# Patient Record
Sex: MALE | Race: WHITE | NOT HISPANIC OR LATINO | Employment: OTHER | URBAN - METROPOLITAN AREA
[De-identification: names, ages, dates, MRNs, and addresses within clinical notes are randomized per-mention and may not be internally consistent; named-entity substitution may affect disease eponyms.]

---

## 2020-04-02 ENCOUNTER — TELEPHONE (OUTPATIENT)
Dept: FAMILY MEDICINE CLINIC | Facility: CLINIC | Age: 65
End: 2020-04-02

## 2020-04-02 DIAGNOSIS — I10 ESSENTIAL HYPERTENSION: Primary | ICD-10-CM

## 2020-04-02 RX ORDER — AMLODIPINE BESYLATE 5 MG/1
5 TABLET ORAL DAILY
Qty: 90 TABLET | Refills: 0 | Status: SHIPPED | OUTPATIENT
Start: 2020-04-02 | End: 2020-08-03 | Stop reason: SDUPTHER

## 2020-07-10 LAB — HBA1C MFR BLD HPLC: 5.7 %

## 2020-07-21 ENCOUNTER — OFFICE VISIT (OUTPATIENT)
Dept: FAMILY MEDICINE CLINIC | Facility: CLINIC | Age: 65
End: 2020-07-21
Payer: COMMERCIAL

## 2020-07-21 VITALS
OXYGEN SATURATION: 98 % | HEIGHT: 74 IN | DIASTOLIC BLOOD PRESSURE: 100 MMHG | RESPIRATION RATE: 17 BRPM | BODY MASS INDEX: 32.65 KG/M2 | WEIGHT: 254.4 LBS | TEMPERATURE: 97.8 F | SYSTOLIC BLOOD PRESSURE: 154 MMHG | HEART RATE: 77 BPM

## 2020-07-21 DIAGNOSIS — Z76.89 ENCOUNTER TO ESTABLISH CARE WITH NEW DOCTOR: Primary | ICD-10-CM

## 2020-07-21 DIAGNOSIS — E78.00 HYPERCHOLESTEROLEMIA: ICD-10-CM

## 2020-07-21 DIAGNOSIS — G47.33 OBSTRUCTIVE SLEEP APNEA SYNDROME: ICD-10-CM

## 2020-07-21 DIAGNOSIS — Z11.4 SCREENING FOR HUMAN IMMUNODEFICIENCY VIRUS: ICD-10-CM

## 2020-07-21 DIAGNOSIS — I10 ESSENTIAL HYPERTENSION: ICD-10-CM

## 2020-07-21 DIAGNOSIS — E66.09 CLASS 1 OBESITY DUE TO EXCESS CALORIES WITH SERIOUS COMORBIDITY AND BODY MASS INDEX (BMI) OF 32.0 TO 32.9 IN ADULT: ICD-10-CM

## 2020-07-21 DIAGNOSIS — Z13.31 NEGATIVE DEPRESSION SCREENING: ICD-10-CM

## 2020-07-21 DIAGNOSIS — Z11.59 NEED FOR HEPATITIS C SCREENING TEST: ICD-10-CM

## 2020-07-21 DIAGNOSIS — Z12.5 SCREENING FOR PROSTATE CANCER: ICD-10-CM

## 2020-07-21 PROBLEM — E66.811 CLASS 1 OBESITY DUE TO EXCESS CALORIES WITH SERIOUS COMORBIDITY AND BODY MASS INDEX (BMI) OF 32.0 TO 32.9 IN ADULT: Status: ACTIVE | Noted: 2020-07-21

## 2020-07-21 PROCEDURE — 1036F TOBACCO NON-USER: CPT | Performed by: FAMILY MEDICINE

## 2020-07-21 PROCEDURE — 3080F DIAST BP >= 90 MM HG: CPT | Performed by: FAMILY MEDICINE

## 2020-07-21 PROCEDURE — 3008F BODY MASS INDEX DOCD: CPT | Performed by: FAMILY MEDICINE

## 2020-07-21 PROCEDURE — 3077F SYST BP >= 140 MM HG: CPT | Performed by: FAMILY MEDICINE

## 2020-07-21 PROCEDURE — 99204 OFFICE O/P NEW MOD 45 MIN: CPT | Performed by: FAMILY MEDICINE

## 2020-07-21 RX ORDER — VALSARTAN 160 MG/1
160 TABLET ORAL DAILY
COMMUNITY
End: 2021-02-03 | Stop reason: SDUPTHER

## 2020-07-21 RX ORDER — ATORVASTATIN CALCIUM 10 MG/1
5 TABLET, FILM COATED ORAL DAILY
COMMUNITY
End: 2021-02-12 | Stop reason: SDUPTHER

## 2020-07-21 RX ORDER — HYDROCHLOROTHIAZIDE 12.5 MG/1
12.5 CAPSULE, GELATIN COATED ORAL DAILY
COMMUNITY
End: 2021-02-03 | Stop reason: SDUPTHER

## 2020-07-21 RX ORDER — ASPIRIN 81 MG/1
81 TABLET, CHEWABLE ORAL DAILY
COMMUNITY
End: 2021-01-20 | Stop reason: HOSPADM

## 2020-07-21 NOTE — PROGRESS NOTES
Assessment/Plan:    No problem-specific Assessment & Plan notes found for this encounter  Diagnoses and all orders for this visit:    Encounter to establish care with new doctor    Essential hypertension  -     CBC and differential; Future  -     Comprehensive metabolic panel; Future  -     TSH, 3rd generation with Free T4 reflex; Future    Need for hepatitis C screening test  -     Hepatitis C antibody; Future    Screening for human immunodeficiency virus  -     HIV 1/2 Antigen/Antibody (4th Generation) w Reflex [facility]; Future    Screening for prostate cancer  -     PSA, Total Screen; Future    Class 1 obesity due to excess calories with serious comorbidity and body mass index (BMI) of 32 0 to 32 9 in adult    Negative depression screening    Hypercholesterolemia  -     Comprehensive metabolic panel; Future  -     Lipid panel; Future    Obstructive sleep apnea syndrome    Other orders  -     valsartan (DIOVAN) 160 mg tablet; Take 160 mg by mouth daily  -     hydrochlorothiazide (MICROZIDE) 12 5 mg capsule; Take 12 5 mg by mouth daily  -     atorvastatin (LIPITOR) 10 mg tablet; Take 10 mg by mouth daily  -     aspirin 81 mg chewable tablet; Chew 81 mg daily          PHQ-9 Depression Screening    PHQ-9:    Frequency of the following problems over the past two weeks:       Little interest or pleasure in doing things:  0 - not at all  Feeling down, depressed, or hopeless:  0 - not at all  PHQ-2 Score:  0        BMI Counseling: Body mass index is 32 66 kg/m²  The BMI is above normal  Nutrition recommendations include reducing portion sizes and 3-5 servings of fruits/vegetables daily  Exercise recommendations include moderate aerobic physical activity for 150 minutes/week and exercising 3-5 times per week  Subjective:      Patient ID: Tami Holland is a 60 y o  male      New pt here to establish with HTN, hypercholesterolemia, insomnia, mild sleep apnea, tonsils out, ORIF left thumb, retired , single, no children, non smoker, occ  EtoH, no illegal drugs    Hypertension   This is a chronic problem  Pertinent negatives include no chest pain, palpitations or shortness of breath  Past treatments include diuretics, angiotensin blockers and calcium channel blockers  Compliance problems include diet and exercise  The following portions of the patient's history were reviewed and updated as appropriate: allergies, current medications, past family history, past medical history, past social history, past surgical history and problem list     Review of Systems   Constitutional: Negative  Negative for chills, fatigue and fever  HENT: Negative  Eyes: Negative  Respiratory: Negative for shortness of breath and wheezing  Cardiovascular: Negative for chest pain and palpitations  Gastrointestinal: Negative for abdominal pain, blood in stool, constipation, diarrhea, nausea and vomiting  Endocrine: Negative  Genitourinary: Negative for difficulty urinating and dysuria  Musculoskeletal: Negative for arthralgias and myalgias  Skin: Negative  Allergic/Immunologic: Negative  Neurological: Negative for seizures and syncope  Hematological: Negative for adenopathy  Psychiatric/Behavioral: Negative  Objective:    /100   Pulse 77   Temp 97 8 °F (36 6 °C)   Resp 17   Ht 6' 2" (1 88 m)   Wt 115 kg (254 lb 6 4 oz)   SpO2 98%   BMI 32 66 kg/m²      Physical Exam   Constitutional: He is oriented to person, place, and time  He appears well-developed and well-nourished  No distress  HENT:   Head: Normocephalic and atraumatic  Right Ear: External ear normal    Left Ear: External ear normal    Nose: Nose normal    Mouth/Throat: Oropharynx is clear and moist    Eyes: Pupils are equal, round, and reactive to light  Conjunctivae and EOM are normal  No scleral icterus  Neck: Normal range of motion  Neck supple  Cardiovascular: Normal rate, regular rhythm and normal heart sounds   Exam reveals no gallop and no friction rub  No murmur heard  Pulmonary/Chest: Effort normal and breath sounds normal  No respiratory distress  He has no wheezes  He has no rales  Abdominal: Soft  Bowel sounds are normal  He exhibits no distension and no mass  There is no tenderness  There is no rebound and no guarding  Musculoskeletal: Normal range of motion  He exhibits no edema  Lymphadenopathy:     He has no cervical adenopathy  Neurological: He is alert and oriented to person, place, and time  He has normal reflexes  Skin: Skin is warm and dry  He is not diaphoretic  Psychiatric: He has a normal mood and affect   His behavior is normal  Judgment and thought content normal

## 2020-08-02 DIAGNOSIS — I10 ESSENTIAL HYPERTENSION: ICD-10-CM

## 2020-08-02 RX ORDER — AMLODIPINE BESYLATE 5 MG/1
TABLET ORAL
Qty: 90 TABLET | Refills: 0 | OUTPATIENT
Start: 2020-08-02

## 2020-08-02 NOTE — TELEPHONE ENCOUNTER
I do not know this patient and do not think he comes here  PCP in chart is listed Shayne Horne and pt is seeing him 7:30 a m  9/1?? Nothing in Epic to indicate he ever came here? ? (that I could find? ?)

## 2020-08-03 DIAGNOSIS — I10 ESSENTIAL HYPERTENSION: ICD-10-CM

## 2020-08-03 RX ORDER — AMLODIPINE BESYLATE 5 MG/1
5 TABLET ORAL DAILY
Qty: 90 TABLET | Refills: 3 | Status: SHIPPED | OUTPATIENT
Start: 2020-08-03 | End: 2020-09-01

## 2020-08-24 LAB
EXTERNAL HIV SCREEN: NORMAL
HCV AB SER-ACNC: NEGATIVE

## 2020-09-01 ENCOUNTER — OFFICE VISIT (OUTPATIENT)
Dept: FAMILY MEDICINE CLINIC | Facility: CLINIC | Age: 65
End: 2020-09-01
Payer: COMMERCIAL

## 2020-09-01 VITALS
BODY MASS INDEX: 32.98 KG/M2 | HEIGHT: 74 IN | WEIGHT: 257 LBS | OXYGEN SATURATION: 96 % | DIASTOLIC BLOOD PRESSURE: 92 MMHG | TEMPERATURE: 97.8 F | SYSTOLIC BLOOD PRESSURE: 142 MMHG | HEART RATE: 71 BPM

## 2020-09-01 DIAGNOSIS — I10 ESSENTIAL HYPERTENSION: Primary | ICD-10-CM

## 2020-09-01 DIAGNOSIS — Z12.11 SCREENING FOR COLON CANCER: ICD-10-CM

## 2020-09-01 PROCEDURE — 99213 OFFICE O/P EST LOW 20 MIN: CPT | Performed by: FAMILY MEDICINE

## 2020-09-01 RX ORDER — AMLODIPINE BESYLATE 10 MG/1
10 TABLET ORAL DAILY
Qty: 30 TABLET | Refills: 6 | Status: SHIPPED | OUTPATIENT
Start: 2020-09-01 | End: 2020-10-01 | Stop reason: SDUPTHER

## 2020-09-01 NOTE — PROGRESS NOTES
Assessment/Plan:    No problem-specific Assessment & Plan notes found for this encounter  Diagnoses and all orders for this visit:    Essential hypertension  -     amLODIPine (NORVASC) 10 mg tablet; Take 1 tablet (10 mg total) by mouth daily    Screening for colon cancer  -     Ambulatory referral to Gastroenterology; Future          PHQ-9 Depression Screening    PHQ-9:    Frequency of the following problems over the past two weeks:                 Subjective:      Patient ID: Inez Sanchez is a 60 y o  male  Hypertension   This is a chronic problem  The current episode started more than 1 year ago  The problem is unchanged  The problem is uncontrolled  Pertinent negatives include no chest pain, palpitations or shortness of breath  There are no associated agents to hypertension  Risk factors for coronary artery disease include obesity, male gender and sedentary lifestyle  Past treatments include calcium channel blockers, angiotensin blockers and diuretics  Compliance problems include diet and exercise  The following portions of the patient's history were reviewed and updated as appropriate: allergies, current medications, past family history, past medical history, past social history, past surgical history and problem list     Review of Systems   Constitutional: Negative  Negative for chills, fatigue and fever  HENT: Negative  Eyes: Negative  Respiratory: Negative for shortness of breath and wheezing  Cardiovascular: Negative for chest pain and palpitations  Gastrointestinal: Negative for abdominal pain, blood in stool, constipation, diarrhea, nausea and vomiting  Endocrine: Negative  Genitourinary: Negative for difficulty urinating and dysuria  Musculoskeletal: Negative for arthralgias and myalgias  Skin: Negative  Allergic/Immunologic: Negative  Neurological: Negative for seizures and syncope  Hematological: Negative for adenopathy  Psychiatric/Behavioral: Negative  Objective:    /92 (BP Location: Left arm, Patient Position: Sitting, Cuff Size: Large)   Pulse 71   Temp 97 8 °F (36 6 °C) (Tympanic)   Ht 6' 2" (1 88 m)   Wt 117 kg (257 lb)   SpO2 96%   BMI 33 00 kg/m²      Physical Exam  Vitals signs and nursing note reviewed  Constitutional:       General: He is not in acute distress  Appearance: Normal appearance  He is well-developed  He is obese  He is not ill-appearing, toxic-appearing or diaphoretic  HENT:      Head: Normocephalic and atraumatic  Right Ear: Tympanic membrane, ear canal and external ear normal  There is no impacted cerumen  Left Ear: Tympanic membrane, ear canal and external ear normal  There is no impacted cerumen  Nose: Nose normal  No congestion or rhinorrhea  Mouth/Throat:      Mouth: Mucous membranes are moist       Pharynx: No oropharyngeal exudate or posterior oropharyngeal erythema  Eyes:      General: No scleral icterus  Conjunctiva/sclera: Conjunctivae normal       Pupils: Pupils are equal, round, and reactive to light  Neck:      Musculoskeletal: Normal range of motion and neck supple  No neck rigidity  Cardiovascular:      Rate and Rhythm: Normal rate and regular rhythm  Heart sounds: Normal heart sounds  No murmur  No friction rub  No gallop  Pulmonary:      Effort: Pulmonary effort is normal  No respiratory distress  Breath sounds: Normal breath sounds  No wheezing or rales  Abdominal:      General: Bowel sounds are normal  There is no distension  Palpations: Abdomen is soft  There is no mass  Tenderness: There is no abdominal tenderness  There is no guarding or rebound  Musculoskeletal: Normal range of motion  Right lower leg: No edema  Left lower leg: No edema  Lymphadenopathy:      Cervical: No cervical adenopathy  Skin:     General: Skin is warm and dry  Neurological:      General: No focal deficit present        Mental Status: He is alert and oriented to person, place, and time  Sensory: No sensory deficit  Motor: No weakness  Coordination: Coordination normal       Gait: Gait normal       Deep Tendon Reflexes: Reflexes are normal and symmetric  Psychiatric:         Mood and Affect: Mood normal          Behavior: Behavior normal          Thought Content:  Thought content normal          Judgment: Judgment normal

## 2020-09-24 ENCOUNTER — OFFICE VISIT (OUTPATIENT)
Dept: SLEEP CENTER | Facility: CLINIC | Age: 65
End: 2020-09-24
Payer: COMMERCIAL

## 2020-09-24 ENCOUNTER — TELEPHONE (OUTPATIENT)
Dept: SLEEP CENTER | Facility: CLINIC | Age: 65
End: 2020-09-24

## 2020-09-24 VITALS
WEIGHT: 257 LBS | HEART RATE: 80 BPM | DIASTOLIC BLOOD PRESSURE: 80 MMHG | TEMPERATURE: 97.1 F | BODY MASS INDEX: 32.98 KG/M2 | HEIGHT: 74 IN | OXYGEN SATURATION: 97 % | SYSTOLIC BLOOD PRESSURE: 130 MMHG

## 2020-09-24 DIAGNOSIS — G47.33 OBSTRUCTIVE SLEEP APNEA SYNDROME: Primary | ICD-10-CM

## 2020-09-24 DIAGNOSIS — R41.3 MEMORY DIFFICULTY: ICD-10-CM

## 2020-09-24 DIAGNOSIS — F45.8 BRUXISM: ICD-10-CM

## 2020-09-24 DIAGNOSIS — G47.9 SLEEP DISTURBANCE: ICD-10-CM

## 2020-09-24 DIAGNOSIS — R40.0 DAYTIME SLEEPINESS: ICD-10-CM

## 2020-09-24 DIAGNOSIS — Z20.822 ENCOUNTER FOR LABORATORY TESTING FOR COVID-19 VIRUS: Primary | ICD-10-CM

## 2020-09-24 DIAGNOSIS — I10 ESSENTIAL HYPERTENSION: ICD-10-CM

## 2020-09-24 DIAGNOSIS — E66.9 OBESITY (BMI 30-39.9): ICD-10-CM

## 2020-09-24 PROCEDURE — 99244 OFF/OP CNSLTJ NEW/EST MOD 40: CPT | Performed by: INTERNAL MEDICINE

## 2020-09-24 NOTE — PROGRESS NOTES
Review of Systems      Genitourinary need to urinate more than twice a night   Cardiology ankle/leg swelling   Gastrointestinal none   Neurology forgetfulness and difficulty with memory   Constitutional fatigue   Integumentary itching   Psychiatry none   Musculoskeletal none   Pulmonary snoring   ENT throat clearing and ringing in ears   Endocrine frequent urination   Hematological none

## 2020-09-24 NOTE — PROGRESS NOTES
Consultation - [facility] [facility]  60 y o  male  GSH:[birth year]  ASR:[id number]    Physician Requesting Consult: Leandra Marte DO     Reason for Consult : At your kind request, I saw this patient for initial sleep evaluation today  Home sleep testing was undertaken to evaluate for sleep disordered breathing around 3 years ago and patient is here because he is experiencing difficulty with sleep continuity and memory  He is interested in repeating his studies to get to the bottom of his symptoms  He states he is unable to obtain results of the study however he has apnea was characterized as mild and CPAP was recommended  [facility], Problem List, Medications & Allergies were reviewed in EMR  He  has a past medical history of Hypertension  He has a current medication list which includes the following prescription(s): amlodipine, aspirin, atorvastatin, hydrochlorothiazide, and valsartan  HPI:  So study was undertaken for complaints of sleep difficulties and daily headaches  The latter resolved since he has initiated CPAP  He is using CPAP regularly, benefitting from use and experiencing no adverse effects  He sleeps alone and is not aware of snoring or breathing difficulties during sleep  Other Complaints: none  Restless Leg Syndrome: reports no suggestive symptoms    Parasomnia: reports teeth grinding during sleep, but no other features of parasomnia   Sleep Routine:   Typical Bedtime:  11:30 p m  Gets OOB:  7:00 a m  TIB:7 5 hrs  Sleep latency:< 15 minutes Sleep Interruptions:3/nite is unsure of the cause and struggles to fall back asleep  Awakens: spontaneously  Upon awakening: is not always refreshed  He estimates getting 6 hrs sleep  He has  Daytime Sleepiness and naps occasionally  [place] Sleepiness Scale rated at Total score: 7 /24  Habits:  reports that he has quit smoking  His smoking use included cigarettes   He has never used smokeless tobacco  ;   E-Cigarette/Vaping  E-Cigarette Use Never User     ;  reports no history of drug use ;  reports current alcohol use  ; Caffeine use:limited ; Exercise routine: none   Family History: Negative for sleep disturbance  ROS - constitutional, psychiatric, ENT, respiratory,CVS, GI, UGS, CNS, MSK, integumentary, endocrine, hematological reviewed- see attached  Significant for weight fluctuates in the range of around 5 lb  He has been experiencing difficulty with memory  EXAM:  /80 (BP Location: Left arm, Cuff Size: Large)   Pulse 80   Temp (!) 97 1 °F (36 2 °C) (Tympanic)   Ht 6' 2" (1 88 m)   Wt 117 kg (257 lb)   SpO2 97%   BMI 33 00 kg/m²    General: Well groomed male, well appearing, in no apparent distress  Psychiatric: Alert and orientated; Cooperative; Speech:clear and coherent; Normal mood, affect & thought   HEENT:  Craniofacial anatomy: obvious overjet Sinuses: non- tender  TMJ: Normal    Eyes: EOM's intact;  conjunctiva/corneas clear   Ears: Externallyappear normal     Nasal Airway: is patent Septum:intact; Mucosa: normal; Turbinates: normal; Rhinorrhea: None   Mouth: Lips: normal posture; Dentition: worn down and irregular  Mucosa:moist  ; Hard Palate:narrow and high arched   Oropharryx: crowded and AP narrowing Tongue: Mallampati:Class IV and MobileSoft Palate:  redundant  Tonsils: no hypertrophy  Neck:is thick; Neck Circumference: 17 "; Supple; no abnormal masses; Thyroid:normal  Trachea:central     Lymph: No Cervical or Submandibular Lymhadenopathy  Heart: S1,S2 normal; RRR; no gallop; nomurmurs   Lungs: Respiratory Effort:normal  Air entry good bilaterally  No wheezes  No rales  Abdomen: Obese, Soft & non-tender    Extremities: No pedal edema  No clubbing or cyanosis  Skin: warm and dry; Color& Hydration good; no facial rashes or lesions   Neurological: CNII-XII intact;  Motor normal; Sensation normal  Musculoskeletal: Muscle bulk, tone and power WNL Gait:normal        IMPRESSION: Primary, Secondary Sleep Diagnoses (to Medical or Psych conditions) & Comorbidities   1  Obstructive sleep apnea syndrome  Ambulatory referral to Sleep Medicine    Split Study   2  Sleep disturbance  Split Study   3  Daytime sleepiness  Split Study   4  Memory difficulty     5  Bruxism     6  Essential hypertension     7  Obesity (BMI 30-39  9)        PLAN:   1  Comprehensive counseling was provided on pathophysiology, diagnostic strategies & treatment options; effects on symptoms and comorbidities; risks of inadequate therapy; costs and insurance aspects  2  I advised on weight reduction, avoiding sleeping supine, using alcohol or sedating medications close to bed time and on safe driving practices  3  Repeat Nocturnal polysomnography is indicated and since his willing to continue CPAP, a split study will be scheduled  He was instructed to discontinue use of CPAP for 5 days prior to the study  4  Cognitive behavioral therapy was initiated, Sleep Hygiene and behavioral techniques to manage Insomnia were discussed  Specifically, starting an exercise routine, limiting time in bed to 7 hours and on relaxation techniques  5  Follow-up will be scheduled after the studies to review results, further details of treatment options and to adjust therapy  Thank you for allowing me to participate in the care of this patient  I will keep you apprised of developments      Sincerely,     Authenticated electronically by George Robbins MD   on 38/97/86   Board Certified Specialist

## 2020-09-24 NOTE — PATIENT INSTRUCTIONS
What is JESSICA? Obstructive sleep apnea is a common and serious sleep disorder that causes you to stop breathing during sleep  The airway repeatedly becomes blocked, limiting the amount of air that reaches your lungs  When this happens, you may snore loudly or making choking noises as you try to breathe  Your brain and body becomes oxygen deprived and you may wake up  This may happen a few times a night, or in more severe cases, several hundred times a night  Sleep apnea can make you wake up in the morning feeling tired or unrefreshed even though you have had a full night of sleep  During the day, you may feel fatigued, have difficulty concentrating or you may even unintentionally fall asleep  This is because your body is waking up numerous times throughout the night, even though you might not be conscious of each awakening  The lack of oxygen your body receives can have negative long-term consequences for your health  This includes:  High blood pressure  Heart disease  Irregular heart rhythms  Stroke  Pre-diabetes and diabetes  Depression    Testing  An objective evaluation of your sleep may be needed before your board certified sleep physician can make a diagnosis  Options include:   In-lab overnight sleep study  This type of sleep study requires you to stay overnight at a sleep center, in a bed that may resemble a hotel room  You will sleep with sensors hooked up to various parts of your body  These sensors record your brain waves, heartbeat, breathing and movement  An overnight sleep study also provides your doctor with the most complete information about your sleep  Learn more about an overnight sleep study      Home sleep apnea test  Some patients with high risk factors for obstructive sleep apnea and no other medical disorders may be candidates for a home sleep apnea test  The testing equipment differs in that it is less complicated than what is used in an overnight sleep study   As such, does not give all the data an in-lab will and if negative, may not mean you do not have the problem  Treatment for sleep apnea  includes using a continuous positive airway pressure (CPAP) machine to keep your airway open during sleep  A mask is placed over your nose and mouth, or just your nose  The mask is hooked to the CPAP machine that blows a gentle stream of air into the mask when you breathe  This helps keep your airway open so you can breathe more regularly  Extra oxygen may be given to you through the machine  You may be given a mouth device  It looks like a mouth guard or dental retainer and stops your tongue and mouth tissues from blocking your throat while you sleep  Surgery may be needed to remove extra tissues that block your mouth, throat, or nose  Manage sleep apnea:   Do not smoke  Nicotine and other chemicals in cigarettes and cigars can cause lung damage  Ask your healthcare provider for information if you currently smoke and need help to quit  E-cigarettes or smokeless tobacco still contain nicotine  Talk to your healthcare provider before you use these products  Do not drink alcohol or take sedative medicine before you go to sleep  Alcohol and sedatives can relax the muscles and tissues around your throat  This can block the airflow to your lungs  Maintain a healthy weight  Excess tissue around your throat may restrict your breathing  Ask your healthcare provider for information if you need to lose weight  Sleep on your side or use pillows designed to prevent sleep apnea  This prevents your tongue or other tissues from blocking your throat  You can also raise the head of your bed  Driving Safety  Refrain from driving when drowsy  Follow up with your healthcare provider as directed:  Write down your questions so you remember to ask them during your visits  Go to [employer] website for more information: Sleepeducation  org     What is JESSICA?    Obstructive sleep apnea is a common and serious sleep disorder that causes you to stop breathing during sleep  The airway repeatedly becomes blocked, limiting the amount of air that reaches your lungs  When this happens, you may snore loudly or making choking noises as you try to breathe  Your brain and body becomes oxygen deprived and you may wake up  This may happen a few times a night, or in more severe cases, several hundred times a night  Sleep apnea can make you wake up in the morning feeling tired or unrefreshed even though you have had a full night of sleep  During the day, you may feel fatigued, have difficulty concentrating or you may even unintentionally fall asleep  This is because your body is waking up numerous times throughout the night, even though you might not be conscious of each awakening  The lack of oxygen your body receives can have negative long-term consequences for your health  This includes:  High blood pressure  Heart disease  Irregular heart rhythms  Stroke  Pre-diabetes and diabetes  Depression    Testing  An objective evaluation of your sleep may be needed before your board certified sleep physician can make a diagnosis  Options include:   In-lab overnight sleep study  This type of sleep study requires you to stay overnight at a sleep center, in a bed that may resemble a hotel room  You will sleep with sensors hooked up to various parts of your body  These sensors record your brain waves, heartbeat, breathing and movement  An overnight sleep study also provides your doctor with the most complete information about your sleep  Learn more about an overnight sleep study      Home sleep apnea test  Some patients with high risk factors for obstructive sleep apnea and no other medical disorders may be candidates for a home sleep apnea test  The testing equipment differs in that it is less complicated than what is used in an overnight sleep study   As such, does not give all the data an in-lab will and if negative, may not mean you do not have the problem  Treatment for sleep apnea  includes using a continuous positive airway pressure (CPAP) machine to keep your airway open during sleep  A mask is placed over your nose and mouth, or just your nose  The mask is hooked to the CPAP machine that blows a gentle stream of air into the mask when you breathe  This helps keep your airway open so you can breathe more regularly  Extra oxygen may be given to you through the machine  You may be given a mouth device  It looks like a mouth guard or dental retainer and stops your tongue and mouth tissues from blocking your throat while you sleep  Surgery may be needed to remove extra tissues that block your mouth, throat, or nose  Manage sleep apnea:   Do not smoke  Nicotine and other chemicals in cigarettes and cigars can cause lung damage  Ask your healthcare provider for information if you currently smoke and need help to quit  E-cigarettes or smokeless tobacco still contain nicotine  Talk to your healthcare provider before you use these products  Do not drink alcohol or take sedative medicine before you go to sleep  Alcohol and sedatives can relax the muscles and tissues around your throat  This can block the airflow to your lungs  Maintain a healthy weight  Excess tissue around your throat may restrict your breathing  Ask your healthcare provider for information if you need to lose weight  Sleep on your side or use pillows designed to prevent sleep apnea  This prevents your tongue or other tissues from blocking your throat  You can also raise the head of your bed  Driving Safety  Refrain from driving when drowsy  Follow up with your healthcare provider as directed:  Write down your questions so you remember to ask them during your visits  Go to AAS website for more information: Sleepeducation  org     Nursing Support:  When: Monday through Friday 7A-5PM except holidays  Where: Our direct line is [phone]      If you are having a true emergency please call 911  In the event that the line is busy or it is after hours please leave a voice message and we will return your call  Please speak clearly, leaving your full name, birth date, best number to reach you and the reason for your call  Medication refills: We will need the name of the medication, the dosage, the ordering provider, whether you get a 30 or 90 day refill, and the pharmacy name and address  Medications will be ordered by the provider only  Nurses cannot call in prescriptions  Please allow 7 days for medication refills  Physician requested updates: If your provider requested that you call with an update after starting medication, please be ready to provide us the medication and dosage, what time you take your medication, the time you attempt to fall asleep, time you fall asleep, when you wake up, and what time you get out of bed  Sleep Study Results: We will contact you with sleep study results and/or next steps after the physician has reviewed your testing

## 2020-09-29 ENCOUNTER — OFFICE VISIT (OUTPATIENT)
Dept: URGENT CARE | Facility: CLINIC | Age: 65
End: 2020-09-29
Payer: COMMERCIAL

## 2020-09-29 VITALS
HEART RATE: 84 BPM | OXYGEN SATURATION: 97 % | DIASTOLIC BLOOD PRESSURE: 77 MMHG | WEIGHT: 257 LBS | SYSTOLIC BLOOD PRESSURE: 136 MMHG | RESPIRATION RATE: 18 BRPM | TEMPERATURE: 99.1 F | HEIGHT: 74 IN | BODY MASS INDEX: 32.98 KG/M2

## 2020-09-29 DIAGNOSIS — R30.0 DYSURIA: ICD-10-CM

## 2020-09-29 DIAGNOSIS — R31.9 URINARY TRACT INFECTION WITH HEMATURIA, SITE UNSPECIFIED: Primary | ICD-10-CM

## 2020-09-29 DIAGNOSIS — N39.0 URINARY TRACT INFECTION WITH HEMATURIA, SITE UNSPECIFIED: Primary | ICD-10-CM

## 2020-09-29 LAB
SL AMB  POCT GLUCOSE, UA: ABNORMAL
SL AMB LEUKOCYTE ESTERASE,UA: ABNORMAL
SL AMB POCT BILIRUBIN,UA: ABNORMAL
SL AMB POCT BLOOD,UA: ABNORMAL
SL AMB POCT CLARITY,UA: ABNORMAL
SL AMB POCT COLOR,UA: ABNORMAL
SL AMB POCT KETONES,UA: ABNORMAL
SL AMB POCT NITRITE,UA: ABNORMAL
SL AMB POCT PH,UA: 5
SL AMB POCT SPECIFIC GRAVITY,UA: 1.01
SL AMB POCT URINE PROTEIN: 30
SL AMB POCT UROBILINOGEN: 0.2

## 2020-09-29 PROCEDURE — 87077 CULTURE AEROBIC IDENTIFY: CPT | Performed by: PHYSICIAN ASSISTANT

## 2020-09-29 PROCEDURE — 87186 SC STD MICRODIL/AGAR DIL: CPT | Performed by: PHYSICIAN ASSISTANT

## 2020-09-29 PROCEDURE — 99283 EMERGENCY DEPT VISIT LOW MDM: CPT | Performed by: PHYSICIAN ASSISTANT

## 2020-09-29 PROCEDURE — G0382 LEV 3 HOSP TYPE B ED VISIT: HCPCS | Performed by: PHYSICIAN ASSISTANT

## 2020-09-29 PROCEDURE — 87086 URINE CULTURE/COLONY COUNT: CPT | Performed by: PHYSICIAN ASSISTANT

## 2020-09-29 RX ORDER — CIPROFLOXACIN 500 MG/1
500 TABLET, FILM COATED ORAL EVERY 12 HOURS SCHEDULED
Qty: 14 TABLET | Refills: 0 | Status: SHIPPED | OUTPATIENT
Start: 2020-09-29 | End: 2020-10-06

## 2020-09-29 NOTE — PROGRESS NOTES
[address] [facility]        NAME: Millie Veliz is a 60 y o  male  : 1955    MRN: [id number]  DATE: 2020  TIME: 12:29 PM    Assessment and Plan   Urinary tract infection with hematuria, site unspecified [N39 0, R31 9]  1  Urinary tract infection with hematuria, site unspecified  ciprofloxacin (CIPRO) 500 mg tablet   2  Dysuria  POCT urine dip auto non-scope    Urine culture         Patient Instructions     Take Ciprofloxacin as prescribed  Drink plenty of water   Follow up with PCP in 3-5 days  Proceed to  ER if symptoms worsen  Chief Complaint     Chief Complaint   Patient presents with    Possible UTI     x 2 days    Fever         History of Present Illness       Urinary Tract Infection    This is a new problem  Episode onset: 2d  The problem occurs every urination  The quality of the pain is described as burning  The pain is mild  The maximum temperature recorded prior to his arrival was 100 - 100 9 F  There is no history of pyelonephritis  Associated symptoms include frequency, hematuria, nausea and urgency  Pertinent negatives include no chills or vomiting  He has tried nothing for the symptoms  There is no history of catheterization, kidney stones, recurrent UTIs or a urological procedure  Review of Systems   Review of Systems   Constitutional: Positive for fever  Negative for activity change, appetite change and chills  Gastrointestinal: Positive for nausea  Negative for abdominal pain and vomiting  Genitourinary: Positive for dysuria, frequency, hematuria and urgency  Negative for discharge, penile pain, penile swelling, scrotal swelling and testicular pain  Neurological: Negative for light-headedness           Current Medications       Current Outpatient Medications:     amLODIPine (NORVASC) 10 mg tablet, Take 1 tablet (10 mg total) by mouth daily, Disp: 30 tablet, Rfl: 6    aspirin 81 mg chewable tablet, Chew 81 mg daily, Disp: , Rfl:     atorvastatin (LIPITOR) 10 mg tablet, Take 10 mg by mouth daily, Disp: , Rfl:     hydrochlorothiazide (MICROZIDE) 12 5 mg capsule, Take 12 5 mg by mouth daily, Disp: , Rfl:     valsartan (DIOVAN) 160 mg tablet, Take 160 mg by mouth daily, Disp: , Rfl:     ciprofloxacin (CIPRO) 500 mg tablet, Take 1 tablet (500 mg total) by mouth every 12 (twelve) hours for 7 days, Disp: 14 tablet, Rfl: 0    Current Allergies     Allergies as of 09/29/2020    (No Known Allergies)            The following portions of the patient's history were reviewed and updated as appropriate: allergies, current medications, past family history, past medical history, past social history, past surgical history and problem list      Past Medical History:   Diagnosis Date    Hypertension        Past Surgical History:   Procedure Laterality Date    TONSILLECTOMY         Family History   Problem Relation Age of Onset    Heart disease Mother     Heart attack Father     Leukemia Brother          Medications have been verified  Objective   /77   Pulse 84   Temp 99 1 °F (37 3 °C)   Resp 18   Ht 6' 2" (1 88 m)   Wt 117 kg (257 lb)   SpO2 97%   BMI 33 00 kg/m²        Physical Exam     Physical Exam  Vitals signs reviewed  Constitutional:       General: He is not in acute distress  Appearance: He is well-developed  He is not diaphoretic  Cardiovascular:      Rate and Rhythm: Normal rate and regular rhythm  Heart sounds: Normal heart sounds  No murmur  No friction rub  No gallop  Pulmonary:      Effort: Pulmonary effort is normal  No respiratory distress  Breath sounds: Normal breath sounds  No wheezing or rales  Chest:      Chest wall: No tenderness  Abdominal:      Palpations: Abdomen is soft  Tenderness: There is no abdominal tenderness  Comments: Negative CVA tenderness  Skin:     General: Skin is warm  Neurological:      Mental Status: He is alert     Psychiatric:         Behavior: Behavior normal          Thought Content:  Thought content normal          Judgment: Judgment normal

## 2020-09-29 NOTE — PATIENT INSTRUCTIONS
Take Ciprofloxacin as prescribed  Drink plenty of water   Follow up with PCP in 3-5 days  Proceed to  ER if symptoms worsen  Urinary Tract Infection in Men   WHAT YOU NEED TO KNOW:   A urinary tract infection (UTI) is caused by bacteria that get inside your urinary tract  Most bacteria that enter your urinary tract come out when you urinate  If the bacteria stay in your urinary tract, you may get an infection  Your urinary tract includes your kidneys, ureters, bladder, and urethra  Urine is made in your kidneys, and it flows from the ureters to the bladder  Urine leaves the bladder through the urethra  A UTI is more common in your lower urinary tract, which includes your bladder and urethra  DISCHARGE INSTRUCTIONS:   Return to the emergency department if:   · You are urinating very little or not at all  · You have a high fever with shaking chills  · You have side or back pain that gets worse  Contact your healthcare provider if:   · You have a mild fever  · You do not feel better after 2 days of taking antibiotics  · You are vomiting  · You have new symptoms, such as blood or pus in your urine  · You have questions or concerns about your condition or care  Medicines:   · Antibiotics  help fight a bacterial infection  · Medicines  may be given to decrease pain and burning when you urinate  They will also help decrease the feeling that you need to urinate often  These medicines will make your urine orange or red  · Take your medicine as directed  Contact your healthcare provider if you think your medicine is not helping or if you have side effects  Tell him of her if you are allergic to any medicine  Keep a list of the medicines, vitamins, and herbs you take  Include the amounts, and when and why you take them  Bring the list or the pill bottles to follow-up visits  Carry your medicine list with you in case of an emergency  Prevent another UTI:   · Empty your bladder often  Urinate and empty your bladder as soon as you feel the need  Do not hold your urine for long periods of time  · Drink liquids as directed  Ask how much liquid to drink each day and which liquids are best for you  You may need to drink more liquids than usual to help flush out the bacteria  Do not drink alcohol, caffeine, or citrus juices  These can irritate your bladder and increase your symptoms  Your healthcare provider may recommend cranberry juice to help prevent a UTI  · Urinate after you have sex  This can help flush out bacteria passed during sex  · Do pelvic muscle exercises often  Pelvic muscle exercises may help you start and stop urinating  Strong pelvic muscles may help you empty your bladder easier  Squeeze these muscles tightly for 5 seconds like you are trying to hold back urine  Then relax for 5 seconds  Gradually work up to squeezing for 10 seconds  Do 3 sets of 15 repetitions a day, or as directed  Follow up with your healthcare provider as directed:  Write down your questions so you remember to ask them during your visits  © 2017 [facility] Information is for End User's use only and may not be sold, redistributed or otherwise used for commercial purposes  All illustrations and images included in CareNotes® are the copyrighted property of A D A M [employer]  or [employer]  The above information is an  only  It is not intended as medical advice for individual conditions or treatments  Talk to your doctor, nurse or pharmacist before following any medical regimen to see if it is safe and effective for you

## 2020-10-01 DIAGNOSIS — I10 ESSENTIAL HYPERTENSION: ICD-10-CM

## 2020-10-01 LAB — BACTERIA UR CULT: ABNORMAL

## 2020-10-01 RX ORDER — AMLODIPINE BESYLATE 10 MG/1
10 TABLET ORAL DAILY
Qty: 30 TABLET | Refills: 6 | Status: SHIPPED | OUTPATIENT
Start: 2020-10-01 | End: 2020-11-05 | Stop reason: SDUPTHER

## 2020-10-30 DIAGNOSIS — Z20.822 ENCOUNTER FOR LABORATORY TESTING FOR COVID-19 VIRUS: ICD-10-CM

## 2020-10-30 PROCEDURE — U0003 INFECTIOUS AGENT DETECTION BY NUCLEIC ACID (DNA OR RNA); SEVERE ACUTE RESPIRATORY SYNDROME CORONAVIRUS 2 (SARS-COV-2) (CORONAVIRUS DISEASE [COVID-19]), AMPLIFIED PROBE TECHNIQUE, MAKING USE OF HIGH THROUGHPUT TECHNOLOGIES AS DESCRIBED BY CMS-2020-01-R: HCPCS | Performed by: INTERNAL MEDICINE

## 2020-10-31 LAB — SARS-COV-2 RNA SPEC QL NAA+PROBE: NOT DETECTED

## 2020-11-02 NOTE — TELEPHONE ENCOUNTER
Left message that COVID test is negative and to continue to social distance and wear mask  Offered 11/4 opening 
Order placed for COVID test to be completed on 10/30/20 prior to PAP study on 11/11/20 
Patient informed that COVID testing is to be performed 10 days prior to PAP study  Patient is to tell site that it is needed for a procedure so it is expedited  Testing site information provided  Date is 11/11/2020 and covid papers were given 
Negative

## 2020-11-05 ENCOUNTER — OFFICE VISIT (OUTPATIENT)
Dept: FAMILY MEDICINE CLINIC | Facility: CLINIC | Age: 65
End: 2020-11-05
Payer: COMMERCIAL

## 2020-11-05 VITALS
WEIGHT: 262 LBS | BODY MASS INDEX: 33.62 KG/M2 | DIASTOLIC BLOOD PRESSURE: 88 MMHG | HEART RATE: 72 BPM | SYSTOLIC BLOOD PRESSURE: 138 MMHG | TEMPERATURE: 97.2 F | HEIGHT: 74 IN

## 2020-11-05 DIAGNOSIS — I10 ESSENTIAL HYPERTENSION: Primary | ICD-10-CM

## 2020-11-05 DIAGNOSIS — Z12.11 SCREENING FOR COLON CANCER: ICD-10-CM

## 2020-11-05 DIAGNOSIS — Z12.5 SCREENING FOR PROSTATE CANCER: ICD-10-CM

## 2020-11-05 DIAGNOSIS — E78.00 HYPERCHOLESTEROLEMIA: ICD-10-CM

## 2020-11-05 DIAGNOSIS — Z23 ENCOUNTER FOR IMMUNIZATION: ICD-10-CM

## 2020-11-05 PROCEDURE — 99213 OFFICE O/P EST LOW 20 MIN: CPT | Performed by: FAMILY MEDICINE

## 2020-11-05 RX ORDER — AMLODIPINE BESYLATE 10 MG/1
10 TABLET ORAL DAILY
Qty: 90 TABLET | Refills: 3 | Status: SHIPPED | OUTPATIENT
Start: 2020-11-05 | End: 2021-11-15

## 2020-11-11 ENCOUNTER — HOSPITAL ENCOUNTER (OUTPATIENT)
Dept: SLEEP CENTER | Facility: HOSPITAL | Age: 65
Discharge: HOME | End: 2020-11-11
Attending: INTERNAL MEDICINE
Payer: COMMERCIAL

## 2020-11-11 DIAGNOSIS — G47.9 SLEEP DISTURBANCE: ICD-10-CM

## 2020-11-11 DIAGNOSIS — R40.0 DAYTIME SLEEPINESS: ICD-10-CM

## 2020-11-11 DIAGNOSIS — G47.33 OBSTRUCTIVE SLEEP APNEA SYNDROME: ICD-10-CM

## 2020-11-11 PROCEDURE — 95811 POLYSOM 6/>YRS CPAP 4/> PARM: CPT

## 2020-11-12 DIAGNOSIS — G47.33 OBSTRUCTIVE SLEEP APNEA SYNDROME: Primary | ICD-10-CM

## 2020-11-13 ENCOUNTER — TELEPHONE (OUTPATIENT)
Dept: SLEEP CENTER | Facility: CLINIC | Age: 65
End: 2020-11-13

## 2020-12-07 ENCOUNTER — TELEPHONE (OUTPATIENT)
Dept: FAMILY MEDICINE CLINIC | Facility: CLINIC | Age: 65
End: 2020-12-07

## 2020-12-07 ENCOUNTER — TRANSCRIBE ORDERS (OUTPATIENT)
Dept: FAMILY MEDICINE CLINIC | Facility: CLINIC | Age: 65
End: 2020-12-07

## 2020-12-07 DIAGNOSIS — R35.0 FREQUENT URINATION: Primary | ICD-10-CM

## 2021-01-01 ENCOUNTER — APPOINTMENT (EMERGENCY)
Dept: CT IMAGING | Facility: HOSPITAL | Age: 66
End: 2021-01-01
Payer: COMMERCIAL

## 2021-01-01 ENCOUNTER — HOSPITAL ENCOUNTER (EMERGENCY)
Facility: HOSPITAL | Age: 66
Discharge: OTHER INSTITUTION | End: 2021-01-01
Attending: EMERGENCY MEDICINE
Payer: COMMERCIAL

## 2021-01-01 ENCOUNTER — APPOINTMENT (INPATIENT)
Dept: RADIOLOGY | Facility: HOSPITAL | Age: 66
DRG: 055 | End: 2021-01-01
Payer: COMMERCIAL

## 2021-01-01 ENCOUNTER — HOSPITAL ENCOUNTER (INPATIENT)
Facility: HOSPITAL | Age: 66
LOS: 5 days | Discharge: HOME | DRG: 055 | End: 2021-01-06
Attending: EMERGENCY MEDICINE | Admitting: INTERNAL MEDICINE
Payer: COMMERCIAL

## 2021-01-01 VITALS
SYSTOLIC BLOOD PRESSURE: 139 MMHG | TEMPERATURE: 98.5 F | HEART RATE: 73 BPM | RESPIRATION RATE: 36 BRPM | DIASTOLIC BLOOD PRESSURE: 82 MMHG | OXYGEN SATURATION: 95 %

## 2021-01-01 DIAGNOSIS — G93.89: ICD-10-CM

## 2021-01-01 DIAGNOSIS — I63.9 CVA (CEREBRAL VASCULAR ACCIDENT) (HCC): Primary | ICD-10-CM

## 2021-01-01 DIAGNOSIS — R47.01 MIXED APHASIA: ICD-10-CM

## 2021-01-01 DIAGNOSIS — I63.9 ACUTE CEREBROVASCULAR ACCIDENT (CVA) (HCC): Primary | ICD-10-CM

## 2021-01-01 LAB
ANION GAP SERPL CALCULATED.3IONS-SCNC: 8 MMOL/L (ref 4–13)
APTT PPP: 27 SECONDS (ref 23–37)
BUN SERPL-MCNC: 14 MG/DL (ref 7–25)
CALCIUM SERPL-MCNC: 9.3 MG/DL (ref 8.6–10.5)
CHLORIDE SERPL-SCNC: 102 MMOL/L (ref 98–107)
CO2 SERPL-SCNC: 27 MMOL/L (ref 21–31)
CREAT SERPL-MCNC: 0.68 MG/DL (ref 0.7–1.3)
ERYTHROCYTE [DISTWIDTH] IN BLOOD BY AUTOMATED COUNT: 15.1 % (ref 11.5–14.5)
FLUAV RNA RESP QL NAA+PROBE: NEGATIVE
FLUBV RNA RESP QL NAA+PROBE: NEGATIVE
GFR SERPL CREATININE-BSD FRML MDRD: 101 ML/MIN/1.73SQ M
GLUCOSE SERPL-MCNC: 86 MG/DL (ref 65–140)
GLUCOSE SERPL-MCNC: 90 MG/DL (ref 65–99)
HCT VFR BLD AUTO: 47.1 % (ref 42–47)
HGB BLD-MCNC: 16 G/DL (ref 14–18)
INR PPP: 0.89 (ref 0.84–1.19)
MCH RBC QN AUTO: 30 PG (ref 26–34)
MCHC RBC AUTO-ENTMCNC: 34 G/DL (ref 31–37)
MCV RBC AUTO: 88 FL (ref 81–99)
PLATELET # BLD AUTO: 204 THOUSANDS/UL (ref 149–390)
PMV BLD AUTO: 8.6 FL (ref 8.6–11.7)
POTASSIUM SERPL-SCNC: 4.3 MMOL/L (ref 3.5–5.5)
PROTHROMBIN TIME: 12 SECONDS (ref 11.6–14.5)
RBC # BLD AUTO: 5.35 MILLION/UL (ref 4.3–5.9)
RSV RNA RESP QL NAA+PROBE: NEGATIVE
SARS-COV-2 RNA RESP QL NAA+PROBE: NEGATIVE
SODIUM SERPL-SCNC: 137 MMOL/L (ref 134–143)
TROPONIN I SERPL-MCNC: <0.03 NG/ML
TSH SERPL DL<=0.05 MIU/L-ACNC: 3.66 UIU/ML (ref 0.45–5.33)
WBC # BLD AUTO: 7.9 THOUSAND/UL (ref 4.8–10.8)

## 2021-01-01 PROCEDURE — 85610 PROTHROMBIN TIME: CPT | Performed by: PHYSICIAN ASSISTANT

## 2021-01-01 PROCEDURE — 70496 CT ANGIOGRAPHY HEAD: CPT

## 2021-01-01 PROCEDURE — G1004 CDSM NDSC: HCPCS

## 2021-01-01 PROCEDURE — 99291 CRITICAL CARE FIRST HOUR: CPT

## 2021-01-01 PROCEDURE — 70498 CT ANGIOGRAPHY NECK: CPT

## 2021-01-01 PROCEDURE — 84443 ASSAY THYROID STIM HORMONE: CPT | Performed by: STUDENT IN AN ORGANIZED HEALTH CARE EDUCATION/TRAINING PROGRAM

## 2021-01-01 PROCEDURE — 99292 CRITICAL CARE ADDL 30 MIN: CPT

## 2021-01-01 PROCEDURE — 82948 REAGENT STRIP/BLOOD GLUCOSE: CPT

## 2021-01-01 PROCEDURE — 85730 THROMBOPLASTIN TIME PARTIAL: CPT | Performed by: PHYSICIAN ASSISTANT

## 2021-01-01 PROCEDURE — 94660 CPAP INITIATION&MGMT: CPT

## 2021-01-01 PROCEDURE — 84484 ASSAY OF TROPONIN QUANT: CPT | Performed by: PHYSICIAN ASSISTANT

## 2021-01-01 PROCEDURE — G0509 CRIT CARE TELEHEA CONSULT 50: HCPCS | Performed by: PSYCHIATRY & NEUROLOGY

## 2021-01-01 PROCEDURE — 93005 ELECTROCARDIOGRAM TRACING: CPT

## 2021-01-01 PROCEDURE — 85027 COMPLETE CBC AUTOMATED: CPT | Performed by: PHYSICIAN ASSISTANT

## 2021-01-01 PROCEDURE — 99285 EMERGENCY DEPT VISIT HI MDM: CPT | Performed by: PHYSICIAN ASSISTANT

## 2021-01-01 PROCEDURE — 36415 COLL VENOUS BLD VENIPUNCTURE: CPT | Performed by: PHYSICIAN ASSISTANT

## 2021-01-01 PROCEDURE — 80048 BASIC METABOLIC PNL TOTAL CA: CPT | Performed by: PHYSICIAN ASSISTANT

## 2021-01-01 PROCEDURE — 0241U HB NFCT DS VIR RESP RNA 4 TRGT: CPT | Performed by: PHYSICIAN ASSISTANT

## 2021-01-01 PROCEDURE — 70551 MRI BRAIN STEM W/O DYE: CPT

## 2021-01-01 PROCEDURE — 99291 CRITICAL CARE FIRST HOUR: CPT | Performed by: EMERGENCY MEDICINE

## 2021-01-01 PROCEDURE — 94760 N-INVAS EAR/PLS OXIMETRY 1: CPT

## 2021-01-01 RX ORDER — HYDROCHLOROTHIAZIDE 12.5 MG/1
12.5 TABLET ORAL DAILY
Status: DISCONTINUED | OUTPATIENT
Start: 2021-01-02 | End: 2021-01-06 | Stop reason: HOSPADM

## 2021-01-01 RX ORDER — HYDRALAZINE HYDROCHLORIDE 20 MG/ML
10 INJECTION INTRAMUSCULAR; INTRAVENOUS EVERY 6 HOURS PRN
Status: DISCONTINUED | OUTPATIENT
Start: 2021-01-01 | End: 2021-01-06 | Stop reason: HOSPADM

## 2021-01-01 RX ORDER — LOSARTAN POTASSIUM 50 MG/1
100 TABLET ORAL DAILY
Status: DISCONTINUED | OUTPATIENT
Start: 2021-01-02 | End: 2021-01-06 | Stop reason: HOSPADM

## 2021-01-01 RX ORDER — LOSARTAN POTASSIUM 50 MG/1
100 TABLET ORAL DAILY
Status: DISCONTINUED | OUTPATIENT
Start: 2021-01-02 | End: 2021-01-01

## 2021-01-01 RX ORDER — ONDANSETRON 2 MG/ML
4 INJECTION INTRAMUSCULAR; INTRAVENOUS ONCE
Status: DISCONTINUED | OUTPATIENT
Start: 2021-01-01 | End: 2021-01-01

## 2021-01-01 RX ORDER — ACETAMINOPHEN 325 MG/1
650 TABLET ORAL EVERY 6 HOURS PRN
Status: DISCONTINUED | OUTPATIENT
Start: 2021-01-01 | End: 2021-01-06 | Stop reason: HOSPADM

## 2021-01-01 RX ORDER — ATORVASTATIN CALCIUM 40 MG/1
40 TABLET, FILM COATED ORAL EVERY EVENING
Status: DISCONTINUED | OUTPATIENT
Start: 2021-01-01 | End: 2021-01-06 | Stop reason: HOSPADM

## 2021-01-01 RX ORDER — AMLODIPINE BESYLATE 10 MG/1
10 TABLET ORAL DAILY
Status: DISCONTINUED | OUTPATIENT
Start: 2021-01-02 | End: 2021-01-06 | Stop reason: HOSPADM

## 2021-01-01 RX ORDER — FENTANYL CITRATE 50 UG/ML
50 INJECTION, SOLUTION INTRAMUSCULAR; INTRAVENOUS ONCE
Status: DISCONTINUED | OUTPATIENT
Start: 2021-01-01 | End: 2021-01-01

## 2021-01-01 RX ADMIN — ATORVASTATIN CALCIUM 40 MG: 40 TABLET, FILM COATED ORAL at 19:31

## 2021-01-01 RX ADMIN — ALTEPLASE 81 MG: KIT at 11:59

## 2021-01-01 RX ADMIN — IOHEXOL 85 ML: 350 INJECTION, SOLUTION INTRAVENOUS at 11:38

## 2021-01-01 NOTE — EMTALA/ACUTE CARE TRANSFER
[facility]  [address] [address] [phone]  Dept: [phone]      EMTALA TRANSFER CONSENT    NAME Medardo Paniagua                                         1955                              MRN [id number]    I have been informed of my rights regarding examination, treatment, and transfer   by Dr Anil Duvall DO    Benefits: Specialized equipment and/or services available at the receiving facility (Include comment)________________________(Neuro, Critical Care)    Risks: Potential for delay in receiving treatment, Potential deterioration of medical condition, Increased discomfort during transfer, Loss of IV, Possible worsening of condition or death during transfer      Consent for Transfer:  I acknowledge that my medical condition has been evaluated and explained to me by the emergency department physician or other qualified medical person and/or my attending physician, who has recommended that I be transferred to the service of  Accepting Physician: Dr Bobby Cavazos at [address], [address] : [facility]  The above potential benefits of such transfer, the potential risks associated with such transfer, and the probable risks of not being transferred have been explained to me, and I fully understand them  The doctor has explained that, in my case, the benefits of transfer outweigh the risks  I agree to be transferred  I authorize the performance of emergency medical procedures and treatments upon me in both transit and upon arrival at the receiving facility  Additionally, I authorize the release of any and all medical records to the receiving facility and request they be transported with me, if possible  I understand that the safest mode of transportation during a medical emergency is an ambulance and that the Hospital advocates the use of this mode of transport   Risks of traveling to the receiving facility by car, including absence of medical control, life sustaining equipment, such as oxygen, and medical personnel has been explained to me and I fully understand them  (JAYASHREE CORRECT BOX BELOW)  [  ]  I consent to the stated transfer and to be transported by ambulance/helicopter  [  ]  I consent to the stated transfer, but refuse transportation by ambulance and accept full responsibility for my transportation by car  I understand the risks of non-ambulance transfers and I exonerate the Hospital and its staff from any deterioration in my condition that results from this refusal     X___________________________________________    DATE  21  TIME________  Signature of patient or legally responsible individual signing on patient behalf           RELATIONSHIP TO PATIENT_________________________          Provider Certification    NAME Kayden Mata                                        [facility] 1955                              MRN [id number]    A medical screening exam was performed on the above named patient  Based on the examination:    Condition Necessitating Transfer The primary encounter diagnosis was CVA (cerebral vascular accident) ([address] )  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      Patient Condition: The patient has been stabilized such that within reasonable medical probability, no material deterioration of the patient condition or the condition of the unborn child(capri) is likely to result from the transfer    Reason for Transfer: Level of Care needed not available at this facility    Transfer Requirements: [facility] [address]   · Space available and qualified personnel available for treatment as acknowledged by    · Agreed to accept transfer and to provide appropriate medical treatment as acknowledged by       Dr Fito Olguin  · Appropriate medical records of the examination and treatment of the patient are provided at the time of transfer   [address] _______  · Transfer will be performed by qualified personnel from    and appropriate transfer equipment as required, including the use of necessary and appropriate life support measures  Provider Certification: I have examined the patient and explained the following risks and benefits of being transferred/refusing transfer to the patient/family:  General risk, such as traffic hazards, adverse weather conditions, rough terrain or turbulence, possible failure of equipment (including vehicle or aircraft), or consequences of actions of persons outside the control of the transport personnel, Unanticipated needs of medical equipment and personnel during transport, Risk of worsening condition, The possibility of a transport vehicle being unavailable      Based on these reasonable risks and benefits to the patient and/or the unborn child(capri), and based upon the information available at the time of the patients examination, I certify that the medical benefits reasonably to be expected from the provision of appropriate medical treatments at another medical facility outweigh the increasing risks, if any, to the individuals medical condition, and in the case of labor to the unborn child, from effecting the transfer      X____________________________________________ DATE 01/01/21        TIME_______      ORIGINAL - SEND TO MEDICAL RECORDS   COPY - SEND WITH PATIENT DURING TRANSFER

## 2021-01-01 NOTE — ED PROVIDER NOTES
History  Chief Complaint   Patient presents with   Hraun[address] 21     pt presents with difficulty speaking and slurred speech since [facility] 0900-0930 today       72-year-old male presents emergency department complaining of expressive aphasia that was sudden onset approximately 8:45 this morning  He is accompanied by his mother  He lives with his mother  His mother states that he was normal during breakfast and afterwords developed "an inability to speak "  He gives jumbled verbal responses when asked questions  Stroke alert called at time of presentation  Patient's mother provides his medical history  Unable to obtain ROS  Patient has a medical history of hypertension obesity hyperlipidemia sleep apnea  Prior to Admission Medications   Prescriptions Last Dose Informant Patient Reported? Taking? amLODIPine (NORVASC) 10 mg tablet   No No   Sig: Take 1 tablet (10 mg total) by mouth daily   aspirin 81 mg chewable tablet  Self Yes No   Sig: Chew 81 mg daily   atorvastatin (LIPITOR) 10 mg tablet  Self Yes No   Sig: Take 10 mg by mouth daily   hydrochlorothiazide (MICROZIDE) 12 5 mg capsule  Self Yes No   Sig: Take 12 5 mg by mouth daily   valsartan (DIOVAN) 160 mg tablet  Self Yes No   Sig: Take 160 mg by mouth daily      Facility-Administered Medications: None       Past Medical History:   Diagnosis Date    Bruxism (teeth grinding)     Hypertension     Obesity     Sleep apnea     CPAP nightly       Past Surgical History:   Procedure Laterality Date    TONSILLECTOMY         Family History   Problem Relation Age of Onset    Heart disease Mother     Heart attack Father     Leukemia Brother      I have reviewed and agree with the history as documented      E-Cigarette/Vaping    E-Cigarette Use Never User      E-Cigarette/Vaping Substances    Nicotine No     THC No     CBD No     Flavoring No     Other No     Unknown No      Social History     Tobacco Use    Smoking status: Former Smoker Types: Cigarettes    Smokeless tobacco: Never Used   Substance Use Topics    Alcohol use: Yes     Frequency: 2-4 times a month     Drinks per session: 3 or 4     Binge frequency: Never    Drug use: Never       Review of Systems   Unable to perform ROS: Acuity of condition       Physical Exam  Physical Exam  Vitals signs and nursing note reviewed  Constitutional:       General: He is not in acute distress  Appearance: He is well-developed  He is not ill-appearing  HENT:      Head: Normocephalic and atraumatic  Right Ear: External ear normal       Left Ear: External ear normal       Nose: Nose normal    Eyes:      Pupils: Pupils are equal, round, and reactive to light  Neck:      Musculoskeletal: Normal range of motion and neck supple  Cardiovascular:      Rate and Rhythm: Normal rate and regular rhythm  Heart sounds: Normal heart sounds  No murmur  No friction rub  No gallop  Pulmonary:      Effort: Pulmonary effort is normal  No respiratory distress  Breath sounds: Normal breath sounds  No stridor  No wheezing or rales  Abdominal:      General: Bowel sounds are normal  There is no distension  Palpations: Abdomen is soft  Tenderness: There is no abdominal tenderness  There is no guarding  Musculoskeletal: Normal range of motion  General: No tenderness  Skin:     General: Skin is warm  Capillary Refill: Capillary refill takes less than 2 seconds  Neurological:      General: No focal deficit present  Mental Status: He is alert  GCS: GCS eye subscore is 4  GCS verbal subscore is 3  GCS motor subscore is 6  Cranial Nerves: Dysarthria present  No facial asymmetry  Sensory: Sensation is intact  Motor: Motor function is intact  Coordination: Coordination is intact  Gait: Gait is intact  Gait normal       Comments:   Patient has mixed aphasia  Patient appears confused at times and gives an inappropriate verbal responses  Difficult to assess complete NIH  Psychiatric:         Behavior: Behavior is cooperative  Vital Signs  ED Triage Vitals   Temperature Pulse Respirations Blood Pressure SpO2   01/01/21 1307 01/01/21 1123 01/01/21 1123 01/01/21 1123 01/01/21 1123   98 5 °F (36 9 °C) 71 18 150/83 97 %      Temp Source Heart Rate Source Patient Position - Orthostatic VS BP Location FiO2 (%)   01/01/21 1307 01/01/21 1123 -- 01/01/21 1123 --   Oral Monitor  Right arm       Pain Score       01/01/21 1115       1           Vitals:    01/01/21 1330 01/01/21 1345 01/01/21 1400 01/01/21 1415   BP: 150/85 155/83 139/80 139/82   Pulse: 73 85 76 73         Visual Acuity  Visual Acuity      Most Recent Value   L Pupil Size (mm)  3   R Pupil Size (mm)  3          ED Medications  Medications   iohexol (OMNIPAQUE) 350 MG/ML injection (MULTI-DOSE) 85 mL (85 mL Intravenous Given 1/1/21 1138)   alteplase (ACTIVASE) bolus 9 mg (9 mg Intravenous Given 1/1/21 1155)     Followed by   alteplase (ACTIVASE) infusion 81 mg (0 mg Intravenous Stopped 1/1/21 1259)       Diagnostic Studies  Results Reviewed     Procedure Component Value Units Date/Time    COVID19, Influenza A/B, RSV PCR, [facility] [[id number]]  (Normal) Collected: 01/01/21 1144    Lab Status: Final result Specimen: Nares from Nasopharyngeal Swab Updated: 01/01/21 1305     SARS-CoV-2 Negative     INFLUENZA A PCR Negative     INFLUENZA B PCR Negative     RSV PCR Negative    Narrative: This test has been authorized by FDA under an EUA (Emergency Use Assay) for use by authorized laboratories  Clinical caution and judgement should be used with the interpretation of these results with consideration of the clinical impression and other laboratory testing  Testing reported as "Positive" or "Negative" has been proven to be accurate according to standard laboratory validation requirements  All testing is performed with control materials showing appropriate reactivity at standard intervals  Basic metabolic panel [[id number]]  (Abnormal) Collected: 01/01/21 1144    Lab Status: Final result Specimen: Blood from Arm, Right Updated: 01/01/21 1223     Sodium 137 mmol/L      Potassium 4 3 mmol/L      Chloride 102 mmol/L      CO2 27 mmol/L      ANION GAP 8 mmol/L      BUN 14 mg/dL      Creatinine 0 68 mg/dL      Glucose 90 mg/dL      Calcium 9 3 mg/dL      eGFR 101 ml/min/1 73sq m     Narrative:      [place] guidelines for Chronic Kidney Disease (CKD):     Stage 1 with normal or high GFR (GFR > 90 mL/min/1 73 square meters)    Stage 2 Mild CKD (GFR = 60-89 mL/min/1 73 square meters)    Stage 3A Moderate CKD (GFR = 45-59 mL/min/1 73 square meters)    Stage 3B Moderate CKD (GFR = 30-44 mL/min/1 73 square meters)    Stage 4 Severe CKD (GFR = 15-29 mL/min/1 73 square meters)    Stage 5 End Stage CKD (GFR <15 mL/min/1 73 square meters)  Note: GFR calculation is accurate only with a steady state creatinine    Protime-INR [[id number]]  (Normal) Collected: 01/01/21 1144    Lab Status: Final result Specimen: Blood from Arm, Right Updated: 01/01/21 1221     Protime 12 0 seconds      INR 0 89    APTT [[id number]]  (Normal) Collected: 01/01/21 1144    Lab Status: Final result Specimen: Blood from Arm, Right Updated: 01/01/21 1221     PTT 27 seconds     Troponin I [[id number]]  (Normal) Collected: 01/01/21 1144    Lab Status: Final result Specimen: Blood from Arm, Right Updated: 01/01/21 1217     Troponin I <0 03 ng/mL     CBC and Platelet [[id number]]  (Abnormal) Collected: 01/01/21 1144    Lab Status: Final result Specimen: Blood from Arm, Right Updated: 01/01/21 1152     WBC 7 90 Thousand/uL      RBC 5 35 Million/uL      Hemoglobin 16 0 g/dL      Hematocrit 47 1 %      MCV 88 fL      MCH 30 0 pg      MCHC 34 0 g/dL      RDW 15 1 %      Platelets 364 Thousands/uL      MPV 8 6 fL     Fingerstick Glucose (POCT) [[id number]]  (Normal) Collected: 01/01/21 1113    Lab Status: Final result Updated: 01/01/21 1114     POC Glucose 86 mg/dl                  CTA stroke alert (head/neck)   Final Result by Dale Alexandra DO (01/01 1210)   1  Unremarkable CTA of the neck and brain  No evidence of large vessel central occlusive disease involving the [place] or its branch vessels  If there is continued concern for acute cerebral ischemia, recommend follow-up MRI of the brain with    diffusion-weighted sequencing  2   No pulmonary parenchymal changes to suggest COVID19 infection  Findings were directly discussed with René Lira on 1/1/2021 12:00 PM                      Workstation performed: [id number]         CT stroke alert brain   Final Result by Dale Alexandra DO (01/01 1210)   No acute intracranial abnormality  Findings were directly discussed with René Lira on 1/1/2021 11:33 AM       Workstation performed: [id number]                    Procedures  Procedures         ED Course  ED Course as of Jan 01 1715 Fri Jan 01, 2021   1115 Dr Dmitriy Faye made aware of patient at time of presentation to ED        1144  Patient indicated that the mother may answer for the patint   Risks and benefits of tPA explained  TPA consent obtained from the patient's mother and patient  Camelia Chanel 45 neuro completed  Patient appears to have mixed aphasia                      Stroke Assessment     Row Name 01/01/21 1123             NIH Stroke Scale    Interval  Baseline      Level of Consciousness (1a )  0      LOC Questions (1b )  2      LOC Commands (1c )  0      Best Gaze (2 )  0      Visual (3 )        Facial Palsy (4 )  0      Motor Arm, Left (5a )  0      Motor Arm, Right (5b )  0      Motor Leg, Left (6a )  0      Motor Leg, Right (6b )  0      Limb Ataxia (7 )  0      Sensory (8 )        Best Language (9 )  1      Dysarthria (10 )  0      Extinction and Inattention (11 ) (Formerly Neglect)        Total                                    MDM  Number of Diagnoses or Management Options  CVA (cerebral vascular accident) [facility]):   Mixed aphasia:   Diagnosis management comments:   Suspected CVA with mixed aphasia  Case was discussed with Neuro Dr Kamari Mike  Patient received TPA  Tele Neuro consult completed  Patient's symptoms appear to be waxing and waning during tPA administration and during tele Neuro consult  Patient to be transferred to ICU for further treatment evaluation  Patient will need MRI with diffusion imaging per neuro   Prior to the patient departing the emergency department his symptoms were completely resolved and he stated that he feels much better  He does endorse having a mild headache prior to the onset of his symptoms       Amount and/or Complexity of Data Reviewed  Clinical lab tests: ordered and reviewed  Tests in the radiology section of CPT®: reviewed and ordered    Risk of Complications, Morbidity, and/or Mortality  Presenting problems: low  Diagnostic procedures: low  Management options: low    Patient Progress  Patient progress: stable      Disposition  Final diagnoses:   CVA (cerebral vascular accident) ([facility] [address] )   Mixed aphasia     Time reflects when diagnosis was documented in both MDM as applicable and the Disposition within this note     Time User Action Codes Description Comment    1/1/2021 12:37 PM Maple Cera Add [I63 9] [facility]     1/1/2021 12:38 PM Maple Cera Add [R47 01] Mixed aphasia       ED Disposition     ED Disposition Condition Date/Time Comment    Transfer to Another Facility-In Network  Fri Jan 1, 2021  1:20 PM Unjuanita Ferguson should be transferred out to [facility]          MD Documentation      Most Recent Value   Patient Condition  The patient has been stabilized such that within reasonable medical probability, no material deterioration of the patient condition or the condition of the unborn child(capri) is likely to result from the transfer   Reason for Transfer  Level of Care needed not available at this facility   Benefits of Transfer  Specialized equipment and/or services available at the receiving facility (Include comment)________________________ Chong Canavan, Critical Care]   Risks of Transfer  Potential for delay in receiving treatment, Potential deterioration of medical condition, Increased discomfort during transfer, Loss of IV, Possible worsening of condition or death during transfer   Accepting Physician  Dr Melissa Mckeon Name, Lázaro Avery   Sending MD Kulwinder Molina PA-C   Provider Certification  General risk, such as traffic hazards, adverse weather conditions, rough terrain or turbulence, possible failure of equipment (including vehicle or aircraft), or consequences of actions of persons outside the control of the transport personnel, Unanticipated needs of medical equipment and personnel during transport, Risk of worsening condition, The possibility of a transport vehicle being unavailable      RN Documentation      [facility] Name, Lázaro Avery   Transport Mode  Ambulance   Level of Care  Advanced life support      Follow-up Information    None         Discharge Medication List as of 1/1/2021  2:30 PM      CONTINUE these medications which have NOT CHANGED    Details   amLODIPine (NORVASC) 10 mg tablet Take 1 tablet (10 mg total) by mouth daily, Starting Thu 11/5/2020, Normal      aspirin 81 mg chewable tablet Chew 81 mg daily, Historical Med      atorvastatin (LIPITOR) 10 mg tablet Take 10 mg by mouth daily, Historical Med      hydrochlorothiazide (MICROZIDE) 12 5 mg capsule Take 12 5 mg by mouth daily, Historical Med      valsartan (DIOVAN) 160 mg tablet Take 160 mg by mouth daily, Historical Med           No discharge procedures on file      PDMP Review     None          ED Provider  Electronically Signed by           Augustin Tavarez PA-C  01/01/21 0064

## 2021-01-01 NOTE — TELEMEDICINE
TeleConsultation - Stroke   Tracy Duran 60 y o  male MRN: [id number]  Unit/Bed#: TR 05 Encounter: [id number]      REQUIRED DOCUMENTATION:     1  This service was provided via Telemedicine  2  Provider located at [facility]  3  TeleMed provider: Jhoan Hodges MD   4  Identify all parties in room with patient during tele consult:  Patient, advanced practitioner Denise Gruber  5  After connecting through televideo, patient was identified by name and date of birth and assistant checked wristband  Patient was then informed that this was a Telemedicine visit and that the exam was being conducted confidentially over secure lines  Patient acknowledged consent and understanding of privacy and security of the Telemedicine visit, and gave us permission to have the assistant stay in the room in order to assist with the history and to conduct the exam   I informed the patient that I have reviewed their record in Epic and presented the opportunity for them to ask any questions regarding the visit today  The patient agreed to participate  Assessment/Plan   Assessment: Acute left hemispheric cva  Pt in window for iv tpa and received bolus at 11:55 am  He exhibits an expressive>receptive aphasia  He has vascular risk factors such as JESSICA, htn, hlp  He's on lipitor at home, no antiplatelets  Etiology unclear  No cardiac hx or palpitation hx  No nidus on cta head/neck  No infectious signs/symptoms  Plan:  Pt being [facility]    Follow post iv tpa parameters  Neurology team will see over weekend  Mri brain w/o contrast  Tele  2-d echo  Pt will need post IV TPA 24 hour head ct        History of Present Illness     Reason for Consult / Principal Problem: stroke alert    Patient last known well: :8:30 AM  Stroke alert called: 11:13 AM  Neurology time of arrival: 11:13 AM call back and then eventual teleconsult  HPI: Tracy Duran is a 60 y o   male who presents with acute mixed aphasia since this morning  No hx of prior cva or any hx of anxiety and no headache  Per mother who was with him, he was fine this morning, had breakfast and then went over to watch TV with her when he began having trouble communicating  Never happened before  No infectious signs/symptoms  Ct head and cta head/neck unremarkable  sbp in the 140s  Blood sugar 86  No hx of palpitations  Pt in sinus rhythm at the [facility] ED  He did have a mild HA prior to coming to the ED  Consult to Neurology  Consult performed by: Jackie Fontaine MD  Consult ordered by: Joel Tate PA-C          Review of Systems   Negative per review however not at cognitive baseline  Historical Information   Past Medical History:   Diagnosis Date    Bruxism (teeth grinding)     Hypertension     Obesity     Sleep apnea     CPAP nightly     Past Surgical History:   Procedure Laterality Date    TONSILLECTOMY       Social History   Social History     Substance and Sexual Activity   Alcohol Use Yes    Frequency: 2-4 times a month    Drinks per session: 3 or 4    Binge frequency: Never     Social History     Substance and Sexual Activity   Drug Use Never     E-Cigarette/Vaping    E-Cigarette Use Never User      E-Cigarette/Vaping Substances    Nicotine No     THC No     CBD No     Flavoring No     Other No     Unknown No      Social History     Tobacco Use   Smoking Status Former Smoker    Types: Cigarettes   Smokeless Tobacco Never Used     Family History: non-contributory      Meds/Allergies   all current active meds have been reviewed    No Known Allergies    Objective   Vitals:Blood pressure 136/82, pulse 71, temperature 98 5 °F (36 9 °C), temperature source Oral, resp  rate 20, SpO2 94 %  ,There is no height or weight on file to calculate BMI  Physical Exam   General: no acute distress  Extremities: no visible deformities  Neurologic Exam  MS: Alert and oriented to self   Initially couldn't say where he was however by end of assessment give correct location  Able to only name one object from stroke booklet, another object was able to describe what it does but not name it  He was able to read the phrases appropriately with occasional paraphrasic error  Unable to follow multistep commands such as taking rt thumb to left ear to nose  Two point discrimination intact  No neglect  He called a chair a table  When asking him to push and pull he would do the opposite  He would say yes/no but not consistently appearing that he was always processing the questions  My exam was about thirty minutes after IV tpa was administered  He had near resolution of symptoms then similar level of aphasia returned but even during my exam there was some improvement by the end  CN 2-12 INTACT  Unable to properly test visual fields however he would reach out and touch examiner's hands in different quadrants suggesting intact  Motor  Normal tone and bulk  No involuntary movements  Appears 5 power ue/le bilat  Coordination: finger to nose intact bilat, wasn't comprehending heel to shin bilat  Sensory: light touch intact throughout  NIHSS:  1a Level of Consciousness: 0 = Alert   1b  LOC Questions: 1 = Answers one correctly   1c  LOC Commands: 1 = Obeys one correctly   2  Best Gaze: 0 = Normal   3  Visual: 0 = No visual field loss   4  Facial Palsy: 0=Normal symmetric movement   5a  Motor Right Arm: 0=No drift, limb holds 90 (or 45) degrees for full 10 seconds   5b  Motor Left Arm: 0=No drift, limb holds 90 (or 45) degrees for full 10 seconds   6a  Motor Right Le=No drift, limb holds 90 (or 45) degrees for full 10 seconds   6b  Motor Left Le=No drift, limb holds 90 (or 45) degrees for full 10 seconds   7  Limb Ataxia:  0=Absent   8  Sensory: 0=Normal; no sensory loss   9  Best Language:  2=Severe aphasia; fragmentary expression, inference needed, cannot identify materials   10  Dysarthria: 0=Normal articulation   11   Extinction and Inattention (formerly Neglect): 0=No abnormality   Total Score: 4     Time NIHSS was completed: 12:30 pm    Modified Soraya Score:  2 (Unable to carry out all previous activities, but able to look after own affairs without assistance)    Lab Results: I have personally reviewed pertinent reports  Imaging Studies: I have personally reviewed pertinent films in PACS  EKG, Pathology, and Other Studies: I have personally reviewed pertinent films in PACS    Counseling / Coordination of Care  Total 32 min critical care time spent

## 2021-01-01 NOTE — H&P
H&P Exam - Critical Care   Reji Akins 60 y o  male MRN: [id number]  Unit/Bed#: ICU 08 Encounter: [id number]      -------------------------------------------------------------------------------------------------------------  Chief Complaint: Acute L cerebral CVA    Reji Akins is a 60 y o  male w PMH of HTN, Obsetiy, Sleep apena and HLD who presents with an incident of slurred speech started today morning while talking to his mother, for which he took 10 of his home daily ASA 81mg (6*81mg), and went to [facility] ED stroke alert was called, Neuro was consulted and the patient was given tPA at 11:55am, and transferred to [facility] ICU for further evaluation and management       History obtained from the patient   -------------------------------------------------------------------------------------------------------------  Assessment and Plan:    Neuro:    Diagnosis: Acute Left Hemispheric CVA   CT Stroke alert brain - no acute intracranial abnormality CTA Stroke Alert Head/Neck - unremarkable   o Plan: Neurology is consulted and following    o Repeat CT Head 24H post tpa   o On Cardio-Pulmonary monitoring     CV:    Diagnosis: Hypertension    Plan: Continue home medications:    - Norvasc 10 daily    - HCZ 12 5 mg daily    - Valsartan 160 switched to Losartan 100 mg daily while in patient    - Hydralazine for SBP>160      Diagnosis: Acute CVA stroke - unknown etiology   o Echocardiogram      Diagnosis: Hyperlipidemia   o home Lipitor 10 mg increased to 40 mg     Pulm:   Diagnosis: Obstructive Sleep Apnea   o Plan: CPAP at bed time       GI:    Diagnosis: no acute issue   o Plan: no indication for ulcer prophylaxis     :    Diagnosis: No acute issue   o I/O     F/E/N:    Plan: Bedside swallow eval and Clear liquid diet    Strict I/O    Monitor electrolytes and replete as needed       Heme/Onc:   o Diagnosis: No acute issues   o S/p tpa today morning        Endo:     Diagnosis: No acute issues   o Plan: TSH w reflex to T4   o HA1C   o Lipid Panel     ID:   o Diagnosis: no acute issue   o Patient afebrile and no leucocytosis   o Trend WBC and Temperature       MSK/Skin:    Diagnosis: No acute issues   o Plan: encourage mobilization / frequent turning   o PT/OT Eval and Treatment   o PMNR consult         Disposition: Continue Critical Care   Code Status: No Order  --------------------------------------------------------------------------------------------------------------  Review of Systems   Constitutional: Negative for fatigue and fever  HENT: Negative for congestion, mouth sores, nosebleeds, sneezing, sore throat and trouble swallowing  Eyes: Negative for photophobia, pain and visual disturbance  Respiratory: Negative for cough, chest tightness and wheezing  Cardiovascular: Negative for chest pain and palpitations  Gastrointestinal: Negative for abdominal pain, constipation, diarrhea, nausea and vomiting  Genitourinary: Negative for difficulty urinating and dysuria  Neurological: Positive for speech difficulty and headaches  Negative for seizures, facial asymmetry and numbness  Psychiatric/Behavioral: Negative for confusion, hallucinations, self-injury, sleep disturbance and suicidal ideas  A 12-point, complete review of systems was reviewed and negative except as stated above     Physical Exam  Constitutional:       General: He is not in acute distress  Appearance: Normal appearance  He is obese  He is not ill-appearing  HENT:      Head: Normocephalic and atraumatic  Right Ear: External ear normal       Left Ear: External ear normal       Nose: Nose normal       Mouth/Throat:      Mouth: Mucous membranes are moist    Eyes:      General: No scleral icterus  Extraocular Movements: Extraocular movements intact  Conjunctiva/sclera: Conjunctivae normal       Pupils: Pupils are equal, round, and reactive to light     Cardiovascular:      Rate and Rhythm: Normal rate and regular rhythm  Pulses: Normal pulses  Heart sounds: Normal heart sounds  No murmur  No gallop  Pulmonary:      Effort: Pulmonary effort is normal       Breath sounds: Normal breath sounds  No wheezing  Abdominal:      General: Abdomen is flat  Bowel sounds are normal  There is no distension  Tenderness: There is no abdominal tenderness  Musculoskeletal:      Right lower leg: No edema  Left lower leg: No edema  Skin:     General: Skin is warm  Capillary Refill: Capillary refill takes less than 2 seconds  Neurological:      General: No focal deficit present  Mental Status: He is alert and oriented to person, place, and time  Motor: No weakness  Psychiatric:         Mood and Affect: Mood normal          Behavior: Behavior normal          Thought Content: Thought content normal          Judgment: Judgment normal        --------------------------------------------------------------------------------------------------------------  Vitals:   Vitals:    01/01/21 1532 01/01/21 1600   BP: 135/99 150/99   BP Location: Left arm Left arm   Pulse: 78 72   Resp: 22 (!) 26   Temp: 98 °F (36 7 °C)    TempSrc: Oral    SpO2: 95% 96%     Temp  Min: 98 °F (36 7 °C)  Max: 98 5 °F (36 9 °C)        There is no height or weight on file to calculate BMI    N/A    Laboratory and Diagnostics:  Results from last 7 days   Lab Units 01/01/21  1144   WBC Thousand/uL 7 90   HEMOGLOBIN g/dL 16 0   HEMATOCRIT % 47 1*   PLATELETS Thousands/uL 204     Results from last 7 days   Lab Units 01/01/21  1144   SODIUM mmol/L 137   POTASSIUM mmol/L 4 3   CHLORIDE mmol/L 102   CO2 mmol/L 27   ANION GAP mmol/L 8   BUN mg/dL 14   CREATININE mg/dL 0 68*   CALCIUM mg/dL 9 3   GLUCOSE RANDOM mg/dL 90          Results from last 7 days   Lab Units 01/01/21  1144   INR  0 89   PTT seconds 27      Results from last 7 days   Lab Units 01/01/21  1144   TROPONIN I ng/mL <0 03         ABG:    VBG:          Micro:        EKG: EKG 12 lead ordered 01/01/2021  Imaging: I have personally reviewed pertinent reports  Historical Information   Past Medical History:   Diagnosis Date    Bruxism (teeth grinding)     Hypertension     Obesity     Sleep apnea     CPAP nightly     Past Surgical History:   Procedure Laterality Date    TONSILLECTOMY       Social History   Social History     Substance and Sexual Activity   Alcohol Use Yes    Frequency: 2-4 times a month    Drinks per session: 3 or 4    Binge frequency: Never     Social History     Substance and Sexual Activity   Drug Use Never     Social History     Tobacco Use   Smoking Status Former Smoker    Types: Cigarettes   Smokeless Tobacco Never Used     Exercise History:     Family History:   Family History   Problem Relation Age of Onset    Heart disease Mother     Heart attack Father     Leukemia Brother      I have reviewed this patient's family history and commented on sigificant items within the HPI      Medications:  Current Facility-Administered Medications   Medication Dose Route Frequency    atorvastatin (LIPITOR) tablet 40 mg  40 mg Per NG Tube QPM     Home medications:  Prior to Admission Medications   Prescriptions Last Dose Informant Patient Reported? Taking?    amLODIPine (NORVASC) 10 mg tablet 1/1/2021 at Unknown time  No Yes   Sig: Take 1 tablet (10 mg total) by mouth daily   aspirin 81 mg chewable tablet 1/1/2021 at Unknown time Self Yes Yes   Sig: Chew 81 mg daily   atorvastatin (LIPITOR) 10 mg tablet 12/31/2020 at Unknown time Self Yes Yes   Sig: Take 10 mg by mouth daily   hydrochlorothiazide (MICROZIDE) 12 5 mg capsule 1/1/2021 at Unknown time Self Yes Yes   Sig: Take 12 5 mg by mouth daily   valsartan (DIOVAN) 160 mg tablet 1/1/2021 at Unknown time Self Yes Yes   Sig: Take 160 mg by mouth daily      Facility-Administered Medications: None     Allergies:  No Known Allergies  ------------------------------------------------------------------------------------------------------------  Advance Directive and Living Will:      Power of :    POLST:    ------------------------------------------------------------------------------------------------------------  Anticipated Length of Stay is > 2 midnights    Care Time Delivered:   No Critical Care time spent       [facility] LEJEUNE,         Portions of the record may have been created with voice recognition software  Occasional wrong word or "sound a like" substitutions may have occurred due to the inherent limitations of voice recognition software    Read the chart carefully and recognize, using context, where substitutions have occurred

## 2021-01-02 ENCOUNTER — APPOINTMENT (INPATIENT)
Dept: NON INVASIVE DIAGNOSTICS | Facility: HOSPITAL | Age: 66
DRG: 055 | End: 2021-01-02
Payer: COMMERCIAL

## 2021-01-02 ENCOUNTER — APPOINTMENT (INPATIENT)
Dept: RADIOLOGY | Facility: HOSPITAL | Age: 66
DRG: 055 | End: 2021-01-02
Payer: COMMERCIAL

## 2021-01-02 PROBLEM — R73.03 PREDIABETES: Status: ACTIVE | Noted: 2021-01-02

## 2021-01-02 LAB
ANION GAP SERPL CALCULATED.3IONS-SCNC: 4 MMOL/L (ref 4–13)
BASOPHILS # BLD AUTO: 0.03 THOUSANDS/ΜL (ref 0–0.1)
BASOPHILS NFR BLD AUTO: 0 % (ref 0–1)
BUN SERPL-MCNC: 10 MG/DL (ref 5–25)
CALCIUM SERPL-MCNC: 9 MG/DL (ref 8.3–10.1)
CHLORIDE SERPL-SCNC: 108 MMOL/L (ref 100–108)
CHOLEST SERPL-MCNC: 182 MG/DL (ref 50–200)
CO2 SERPL-SCNC: 30 MMOL/L (ref 21–32)
CREAT SERPL-MCNC: 0.81 MG/DL (ref 0.6–1.3)
EOSINOPHIL # BLD AUTO: 0.11 THOUSAND/ΜL (ref 0–0.61)
EOSINOPHIL NFR BLD AUTO: 2 % (ref 0–6)
ERYTHROCYTE [DISTWIDTH] IN BLOOD BY AUTOMATED COUNT: 14.7 % (ref 11.6–15.1)
EST. AVERAGE GLUCOSE BLD GHB EST-MCNC: 117 MG/DL
GFR SERPL CREATININE-BSD FRML MDRD: 94 ML/MIN/1.73SQ M
GLUCOSE SERPL-MCNC: 115 MG/DL (ref 65–140)
HBA1C MFR BLD: 5.7 %
HCT VFR BLD AUTO: 47.3 % (ref 36.5–49.3)
HDLC SERPL-MCNC: 34 MG/DL
HGB BLD-MCNC: 15.4 G/DL (ref 12–17)
IMM GRANULOCYTES # BLD AUTO: 0.02 THOUSAND/UL (ref 0–0.2)
IMM GRANULOCYTES NFR BLD AUTO: 0 % (ref 0–2)
LDLC SERPL CALC-MCNC: 104 MG/DL (ref 0–100)
LYMPHOCYTES # BLD AUTO: 2.05 THOUSANDS/ΜL (ref 0.6–4.47)
LYMPHOCYTES NFR BLD AUTO: 28 % (ref 14–44)
MCH RBC QN AUTO: 29.1 PG (ref 26.8–34.3)
MCHC RBC AUTO-ENTMCNC: 32.6 G/DL (ref 31.4–37.4)
MCV RBC AUTO: 89 FL (ref 82–98)
MONOCYTES # BLD AUTO: 0.69 THOUSAND/ΜL (ref 0.17–1.22)
MONOCYTES NFR BLD AUTO: 9 % (ref 4–12)
NEUTROPHILS # BLD AUTO: 4.47 THOUSANDS/ΜL (ref 1.85–7.62)
NEUTS SEG NFR BLD AUTO: 61 % (ref 43–75)
NRBC BLD AUTO-RTO: 0 /100 WBCS
PLATELET # BLD AUTO: 214 THOUSANDS/UL (ref 149–390)
PMV BLD AUTO: 10.3 FL (ref 8.9–12.7)
POTASSIUM SERPL-SCNC: 4.3 MMOL/L (ref 3.5–5.3)
RBC # BLD AUTO: 5.3 MILLION/UL (ref 3.88–5.62)
SODIUM SERPL-SCNC: 142 MMOL/L (ref 136–145)
TRIGL SERPL-MCNC: 219 MG/DL
WBC # BLD AUTO: 7.37 THOUSAND/UL (ref 4.31–10.16)

## 2021-01-02 PROCEDURE — 94760 N-INVAS EAR/PLS OXIMETRY 1: CPT

## 2021-01-02 PROCEDURE — NC001 PR NO CHARGE: Performed by: EMERGENCY MEDICINE

## 2021-01-02 PROCEDURE — 94660 CPAP INITIATION&MGMT: CPT

## 2021-01-02 PROCEDURE — 97163 PT EVAL HIGH COMPLEX 45 MIN: CPT

## 2021-01-02 PROCEDURE — 70450 CT HEAD/BRAIN W/O DYE: CPT

## 2021-01-02 PROCEDURE — 93306 TTE W/DOPPLER COMPLETE: CPT | Performed by: INTERNAL MEDICINE

## 2021-01-02 PROCEDURE — 80048 BASIC METABOLIC PNL TOTAL CA: CPT | Performed by: STUDENT IN AN ORGANIZED HEALTH CARE EDUCATION/TRAINING PROGRAM

## 2021-01-02 PROCEDURE — 92610 EVALUATE SWALLOWING FUNCTION: CPT

## 2021-01-02 PROCEDURE — 99233 SBSQ HOSP IP/OBS HIGH 50: CPT | Performed by: EMERGENCY MEDICINE

## 2021-01-02 PROCEDURE — 85025 COMPLETE CBC W/AUTO DIFF WBC: CPT | Performed by: STUDENT IN AN ORGANIZED HEALTH CARE EDUCATION/TRAINING PROGRAM

## 2021-01-02 PROCEDURE — 70553 MRI BRAIN STEM W/O & W/DYE: CPT

## 2021-01-02 PROCEDURE — G1004 CDSM NDSC: HCPCS

## 2021-01-02 PROCEDURE — 80061 LIPID PANEL: CPT | Performed by: STUDENT IN AN ORGANIZED HEALTH CARE EDUCATION/TRAINING PROGRAM

## 2021-01-02 PROCEDURE — A9577 INJ MULTIHANCE: HCPCS | Performed by: PSYCHIATRY & NEUROLOGY

## 2021-01-02 PROCEDURE — 93306 TTE W/DOPPLER COMPLETE: CPT

## 2021-01-02 PROCEDURE — 83036 HEMOGLOBIN GLYCOSYLATED A1C: CPT | Performed by: STUDENT IN AN ORGANIZED HEALTH CARE EDUCATION/TRAINING PROGRAM

## 2021-01-02 PROCEDURE — 99233 SBSQ HOSP IP/OBS HIGH 50: CPT | Performed by: PSYCHIATRY & NEUROLOGY

## 2021-01-02 RX ORDER — ASPIRIN 81 MG/1
81 TABLET, CHEWABLE ORAL DAILY
Status: DISCONTINUED | OUTPATIENT
Start: 2021-01-02 | End: 2021-01-06 | Stop reason: HOSPADM

## 2021-01-02 RX ORDER — HEPARIN SODIUM 5000 [USP'U]/ML
5000 INJECTION, SOLUTION INTRAVENOUS; SUBCUTANEOUS EVERY 8 HOURS SCHEDULED
Status: DISCONTINUED | OUTPATIENT
Start: 2021-01-02 | End: 2021-01-03

## 2021-01-02 RX ORDER — LORAZEPAM 1 MG/1
1 TABLET ORAL ONCE
Status: COMPLETED | OUTPATIENT
Start: 2021-01-02 | End: 2021-01-02

## 2021-01-02 RX ADMIN — HEPARIN SODIUM 5000 UNITS: 5000 INJECTION INTRAVENOUS; SUBCUTANEOUS at 15:51

## 2021-01-02 RX ADMIN — LORAZEPAM 1 MG: 1 TABLET ORAL at 21:09

## 2021-01-02 RX ADMIN — ASPIRIN 81 MG CHEWABLE TABLET 81 MG: 81 TABLET CHEWABLE at 15:52

## 2021-01-02 RX ADMIN — GADOBENATE DIMEGLUMINE 11 ML: 529 INJECTION, SOLUTION INTRAVENOUS at 22:20

## 2021-01-02 RX ADMIN — AMLODIPINE BESYLATE 10 MG: 10 TABLET ORAL at 09:25

## 2021-01-02 RX ADMIN — ACETAMINOPHEN 650 MG: 325 TABLET, FILM COATED ORAL at 12:12

## 2021-01-02 RX ADMIN — ATORVASTATIN CALCIUM 40 MG: 40 TABLET, FILM COATED ORAL at 17:59

## 2021-01-02 RX ADMIN — HYDROCHLOROTHIAZIDE 12.5 MG: 12.5 TABLET ORAL at 09:25

## 2021-01-02 RX ADMIN — HEPARIN SODIUM 5000 UNITS: 5000 INJECTION INTRAVENOUS; SUBCUTANEOUS at 21:09

## 2021-01-02 NOTE — PLAN OF CARE
Problem: Nutrition  Goal: Nutrition/Hydration status is improving  Description: Monitor and assess patient's nutrition/hydration status for malnutrition (ex- brittle hair, bruises, dry skin, pale skin and conjunctiva, muscle wasting, smooth red tongue, and disorientation)  Collaborate with interdisciplinary team and initiate plan and interventions as ordered  Monitor patient's weight and dietary intake as ordered or per policy  Utilize nutrition screening tool and intervene per policy  Determine patient's food preferences and provide high-protein, high-caloric foods as appropriate  - Assist patient with eating   - Allow adequate time for meals   - Encourage patient to take dietary supplement as ordered  - Collaborate with clinical nutritionist   - Include patient/family/caregiver in decisions related to nutrition    Outcome: Progressing

## 2021-01-02 NOTE — ASSESSMENT & PLAN NOTE
Radames Bruner is a 60 y o  male who presented as stroke alert to [facility] on 1/1/2021 at 11:13 AM with LKW at 8:30AM , initial BP was Blood Pressure: 135/99  Initial presenting deficits were dysarthria and expressive aphasia  CTH/CTA were negative for acute findings  Pt was found to be a tPA candidate within the appropriate time window and without obvious contraindication and tPA was given at 11:55AM     Post tPA exam:    Current BP: Blood Pressure: 131/87    Lab Results   Component Value Date    HGBA1C 5 7 (H) 01/02/2021    CHOLESTEROL 182 01/02/2021    LDLCALC 104 (H) 01/02/2021    TRIG 219 (H) 01/02/2021    INR 0 89 01/01/2021        Imaging:   CTH: unremarkable   CTA: unremarkable  · MRI: No evidence of acute infarct  T2/ FLAIR hyperintense focus at the left temporal juxtacortical region presumed to represent a developmental venous anomaly, however given patient's clinical history a subacute infarct cannot be excluded  Contrast-enhanced MR examination recommended for further characterization     Repeat CTH at 24 hours: pending   Echocardiogram: pending   Telemetry: NSR    Plan:   Stroke pathway   If pt mental status declines by GCS > 2 in 1 hour, obtain CTH urgently   BP as close to but <180 SBP due to recent tPA administration, pressers/fluids indicated if necessary to keep BP at goal  [facility] ASA after repeat CTH as long as no hemorrhage present   atorvastatin 40mg qhs   Maintain glucose below 200   Echo pending   Neuro checks   Monitor on telemetry   Medical management as per critical care appreciated   PT/OT/Speech/PMR consults appreciated when able

## 2021-01-02 NOTE — PHYSICAL THERAPY NOTE
Physical Therapy Evaluation     Patient's Name: Malick David    Admitting Diagnosis  CVA (cerebral vascular accident) [facility]) [I63 9]  [facility]vascular [facility] [I63 9]    Problem List  Patient Active Problem List   Diagnosis    Class 1 obesity due to excess calories with serious comorbidity and body mass index (BMI) of 32 0 to 32 9 in adult    Negative depression screening    Hypercholesterolemia    Essential hypertension    Obstructive sleep apnea syndrome    Sleep disturbance    Daytime sleepiness    CVA [facility]cerebral vascular [facility]) ([facility] )    Prediabetes       Past Medical History  Past Medical History:   Diagnosis Date    Bruxism (teeth grinding)     Hypertension     Obesity     Sleep apnea     CPAP nightly       Past Surgical History  Past Surgical History:   Procedure Laterality Date    TONSILLECTOMY          01/02/21 1130   PT Last Visit   PT Visit Date 01/02/21   Note Type   Note type Evaluation   Pain Assessment   Pain Assessment Tool 0-10   Pain Score 1   Hospital Pain Intervention(s) Repositioned   Home Living   Type of [address] One level  (4 GRACIE)   Prior Function   Level of [place] Independent with ADLs and functional mobility   Lives With Other (Comment)  (mother)   ADL Assistance Independent   IADLs Independent   Falls in the last 6 months 0   Vocational Retired   General   Family/Caregiver Present No   Cognition   Orientation Level Oriented X4   RLE Assessment   RLE Assessment WNL   LLE Assessment   LLE Assessment WNL   Coordination   Movements are Fluid and Coordinated 1   Bed Mobility   Supine to Sit 7  Independent   Sit to Supine 7  Independent   Transfers   Sit to Stand 7  Independent   Stand to Sit 7  Independent   Ambulation/Elevation   Gait pattern WNL   Gait Assistance 5  Supervision   Additional items Assist x 1   Assistive Device None   Distance 360   Balance   Static Sitting Normal   Dynamic Sitting Normal   Ambulatory Fair -   Endurance Deficit   Endurance Deficit Yes   Endurance Deficit Description limited compared to baseline   Activity Tolerance   Activity Tolerance Patient tolerated treatment well   Nurse Made Aware yes, nsg gave clearance to work with pt   Assessment   Prognosis Excellent   Problem List Pain   Assessment Pt is 60 y o  male seen for PT evaluation s/p admit to [facility] on 1/1/2021 w/ CVA  PT consulted to assess pt's functional mobility and d/c needs  Order placed for PT eval and tx  Comorbidities affecting pt's physical performance at time of assessment include:  has a past medical history of Bruxism (teeth grinding), Hypertension, Obesity, and Sleep apnea  PTA, pt was retired and I with all mobility  Personal factors affecting pt at time of IE include: impulsivity and inability to perform IADLs  Please find objective findings from PT assessment regarding body systems outlined above with impairments and limitations including decreased endurance and decreased activity tolerance  Pt demonstrated ability to complete all mobility with S or better level of A  Ambulated with steady gait  Offered stair training/trial although pt deferred  Pt offered no additional questions or concerns related to mobility  The following objective measures performed on IE also reveal limitations: Barthel Index: 85/100  Pt's clinical presentation is currently unstable/unpredictable seen in pt's presentation of critical care monitoring  Pt to benefit from continued ambulation with staff to maintain level of functional independent mobility and consistency  From PT/mobility standpoint, recommendation at time of d/c would be home with increased family support pending progress in order to facilitate return to PLOF  Goals   Patient Goals To go home   Plan   Treatment/Interventions OT; Spoke to case management;Spoke to nursing;Gait training;Bed mobility;Cognitive reorientation; Endurance training;Functional transfer training   Recommendation   PT Discharge Recommendation Return to previous environment with no needs   PT - OK to Discharge Yes   Barthel Index   Feeding 10   Bathing 5   Grooming Score 5   Dressing Score 10   Bladder Score 10   Bowels Score 10   Toilet Use Score 10   Transfers (Bed/Chair) Score 10   Mobility (Level Surface) Score 10   Stairs Score 5   Barthel Index Score [address] [facility] [address], PT          Palak Blevins, [place]

## 2021-01-02 NOTE — CONSULTS
NEUROLOGY RESIDENCY CONSULT NOTE     Name: Reji Akins   Age & Sex: 60 y o  male   MRN: [id number]  Unit/Bed#: ICU 08   Encounter: [id number]  Length of Stay: 1    ASSESSMENT & PLAN     * CVA (cerebral vascular accident) [facility]  Pieter Mullins is a 60 y o  male who presented as stroke alert to [facility] on 1/1/2021 at 11:13 AM with LKW at 8:30AM , initial BP was Blood Pressure: 135/99  Initial presenting deficits were dysarthria and expressive aphasia  CTH/CTA were negative for acute findings  Pt was found to be a tPA candidate within the appropriate time window and without obvious contraindication and tPA was given at 11:55AM     Post tPA exam:    Current BP: Blood Pressure: 131/87    Lab Results   Component Value Date    HGBA1C 5 7 (H) 01/02/2021    CHOLESTEROL 182 01/02/2021    LDLCALC 104 (H) 01/02/2021    TRIG 219 (H) 01/02/2021    INR 0 89 01/01/2021        Imaging:   CTH: unremarkable   CTA: unremarkable  · MRI: No evidence of acute infarct  T2/ FLAIR hyperintense focus at the left temporal juxtacortical region presumed to represent a developmental venous anomaly, however given patient's clinical history a subacute infarct cannot be excluded  Contrast-enhanced MR examination recommended for further characterization     Repeat CTH at 24 hours: pending   Echocardiogram: pending   Telemetry: NSR    Plan:   Stroke pathway   If pt mental status declines by GCS > 2 in 1 hour, obtain CTH urgently   BP as close to but <180 SBP due to recent tPA administration, pressers/fluids indicated if necessary to keep BP at goal  [facility] ASA after repeat CTH as long as no hemorrhage present   atorvastatin 40mg qhs   Maintain glucose below 200   Echo pending   Neuro checks   Monitor on telemetry   Medical management as per critical care appreciated   PT/OT/Speech/PMR consults appreciated when able        Reji Akins will need follow up in in 6 weeks with neurovascular attending or advance practitioner  He will not require outpatient neurological testing  SUBJECTIVE     Reason for Consult / Principal Problem:   Presumed stroke s/p tPA  Hx and PE limited by: Nothing    HPI    HPI: Stephany Doyle is a 60 y o  male with past medical history significant for JESSICA, HTN and HLD who presented on 1/1/2021 as a transfer from [facility] as a stroke alert s/p tPA  He was at home and acutely developed "an inability to speak" at 8:45AM which was witnessed by his mother  On arrival to the ED he was noted to be dysarthric with mixed aphasia, NIHSS of 3 and stroke alert was called  CT head and CTA head and neck were unremarkable  He does not take thinners at baseline, and was otherwise a candidate for tPA which was given at 11:55AM and subsequently transferred to [facility] for further stroke workup  MRI was completed which was negative for acute infarct however did show a congenital vascular abnormality vs subacute stroke in the left temporal juxtacortical region on flair  On my exam this morning, patient appears to be mostly back to baseline, however does have waxing and waning mixed aphasia  Most of the time his conversation is completely intelligible without any errors, at times he appears to stumble over words and has word insertion errors  Repetition is also waxing and waning  On 1st exam, patient was able to repeat several complex sentences with only 1 error  On 2nd visit to the room 15 minutes later he had significant difficulty with repetition  This presentation of waxing and weaning aphasia is not typical for stroke, and given MRI findings and sudden onset of clinical presentation yesterday morning it is pertinent to also rule out seizure as cause of his symptoms    Will additionally obtain routine EEG and as recommended by Radiology, MRI brain with contrast           Inpatient consult to Neurology     Date/Time 1/2/2021 9:25 AM     Performed by  Duc Cerda MD     Authorized by Red Lundy DO Historical Information   Past Medical History:   Diagnosis Date    Bruxism (teeth grinding)     Hypertension     Obesity     Sleep apnea     CPAP nightly     Past Surgical History:   Procedure Laterality Date    TONSILLECTOMY       Social History   Social History     Substance and Sexual Activity   Alcohol Use Yes    Frequency: 2-4 times a month    Drinks per session: 3 or 4    Binge frequency: Never     Social History     Substance and Sexual Activity   Drug Use Never     E-Cigarette/Vaping    E-Cigarette Use Never User      E-Cigarette/Vaping Substances    Nicotine No     THC No     CBD No     Flavoring No     Other No     Unknown No      Social History     Tobacco Use   Smoking Status Former Smoker    Types: Cigarettes   Smokeless Tobacco Never Used     Family History:   Family History   Problem Relation Age of Onset    Heart disease Mother     Heart attack Father     Leukemia Brother      Meds/Allergies   all current active meds have been reviewed, current meds:   Current Facility-Administered Medications   Medication Dose Route Frequency    acetaminophen (TYLENOL) tablet 650 mg  650 mg Oral Q6H PRN    amLODIPine (NORVASC) tablet 10 mg  10 mg Oral Daily    atorvastatin (LIPITOR) tablet 40 mg  40 mg Per NG Tube QPM    hydrALAZINE (APRESOLINE) injection 10 mg  10 mg Intravenous Q6H PRN    hydrochlorothiazide (HYDRODIURIL) tablet 12 5 mg  12 5 mg Oral Daily    losartan (COZAAR) tablet 100 mg  100 mg Oral Daily    and PTA meds:   Prior to Admission Medications   Prescriptions Last Dose Informant Patient Reported? Taking?    amLODIPine (NORVASC) 10 mg tablet 1/1/2021 at Unknown time  No Yes   Sig: Take 1 tablet (10 mg total) by mouth daily   aspirin 81 mg chewable tablet 1/1/2021 at Unknown time Self Yes Yes   Sig: Chew 81 mg daily   atorvastatin (LIPITOR) 10 mg tablet 12/31/2020 at Unknown time Self Yes Yes   Sig: Take 10 mg by mouth daily   hydrochlorothiazide (MICROZIDE) 12 5 mg capsule 1/1/2021 at Unknown time Self Yes Yes   Sig: Take 12 5 mg by mouth daily   valsartan (DIOVAN) 160 mg tablet 1/1/2021 at Unknown time Self Yes Yes   Sig: Take 160 mg by mouth daily      Facility-Administered Medications: None     No Known Allergies    Review of previous medical records was  completed  Review of Systems   Constitutional: Negative for chills and fever  HENT: Negative for ear pain and sore throat  Eyes: Negative for pain and visual disturbance  Respiratory: Negative for cough and shortness of breath  Cardiovascular: Negative for chest pain and palpitations  Gastrointestinal: Negative for abdominal pain and vomiting  Genitourinary: Negative for dysuria and hematuria  Musculoskeletal: Negative for arthralgias and back pain  Skin: Negative for color change and rash  Neurological: Negative for seizures and syncope  All other systems reviewed and are negative  OBJECTIVE     Patient ID: Willis Chávez is a 60 y o  male  Vitals:   Vitals:    01/02/21 0603 01/02/21 0700 01/02/21 0740 01/02/21 0800   BP: 123/83 133/91 135/84 131/87   BP Location:   Left arm Left arm   Pulse: 62 60 58 64   Resp: 18 21 12 (!) 26   Temp:   97 9 °F (36 6 °C)    TempSrc:   Oral    SpO2: 93% 94% 92% 94%   Weight:       Height:          Body mass index is 32 75 kg/m²  Intake/Output Summary (Last 24 hours) at 1/2/2021 0920  Last data filed at 1/2/2021 0856  Gross per 24 hour   Intake 720 ml   Output 1640 ml   Net -920 ml       Physical Exam  Vitals signs and nursing note reviewed  Constitutional:       Appearance: He is well-developed  HENT:      Head: Normocephalic and atraumatic  Eyes:      Extraocular Movements: EOM normal       Conjunctiva/sclera: Conjunctivae normal       Pupils: Pupils are equal, round, and reactive to light  Neck:      Musculoskeletal: Neck supple  Cardiovascular:      Rate and Rhythm: Normal rate and regular rhythm  Heart sounds: No murmur     Pulmonary: Effort: Pulmonary effort is normal  No respiratory distress  Breath sounds: Normal breath sounds  Abdominal:      Palpations: Abdomen is soft  Tenderness: There is no abdominal tenderness  Skin:     General: Skin is warm and dry  Neurological:      Mental Status: He is alert and oriented to person, place, and time  Coordination: Finger-Nose-Finger Test normal       Gait: Gait is intact  Deep Tendon Reflexes: Strength normal    Psychiatric:         Mood and Affect: Mood normal          Behavior: Behavior normal          Thought Content: Thought content normal           Neurologic Exam     Mental Status   Oriented to person, place, and time  Level of consciousness: alert  Knowledge: good  Able to name object  Able to read  Normal comprehension  Waxing and waning repetition and speech errors  At times conversation is baseline and he is able to repeat complex sentences adequately  At other times he has word insertion errors and expressive aphasia     Cranial Nerves     CN II   Visual fields full to confrontation  CN III, IV, VI   Pupils are equal, round, and reactive to light  Extraocular motions are normal      CN V   Facial sensation intact  CN VII   Facial expression full, symmetric  CN VIII   CN VIII normal      CN IX, X   CN IX normal    CN X normal      CN XI   CN XI normal      CN XII   CN XII normal      Motor Exam   Muscle bulk: normal  Overall muscle tone: normal  Right arm pronator drift: absent  Left arm pronator drift: absent    Strength   Strength 5/5 throughout  Sensory Exam   Light touch normal      Gait, Coordination, and Reflexes     Gait  Gait: normal    Coordination   Finger to nose coordination: normal    Reflexes   Reflexes 2+ except as noted  LABORATORY DATA     Labs: I have personally reviewed pertinent reports      Results from last 7 days   Lab Units 01/02/21  0440 01/01/21  1144   WBC Thousand/uL 7 37 7 90   HEMOGLOBIN g/dL 15 4 16 0   HEMATOCRIT % 47 3 47 1*   PLATELETS Thousands/uL 214 204   NEUTROS PCT % 61  --    MONOS PCT % 9  --       Results from last 7 days   Lab Units 01/02/21  0440 01/01/21  1144   POTASSIUM mmol/L 4 3 4 3   CHLORIDE mmol/L 108 102   CO2 mmol/L 30 27   BUN mg/dL 10 14   CREATININE mg/dL 0 81 0 68*   CALCIUM mg/dL 9 0 9 3              Results from last 7 days   Lab Units 01/01/21  1144   INR  0 89   PTT seconds 27         Results from last 7 days   Lab Units 01/01/21  1144   TROPONIN I ng/mL <0 03       IMAGING & DIAGNOSTIC TESTING     Radiology Results: I have personally reviewed pertinent reports  and I have personally reviewed pertinent films in PACS  MRI brain wo contrast   Final Result by Marianne Ferguson MD (01/02 0019)      No evidence of acute infarct  T2/ FLAIR hyperintense focus at the left temporal juxtacortical region presumed to represent a developmental venous anomaly, however given patient's clinical history a subacute infarct cannot be excluded  Contrast-enhanced MR examination recommended for    further characterization  The study was marked in EPIC for significant notification  Workstation performed: [id number]         CT head wo contrast    (Results Pending)       Other Diagnostic Testing: I have personally reviewed pertinent reports  ACTIVE MEDICATIONS     Current Facility-Administered Medications   Medication Dose Route Frequency    acetaminophen (TYLENOL) tablet 650 mg  650 mg Oral Q6H PRN    amLODIPine (NORVASC) tablet 10 mg  10 mg Oral Daily    atorvastatin (LIPITOR) tablet 40 mg  40 mg Per NG Tube QPM    hydrALAZINE (APRESOLINE) injection 10 mg  10 mg Intravenous Q6H PRN    hydrochlorothiazide (HYDRODIURIL) tablet 12 5 mg  12 5 mg Oral Daily    losartan (COZAAR) tablet 100 mg  100 mg Oral Daily       Prior to Admission medications    Medication Sig Start Date End Date Taking?  Authorizing Provider   amLODIPine (NORVASC) 10 mg tablet Take 1 tablet (10 mg total) by mouth daily 11/5/20  Yes Feng Lilly,    aspirin 81 mg chewable tablet Chew 81 mg daily   Yes Historical Provider, MD   atorvastatin (LIPITOR) 10 mg tablet Take 10 mg by mouth daily   Yes Historical Provider, MD   hydrochlorothiazide (MICROZIDE) 12 5 mg capsule Take 12 5 mg by mouth daily   Yes Historical Provider, MD   valsartan (DIOVAN) 160 mg tablet Take 160 mg by mouth daily   Yes Historical Provider, MD       CODE STATUS & ADVANCED DIRECTIVES     Code Status: No Order  Advance Directive and Living Will:      Power of :    POLST:        VTE Pharmacologic Prophylaxis: Pharmacologic VTE Prophylaxis contraindicated due to recent tPA administration  VTE Mechanical Prophylaxis: sequential compression device    ==  MD Omar Zarate 74 Neurology Residency, PGY-2

## 2021-01-02 NOTE — CASE MANAGEMENT
PT IS NOT A 30 DAYS READMISSION  PT IS NOT A BUNDLE  CM contacted pt's mother Ivana to discuss DCP and cm role  Pt lives with mother in a ranch home with 5 myron  Prior to admission pt was independent with ambulation and with ADLS  No prior hx of VNA  Pt's preference for pharmacy is [facility] in [facility]  Pt has no hx of drugs/ETOH or inpt psych stays  CM reviewed d/c planning process including the following: identifying help at home, patient preference for d/c planning needs, Discharge Lounge, [facility] Meds to Bed program, availability of treatment team to discuss questions or concerns patient and/or family may have regarding understanding medications and recognizing signs and symptoms once discharged  CM also encouraged patient to follow up with all recommended appointments after discharge  Patient advised of importance for patient and family to participate in managing patients medical well being

## 2021-01-02 NOTE — PROGRESS NOTES
Code Status: Level 1 - Full Code  POA:    POLST:      Reason for ICU admission:   Speech Arrest incident stroke vs seizure s/p tPA 01/01/2021 AM     Active problems:   Principal Problem:    CVA (cerebral vascular accident) ([address] )  Active Problems:    Hypercholesterolemia    Essential hypertension    Obstructive sleep apnea syndrome    Class 1 obesity due to excess calories with serious comorbidity and body mass index (BMI) of 32 0 to 32 9 in adult    Prediabetes  Resolved Problems:    * No resolved hospital problems  *      Consultants:   Neurology   Epileptology   Nutrition     History of Present Illness:   60 y o  male w PMH of HTN, Obsetiy, Sleep apena and HLD who presents with an incident of slurred speech started today morning while talking to his mother, for which he took 10 of his home daily ASA 81mg (6*81mg), and went to [facility] ED stroke alert was called, NIH 3, Neuro was consulted and the patient was given tPA at 11:55am, and transferred to [facility] ICU for further evaluation and management  Summary of clinical course:   Arrived to [facility] 01/02 night AOX4, overall negative neuro exam, overnight used CPAP given JESSICA hx, BP controlled goal SBP<160, noted to have intermittent/incidental difficulty finding the right words sometimes still, otherwise PE unremarkable, MRI showed a temporal MATT region possible venous anomaly for which f/u MRI w contrast order for further evaluation as well as Spot EEG to r/o epileptic activity/seizure  CTH 24H post TPA today no acute abnormalities  DVT prophylaxis and daily ASA added  1/1 MRI- No evidence of acute infarct   T2/ FLAIR hyperintense focus at the left temporal juxtacortical region presumed to represent a developmental venous anomaly, however given patient's clinical history a subacute infarct cannot be excluded    Recent or scheduled procedures:   Spot EEG   MRI Head w contrast     Outstanding/pending diagnostics:   n/a    Cultures:   None        Mobilization Plan: As tolerated, PT/OT following     Nutrition Plan:   Regular low salt diet, nutrition consulted     Invasive Devices Review  Invasive Devices     Peripheral Intravenous Line            Peripheral IV 01/01/21 Antecubital 1 day                Rationale for remaining devices: n/a   Peripheral IV while in patient     VTE Pharmacologic Prophylaxis: Heparin  VTE Mechanical Prophylaxis: sequential compression device    Discharge Plan:   Patient should be ready for discharge to med surg on 1/2/2021 after 2 pm     Initial Physical Therapy Recommendations: TBD       Initial Occupational Therapy Recommendations: TBD   Initial /Plan: following     Home medications that are not reordered and reason why:   Lipitor increased to 40 mg   Daily ASA was held initially but restarted 1/02/2021       Spoke with Dr Papo Arana  regarding transfer  Please call Meliza Tellez, DO with any questions or concerns  Portions of the record may have been created with voice recognition software  Occasional wrong word or "sound a like" substitutions may have occurred due to the inherent limitations of voice recognition software  Read the chart carefully and recognize, using context, where substitutions have occurred

## 2021-01-02 NOTE — SPEECH THERAPY NOTE
Speech Language/Pathology  Speech/Language Pathology  Assessment    Patient Name: Kamar Saxena  QDOAL'A Date: 1/2/2021     Problem List  Principal Problem:    CVA (cerebral vascular accident) [facility])    Past Medical History  Past Medical History:   Diagnosis Date    Bruxism (teeth grinding)     Hypertension     Obesity     Sleep apnea     CPAP nightly     Past Surgical History  Past Surgical History:   Procedure Laterality Date    TONSILLECTOMY          Bedside Swallow Evaluation:    Summary:  Pt presents w/ oral and pharyngeal stages that are wnl for mastication, bolus control, formation, transfer, prompt swallow, and laryngeal rise  No overt s/s aspiration  t denied any swallowing diffiuclty and was toelrating [place] toast and  thin liquids WNL  Speech was clear and fluent at this time w/ no dysarthria, word-finding diffiuclties,  or paraphasias  No need for self correction  Pt denied any current difficulty and was quite conversive  Recommendations:  Diet:regular  Liquid:thin  Meds:as tolerated  Supervision:none at this time  Positioning:Upright  Strategies: Pt to take PO/Meds only when fully alert and upright  Oral care  Aspiration precautions  Reflux precautions  Eval only, No f/u tx indicated  If there are speech/language concerns or status changes, recommend reconsult speech (or f/u as OP if mild)       Patient's goal: hopes he can go home soon    Consider consult w/:  Nutrition    Reason for consult:  R/o aspiration  Determine safest and least restrictive diet  New neuro event  Stroke protocol  Current diet:  Was NPO, now regular ordered this am  Premorbid diet[de-identified]  regular  Previous VBS:  none  O2 requirement:  none  Voice/Speech:  wnl  Social:  States his mother SO dies about a year ago so they live together now  Follows commands:  yes                        Cognitive Status:  A&O  [facility]  Dentition:wnl  Labial strength and ROM:+  Lingual strength and ROM:+  Mandibular strength and ROM:+  Secretion management:wnl    Items administered:  Noted above    Results d/w:  Pt, nursing  Nursing noted no deficits observed  Per H&P  Chief Complaint: Acute L cerebral CVA  Stephanie Louie is a 60 y o  male w PMH of HTN, Obsetiy, Sleep apena and HLD who presents with an incident of slurred speech started today morning while talking to his mother, for which he took 10 of his home daily ASA 81mg (6*81mg), and went to [facility] ED stroke alert was called, Neuro was consulted and the patient was given tPA at 11:55am, and transferred to [facility] ICU for further evaluation and management  Per neuro:1/1  Assessment/Plan   Assessment: Acute left hemispheric cva  Pt in window for iv tpa and received bolus at 11:55 am  He exhibits an expressive>receptive aphasia  He has vascular risk factors such as JESSICA, htn, hlp  He's on lipitor at home, no antiplatelets  Etiology unclear  No cardiac hx or palpitation hx  No nidus on cta head/neck  No infectious signs/symptoms  Plan:  Pt being transferred to [facility]  Follow post iv tpa parameters  Neurology team will see over weekend  Mri brain w/o contrast  Tele  2-d echo  Pt will need post IV TPA 24 hour head ct    MRI 1/1  IMPRESSION:  No evidence of acute infarct  T2/ FLAIR hyperintense focus at the left temporal juxtacortical region presumed to represent a developmental venous anomaly, however given patient's clinical history a subacute infarct cannot be excluded  Contrast-enhanced MR examination recommended for   further characterization

## 2021-01-02 NOTE — UTILIZATION REVIEW
Initial Clinical Review    Admission: Date/Time/Statement:   Admission Orders (From admission, onward)     Ordered        01/01/21 1537  Inpatient Admission  Once                   Orders Placed This Encounter   Procedures    Inpatient Admission     Standing Status:   Standing     Number of Occurrences:   1     Order Specific Question:   Admitting Physician     Answer:   Paramjit Irving     Order Specific Question:   Level of Care     Answer:   Critical Care [15]     Order Specific Question:   Estimated length of stay     Answer:   More than 2 Midnights     Order Specific Question:   Certification     Answer:   I certify that inpatient services are medically necessary for this patient for a duration of greater than two midnights  See H&P and MD Progress Notes for additional information about the patient's course of treatment  Assessment/Plan: transferred from [place] [id number]  60 y o  male w PMH of HTN, Obsetiy, Sleep apena and HLD who presents with an incident of slurred speech started today morning while talking to his mother, for which he took 6 of his home daily ASA 81mg (6*81mg), and went to [facility] ED stroke alert was called, Neuro was consulted and the patient was given tPA at 11:55am, and transferred to [facility] ICU for further evaluation and management  Admitted to inpatient status for Acute CVA- presume left MCA distribution (NIHSS 3) s/p tPA, neuro consulted      ED Triage Vitals   Temperature Pulse Respirations Blood Pressure SpO2   01/01/21 1532 01/01/21 1532 01/01/21 1532 01/01/21 1532 01/01/21 1532   98 °F (36 7 °C) 78 22 135/99 95 %      Temp Source Heart Rate Source Patient Position - Orthostatic VS BP Location FiO2 (%)   01/01/21 1532 01/01/21 1532 01/01/21 1532 01/01/21 1532 --   Oral Monitor Lying Left arm       Pain Score       01/01/21 1530       1          Wt Readings from Last 1 Encounters:   01/01/21 119 kg (262 lb)     Additional Vital Signs:   01/01/21 1830  --  84 34Abnormal   174/98Abnormal   119  95 %  None (Room air)  Lying   01/01/21 1800  --  80  36Abnormal   161/89  112  92 %  None (Room air)  Lying   01/01/21 1730  --  70  26Abnormal   139/85  100  95 %  --  Lying   01/01/21 1700  --  74  20  147/92  106  95 %  None (Room air)  Lying   01/01/21 1629  --  74  25Abnormal   132/95  --  94 %  None (Room air)  Lying   01/01/21 1600  --  72  26Abnormal   150/99  114  96 %  None (Room air)  Lying   01/01/21 1532  98 °F (36 7 °C)  78  22  135/99  --  95 %  None (Room air)  Lying     Pertinent Labs/Diagnostic Test Results:   Results from last 7 days   Lab Units 01/01/21  1144   SARS-COV-2  Negative     Results from last 7 days   Lab Units 01/02/21  0440 01/01/21  1144   WBC Thousand/uL 7 37 7 90   HEMOGLOBIN g/dL 15 4 16 0   HEMATOCRIT % 47 3 47 1*   PLATELETS Thousands/uL 214 204   NEUTROS ABS Thousands/µL 4 47  --      Results from last 7 days   Lab Units 01/02/21  0440 01/01/21  1144   SODIUM mmol/L 142 137   POTASSIUM mmol/L 4 3 4 3   CHLORIDE mmol/L 108 102   CO2 mmol/L 30 27   ANION GAP mmol/L 4 8   BUN mg/dL 10 14   CREATININE mg/dL 0 81 0 68*   EGFR ml/min/1 73sq m 94 101   CALCIUM mg/dL 9 0 9 3     Results from last 7 days   Lab Units 01/01/21  1113   POC GLUCOSE mg/dl 86     Results from last 7 days   Lab Units 01/02/21  0440 01/01/21  1144   GLUCOSE RANDOM mg/dL 115 90     Results from last 7 days   Lab Units 01/02/21  0440   HEMOGLOBIN A1C % 5 7*   EAG mg/dl 117     Results from last 7 days   Lab Units 01/01/21  1144   TROPONIN I ng/mL <0 03     Results from last 7 days   Lab Units 01/01/21  1144   PROTIME seconds 12 0   INR  0 89   PTT seconds 27     Results from last 7 days   Lab Units 01/01/21  1144   TSH 3RD GENERATON uIU/mL 3 660     Results from last 7 days   Lab Units 01/01/21  1144   INFLUENZA A PCR  Negative   INFLUENZA B PCR  Negative   RSV PCR  Negative     1/1  Ct brain =No acute intracranial abnormality  CTA head & neck=1    Unremarkable CTA of the neck and brain  No evidence of large vessel central occlusive disease involving the [place] or its branch vessels  If there is continued concern for acute cerebral ischemia, recommend follow-up MRI of the brain with  diffusion-weighted sequencing  2   No pulmonary parenchymal changes to suggest COVID19 infection  MRI brain=No evidence of acute infarct  T2/ FLAIR hyperintense focus at the left temporal juxtacortical region presumed to represent a developmental venous anomaly, however given patient's clinical history a subacute infarct cannot be excluded  Contrast-enhanced MR examination recommended for  further characterization  1/2  CT head=No acute intracranial abnormality  Specifically, no noncontrast CT evidence of acute intracranial hemorrhage or evolving acute intracranial ischemia  Past Medical History:   Diagnosis Date    Bruxism (teeth grinding)     Hypertension     Obesity     Sleep apnea     CPAP nightly     Admitting Diagnosis: CVA (cerebral vascular accident) ([facility] [address] ) [I63 9]  Age/Sex: 60 y o  male  Admission Orders:  Neuro checks:Every 1 hour x 4 hours, then every 2 hours x 8 hours, then every 4 hours x 72 hours  Nursing dysphagia assessment  Consult neuro  Pt/ot/st eval & tx  1:1 observation    Scheduled Medications:  amLODIPine, 10 mg, Oral, Daily  aspirin, 81 mg, Oral, Daily  atorvastatin, 40 mg, Per NG Tube, QPM  heparin (porcine), 5,000 Units, Subcutaneous, Q8H ERICA  hydrochlorothiazide, 12 5 mg, Oral, Daily  losartan, 100 mg, Oral, Daily    PRN Meds:  acetaminophen, 650 mg, Oral, Q6H PRN  hydrALAZINE, 10 mg, Intravenous, Q6H PRN    Network Utilization Review Department  ATTENTION: Please call with any questions or concerns to [phone] and carefully listen to the prompts so that you are directed to the right person   All voicemails are confidential   Odumnomi Lozano all requests for admission clinical reviews, approved or denied determinations and any other requests to dedicated fax number below belonging to the campus where the patient is receiving treatment   List of dedicated fax numbers for the Facilities:  [address] [place] [address] DENIALS (Administrative/Medical Necessity) [phone]   [address] (Maternity/NICU/Pediatrics) [phone] [address] Virginia Bliss 1531 (  Mckayla Araya "Thea" 103) [address] [address] Michela Perez 1481 [address] [place] [address] [phone]

## 2021-01-02 NOTE — PROGRESS NOTES
Daily Progress Note - Critical Care   Isaac Ochoa 60 y o  male MRN: [id number]  Unit/Bed#: ICU 08 Encounter: [id number]        ----------------------------------------------------------------------------------------  HPI/24hr events: no acute events overnight except for two times noted the patient use/ pick wrong word during talking/chatting which he correct quickly otherwise stable and neuro checks are normal since admission      ---------------------------------------------------------------------------------------  SUBJECTIVE  - GENERAL: Negative for any nausea, vomiting, fevers, chills, or weight loss  - HEENT: positive for mild headache overnight which was relieved by Tylenol  Negative for any head/Neck trauma, double/blurry vision, sinusitis, rhinitis, nose bleeding   - CARDIAC: Negative for any chest pain, palpitation, Dyspnea on exertion, peripheral edema  - PULMONARY: Negative for any SOB, cough, wheezing    - GASTROINTESTINAL: Negative for any abdominal pain, N/V/D/C, blood in stool    - GENITOURINARY: Negative for any dysuria, hematuria, incontinence   - NEUROLOGIC: Negative for any muscle weakness, numbness/tingling, memory changes  - MUSCULOSKELETAL: Negative for any joint pains/swelling, limited ROM  - INTEGUMENTARY: Negative for any rashes, cuts/ lesions   - HEMATOLOGIC: Negative for any abnormal bruising, frequent infections or bleeding  Review of Systems  Review of systems was reviewed and negative unless stated above in HPI/24-hour events   ---------------------------------------------------------------------------------------  Assessment and Plan:    Neuro:   · Diagnosis: Acute Left Hemispheric CVA   CT Stroke alert brain - no e Head/Neck - unremarkable   ? Plan: Neurology is consulted and following  ? Repeat CT Head 24H post tpa - scheduled    ? MRI head: no acute infarct - see full report for more details   ?  On Cardio-Pulmonary monitoring      CV:   · Diagnosis: Hypertension · Plan: Continue home medications:               - Norvasc 10 daily               - HCZ 12 5 mg daily               - Valsartan 160 switched to Losartan 100 mg daily while in patient               - Hydralazine PRN for SBP>160 and HR>70     · Diagnosis: Acute CVA stroke - unknown etiology   ? Echocardiogram , f/u repor     · Diagnosis: Hyperlipidemia   ? Lipid profile -  H , H and HDL <50   ? home Lipitor 10 mg increased to 40 mg      Pulm:  · Diagnosis: Obstructive Sleep Apnea   ? Plan: CPAP at bed time         GI:   · Diagnosis: no acute issue   ? Plan: no indication for ulcer prophylaxis      :   · Diagnosis: No acute issue   ? I/O      F/E/N:   · Plan: Bedside swallow eval and Clear liquid diet > started on Regular Diet 1/2  · Strict I/O   · Monitor electrolytes and replete as needed          Heme/Onc:   ? Diagnosis: No acute issues   ? S/p tpa today morning         Endo:    · Diagnosis: No acute issues   ? Plan: TSH w reflex to T4 - normal   ? HA1C - 5 7 (Prediabetes)  ? Counseling on weight and diet management   ? Exercise and lifestyle changes   ? F/u with PCP for further management      ID:   ? Diagnosis: no acute issue   ? Patient afebrile and no leucocytosis   ? Trend WBC and Temperature         MSK/Skin:   · Diagnosis: No acute issues   ? Plan: encourage mobilization / frequent turning   ? PT/OT Eval and Treatment   ? PMNR consult       Disposition: Continue Critical Care till after 24H post tPA Ct H; then will consider transfer to Med Surgery   Code Status: No Order  ---------------------------------------------------------------------------------------  ICU CORE MEASURES    Prophylaxis   VTE Pharmacologic Prophylaxis: Pharmacologic VTE Prophylaxis contraindicated due to post tPA   VTE Mechanical Prophylaxis: sequential compression device  Stress Ulcer Prophylaxis: Prophylaxis Not Indicated     ABCDE Protocol (if indicated)  Plan to perform spontaneous awakening trial today?  Not applicable  Plan to perform spontaneous breathing trial today? Not applicable  Obvious barriers to extubation? Not applicable  CAM-ICU: Negative    Invasive Devices Review  Invasive Devices     Peripheral Intravenous Line            Peripheral IV 21 Antecubital less than 1 day              Can any invasive devices be discontinued today? Not applicable , continue IV peripheral line while inpatient   ---------------------------------------------------------------------------------------  OBJECTIVE    Vitals   Vitals:    21 0603 21 0700 21 0740 21 0800   BP: 123/83 133/91 135/84 131/87   BP Location:   Left arm Left arm   Pulse: 62 60 58 64   Resp: 18 21 12 (!) 26   Temp:   97 9 °F (36 6 °C)    TempSrc:   Oral    SpO2: 93% 94% 92% 94%   Weight:       Height:         Temp (24hrs), Av °F (36 7 °C), Min:97 5 °F (36 4 °C), Max:98 5 °F (36 9 °C)  Current: Temperature: 97 9 °F (36 6 °C)  HR:64  BP: 130/80   RR: 20  SpO2: 94%  Respiratory:  SpO2: SpO2: 94 %       Invasive/non-invasive ventilation settings   Respiratory    Lab Data (Last 4 hours)    None         O2/Vent Data (Last 4 hours)    None                Physical Exam  - GEN: Appears well, alert and oriented x 3, pleasant and cooperative, in no acute distress  - HEENT: Anicteric, mucous membranes moist, PERRL and EOMI   - NECK: No lymphadenopathy, JVD or carotid bruits   - HEART: RRR, normal S1 and S2, no murmurs, clicks, gallops or rubs   - LUNGS: Clear to auscultation bilaterally; no wheezes, rales, or rhonchi  - ABDOMEN: Normal bowel sounds, soft, no tenderness, no distention, no organomegaly or masses felt on exam    - EXTREMITIES: Peripheral pulses normal; no clubbing, cyanosis, or edema  - NEURO: No focal findings, CN II-XII are grossly intact  - Musculoskeletal: 5/5 strength, normal ROM, no swollen or erythematous joints     - SKIN: Normal without suspicious lesions on exposed skin  Laboratory and Diagnostics:  Results from last 7 days   Lab Units 01/02/21  0440 01/01/21  1144   WBC Thousand/uL 7 37 7 90   HEMOGLOBIN g/dL 15 4 16 0   HEMATOCRIT % 47 3 47 1*   PLATELETS Thousands/uL 214 204   NEUTROS PCT % 61  --    MONOS PCT % 9  --      Results from last 7 days   Lab Units 01/02/21  0440 01/01/21  1144   SODIUM mmol/L 142 137   POTASSIUM mmol/L 4 3 4 3   CHLORIDE mmol/L 108 102   CO2 mmol/L 30 27   ANION GAP mmol/L 4 8   BUN mg/dL 10 14   CREATININE mg/dL 0 81 0 68*   CALCIUM mg/dL 9 0 9 3   GLUCOSE RANDOM mg/dL 115 90          Results from last 7 days   Lab Units 01/01/21  1144   INR  0 89   PTT seconds 27      Results from last 7 days   Lab Units 01/01/21  1144   TROPONIN I ng/mL <0 03         ABG:    VBG:          Micro        EKG: N/A   Imaging: I have personally reviewed pertinent reports   - See NEURO A&P    Intake and Output  I/O       12/31 0701 - 01/01 0700 01/01 0701 - 01/02 0700 01/02 0701 - 01/03 0700    P  O   720     Total Intake(mL/kg)  720 (6 1)     Urine (mL/kg/hr)  1400     Total Output  1400     Net  -680                UOP: 0 5 ml/kg/hr! Height and Weights   Height: 6' 3" (190 5 cm)  IBW: 84 5 kg  Body mass index is 32 75 kg/m²  Weight (last 2 days)     Date/Time   Weight    01/01/21 1532   119 (262)                Nutrition       Diet Orders   (From admission, onward)             Start     Ordered    01/02/21 0832  Diet Regular; Regular House  Diet effective now     Question Answer Comment   Diet Type Regular    Regular Regular House    RD to adjust diet per protocol?  Yes        01/02/21 0831              TF n/a on regular diet       Active Medications  Scheduled Meds:  Current Facility-Administered Medications   Medication Dose Route Frequency Provider Last Rate    acetaminophen  650 mg Oral Q6H PRN PAMELA Reina      amLODIPine  10 mg Oral Daily Rakan Aiad, DO      atorvastatin  40 mg Per NG Tube QPM Rakan Aiad, DO      hydrALAZINE  10 mg Intravenous Q6H PRN Rakan Aiad, DO      hydrochlorothiazide 12 5 mg Oral Daily Rakan Aiad, DO      losartan  100 mg Oral Daily Rakan Aiad, DO       Continuous Infusions:     PRN Meds:   acetaminophen, 650 mg, Q6H PRN  hydrALAZINE, 10 mg, Q6H PRN        Allergies   No Known Allergies  ---------------------------------------------------------------------------------------  Advance Directive and Living Will:      Power of :    POLST:    ---------------------------------------------------------------------------------------  Care Time Delivered:   No Critical Care time spent     [facility],       Portions of the record may have been created with voice recognition software  Occasional wrong word or "sound a like" substitutions may have occurred due to the inherent limitations of voice recognition software    Read the chart carefully and recognize, using context, where substitutions have occurred

## 2021-01-03 ENCOUNTER — APPOINTMENT (INPATIENT)
Dept: RADIOLOGY | Facility: HOSPITAL | Age: 66
DRG: 055 | End: 2021-01-03
Payer: COMMERCIAL

## 2021-01-03 PROBLEM — R56.9 SEIZURE-LIKE ACTIVITY (HCC): Status: ACTIVE | Noted: 2021-01-03

## 2021-01-03 PROCEDURE — G1004 CDSM NDSC: HCPCS

## 2021-01-03 PROCEDURE — 94660 CPAP INITIATION&MGMT: CPT

## 2021-01-03 PROCEDURE — 94760 N-INVAS EAR/PLS OXIMETRY 1: CPT

## 2021-01-03 PROCEDURE — 99233 SBSQ HOSP IP/OBS HIGH 50: CPT | Performed by: PSYCHIATRY & NEUROLOGY

## 2021-01-03 PROCEDURE — 71260 CT THORAX DX C+: CPT

## 2021-01-03 PROCEDURE — 99232 SBSQ HOSP IP/OBS MODERATE 35: CPT | Performed by: INTERNAL MEDICINE

## 2021-01-03 PROCEDURE — B01B1ZZ FLUOROSCOPY OF SPINAL CORD USING LOW OSMOLAR CONTRAST: ICD-10-PCS | Performed by: RADIOLOGY

## 2021-01-03 PROCEDURE — 74177 CT ABD & PELVIS W/CONTRAST: CPT

## 2021-01-03 PROCEDURE — 009U3ZX DRAINAGE OF SPINAL CANAL, PERCUTANEOUS APPROACH, DIAGNOSTIC: ICD-10-PCS | Performed by: RADIOLOGY

## 2021-01-03 PROCEDURE — 87389 HIV-1 AG W/HIV-1&-2 AB AG IA: CPT | Performed by: PSYCHIATRY & NEUROLOGY

## 2021-01-03 RX ORDER — LEVETIRACETAM 500 MG/1
1000 TABLET ORAL EVERY 12 HOURS SCHEDULED
Status: DISCONTINUED | OUTPATIENT
Start: 2021-01-03 | End: 2021-01-06 | Stop reason: HOSPADM

## 2021-01-03 RX ORDER — HEPARIN SODIUM 5000 [USP'U]/ML
5000 INJECTION, SOLUTION INTRAVENOUS; SUBCUTANEOUS EVERY 8 HOURS SCHEDULED
Status: DISCONTINUED | OUTPATIENT
Start: 2021-01-04 | End: 2021-01-04

## 2021-01-03 RX ADMIN — ATORVASTATIN CALCIUM 40 MG: 40 TABLET, FILM COATED ORAL at 17:52

## 2021-01-03 RX ADMIN — HEPARIN SODIUM 5000 UNITS: 5000 INJECTION INTRAVENOUS; SUBCUTANEOUS at 14:54

## 2021-01-03 RX ADMIN — IOHEXOL 100 ML: 350 INJECTION, SOLUTION INTRAVENOUS at 21:41

## 2021-01-03 RX ADMIN — ASPIRIN 81 MG CHEWABLE TABLET 81 MG: 81 TABLET CHEWABLE at 08:41

## 2021-01-03 RX ADMIN — LOSARTAN POTASSIUM 100 MG: 50 TABLET, FILM COATED ORAL at 08:42

## 2021-01-03 RX ADMIN — HEPARIN SODIUM 5000 UNITS: 5000 INJECTION INTRAVENOUS; SUBCUTANEOUS at 05:14

## 2021-01-03 RX ADMIN — LEVETIRACETAM 1000 MG: 500 TABLET, FILM COATED ORAL at 21:03

## 2021-01-03 RX ADMIN — AMLODIPINE BESYLATE 10 MG: 10 TABLET ORAL at 08:42

## 2021-01-03 RX ADMIN — HYDROCHLOROTHIAZIDE 12.5 MG: 12.5 TABLET ORAL at 08:41

## 2021-01-03 NOTE — PLAN OF CARE
Problem: Neurological Deficit  Goal: Neurological status is stable or improving  Description: Interventions:  - Monitor and assess patient's level of consciousness, motor function, sensory function, and level of assistance needed for ADLs  - Monitor and report changes from baseline  Collaborate with interdisciplinary team to initiate plan and implement interventions as ordered  - Provide and maintain a safe environment  - Consider seizure precautions  - Consider fall precautions  - Consider aspiration precautions  - Consider bleeding precautions  Outcome: Progressing     Problem: Communication Impairment  Goal: Ability to express needs and understand communication  Description: Assess patient's communication skills and ability to understand information  Patient will demonstrate use of effective communication techniques, alternative methods of communication and understanding even if not able to speak  - Encourage communication and provide alternate methods of communication as needed  - Collaborate with case management/ for discharge needs  - Include patient/family/caregiver in decisions related to communication  Outcome: Progressing     Problem: Potential for Aspiration  Goal: Non-ventilated patient's risk of aspiration is minimized  Description: Assess and monitor vital signs, respiratory status, and labs (WBC)  Monitor for signs of aspiration (tachypnea, cough, rales, wheezing, cyanosis, fever)  - Assess and monitor patient's ability to swallow  - Place patient up in chair to eat if possible  - HOB up at 90 degrees to eat if unable to get patient up into chair   - Supervise patient during oral intake  - Instruct patient/ family to take small bites  - Instruct patient/ family to take small single sips when taking liquids    - Follow patient-specific strategies generated by speech pathologist   Outcome: Progressing     Problem: Nutrition  Goal: Nutrition/Hydration status is improving  Description: Monitor and assess patient's nutrition/hydration status for malnutrition (ex- brittle hair, bruises, dry skin, pale skin and conjunctiva, muscle wasting, smooth red tongue, and disorientation)  Collaborate with interdisciplinary team and initiate plan and interventions as ordered  Monitor patient's weight and dietary intake as ordered or per policy  Utilize nutrition screening tool and intervene per policy  Determine patient's food preferences and provide high-protein, high-caloric foods as appropriate  - Assist patient with eating   - Allow adequate time for meals   - Encourage patient to take dietary supplement as ordered  - Collaborate with clinical nutritionist   - Include patient/family/caregiver in decisions related to nutrition  Outcome: Progressing     Problem: Prexisting or High Potential for Compromised Skin Integrity  Goal: Skin integrity is maintained or improved  Description: INTERVENTIONS:  - Identify patients at risk for skin breakdown  - Assess and monitor skin integrity  - Assess and monitor nutrition and hydration status  - Monitor labs   - Assess for incontinence   - Turn and reposition patient  - Assist with mobility/ambulation  - Relieve pressure over bony prominences  - Avoid friction and shearing  - Provide appropriate hygiene as needed including keeping skin clean and dry  - Evaluate need for skin moisturizer/barrier cream  - Collaborate with interdisciplinary team   - Patient/family teaching  - Consider wound care consult   Outcome: Progressing     Problem: Potential for Falls  Goal: Patient will remain free of falls  Description: INTERVENTIONS:  - Assess patient frequently for physical needs  -  Identify cognitive and physical deficits and behaviors that affect risk of falls    -  [place] fall precautions as indicated by assessment   - Educate patient/family on patient safety including physical limitations  - Instruct patient to call for assistance with activity based on assessment  - Modify environment to reduce risk of injury  - Consider OT/PT consult to assist with strengthening/mobility  Outcome: Progressing     Problem: PAIN - ADULT  Goal: Verbalizes/displays adequate comfort level or baseline comfort level  Description: Interventions:  - Encourage patient to monitor pain and request assistance  - Assess pain using appropriate pain scale  - Administer analgesics based on type and severity of pain and evaluate response  - Implement non-pharmacological measures as appropriate and evaluate response  - Consider cultural and social influences on pain and pain management  - Notify physician/advanced practitioner if interventions unsuccessful or patient reports new pain  Outcome: Progressing     Problem: DISCHARGE PLANNING  Goal: Discharge to home or other facility with appropriate resources  Description: INTERVENTIONS:  - Identify barriers to discharge w/patient   - Arrange for needed discharge resources and transportation as appropriate  - Identify discharge learning needs (meds, wound care, etc )  - Arrange for interpretive services to assist at discharge as needed  - Refer to Case Management Department for coordinating discharge planning if the patient needs post-hospital services based on physician/advanced practitioner order or complex needs related to functional status, cognitive ability, or social support system  Outcome: Progressing     Problem: Knowledge Deficit  Goal: Patient/family/caregiver demonstrates understanding of disease process, treatment plan, medications, and discharge instructions  Description: Complete learning assessment and assess knowledge base    Interventions:  - Provide teaching at level of understanding  - Provide teaching via preferred learning methods  Outcome: Progressing     Problem: Nutrition/Hydration-ADULT  Goal: Nutrient/Hydration intake appropriate for improving, restoring or maintaining nutritional needs  Description: Monitor and assess patient's nutrition/hydration status for malnutrition  Collaborate with interdisciplinary team and initiate plan and interventions as ordered  Monitor patient's weight and dietary intake as ordered or per policy  Utilize nutrition screening tool and intervene as necessary  Determine patient's food preferences and provide high-protein, high-caloric foods as appropriate       INTERVENTIONS:  - Monitor oral intake, urinary output, labs, and treatment plans  - Assess nutrition and hydration status and recommend course of action  - Evaluate amount of meals eate  - Allow adequate time for meals  - Recommend/ encourage appropriate diets, oral nutritional supplements, and vitamin/mineral supplements  - Order, calculate, and assess calorie counts as needed  - Recommend, monitor, and adjust tube feedings and TPN/PPN based on assessed needs  - Assess need for intravenous fluids  - Provide specific nutrition/hydration education as appropriate  - Include patient/family/caregiver in decisions related to nutrition  Outcome: Progressing

## 2021-01-03 NOTE — PROGRESS NOTES
NEUROLOGY RESIDENCY PROGRESS NOTE     Name: Charly Carlisle   Age & Sex: 60 y o  male   MRN: [id number]  Unit/Bed#: [facility] 723-01   Encounter: [id number]    ASSESSMENT & PLAN     Seizure-like activity [facility])  Assessment & Plan  Charly Carlisle is a 60 y o  male who presented as stroke alert to [facility] on 1/1/2021 at 11:13 AM with LKW at 8:30AM , initial BP was Blood Pressure: 135/99  Initial presenting deficits were dysarthria and expressive aphasia  CTH/CTA were negative for acute findings  Pt was found to be a tPA candidate within the appropriate time window and without obvious contraindication and tPA was given at 11:55AM      Impression- Pt's aphasia resolved at this time  Reviewed MRI brain w contrast, pt has intense ring enhancing lesion in left temporal lobe, which localized pt's semiology of difficulty in speaking/aphasia  Unclear etiology of ring enhancing lesion at this time- Neoplastic vs infectious  Will do lumbar puncture to further narrow down the diagnosis  No recent fever, weight loss, no ill contacts, no fatigue or any other constitutional symptoms per patient  Pending EEG we also plan to start [address] as most likely pt had an action seizure causing waxing and waining aphasia  Plan:   Lumbar puncture ordered, pending labs to rule out infectious vs neoplastic cause of lesion   Pending EEG    Start Keppra 1gm bid as suspecting seizure at this time   CT chest abdomen and pelvis with contrast to rule out primary neoplasm   Pending EEG results, will fill out [employer] form   If pt mental status declines by GCS > 2 in 1 hour, obtain CTH urgently   atorvastatin 40mg qhs   Medical management as per primary team    PT/OT/Speech appreciated       Lab Results   Component Value Date    HGBA1C 5 7 (H) 01/02/2021    CHOLESTEROL 182 01/02/2021    LDLCALC 104 (H) 01/02/2021    TRIG 219 (H) 01/02/2021    INR 0 89 01/01/2021      Imaging:   CTH: unremarkable   CTA: unremarkable  · MRI: No evidence of acute infarct  T2/ FLAIR hyperintense focus at the left temporal juxtacortical region presumed to represent a developmental venous anomaly, however given patient's clinical history a subacute infarct cannot be excluded  Contrast-enhanced MR examination recommended for further characterization   Repeat CTH at 24 hours: negative for hemorrhage    MRI brain w contrast- Intense ring enhancement associated with T2/FLAIR signal abnormality lesion in the left temporal lobe   Echocardiogram: EF- 60%, Left atrium- mild to moderately enlarged   Telemetry: NSR   EEG- pending       Opal Santo will need follow up in in 6 weeks with epilepsy attending or advance practitioner  He will not require outpatient neurological testing  SUBJECTIVE     Patient was seen and examined  No acute events overnight  He reports he feels much better today, denies any symptoms today  He thinks his speech is much better today  He does report few instances of forgetfulness at home, for eg-he forgets names of people at times  Independent in all ADLs  Pertinent Negatives include: numbness, weakness, speech or visual changes, headaches, amaurosis, diplopia, other visual changes, paralysis/weakness, numbness or tingling, involuntary movements, tremor, speech impairment     Review of Systems   Constitutional: Negative for activity change  HENT: Negative for congestion  Eyes: Negative for photophobia and visual disturbance  Respiratory: Negative for cough, choking, chest tightness, shortness of breath and wheezing  Cardiovascular: Negative for chest pain, palpitations and leg swelling  Gastrointestinal: Negative for abdominal distention, abdominal pain, diarrhea, nausea and vomiting  Neurological: Negative for facial asymmetry, weakness, light-headedness, numbness and headaches  Hematological: Negative for adenopathy  All other systems reviewed and are negative        OBJECTIVE     Patient ID: Opal Santo is a 60 y o  male  Vitals:    21 0117 21 0154 21 0500 21 0900   BP: 119/79  132/87    BP Location: Left arm  Left arm    Pulse: 64  66    Resp: 20  20    Temp: 97 5 °F (36 4 °C)  97 9 °F (36 6 °C) 100 °F (37 8 °C)   TempSrc: Oral  Oral    SpO2:  98%     Weight:       Height:          Temperature:   Temp (24hrs), Av 5 °F (36 9 °C), Min:97 5 °F (36 4 °C), Max:100 °F (37 8 °C)    Temperature: 100 °F (37 8 °C)      Physical Exam  Vitals signs and nursing note reviewed  Constitutional:       Appearance: Normal appearance  HENT:      Head: Normocephalic and atraumatic  Mouth/Throat:      Mouth: Mucous membranes are moist       Pharynx: Oropharynx is clear  Eyes:      Extraocular Movements: Extraocular movements intact  Conjunctiva/sclera: Conjunctivae normal       Pupils: Pupils are equal, round, and reactive to light  Cardiovascular:      Rate and Rhythm: Normal rate  Pulses: Normal pulses  Pulmonary:      Effort: Pulmonary effort is normal    Abdominal:      General: Bowel sounds are normal       Palpations: Abdomen is soft  Comments: Mild protruded    Musculoskeletal: Normal range of motion  Skin:     Capillary Refill: Capillary refill takes less than 2 seconds  Neurological:      Mental Status: He is alert  Neurological Examination:     Mental Status: The patient was awake, alert, attentive, oriented to person, place, and time  Recent and remote memory intact to conversation with no evidence of language dysfunction  Satisfactory fund of knowledge  Able to name, repeat, describe a complex scene  He was able to name all the objects on the stroke book  Cranial Nerves:   I: smell Not tested   II: visual fields Full to confrontation  Pupils equal, round, reactive to light with normal accomodation  Fundus: benign fundus  III,IV,VI: extraocular muscles EOMI, no nystagmus   V: masseter and pterygoid strength full   Sensation in the V1 through V3 distributions intact to pinprick and light touch bilaterally  VII: Face is symmetric with no weakness noted  VIII: Audition intact to finger rub bilaterally  IX/X: Uvula midline  Soft palate elevation symmetric  XI: Trapezius and SCM strength 5/5 B/L  XII: Tongue midline with no atrophy or fasciculations with appropriate movement  Motor Examination:   No pronator drift  Bulk: Normal  No atrophy Tone: Normal  Fasciculations: None  Deltoid Biceps Triceps WE   WF   FF IO     Right        5         5          5         5      5      5   5        Left           5        5          5          5      5     5   5                       IP        Quad   Ham     TA       Gastroc   Right      5            5          5         5                5  Left         5            5         5         5                5       Reflexes:                   Biceps Brachioradialis Triceps Patella Achilles Plantars   Right          2+            2+                  2+        2+       1+         Down   Left            2+             2+                 2+         2+       1+         Down     Clonus: None    Pathological Reflexes:  Hoffmans: negative  Babinsky: negative  Jaw Jerk: negative    Coordination: Patient able to perform normal finger-to-nose and heel to shin appropriately  Normal rapid alternating movements  Sensory:  Mild decreased vibration bilateral lower extremity  Normal sensation to light touch, pin prick throughout  Gait:normal stance and posture, normal stride length and arm swing, normal turn around  Patient able to perform tandem gait without difficulty  Able toe walk and heel walk without difficulty  Romberg negative  LABORATORY DATA     Labs: I have personally reviewed pertinent reports      Results from last 7 days   Lab Units 01/02/21  0440 01/01/21  1144   WBC Thousand/uL 7 37 7 90   HEMOGLOBIN g/dL 15 4 16 0   HEMATOCRIT % 47 3 47 1*   PLATELETS Thousands/uL 214 204   NEUTROS PCT % 61  -- MONOS PCT % 9  --       Results from last 7 days   Lab Units 01/02/21  0440 01/01/21  1144   POTASSIUM mmol/L 4 3 4 3   CHLORIDE mmol/L 108 102   CO2 mmol/L 30 27   BUN mg/dL 10 14   CREATININE mg/dL 0 81 0 68*   CALCIUM mg/dL 9 0 9 3              Results from last 7 days   Lab Units 01/01/21  1144   INR  0 89   PTT seconds 27         Results from last 7 days   Lab Units 01/01/21  1144   TROPONIN I ng/mL <0 03       IMAGING & DIAGNOSTIC TESTING     Radiology Results: I have personally reviewed pertinent reports  MRI brain w wo contrast   Final Result by Candi Franco MD (01/03 9161)      Intense ring enhancement associated with T2/FLAIR signal abnormality lesion in the left temporal lobe  The appearance is strongly suggestive of parenchymal brain neoplasm, most likely glioma or metastasis  Alternatively, tumefactive demyelinating    lesion is possible, but considered less likely due to complete absence of diffusion restriction  I personally discussed this study with Dr Delbert Wooten via secure text messaging on 1/3/2021 at 8:54 AM                Workstation performed: [id number]         CT head wo contrast   Final Result by Candi Franco MD (01/02 1403)      No acute intracranial abnormality  Specifically, no noncontrast CT evidence of acute intracranial hemorrhage or evolving acute intracranial ischemia  Workstation performed: [id number]         MRI brain wo contrast   Final Result by Marito Benavidez MD (01/02 0019)      No evidence of acute infarct  T2/ FLAIR hyperintense focus at the left temporal juxtacortical region presumed to represent a developmental venous anomaly, however given patient's clinical history a subacute infarct cannot be excluded  Contrast-enhanced MR examination recommended for    further characterization  The study was marked in EPIC for significant notification        Workstation performed: [id number]         CT chest abdomen pelvis w contrast (Results Pending)   FL lumbar puncture diagnostic    (Results Pending)       Other Diagnostic Testing: I have personally reviewed pertinent reports  ACTIVE MEDICATIONS     Current Facility-Administered Medications   Medication Dose Route Frequency    acetaminophen (TYLENOL) tablet 650 mg  650 mg Oral Q6H PRN    amLODIPine (NORVASC) tablet 10 mg  10 mg Oral Daily    aspirin chewable tablet 81 mg  81 mg Oral Daily    atorvastatin (LIPITOR) tablet 40 mg  40 mg Per NG Tube QPM    [START ON 1/4/2021] heparin (porcine) subcutaneous injection 5,000 Units  5,000 Units Subcutaneous Q8H [address]    hydrALAZINE (APRESOLINE) injection 10 mg  10 mg Intravenous Q6H PRN    hydrochlorothiazide (HYDRODIURIL) tablet 12 5 mg  12 5 mg Oral Daily    levETIRAcetam (KEPPRA) tablet 1,000 mg  1,000 mg Oral Q12H ERICA    losartan (COZAAR) tablet 100 mg  100 mg Oral Daily       Prior to Admission medications    Medication Sig Start Date End Date Taking?  Authorizing Provider   amLODIPine (NORVASC) 10 mg tablet Take 1 tablet (10 mg total) by mouth daily 11/5/20  Yes Clem Pale, DO   aspirin 81 mg chewable tablet Chew 81 mg daily   Yes Historical Provider, MD   atorvastatin (LIPITOR) 10 mg tablet Take 10 mg by mouth daily   Yes Historical Provider, MD   hydrochlorothiazide (MICROZIDE) 12 5 mg capsule Take 12 5 mg by mouth daily   Yes Historical Provider, MD   valsartan (DIOVAN) 160 mg tablet Take 160 mg by mouth daily   Yes Historical Provider, MD         VTE Pharmacologic Prophylaxis: Heparin  VTE Mechanical Prophylaxis: sequential compression device    ==  MD Meagan Fan's Neurology Residency, PGY-2

## 2021-01-03 NOTE — ASSESSMENT & PLAN NOTE
Ranjeet Aguayo is a 60 y o  male who presented as stroke alert to [facility] on 1/1/2021 at 11:13 AM with LKW at 8:30AM , initial BP was Blood Pressure: 135/99  Initial presenting deficits were dysarthria and expressive aphasia  CTH/CTA were negative for acute findings  Pt was found to be a tPA candidate within the appropriate time window and without obvious contraindication and tPA was given at 11:55AM     Post tPA exam:    Current BP: Blood Pressure: 132/87    Lab Results   Component Value Date    HGBA1C 5 7 (H) 01/02/2021    CHOLESTEROL 182 01/02/2021    LDLCALC 104 (H) 01/02/2021    TRIG 219 (H) 01/02/2021    INR 0 89 01/01/2021        Imaging:   CTH: unremarkable   CTA: unremarkable  · MRI: No evidence of acute infarct  T2/ FLAIR hyperintense focus at the left temporal juxtacortical region presumed to represent a developmental venous anomaly, however given patient's clinical history a subacute infarct cannot be excluded  Contrast-enhanced MR examination recommended for further characterization     Repeat CTH at 24 hours: pending   Echocardiogram: pending   Telemetry: NSR    Plan:   Stroke pathway   If pt mental status declines by GCS > 2 in 1 hour, obtain CTH urgently   BP as close to but <180 SBP due to recent tPA administration, pressers/fluids indicated if necessary to keep BP at goal  [facility] after repeat CTH as long as no hemorrhage present   atorvastatin 40mg qhs   Maintain glucose below 200   Echo pending   Neuro checks   Monitor on telemetry   Medical management as per critical care appreciated   PT/OT/Speech/PMR consults appreciated when able

## 2021-01-03 NOTE — ASSESSMENT & PLAN NOTE
- pt with BMI of Body mass index is 32 79 kg/m²    - pt counseled on risks associated with obesity and it's contribution to other health issues  - advise portion control, healthy food choices, drinking water instead of juice/soda  - advise beginning light exercise program (i e  Walking 30min 4-5x/wk)  - advise keeping food diary to help identify problem foods/times/stressors  - pt advised that weight loss of ~ 1lb/wk is a good goal and not to become frustrated if loss is slower

## 2021-01-03 NOTE — ASSESSMENT & PLAN NOTE
Lizandro Saini is a 60 y o  male who presented as stroke alert to [facility] on 1/1/2021 at 11:13 AM with LKW at 8:30AM , initial BP was Blood Pressure: 135/99  Initial presenting deficits were dysarthria and expressive aphasia  CTH/CTA were negative for acute findings  Pt was found to be a tPA candidate within the appropriate time window and without obvious contraindication and tPA was given at 11:55AM  Initially symptoms appeared resolved however aphasia transiently reappeared and MRI was negative for acute findings however demonstrated unclear lesion on flair  Impression- Pt's aphasia resolved at this time  Reviewed MRI brain w contrast, pt has intense ring enhancing lesion in left temporal lobe, which localized pt's semiology of difficulty in speaking/aphasia  Unclear etiology of ring enhancing lesion at this time- Neoplastic vs infectious  Will do lumbar puncture to further narrow down the diagnosis  No recent fever, weight loss, no ill contacts, no fatigue or any other constitutional symptoms per patient  Plan:   Lumbar puncture ordered, pending labs to rule out infectious vs neoplastic cause of lesion   Start Keppra 1gm bid as suspecting seizure at this time   If pt mental status declines by GCS > 2 in 1 hour, obtain CTH urgently   Medical management as per primary team     Lab Results   Component Value Date    HGBA1C 5 7 (H) 01/02/2021    CHOLESTEROL 182 01/02/2021    LDLCALC 104 (H) 01/02/2021    TRIG 219 (H) 01/02/2021    INR 0 89 01/01/2021      workup:   CTH: unremarkable   CTA: unremarkable  · MRI: No evidence of acute infarct  T2/ FLAIR hyperintense focus at the left temporal juxtacortical region presumed to represent a developmental venous anomaly, however given patient's clinical history a subacute infarct cannot be excluded  Contrast-enhanced MR examination recommended for further characterization     Repeat CTH at 24 hours: negative for hemorrhage    MRI brain w contrast- Intense ring enhancement associated with T2/FLAIR signal abnormality lesion in the left temporal lobe   Echocardiogram: EF- 60%, Left atrium- mild to moderately enlarged      Telemetry: NSR   EEG- normal  · CT chest abdomen and pelvis with contrast to rule out primary neoplasm negative

## 2021-01-03 NOTE — ASSESSMENT & PLAN NOTE
Currently well controlled; continue current medications  Unclear if true CVA; otherwise, outside window permissive hypertension; continue to maintain SBP < 160

## 2021-01-03 NOTE — PLAN OF CARE
Problem: Neurological Deficit  Goal: Neurological status is stable or improving  Description: Interventions:  - Monitor and assess patient's level of consciousness, motor function, sensory function, and level of assistance needed for ADLs  - Monitor and report changes from baseline  Collaborate with interdisciplinary team to initiate plan and implement interventions as ordered  - Provide and maintain a safe environment  - Consider seizure precautions  - Consider fall precautions  - Consider aspiration precautions  - Consider bleeding precautions  Outcome: Progressing     Problem: Communication Impairment  Goal: Ability to express needs and understand communication  Description: Assess patient's communication skills and ability to understand information  Patient will demonstrate use of effective communication techniques, alternative methods of communication and understanding even if not able to speak  - Encourage communication and provide alternate methods of communication as needed  - Collaborate with case management/ for discharge needs  - Include patient/family/caregiver in decisions related to communication  Outcome: Progressing     Problem: Potential for Aspiration  Goal: Non-ventilated patient's risk of aspiration is minimized  Description: Assess and monitor vital signs, respiratory status, and labs (WBC)  Monitor for signs of aspiration (tachypnea, cough, rales, wheezing, cyanosis, fever)  - Assess and monitor patient's ability to swallow  - Place patient up in chair to eat if possible  - HOB up at 90 degrees to eat if unable to get patient up into chair   - Supervise patient during oral intake  - Instruct patient/ family to take small bites  - Instruct patient/ family to take small single sips when taking liquids    - Follow patient-specific strategies generated by speech pathologist   Outcome: Progressing     Problem: Nutrition  Goal: Nutrition/Hydration status is improving  Description: Monitor and assess patient's nutrition/hydration status for malnutrition (ex- brittle hair, bruises, dry skin, pale skin and conjunctiva, muscle wasting, smooth red tongue, and disorientation)  Collaborate with interdisciplinary team and initiate plan and interventions as ordered  Monitor patient's weight and dietary intake as ordered or per policy  Utilize nutrition screening tool and intervene per policy  Determine patient's food preferences and provide high-protein, high-caloric foods as appropriate  - Assist patient with eating   - Allow adequate time for meals   - Encourage patient to take dietary supplement as ordered  - Collaborate with clinical nutritionist   - Include patient/family/caregiver in decisions related to nutrition  Outcome: Progressing     Problem: Prexisting or High Potential for Compromised Skin Integrity  Goal: Skin integrity is maintained or improved  Description: INTERVENTIONS:  - Identify patients at risk for skin breakdown  - Assess and monitor skin integrity  - Assess and monitor nutrition and hydration status  - Monitor labs   - Assess for incontinence   - Turn and reposition patient  - Assist with mobility/ambulation  - Relieve pressure over bony prominences  - Avoid friction and shearing  - Provide appropriate hygiene as needed including keeping skin clean and dry  - Evaluate need for skin moisturizer/barrier cream  - Collaborate with interdisciplinary team   - Patient/family teaching  - Consider wound care consult   Outcome: Progressing     Problem: Potential for Falls  Goal: Patient will remain free of falls  Description: INTERVENTIONS:  - Assess patient frequently for physical needs  -  Identify cognitive and physical deficits and behaviors that affect risk of falls    -  [place] fall precautions as indicated by assessment   - Educate patient/family on patient safety including physical limitations  - Instruct patient to call for assistance with activity based on assessment  - Modify environment to reduce risk of injury  - Consider OT/PT consult to assist with strengthening/mobility  Outcome: Progressing     Problem: PAIN - ADULT  Goal: Verbalizes/displays adequate comfort level or baseline comfort level  Description: Interventions:  - Encourage patient to monitor pain and request assistance  - Assess pain using appropriate pain scale  - Administer analgesics based on type and severity of pain and evaluate response  - Implement non-pharmacological measures as appropriate and evaluate response  - Consider cultural and social influences on pain and pain management  - Notify physician/advanced practitioner if interventions unsuccessful or patient reports new pain  Outcome: Progressing     Problem: DISCHARGE PLANNING  Goal: Discharge to home or other facility with appropriate resources  Description: INTERVENTIONS:  - Identify barriers to discharge w/patient and caregiver  - Arrange for needed discharge resources and transportation as appropriate  - Identify discharge learning needs (meds, wound care, etc )  - Arrange for interpretive services to assist at discharge as needed  - Refer to Case Management Department for coordinating discharge planning if the patient needs post-hospital services based on physician/advanced practitioner order or complex needs related to functional status, cognitive ability, or social support system  Outcome: Progressing     Problem: Knowledge Deficit  Goal: Patient/family/caregiver demonstrates understanding of disease process, treatment plan, medications, and discharge instructions  Description: Complete learning assessment and assess knowledge base    Interventions:  - Provide teaching at level of understanding  - Provide teaching via preferred learning methods  Outcome: Progressing     Problem: Nutrition/Hydration-ADULT  Goal: Nutrient/Hydration intake appropriate for improving, restoring or maintaining nutritional needs  Description: Monitor and assess patient's nutrition/hydration status for malnutrition  Collaborate with interdisciplinary team and initiate plan and interventions as ordered  Monitor patient's weight and dietary intake as ordered or per policy  Utilize nutrition screening tool and intervene as necessary  Determine patient's food preferences and provide high-protein, high-caloric foods as appropriate       INTERVENTIONS:  - Monitor oral intake, urinary output, labs, and treatment plans  - Assess nutrition and hydration status and recommend course of action  - Evaluate amount of meals eaten  - Assist patient with eating if necessary   - Allow adequate time for meals  - Recommend/ encourage appropriate diets, oral nutritional supplements, and vitamin/mineral supplements  - Order, calculate, and assess calorie counts as needed  - Recommend, monitor, and adjust tube feedings and TPN/PPN based on assessed needs  - Assess need for intravenous fluids  - Provide specific nutrition/hydration education as appropriate  - Include patient/family/caregiver in decisions related to nutrition  Outcome: Progressing

## 2021-01-03 NOTE — UTILIZATION REVIEW
Notification of Inpatient Admission/Inpatient Authorization Request  This is a Notification of Inpatient Admission/Request for Inpatient Authorization for our facility [facility]  Be advised that this patient was admitted to our facility under Inpatient Status  Please contact the Utilization Review Department where the patient is receiving care services for additional admission information  [facility]ility: [facility] ([address] [place] [address])  Place of Service Code: 21   Place of Service Name: [address]  Presentation Date & Time: 1/1/2021  3:32 PM  Inpatient Admission Date & Time: 1/1/21 1532  Discharge Date & Time: No discharge date for patient encounter  Discharge Disposition (if discharged): Discharge/Transfer to not defined Healthcare Facility  Attending Physician and NPI#: Yosef Zurita, [address] [[id number]]  Admission Orders (From admission, onward)     Ordered        01/01/21 1537  Inpatient Admission  Once                   Network Utilization Review Department  Phone: [phone]; Fax [phone]  [email or web address]  ATTENTION: Please call with any questions or concerns to [phone]  and carefully listen to the prompts so that you are directed to the right person  Send all requests for admission clinical reviews, approved or denied determinations and any other requests to fax [phone]   All voicemails are confidential

## 2021-01-03 NOTE — ASSESSMENT & PLAN NOTE
Patient developed acute dysarthria on 01/01/2021 at 8:45 a m ; presented to [facility] ER  At that time, stroke alert was called and patient administered tPA; subsequently transferred to [facility]

## 2021-01-04 ENCOUNTER — APPOINTMENT (INPATIENT)
Dept: RADIOLOGY | Facility: HOSPITAL | Age: 66
DRG: 055 | End: 2021-01-04
Payer: COMMERCIAL

## 2021-01-04 ENCOUNTER — APPOINTMENT (INPATIENT)
Dept: NEUROLOGY | Facility: CLINIC | Age: 66
DRG: 055 | End: 2021-01-04
Payer: COMMERCIAL

## 2021-01-04 LAB
APPEARANCE CSF: CLEAR
APTT PPP: 29 SECONDS (ref 23–37)
ATRIAL RATE: 73 BPM
C GATTII+NEOFOR DNA CSF QL NAA+NON-PROBE: NOT DETECTED
CMV DNA CSF QL NAA+NON-PROBE: NOT DETECTED
E COLI K1 DNA CSF QL NAA+NON-PROBE: NOT DETECTED
EV RNA CSF QL NAA+NON-PROBE: NOT DETECTED
GLUCOSE CSF-MCNC: 56 MG/DL (ref 50–80)
GP B STREP DNA CSF QL NAA+NON-PROBE: NOT DETECTED
GRAM STN SPEC: NORMAL
GRAM STN SPEC: NORMAL
HAEM INFLU DNA CSF QL NAA+NON-PROBE: NOT DETECTED
HHV6 DNA CSF QL NAA+NON-PROBE: NOT DETECTED
HIV 1+2 AB+HIV1 P24 AG SERPL QL IA: NORMAL
HSV1 DNA CSF QL NAA+NON-PROBE: NOT DETECTED
HSV2 DNA CSF QL NAA+NON-PROBE: NOT DETECTED
INR PPP: 0.94 (ref 0.84–1.19)
L MONOCYTOG DNA CSF QL NAA+NON-PROBE: NOT DETECTED
LDH CSF L TO P-CCNC: 43 U/L (ref 0–20)
LYMPHOCYTES NFR CSF MANUAL: 91 %
MONOS+MACROS CSF MANUAL: 9 %
N MEN DNA CSF QL NAA+NON-PROBE: NOT DETECTED
P AXIS: 54 DEGREES
PARECHOVIRUS A RNA CSF QL NAA+NON-PROBE: NOT DETECTED
PLATELET # BLD AUTO: 201 THOUSANDS/UL (ref 149–390)
PMV BLD AUTO: 9.8 FL (ref 8.9–12.7)
PR INTERVAL: 210 MS
PROT CSF-MCNC: 61 MG/DL (ref 15–45)
PROTHROMBIN TIME: 12.6 SECONDS (ref 11.6–14.5)
QRS AXIS: -18 DEGREES
QRSD INTERVAL: 104 MS
QT INTERVAL: 406 MS
QTC INTERVAL: 447 MS
RBC # CSF MANUAL: 7 UL (ref 0–10)
S PNEUM DNA CSF QL NAA+NON-PROBE: NOT DETECTED
T WAVE AXIS: 52 DEGREES
TOTAL CELLS COUNTED BLD: NO
TOTAL CELLS COUNTED SPEC: 11
TUBE # CSF: 4
VENTRICULAR RATE: 73 BPM
VZV DNA CSF QL NAA+NON-PROBE: NOT DETECTED
WBC # CSF AUTO: 1 /UL (ref 0–5)

## 2021-01-04 PROCEDURE — 88108 CYTOPATH CONCENTRATE TECH: CPT | Performed by: PATHOLOGY

## 2021-01-04 PROCEDURE — 87476 LYME DIS DNA AMP PROBE: CPT | Performed by: PSYCHIATRY & NEUROLOGY

## 2021-01-04 PROCEDURE — 87116 MYCOBACTERIA CULTURE: CPT | Performed by: PSYCHIATRY & NEUROLOGY

## 2021-01-04 PROCEDURE — 93010 ELECTROCARDIOGRAM REPORT: CPT | Performed by: INTERNAL MEDICINE

## 2021-01-04 PROCEDURE — 85610 PROTHROMBIN TIME: CPT | Performed by: PHYSICIAN ASSISTANT

## 2021-01-04 PROCEDURE — 82945 GLUCOSE OTHER FLUID: CPT | Performed by: PSYCHIATRY & NEUROLOGY

## 2021-01-04 PROCEDURE — 84157 ASSAY OF PROTEIN OTHER: CPT | Performed by: PSYCHIATRY & NEUROLOGY

## 2021-01-04 PROCEDURE — 84165 PROTEIN E-PHORESIS SERUM: CPT | Performed by: PSYCHIATRY & NEUROLOGY

## 2021-01-04 PROCEDURE — 87102 FUNGUS ISOLATION CULTURE: CPT | Performed by: PSYCHIATRY & NEUROLOGY

## 2021-01-04 PROCEDURE — 84165 PROTEIN E-PHORESIS SERUM: CPT | Performed by: PATHOLOGY

## 2021-01-04 PROCEDURE — 84166 PROTEIN E-PHORESIS/URINE/CSF: CPT | Performed by: PSYCHIATRY & NEUROLOGY

## 2021-01-04 PROCEDURE — 87483 CNS DNA AMP PROBE TYPE 12-25: CPT | Performed by: PSYCHIATRY & NEUROLOGY

## 2021-01-04 PROCEDURE — 85730 THROMBOPLASTIN TIME PARTIAL: CPT | Performed by: PHYSICIAN ASSISTANT

## 2021-01-04 PROCEDURE — 94760 N-INVAS EAR/PLS OXIMETRY 1: CPT

## 2021-01-04 PROCEDURE — 88184 FLOWCYTOMETRY/ TC 1 MARKER: CPT | Performed by: PSYCHIATRY & NEUROLOGY

## 2021-01-04 PROCEDURE — 88185 FLOWCYTOMETRY/TC ADD-ON: CPT

## 2021-01-04 PROCEDURE — 85049 AUTOMATED PLATELET COUNT: CPT | Performed by: PHYSICIAN ASSISTANT

## 2021-01-04 PROCEDURE — 89050 BODY FLUID CELL COUNT: CPT | Performed by: PSYCHIATRY & NEUROLOGY

## 2021-01-04 PROCEDURE — 83615 LACTATE (LD) (LDH) ENZYME: CPT | Performed by: PSYCHIATRY & NEUROLOGY

## 2021-01-04 PROCEDURE — 87206 SMEAR FLUORESCENT/ACID STAI: CPT | Performed by: PSYCHIATRY & NEUROLOGY

## 2021-01-04 PROCEDURE — 99232 SBSQ HOSP IP/OBS MODERATE 35: CPT | Performed by: INTERNAL MEDICINE

## 2021-01-04 PROCEDURE — 95819 EEG AWAKE AND ASLEEP: CPT | Performed by: PSYCHIATRY & NEUROLOGY

## 2021-01-04 PROCEDURE — 62328 DX LMBR SPI PNXR W/FLUOR/CT: CPT

## 2021-01-04 PROCEDURE — 99232 SBSQ HOSP IP/OBS MODERATE 35: CPT | Performed by: PSYCHIATRY & NEUROLOGY

## 2021-01-04 PROCEDURE — 99255 IP/OBS CONSLTJ NEW/EST HI 80: CPT | Performed by: STUDENT IN AN ORGANIZED HEALTH CARE EDUCATION/TRAINING PROGRAM

## 2021-01-04 PROCEDURE — 87070 CULTURE OTHR SPECIMN AEROBIC: CPT | Performed by: PSYCHIATRY & NEUROLOGY

## 2021-01-04 PROCEDURE — 95816 EEG AWAKE AND DROWSY: CPT

## 2021-01-04 PROCEDURE — 89051 BODY FLUID CELL COUNT: CPT | Performed by: PSYCHIATRY & NEUROLOGY

## 2021-01-04 RX ORDER — LORAZEPAM 1 MG/1
1 TABLET ORAL ONCE
Status: COMPLETED | OUTPATIENT
Start: 2021-01-04 | End: 2021-01-05

## 2021-01-04 RX ORDER — LIDOCAINE HYDROCHLORIDE 10 MG/ML
5 INJECTION, SOLUTION EPIDURAL; INFILTRATION; INTRACAUDAL; PERINEURAL
Status: DISCONTINUED | OUTPATIENT
Start: 2021-01-04 | End: 2021-01-06 | Stop reason: HOSPADM

## 2021-01-04 RX ADMIN — ASPIRIN 81 MG CHEWABLE TABLET 81 MG: 81 TABLET CHEWABLE at 08:08

## 2021-01-04 RX ADMIN — LOSARTAN POTASSIUM 100 MG: 50 TABLET, FILM COATED ORAL at 08:11

## 2021-01-04 RX ADMIN — HYDROCHLOROTHIAZIDE 12.5 MG: 12.5 TABLET ORAL at 08:11

## 2021-01-04 RX ADMIN — LEVETIRACETAM 1000 MG: 500 TABLET, FILM COATED ORAL at 08:09

## 2021-01-04 RX ADMIN — AMLODIPINE BESYLATE 10 MG: 10 TABLET ORAL at 08:11

## 2021-01-04 RX ADMIN — LEVETIRACETAM 1000 MG: 500 TABLET, FILM COATED ORAL at 20:41

## 2021-01-04 RX ADMIN — ATORVASTATIN CALCIUM 40 MG: 40 TABLET, FILM COATED ORAL at 17:21

## 2021-01-04 NOTE — PLAN OF CARE
Problem: Neurological Deficit  Goal: Neurological status is stable or improving  Description: Interventions:  - Monitor and assess patient's level of consciousness, motor function, sensory function, and level of assistance needed for ADLs  - Monitor and report changes from baseline  Collaborate with interdisciplinary team to initiate plan and implement interventions as ordered  - Provide and maintain a safe environment  - Consider seizure precautions  - Consider fall precautions  - Consider aspiration precautions  - Consider bleeding precautions  Outcome: Progressing     Problem: Communication Impairment  Goal: Ability to express needs and understand communication  Description: Assess patient's communication skills and ability to understand information  Patient will demonstrate use of effective communication techniques, alternative methods of communication and understanding even if not able to speak  - Encourage communication and provide alternate methods of communication as needed  - Collaborate with case management/ for discharge needs  - Include patient/family/caregiver in decisions related to communication  Outcome: Progressing     Problem: Potential for Aspiration  Goal: Non-ventilated patient's risk of aspiration is minimized  Description: Assess and monitor vital signs, respiratory status, and labs (WBC)  Monitor for signs of aspiration (tachypnea, cough, rales, wheezing, cyanosis, fever)  - Assess and monitor patient's ability to swallow  - Place patient up in chair to eat if possible  - HOB up at 90 degrees to eat if unable to get patient up into chair   - Supervise patient during oral intake  - Instruct patient/ family to take small bites  - Instruct patient/ family to take small single sips when taking liquids    - Follow patient-specific strategies generated by speech pathologist   Outcome: Progressing     Problem: Nutrition  Goal: Nutrition/Hydration status is improving  Description: Monitor and assess patient's nutrition/hydration status for malnutrition (ex- brittle hair, bruises, dry skin, pale skin and conjunctiva, muscle wasting, smooth red tongue, and disorientation)  Collaborate with interdisciplinary team and initiate plan and interventions as ordered  Monitor patient's weight and dietary intake as ordered or per policy  Utilize nutrition screening tool and intervene per policy  Determine patient's food preferences and provide high-protein, high-caloric foods as appropriate  - Assist patient with eating   - Allow adequate time for meals   - Encourage patient to take dietary supplement as ordered  - Collaborate with clinical nutritionist   - Include patient/family/caregiver in decisions related to nutrition  Outcome: Progressing     Problem: Prexisting or High Potential for Compromised Skin Integrity  Goal: Skin integrity is maintained or improved  Description: INTERVENTIONS:  - Identify patients at risk for skin breakdown  - Assess and monitor skin integrity  - Assess and monitor nutrition and hydration status  - Monitor labs   - Assess for incontinence   - Turn and reposition patient  - Assist with mobility/ambulation  - Relieve pressure over bony prominences  - Avoid friction and shearing  - Provide appropriate hygiene as needed including keeping skin clean and dry  - Evaluate need for skin moisturizer/barrier cream  - Collaborate with interdisciplinary team   - Patient/family teaching  - Consider wound care consult   Outcome: Progressing     Problem: Potential for Falls  Goal: Patient will remain free of falls  Description: INTERVENTIONS:  - Assess patient frequently for physical needs  -  Identify cognitive and physical deficits and behaviors that affect risk of falls    -  Ola fall precautions as indicated by assessment   - Educate patient/family on patient safety including physical limitations  - Instruct patient to call for assistance with activity based on assessment  - Modify environment to reduce risk of injury  - Consider OT/PT consult to assist with strengthening/mobility  Outcome: Progressing     Problem: PAIN - ADULT  Goal: Verbalizes/displays adequate comfort level or baseline comfort level  Description: Interventions:  - Encourage patient to monitor pain and request assistance  - Assess pain using appropriate pain scale  - Administer analgesics based on type and severity of pain and evaluate response  - Implement non-pharmacological measures as appropriate and evaluate response  - Consider cultural and social influences on pain and pain management  - Notify physician/advanced practitioner if interventions unsuccessful or patient reports new pain  Outcome: Progressing     Problem: DISCHARGE PLANNING  Goal: Discharge to home or other facility with appropriate resources  Description: INTERVENTIONS:  - Identify barriers to discharge w/patient and caregiver  - Arrange for needed discharge resources and transportation as appropriate  - Identify discharge learning needs (meds, wound care, etc )  - Arrange for interpretive services to assist at discharge as needed  - Refer to Case Management Department for coordinating discharge planning if the patient needs post-hospital services based on physician/advanced practitioner order or complex needs related to functional status, cognitive ability, or social support system  Outcome: Progressing     Problem: Knowledge Deficit  Goal: Patient/family/caregiver demonstrates understanding of disease process, treatment plan, medications, and discharge instructions  Description: Complete learning assessment and assess knowledge base    Interventions:  - Provide teaching at level of understanding  - Provide teaching via preferred learning methods  Outcome: Progressing     Problem: Nutrition/Hydration-ADULT  Goal: Nutrient/Hydration intake appropriate for improving, restoring or maintaining nutritional needs  Description: Monitor and assess patient's nutrition/hydration status for malnutrition  Collaborate with interdisciplinary team and initiate plan and interventions as ordered  Monitor patient's weight and dietary intake as ordered or per policy  Utilize nutrition screening tool and intervene as necessary  Determine patient's food preferences and provide high-protein, high-caloric foods as appropriate       INTERVENTIONS:  - Monitor oral intake, urinary output, labs, and treatment plans  - Assess nutrition and hydration status and recommend course of action  - Evaluate amount of meals eaten  - Assist patient with eating if necessary   - Allow adequate time for meals  - Recommend/ encourage appropriate diets, oral nutritional supplements, and vitamin/mineral supplements  - Order, calculate, and assess calorie counts as needed  - Recommend, monitor, and adjust tube feedings and TPN/PPN based on assessed needs  - Assess need for intravenous fluids  - Provide specific nutrition/hydration education as appropriate  - Include patient/family/caregiver in decisions related to nutrition  Outcome: Progressing

## 2021-01-04 NOTE — OCCUPATIONAL THERAPY NOTE
Occupational Therapy Screen     01/04/21 1326   OT Last Visit   OT Visit Date 01/04/21   Note Type   Note type Screen     OT orders received, pt's chart reviewed  Per chart, and conversation with Clau Monge from physical therapy, pt is functioning independently  No acute OT needs at this time, however if pt does undergo brain biopsy, please re-consult OT        Delmy Ríos, COLBY, OTR/L

## 2021-01-04 NOTE — PROGRESS NOTES
NEUROLOGY RESIDENCY PROGRESS NOTE     Name: Lizandro Saini   Age & Sex: 60 y o  male   MRN: [id number]  Unit/Bed#: [facility] 723-01   Encounter: [id number]    ASSESSMENT & PLAN     * Seizure-like activity [facility])  Assessment & Plan  Lizandro Saini is a 60 y o  male who presented as stroke alert to [facility] on 1/1/2021 at 11:13 AM with LKW at 8:30AM , initial BP was Blood Pressure: 135/99  Initial presenting deficits were dysarthria and expressive aphasia  CTH/CTA were negative for acute findings  Pt was found to be a tPA candidate within the appropriate time window and without obvious contraindication and tPA was given at 11:55AM  Initially symptoms appeared resolved however aphasia transiently reappeared and MRI was negative for acute findings however demonstrated unclear lesion on flair  Impression- Pt's aphasia resolved at this time  Reviewed MRI brain w contrast, pt has intense ring enhancing lesion in left temporal lobe, which localized pt's semiology of difficulty in speaking/aphasia  Unclear etiology of ring enhancing lesion at this time- Neoplastic vs infectious  Will do lumbar puncture to further narrow down the diagnosis  No recent fever, weight loss, no ill contacts, no fatigue or any other constitutional symptoms per patient  Plan:   Lumbar puncture ordered, pending labs to rule out infectious vs neoplastic cause of lesion   Start Keppra 1gm bid as suspecting seizure at this time   If pt mental status declines by GCS > 2 in 1 hour, obtain CTH urgently   Medical management as per primary team     Lab Results   Component Value Date    HGBA1C 5 7 (H) 01/02/2021    CHOLESTEROL 182 01/02/2021    LDLCALC 104 (H) 01/02/2021    TRIG 219 (H) 01/02/2021    INR 0 89 01/01/2021      workup:   CTH: unremarkable   CTA: unremarkable  · MRI: No evidence of acute infarct   T2/ FLAIR hyperintense focus at the left temporal juxtacortical region presumed to represent a developmental venous anomaly, however given patient's clinical history a subacute infarct cannot be excluded  Contrast-enhanced MR examination recommended for further characterization   Repeat CTH at 24 hours: negative for hemorrhage    MRI brain w contrast- Intense ring enhancement associated with T2/FLAIR signal abnormality lesion in the left temporal lobe   Echocardiogram: EF- 60%, Left atrium- mild to moderately enlarged   Telemetry: NSR   EEG- normal  · CT chest abdomen and pelvis with contrast to rule out primary neoplasm negative      Recommendations for outpatient neurological follow up have yet to be determined  SUBJECTIVE     Patient was seen and examined  No acute events overnight  Feels well, and has no acute complaints  Aphasia was not present on my exam although this has been transient previously  Recalled today that he had an episode of confusion, LOC and urinary incontinence 25 years ago without medical follow up    Pertinent Negatives include: numbness, weakness, speech or visual changes     Review of Systems   Constitutional: Negative for chills and fever  HENT: Negative for ear pain and sore throat  Eyes: Negative for pain and visual disturbance  Respiratory: Negative for cough and shortness of breath  Cardiovascular: Negative for chest pain and palpitations  Gastrointestinal: Negative for abdominal pain and vomiting  Genitourinary: Negative for dysuria and hematuria  Musculoskeletal: Negative for arthralgias and back pain  Skin: Negative for color change and rash  Neurological: Negative for seizures and syncope  All other systems reviewed and are negative  OBJECTIVE     Patient ID: Willis Chávez is a 60 y o  male      Vitals:    01/03/21 1945 01/03/21 2219 01/04/21 0300 01/04/21 0807   BP:  133/86 123/78 127/83   BP Location:  Left arm Left arm    Pulse:  83 62    Resp:  16 18    Temp:  97 7 °F (36 5 °C) 97 5 °F (36 4 °C)    TempSrc:  Oral Axillary    SpO2: 98% 97% 96%    Weight:       Height: Temperature:   Temp (24hrs), Av 6 °F (36 4 °C), Min:97 5 °F (36 4 °C), Max:97 7 °F (36 5 °C)    Temperature: 97 5 °F (36 4 °C)      Physical Exam  Vitals signs and nursing note reviewed  Constitutional:       Appearance: He is well-developed  HENT:      Head: Normocephalic and atraumatic  Eyes:      Extraocular Movements: EOM normal       Conjunctiva/sclera: Conjunctivae normal       Pupils: Pupils are equal, round, and reactive to light  Neck:      Musculoskeletal: Neck supple  Cardiovascular:      Rate and Rhythm: Normal rate and regular rhythm  Heart sounds: No murmur  Pulmonary:      Effort: Pulmonary effort is normal  No respiratory distress  Breath sounds: Normal breath sounds  Abdominal:      Palpations: Abdomen is soft  Tenderness: There is no abdominal tenderness  Skin:     General: Skin is warm and dry  Neurological:      Mental Status: He is alert and oriented to person, place, and time  Coordination: Finger-Nose-Finger Test normal       Deep Tendon Reflexes: Strength normal    Psychiatric:         Mood and Affect: Mood normal          Speech: Speech normal          Behavior: Behavior normal          Thought Content: Thought content normal          Judgment: Judgment normal           Neurologic Exam     Mental Status   Oriented to person, place, and time  Attention: normal  Concentration: normal    Speech: speech is normal   Level of consciousness: alert  Knowledge: good  Able to name object  Able to repeat  Normal comprehension  Cranial Nerves     CN II   Visual fields full to confrontation  CN III, IV, VI   Pupils are equal, round, and reactive to light  Extraocular motions are normal      CN V   Facial sensation intact  CN VII   Facial expression full, symmetric       CN VIII   CN VIII normal      CN IX, X   CN IX normal    CN X normal      CN XI   CN XI normal      CN XII   CN XII normal      Motor Exam   Muscle bulk: normal  Overall muscle tone: normal  Right arm pronator drift: absent  Left arm pronator drift: absent    Strength   Strength 5/5 throughout  Sensory Exam   Light touch normal      Gait, Coordination, and Reflexes     Coordination   Finger to nose coordination: normal    Reflexes   Reflexes 2+ except as noted  LABORATORY DATA     Labs: I have personally reviewed pertinent reports  Results from last 7 days   Lab Units 01/04/21  0955 01/02/21  0440 01/01/21  1144   WBC Thousand/uL  --  7 37 7 90   HEMOGLOBIN g/dL  --  15 4 16 0   HEMATOCRIT %  --  47 3 47 1*   PLATELETS Thousands/uL 201 214 204   NEUTROS PCT %  --  61  --    MONOS PCT %  --  9  --       Results from last 7 days   Lab Units 01/02/21  0440 01/01/21  1144   POTASSIUM mmol/L 4 3 4 3   CHLORIDE mmol/L 108 102   CO2 mmol/L 30 27   BUN mg/dL 10 14   CREATININE mg/dL 0 81 0 68*   CALCIUM mg/dL 9 0 9 3              Results from last 7 days   Lab Units 01/04/21  0955 01/01/21  1144   INR  0 94 0 89   PTT seconds 29 27         Results from last 7 days   Lab Units 01/01/21  1144   TROPONIN I ng/mL <0 03       IMAGING & DIAGNOSTIC TESTING     Radiology Results: I have personally reviewed pertinent reports  and I have personally reviewed pertinent films in PACS    CT chest abdomen pelvis w contrast   Final Result by Citlali Baeza MD (01/04 0600)      CT chest:      No evidence of significant abnormal mass  Punctate mediastinal and hilar calcified granulomata  Trace bibasilar subsegmental atelectasis, left greater than right  CT abdomen and pelvis:      1 5 cm left hepatic indeterminate nodule  Although this may simply be a flash filling hemangioma or possibly benign AV shunt, advise follow-up with dedicated liver MRI with and without contrast for further characterization  Otherwise, no evidence of significant abnormal mass        Bilateral renal simple cysts and subcentimeter hypodensities too small to characterize statistically representing simple cysts  Mild prostatomegaly  Colonic diverticulosis  This study demonstrates a significant  finding and was documented as such in [facility] for liaison and referring practitioner notification  This study demonstrates a finding requiring imaging follow-up and was documented as such in Epic  Workstation performed: [id number]         MRI brain w wo contrast   Final Result by Melinda Elizabeth MD (01/03 6411)      Intense ring enhancement associated with T2/FLAIR signal abnormality lesion in the left temporal lobe  The appearance is strongly suggestive of parenchymal brain neoplasm, most likely glioma or metastasis  Alternatively, tumefactive demyelinating    lesion is possible, but considered less likely due to complete absence of diffusion restriction  I personally discussed this study with Dr Saroj Mcgovern via secure text messaging on 1/3/2021 at 8:54 AM                Workstation performed: [id number]         CT head wo contrast   Final Result by Melinda Elizabeth MD (01/02 1403)      No acute intracranial abnormality  Specifically, no noncontrast CT evidence of acute intracranial hemorrhage or evolving acute intracranial ischemia  Workstation performed: [id number]         MRI brain wo contrast   Final Result by Lamar Latham MD (01/02 0019)      No evidence of acute infarct  T2/ FLAIR hyperintense focus at the left temporal juxtacortical region presumed to represent a developmental venous anomaly, however given patient's clinical history a subacute infarct cannot be excluded  Contrast-enhanced MR examination recommended for    further characterization  The study was marked in EPIC for significant notification  Workstation performed: [id number]         FL lumbar puncture diagnostic    (Results Pending)   MRI inpatient order    (Results Pending)       Other Diagnostic Testing: I have personally reviewed pertinent reports        ACTIVE MEDICATIONS Current Facility-Administered Medications   Medication Dose Route Frequency    acetaminophen (TYLENOL) tablet 650 mg  650 mg Oral Q6H PRN    amLODIPine (NORVASC) tablet 10 mg  10 mg Oral Daily    aspirin chewable tablet 81 mg  81 mg Oral Daily    atorvastatin (LIPITOR) tablet 40 mg  40 mg Per NG Tube QPM    enoxaparin (LOVENOX) subcutaneous injection 40 mg  40 mg Subcutaneous Q24H ERICA    hydrALAZINE (APRESOLINE) injection 10 mg  10 mg Intravenous Q6H PRN    hydrochlorothiazide (HYDRODIURIL) tablet 12 5 mg  12 5 mg Oral Daily    levETIRAcetam (KEPPRA) tablet 1,000 mg  1,000 mg Oral Q12H ERICA    lidocaine (PF) (XYLOCAINE-MPF) 1 % injection 5 mL  5 mL Injection Once in imaging    LORazepam (ATIVAN) tablet 1 mg  1 mg Oral Once    losartan (COZAAR) tablet 100 mg  100 mg Oral Daily       Prior to Admission medications    Medication Sig Start Date End Date Taking?  Authorizing Provider   amLODIPine (NORVASC) 10 mg tablet Take 1 tablet (10 mg total) by mouth daily 11/5/20  Yes Meliza Sanderson,    aspirin 81 mg chewable tablet Chew 81 mg daily   Yes Historical Provider, MD   atorvastatin (LIPITOR) 10 mg tablet Take 10 mg by mouth daily   Yes Historical Provider, MD   hydrochlorothiazide (MICROZIDE) 12 5 mg capsule Take 12 5 mg by mouth daily   Yes Historical Provider, MD   valsartan (DIOVAN) 160 mg tablet Take 160 mg by mouth daily   Yes Historical Provider, MD         VTE Pharmacologic Prophylaxis: Enoxaparin (Lovenox)  VTE Mechanical Prophylaxis: sequential compression device    ==  MD Omar Cooley [facility] Neurology Residency, PGY-2

## 2021-01-04 NOTE — ASSESSMENT & PLAN NOTE
Patient presents for acute dysarthria on 1/1; initially treated as CVA  Subsequent imaging and paucity of CVA findings make CVA less likely  Repeat brain MRI revealed:  Intense ring enhancement associated with T2/FLAIR signal abnormality lesion in the left temporal lobe  The appearance is strongly suggestive of parenchymal brain neoplasm, most likely glioma or metastasis  Alternatively, tumefactive demyelinating   lesion is possible, but considered less likely due to complete absence of diffusion restriction      Roberts CT to be initiated by neurology; LP pending  Keppra 1 g BID  Await further neurology findings; may require Bx brain

## 2021-01-04 NOTE — PLAN OF CARE
Problem: Neurological Deficit  Goal: Neurological status is stable or improving  Description: Interventions:  - Monitor and assess patient's level of consciousness, motor function, sensory function, and level of assistance needed for ADLs  - Monitor and report changes from baseline  Collaborate with interdisciplinary team to initiate plan and implement interventions as ordered  - Provide and maintain a safe environment  - Consider seizure precautions  - Consider fall precautions  Problem: Communication Impairment  Goal: Ability to express needs and understand communication  Description: Assess patient's communication skills and ability to understand information  Patient will demonstrate use of effective communication techniques, alternative methods of communication and understanding even if not able to speak  - Encourage communication and provide alternate methods of communication as needed  - Collaborate with case management/ for discharge needs  - Include patient/family/caregiver in decisions related to communication  Outcome: Progressing     Problem: Potential for Aspiration  Goal: Non-ventilated patient's risk of aspiration is minimized  Description: Assess and monitor vital signs, respiratory status, and labs (WBC)  Monitor for signs of aspiration (tachypnea, cough, rales, wheezing, cyanosis, fever)  - Assess and monitor patient's ability to swallow  - Place patient up in chair to eat if possible  - HOB up at 90 degrees to eat if unable to get patient up into chair   - Supervise patient during oral intake  - Instruct patient/ family to take small bites  - Instruct patient/ family to take small single sips when taking liquids    - Follow patient-specific strategies generated by speech pathologist   Outcome: Progressing     Problem: Nutrition  Goal: Nutrition/Hydration status is improving  Description: Monitor and assess patient's nutrition/hydration status for malnutrition (ex- brittle hair, bruises, dry skin, pale skin and conjunctiva, muscle wasting, smooth red tongue, and disorientation)  Collaborate with interdisciplinary team and initiate plan and interventions as ordered  Monitor patient's weight and dietary intake as ordered or per policy  Utilize nutrition screening tool and intervene per policy  Determine patient's food preferences and provide high-protein, high-caloric foods as appropriate  Problem: Prexisting or High Potential for Compromised Skin Integrity  Goal: Skin integrity is maintained or improved  Description: INTERVENTIONS:  - Identify patients at risk for skin breakdown  - Assess and monitor skin integrity  - Assess and monitor nutrition and hydration status  - Monitor labs   - Assess for incontinence   - Turn and reposition patient  - Assist with mobility/ambulation  - Relieve pressure over bony prominences  - Avoid friction and shearing  - Provide appropriate hygiene as needed including keeping skin clean and dry  - Evaluate need for skin moisturizer/barrier cream  - Collaborate with interdisciplinary team   - Patient/family teaching  - Consider wound care consult   Outcome: Progressing     Problem: Potential for Falls  Goal: Patient will remain free of falls  Description: INTERVENTIONS:  - Assess patient frequently for physical needs  -  Identify cognitive and physical deficits and behaviors that affect risk of falls    -  [place] fall precautions as indicated by assessment   - Educate patient/family on patient safety including physical limitations  - Instruct patient to call for assistance with activity based on assessment  - Modify environment to reduce risk of injury  - Consider OT/PT consult to assist with strengthening/mobility  Outcome: Progressing     Problem: PAIN - ADULT  Goal: Verbalizes/displays adequate comfort level or baseline comfort level  Description: Interventions:  - Encourage patient to monitor pain and request assistance  - Assess pain using appropriate pain scale  - Administer analgesics based on type and severity of pain and evaluate response  - Implement non-pharmacological measures as appropriate and evaluate response  - Consider cultural and social influences on pain and pain management  - Notify physician/advanced practitioner if interventions unsuccessful or patient reports new pain  Outcome: Progressing     Problem: DISCHARGE PLANNING  Goal: Discharge to home or other facility with appropriate resources  Description: INTERVENTIONS:  - Identify barriers to discharge w/patient and caregiver  - Arrange for needed discharge resources and transportation as appropriate  - Identify discharge learning needs (meds, wound care, etc )  - Arrange for interpretive services to assist at discharge as needed  - Refer to Case Management Department for coordinating discharge planning if the patient needs post-hospital services based on physician/advanced practitioner order or complex needs related to functional status, cognitive ability, or social support system  Outcome: Progressing     Problem: Knowledge Deficit  Goal: Patient/family/caregiver demonstrates understanding of disease process, treatment plan, medications, and discharge instructions  Description: Complete learning assessment and assess knowledge base  Interventions:  - Provide teaching at level of understanding  - Provide teaching via preferred learning methods  Outcome: Progressing     Problem: Nutrition/Hydration-ADULT  Goal: Nutrient/Hydration intake appropriate for improving, restoring or maintaining nutritional needs  Description: Monitor and assess patient's nutrition/hydration status for malnutrition  Collaborate with interdisciplinary team and initiate plan and interventions as ordered  Monitor patient's weight and dietary intake as ordered or per policy  Utilize nutrition screening tool and intervene as necessary   Determine patient's food preferences and provide high-protein, high-caloric foods as appropriate  INTERVENTIONS:  - Monitor oral intake, urinary output, labs, and treatment plans  - Assess nutrition and hydration status and recommend course of action  - Evaluate amount of meals eaten  - Assist patient with eating if necessary   - Allow adequate time for meals  - Recommend/ encourage appropriate diets, oral nutritional supplements, and vitamin/mineral supplements  - Order, calculate, and assess calorie counts as needed  - Recommend, monitor, and adjust tube feedings and TPN/PPN based on assessed needs  - Assess need for intravenous fluids  - Provide specific nutrition/hydration education as appropriate  - Include patient/family/caregiver in decisions related to nutrition  Outcome: Progressing     - Allow adequate time for meals   - Encourage patient to take dietary supplement as ordered  - Collaborate with clinical nutritionist   - Include patient/family/caregiver in decisions related to nutrition  Outcome: Progressing   - Consider bleeding precautions    Outcome: Progressing

## 2021-01-04 NOTE — ASSESSMENT & PLAN NOTE
Lab Results   Component Value Date    HGBA1C 5 7 (H) 01/02/2021     Monitor and defer to PCP for treatment of prediabetes

## 2021-01-04 NOTE — CONSULTS
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 testing within 48 hours of potential admission  Please re-test if needed  Medical Co-morbidities Plan:  Seizure activity: Currently on antiepileptics, continue work up per primary team  Bowel plan: Last BM 1/3, not on any bowel medications  Can hold off a specific regimen, but if no BM in 2-3 days, would add senna, colace, and PRN miralax  Bladder plan: continent  DVT ppx: lovenox      Thank you for this consultation  Do not hesitate to contact service with further questions  Leonela Krueger, DO  PM&R    History of Present Illness:  Candee Gowers is a 60 y o  male with  has a past medical history of Bruxism (teeth grinding), Hypertension, Obesity, and Sleep apnea     Patient presented to the [address] on 1/1/21 with slurred speech  He had taken 6x his 81mg aspirin afterwards and went to [facility]  He received tPA that morning at 11:55 and transferred to [facility] for further management   He underwent further imaging with evidence for a T2/Flair enhancing lesion in the left temporal lobe suggestive of a neoplasm  He underwent an LP today for further analysis  He remains of Keppra for seizure ppx  Of note, a 1 5cm liver mass was also noted on CT with recommendation to obtain dedicated liver MRI  Review of Systems: 10 point ROS negative except for what is noted in HPI    Function:  Prior level of function and living situation:  Independent functioning  Lives in a single level home with 4-5 steps to enter with handrail  Retired approximately 1 year ago  Lives alone    Current level of function:  Physical Therapy: Independent  Occupational Therapy: Independent    Physical Exam:  /83   Pulse 62   Temp 97 5 °F (36 4 °C) (Axillary)   Resp 18   Ht 6' 3" (1 905 m)   Wt 119 kg (262 lb 5 6 oz)   SpO2 96%   BMI 32 79 kg/m²        Intake/Output Summary (Last 24 hours) at 1/4/2021 1528  Last data filed at 1/4/2021 0601  Gross per 24 hour   Intake --   Output 1680 ml   Net -1680 ml       Body mass index is 32 79 kg/m²  Physical Exam  Constitutional:       Appearance: Normal appearance  HENT:      Head: Normocephalic and atraumatic  Nose: No rhinorrhea  Mouth/Throat:      Mouth: Mucous membranes are moist    Eyes:      Extraocular Movements: Extraocular movements intact  Neck:      Musculoskeletal: Normal range of motion  Cardiovascular:      Rate and Rhythm: Normal rate and regular rhythm  Pulses: Normal pulses  Pulmonary:      Effort: Pulmonary effort is normal  No respiratory distress  Breath sounds: No wheezing or rales  Abdominal:      General: Bowel sounds are normal  There is no distension  Palpations: Abdomen is soft  Musculoskeletal:      Right lower leg: No edema  Left lower leg: No edema  Skin:     General: Skin is warm  Capillary Refill: Capillary refill takes less than 2 seconds  Neurological:      General: No focal deficit present  Mental Status: He is alert and oriented to person, place, and time  Sensory: No sensory deficit  Motor: No weakness        Coordination: Coordination normal    Psychiatric:         Mood and Affect: Mood normal          Behavior: Behavior normal               Social History:    Social History     Socioeconomic History    Marital status: Single     Spouse name: Not on file    Number of children: Not on file    Years of education: Not on file    Highest education level: Not on file   Occupational History    Not on file   Social Needs    Financial resource strain: Not on file    Food insecurity     Worry: Not on file     Inability: Not on file    Transportation needs     Medical: Not on file     Non-medical: Not on file   Tobacco Use    Smoking status: Former Smoker     Types: Cigarettes    Smokeless tobacco: Never Used   Substance and Sexual Activity    Alcohol use: Yes     Frequency: 2-4 times a month     Drinks per session: 3 or 4     Binge frequency: Never    Drug use: Never    Sexual activity: Not Currently   Lifestyle    Physical activity     Days per week: Not on file     Minutes per session: Not on file    Stress: Not on file   Relationships    Social connections     Talks on phone: Not on file     Gets together: Not on file     Attends [employer] service: Not on file     Active member of club or organization: Not on file     Attends meetings of clubs or organizations: Not on file     Relationship status: Not on file    Intimate partner violence     Fear of current or ex partner: Not on file     Emotionally abused: Not on file     Physically abused: Not on file     Forced sexual activity: Not on file   Other Topics Concern    Not on file   Social History Narrative    Daily caffeine use- 2 cups of coffee        Family History:    Family History   Problem Relation Age of Onset    Heart disease Mother     Heart attack Father     Leukemia Brother          Medications:     Current Facility-Administered Medications:     acetaminophen (TYLENOL) tablet 650 mg, 650 mg, Oral, Q6H PRN, [employer], DO, 650 mg at 01/02/21 1212    amLODIPine (NORVASC) tablet 10 mg, 10 mg, Oral, Daily, Rakan Brooks DO, 10 mg at 01/04/21 0811    aspirin chewable tablet 81 mg, 81 mg, Oral, Daily, Rakan Brooks DO, 81 mg at 01/04/21 0808    atorvastatin (LIPITOR) tablet 40 mg, 40 mg, Per NG Tube, QPM, Rakan Brooks DO, 40 mg at 01/03/21 1752    enoxaparin (LOVENOX) subcutaneous injection 40 mg, 40 mg, Subcutaneous, Q24H [address], Eunice Duran MD, Stopped at 01/04/21 1502    hydrALAZINE (APRESOLINE) injection 10 mg, 10 mg, Intravenous, Q6H PRN, Rakan Brooks DO    hydrochlorothiazide (HYDRODIURIL) tablet 12 5 mg, 12 5 mg, Oral, Daily, Rakan Brooks DO, 12 5 mg at 01/04/21 0811    levETIRAcetam (KEPPRA) tablet 1,000 mg, 1,000 mg, Oral, Q12H [address], Arnoldo Mccrary MD, 1,000 mg at 01/04/21 0809    lidocaine (PF) (XYLOCAINE-MPF) 1 % injection 5 mL, 5 mL, Injection, Once in imaging, Arnoldo Mccrary MD    LORazepam (ATIVAN) tablet 1 mg, 1 mg, Oral, Once, Eunice Duran MD    losartan (COZAAR) tablet 100 mg, 100 mg, Oral, Daily, Rakan Brooks DO, 100 mg at 01/04/21 9478    Past Medical History:     Past Medical History:   Diagnosis Date    Bruxism (teeth grinding)     Hypertension     Obesity     Sleep apnea     CPAP nightly        Past Surgical History:     Past Surgical History:   Procedure Laterality Date    TONSILLECTOMY           Allergies:     No Known Allergies        LABORATORY RESULTS:      Lab Results   Component Value Date    HGB 15 4 01/02/2021    HCT 47 3 01/02/2021    WBC 7 37 01/02/2021     Lab Results   Component Value Date    BUN 10 01/02/2021    K 4 3 01/02/2021     01/02/2021    CREATININE 0 81 01/02/2021     Lab Results   Component Value Date    PROTIME 12 6 01/04/2021    INR 0 94 01/04/2021        DIAGNOSTIC STUDIES: Reviewed  Ct Head Wo Contrast    Result Date: 1/2/2021  Impression: No acute intracranial abnormality    Specifically, no noncontrast CT evidence of acute intracranial hemorrhage or evolving acute intracranial ischemia  Workstation performed: [id number]     Mri Brain Wo Contrast    Result Date: 1/2/2021  Impression: No evidence of acute infarct  T2/ FLAIR hyperintense focus at the left temporal juxtacortical region presumed to represent a developmental venous anomaly, however given patient's clinical history a subacute infarct cannot be excluded  Contrast-enhanced MR examination recommended for  further characterization  The study was marked in EPIC for significant notification  Workstation performed: [id number]     Mri Brain W Wo Contrast    Result Date: 1/3/2021  Impression: Intense ring enhancement associated with T2/FLAIR signal abnormality lesion in the left temporal lobe  The appearance is strongly suggestive of parenchymal brain neoplasm, most likely glioma or metastasis  Alternatively, tumefactive demyelinating lesion is possible, but considered less likely due to complete absence of diffusion restriction  I personally discussed this study with Dr Indra Aragon via secure text messaging on 1/3/2021 at 8:54 AM  Workstation performed: [id number]     Ct Chest Abdomen Pelvis W Contrast    Result Date: 1/4/2021  Impression: CT chest: No evidence of significant abnormal mass  Punctate mediastinal and hilar calcified granulomata  Trace bibasilar subsegmental atelectasis, left greater than right  CT abdomen and pelvis: 1 5 cm left hepatic indeterminate nodule  Although this may simply be a flash filling hemangioma or possibly benign AV shunt, advise follow-up with dedicated liver MRI with and without contrast for further characterization  Otherwise, no evidence of significant abnormal mass  Bilateral renal simple cysts and subcentimeter hypodensities too small to characterize statistically representing simple cysts  Mild prostatomegaly  Colonic diverticulosis  This study demonstrates a significant  finding and was documented as such in [facility] for liaison and referring practitioner notification   This study demonstrates a finding requiring imaging follow-up and was documented as such in Epic  Workstation performed: [id number]     Ct Stroke Alert Brain    Result Date: 1/1/2021  Impression: No acute intracranial abnormality  Findings were directly discussed with Brigitte Ospina on 1/1/2021 11:33 AM  Workstation performed: [id number]     Cta Stroke Alert (head/neck)    Result Date: 1/1/2021  Impression: 1  Unremarkable CTA of the neck and brain  No evidence of large vessel central occlusive disease involving the [place] or its branch vessels  If there is continued concern for acute cerebral ischemia, recommend follow-up MRI of the brain with  diffusion-weighted sequencing  2   No pulmonary parenchymal changes to suggest COVID19 infection   Findings were directly discussed with Brigitte Ospina on 1/1/2021 12:00 PM  Workstation performed: [id number]

## 2021-01-04 NOTE — RESTORATIVE TECHNICIAN NOTE
Restorative Specialist Mobility Note       Activity: Ambulate in pedro, Ambulate in room, Bathroom privileges, Chair, Dangle, Stand at bedside(Educated/encouraged pt to ambulate with assistance 3-4 x's/day  Bed alarm on   Pt callbell, phone/tray within reach )     Assistive Device: None       Martina ECHOLS, Restorative Technician, [facility]

## 2021-01-04 NOTE — PROGRESS NOTES
Progress Note - Stephanie Louie 1955, 60 y o  male MRN: [id number]    Unit/Bed#: [facility] 723-01 Encounter: [id number]    Primary Care Provider: Analilia Bowie DO   Date and time admitted to hospital: 1/1/2021  3:32 PM        * Seizure-like activity [facility])  Assessment & Plan  Patient presents for acute dysarthria on 1/1; initially treated as CVA  Subsequent imaging and paucity of CVA findings make CVA less likely  Repeat brain MRI revealed:  Intense ring enhancement associated with T2/FLAIR signal abnormality lesion in the left temporal lobe  The appearance is strongly suggestive of parenchymal brain neoplasm, most likely glioma or metastasis  Alternatively, tumefactive demyelinating   lesion is possible, but considered less likely due to complete absence of diffusion restriction  Roberts CT to be initiated by neurology; LP pending  Keppra 1 g BID  Await further neurology findings; may require Bx brain    1/4-CT chest abdomen pelvis findings of 1 5 cm liver mass; Radiology recommend dedicated MRI liver  Patient somewhat resistant MRI given underlying claustrophobia; will give Ativan and reimage  Prediabetes  Assessment & Plan  Lab Results   Component Value Date    HGBA1C 5 7 (H) 01/02/2021     Monitor and defer to PCP for treatment of prediabetes    Obstructive sleep apnea syndrome  Assessment & Plan  CPAP QHS    Essential hypertension  Assessment & Plan  Currently well controlled; continue current medications  Unclear if true CVA; otherwise, outside window permissive hypertension; continue to maintain SBP < 160    Hypercholesterolemia  Assessment & Plan  statin    Class 1 obesity due to excess calories with serious comorbidity and body mass index (BMI) of 32 0 to 32 9 in adult  Assessment & Plan  - pt with BMI of Body mass index is 32 79 kg/m²    - pt counseled on risks associated with obesity and it's contribution to other health issues  - advise portion control, healthy food choices, drinking water instead of juice/soda  - advise beginning light exercise program (i e  Walking 30min 4-5x/wk)  - advise keeping food diary to help identify problem foods/times/stressors  - pt advised that weight loss of ~ 1lb/wk is a good goal and not to become frustrated if loss is slower        VTE Pharmacologic Prophylaxis:   Pharmacologic: Enoxaparin (Lovenox)  Mechanical VTE Prophylaxis in Place: Yes    Patient Centered Rounds: I have performed bedside rounds with nursing staff today  Discussions with Specialists or Other Care Team Provider:  Neurology    Education and Discussions with Family / Patient: Care plan discussed with patient who voiced understanding and agrees with recommendations  Time Spent for Care: 30 minutes  More than 50% of total time spent on counseling and coordination of care as described above  Current Length of Stay: 3 day(s)    Current Patient Status: Inpatient   Certification Statement: The patient will continue to require additional inpatient hospital stay due to Evaluation of seizure-like activity    Discharge Plan: To be determined    Code Status: Level 1 - Full Code      Subjective:   Patient seen examined bedside, no acute distress or discomfort noted  Discussed with radiology today; patient would benefit from MRI of liver to further describe 1 5 cm liver mass  Cardiology requesting LP and will workup patient for possibility of metastatic brain lesion  Reports no recurrent seizure-like activity; resting comfortably  Objective:     Vitals:   Temp (24hrs), Av 6 °F (36 4 °C), Min:97 5 °F (36 4 °C), Max:97 7 °F (36 5 °C)    Temp:  [97 5 °F (36 4 °C)-97 7 °F (36 5 °C)] 97 5 °F (36 4 °C)  HR:  [62-83] 62  Resp:  [16-18] 18  BP: (123-133)/(78-86) 127/83  SpO2:  [96 %-98 %] 96 %  Body mass index is 32 79 kg/m²  Input and Output Summary (last 24 hours):        Intake/Output Summary (Last 24 hours) at 2021 1304  Last data filed at 2021 0601  Gross per 24 hour   Intake --   Output 2280 ml   Net -2280 ml       Physical Exam:     Physical Exam  Vitals signs and nursing note reviewed  Constitutional:       Appearance: He is well-developed  HENT:      Head: Normocephalic and atraumatic  Eyes:      Conjunctiva/sclera: Conjunctivae normal    Neck:      Musculoskeletal: Neck supple  Cardiovascular:      Rate and Rhythm: Normal rate and regular rhythm  Heart sounds: No murmur  Pulmonary:      Effort: Pulmonary effort is normal  No respiratory distress  Breath sounds: Normal breath sounds  Abdominal:      Palpations: Abdomen is soft  Tenderness: There is no abdominal tenderness  Skin:     General: Skin is warm and dry  Neurological:      Mental Status: He is alert and oriented to person, place, and time  Psychiatric:         Mood and Affect: Mood normal          Behavior: Behavior normal          Judgment: Judgment normal            Additional Data:     Labs:    Results from last 7 days   Lab Units 01/04/21  0955 01/02/21  0440   WBC Thousand/uL  --  7 37   HEMOGLOBIN g/dL  --  15 4   HEMATOCRIT %  --  47 3   PLATELETS Thousands/uL 201 214   NEUTROS PCT %  --  61   LYMPHS PCT %  --  28   MONOS PCT %  --  9   EOS PCT %  --  2     Results from last 7 days   Lab Units 01/02/21  0440   SODIUM mmol/L 142   POTASSIUM mmol/L 4 3   CHLORIDE mmol/L 108   CO2 mmol/L 30   BUN mg/dL 10   CREATININE mg/dL 0 81   ANION GAP mmol/L 4   CALCIUM mg/dL 9 0   GLUCOSE RANDOM mg/dL 115     Results from last 7 days   Lab Units 01/04/21  0955   INR  0 94     Results from last 7 days   Lab Units 01/01/21  1113   POC GLUCOSE mg/dl 86     Results from last 7 days   Lab Units 01/02/21  0440   HEMOGLOBIN A1C % 5 7*               * I Have Reviewed All Lab Data Listed Above  * Additional Pertinent Lab Tests Reviewed:  All Labs Within Last 24 Hours Reviewed    Imaging:    Imaging Reports Reviewed Today Include:  MRI brain, CTA chest abdomen pelvis  Imaging Personally Reviewed by Myself Includes: Recent Cultures (last 7 days):           Last 24 Hours Medication List:   Current Facility-Administered Medications   Medication Dose Route Frequency Provider Last Rate    acetaminophen  650 mg Oral Q6H PRN Rakan Aiad, DO      amLODIPine  10 mg Oral Daily Rakan Aiad, DO      aspirin  81 mg Oral Daily Rakan Aiad, DO      atorvastatin  40 mg Per NG Tube QPM Rakan Aiad, DO      heparin (porcine)  5,000 Units Subcutaneous Q8H [address] [place], MD      hydrALAZINE  10 mg Intravenous Q6H PRN Rakan Aiad, DO      hydrochlorothiazide  12 5 mg Oral Daily Rakan Aiad, DO      levETIRAcetam  1,000 mg Oral Q12H [facility] Marzena Grijalva MD      LORazepam  1 mg Oral Once Raven Neal MD      losartan  100 mg Oral Daily Ciera Hill DO          Today, Patient Was Seen By: Usha Alaniz MD    ** Please Note: Dictation voice to text software may have been used in the creation of this document   **

## 2021-01-04 NOTE — PROGRESS NOTES
Progress Note - Marylee Deaner 1955, 60 y o  male MRN: [id number]    Unit/Bed#: [facility] 723-01 Encounter: [id number]    Primary Care Provider: Armani Ackerman DO   Date and time admitted to hospital: 1/1/2021  3:32 PM        Prediabetes  Assessment & Plan  Lab Results   Component Value Date    HGBA1C 5 7 (H) 01/02/2021     Monitor and defer to PCP for treatment of prediabetes    Obstructive sleep apnea syndrome  Assessment & Plan  CPAP QHS    Essential hypertension  Assessment & Plan  Currently well controlled; continue current medications  Unclear if true CVA; otherwise, outside window permissive hypertension; continue to maintain SBP < 160    Hypercholesterolemia  Assessment & Plan  statin    Class 1 obesity due to excess calories with serious comorbidity and body mass index (BMI) of 32 0 to 32 9 in adult  Assessment & Plan  - pt with BMI of Body mass index is 32 79 kg/m²  - pt counseled on risks associated with obesity and it's contribution to other health issues  - advise portion control, healthy food choices, drinking water instead of juice/soda  - advise beginning light exercise program (i e  Walking 30min 4-5x/wk)  - advise keeping food diary to help identify problem foods/times/stressors  - pt advised that weight loss of ~ 1lb/wk is a good goal and not to become frustrated if loss is slower      * Seizure-like activity [facility])  Assessment & Plan  Patient presents for acute dysarthria on 1/1; initially treated as CVA  Subsequent imaging and paucity of CVA findings make CVA less likely  Repeat brain MRI revealed:  Intense ring enhancement associated with T2/FLAIR signal abnormality lesion in the left temporal lobe  The appearance is strongly suggestive of parenchymal brain neoplasm, most likely glioma or metastasis  Alternatively, tumefactive demyelinating   lesion is possible, but considered less likely due to complete absence of diffusion restriction      Roberts CT to be initiated by neurology; LP pending  Keppra 1 g BID  Await further neurology findings; may require Bx brain      VTE Pharmacologic Prophylaxis:   Pharmacologic: Heparin  Mechanical VTE Prophylaxis in Place: Yes    Patient Centered Rounds: I have performed bedside rounds with nursing staff today  Discussions with Specialists or Other Care Team Provider: neurology    Education and Discussions with Family / Patient: Care plan discussed with patient who voiced understanding and agrees with recommendations  Time Spent for Care: 45 minutes  More than 50% of total time spent on counseling and coordination of care as described above  Current Length of Stay: 2 day(s)    Current Patient Status: Inpatient   Certification Statement: The patient will continue to require additional inpatient hospital stay due to seizure like activity    Discharge Plan: TBD    Code Status: Level 1 - Full Code      Subjective:   Patient seen and examined bedside; no acute complaints  Objective:     Vitals:   Temp (24hrs), Av 5 °F (36 9 °C), Min:97 5 °F (36 4 °C), Max:100 °F (37 8 °C)    Temp:  [97 5 °F (36 4 °C)-100 °F (37 8 °C)] 100 °F (37 8 °C)  HR:  [64-66] 66  Resp:  [20] 20  BP: (119-132)/(79-87) 132/87  SpO2:  [96 %-98 %] 98 %  Body mass index is 32 79 kg/m²  Input and Output Summary (last 24 hours): Intake/Output Summary (Last 24 hours) at 1/3/2021 1953  Last data filed at 1/3/2021 1701  Gross per 24 hour   Intake 480 ml   Output 2400 ml   Net -1920 ml       Physical Exam:     Physical Exam  Vitals signs and nursing note reviewed  Constitutional:       Appearance: He is well-developed  HENT:      Head: Normocephalic and atraumatic  Eyes:      Conjunctiva/sclera: Conjunctivae normal    Neck:      Musculoskeletal: Neck supple  Cardiovascular:      Rate and Rhythm: Normal rate and regular rhythm  Heart sounds: No murmur  Pulmonary:      Effort: Pulmonary effort is normal  No respiratory distress        Breath sounds: Normal breath sounds  Abdominal:      Palpations: Abdomen is soft  Tenderness: There is no abdominal tenderness  Skin:     General: Skin is warm and dry  Neurological:      Mental Status: He is alert and oriented to person, place, and time  Psychiatric:         Mood and Affect: Mood normal          Behavior: Behavior normal          Judgment: Judgment normal            Additional Data:     Labs:    Results from last 7 days   Lab Units 01/02/21  0440   WBC Thousand/uL 7 37   HEMOGLOBIN g/dL 15 4   HEMATOCRIT % 47 3   PLATELETS Thousands/uL 214   NEUTROS PCT % 61   LYMPHS PCT % 28   MONOS PCT % 9   EOS PCT % 2     Results from last 7 days   Lab Units 01/02/21  0440   SODIUM mmol/L 142   POTASSIUM mmol/L 4 3   CHLORIDE mmol/L 108   CO2 mmol/L 30   BUN mg/dL 10   CREATININE mg/dL 0 81   ANION GAP mmol/L 4   CALCIUM mg/dL 9 0   GLUCOSE RANDOM mg/dL 115     Results from last 7 days   Lab Units 01/01/21  1144   INR  0 89     Results from last 7 days   Lab Units 01/01/21  1113   POC GLUCOSE mg/dl 86     Results from last 7 days   Lab Units 01/02/21  0440   HEMOGLOBIN A1C % 5 7*               * I Have Reviewed All Lab Data Listed Above  * Additional Pertinent Lab Tests Reviewed:  All Labs Within Last 24 Hours Reviewed    Imaging:    Imaging Reports Reviewed Today Include: MRI brain; CT Head  Imaging Personally Reviewed by Myself Includes:      Recent Cultures (last 7 days):           Last 24 Hours Medication List:   Current Facility-Administered Medications   Medication Dose Route Frequency Provider Last Rate    acetaminophen  650 mg Oral Q6H PRN Rakan Aiad, DO      amLODIPine  10 mg Oral Daily Rakan Aiad, DO      aspirin  81 mg Oral Daily Rakan Aiad, DO      atorvastatin  40 mg Per NG Tube QPM Rakan Aiad, DO      [START ON 1/4/2021] heparin (porcine)  5,000 Units Subcutaneous Q8H [address] [place], MD      hydrALAZINE  10 mg Intravenous Q6H PRN Rakan Aiad, DO      hydrochlorothiazide  12 5 mg Oral Daily Rakan Aiad, DO  levETIRAcetam  1,000 mg Oral Q12H [address], MD      losartan  100 mg Oral Daily Red Lundy DO          Today, Patient Was Seen By: Akbar Robb MD    ** Please Note: Dictation voice to text software may have been used in the creation of this document   **

## 2021-01-04 NOTE — ASSESSMENT & PLAN NOTE
Patient presents for acute dysarthria on 1/1; initially treated as CVA  Subsequent imaging and paucity of CVA findings make CVA less likely  Repeat brain MRI revealed:  Intense ring enhancement associated with T2/FLAIR signal abnormality lesion in the left temporal lobe  The appearance is strongly suggestive of parenchymal brain neoplasm, most likely glioma or metastasis  Alternatively, tumefactive demyelinating   lesion is possible, but considered less likely due to complete absence of diffusion restriction  Roberts CT to be initiated by neurology; LP pending  Keppra 1 g BID  Await further neurology findings; may require Bx brain    1/4-CT chest abdomen pelvis findings of 1 5 cm liver mass; Radiology recommend dedicated MRI liver  Patient somewhat resistant MRI given underlying claustrophobia; will give Ativan and reimage

## 2021-01-05 ENCOUNTER — APPOINTMENT (INPATIENT)
Dept: RADIOLOGY | Facility: HOSPITAL | Age: 66
DRG: 055 | End: 2021-01-05
Payer: COMMERCIAL

## 2021-01-05 PROBLEM — G93.89 MASS OF LEFT TEMPORAL LOBE: Status: ACTIVE | Noted: 2021-01-05

## 2021-01-05 LAB
ALBUMIN SERPL ELPH-MCNC: 4.12 G/DL (ref 3.5–5)
ALBUMIN SERPL ELPH-MCNC: 61.5 % (ref 52–65)
ALPHA1 GLOB SERPL ELPH-MCNC: 0.32 G/DL (ref 0.1–0.4)
ALPHA1 GLOB SERPL ELPH-MCNC: 4.8 % (ref 2.5–5)
ALPHA2 GLOB SERPL ELPH-MCNC: 0.76 G/DL (ref 0.4–1.2)
ALPHA2 GLOB SERPL ELPH-MCNC: 11.3 % (ref 7–13)
BETA GLOB ABNORMAL SERPL ELPH-MCNC: 0.38 G/DL (ref 0.4–0.8)
BETA1 GLOB SERPL ELPH-MCNC: 5.6 % (ref 5–13)
BETA2 GLOB SERPL ELPH-MCNC: 5.1 % (ref 2–8)
BETA2+GAMMA GLOB SERPL ELPH-MCNC: 0.34 G/DL (ref 0.2–0.5)
GAMMA GLOB ABNORMAL SERPL ELPH-MCNC: 0.78 G/DL (ref 0.5–1.6)
GAMMA GLOB SERPL ELPH-MCNC: 11.7 % (ref 12–22)
IGG/ALB SER: 1.6 {RATIO} (ref 1.1–1.8)
PROT PATTERN SERPL ELPH-IMP: ABNORMAL
PROT SERPL-MCNC: 6.7 G/DL (ref 6.4–8.2)
SCAN RESULT: NORMAL

## 2021-01-05 PROCEDURE — 94660 CPAP INITIATION&MGMT: CPT

## 2021-01-05 PROCEDURE — 99232 SBSQ HOSP IP/OBS MODERATE 35: CPT | Performed by: INTERNAL MEDICINE

## 2021-01-05 PROCEDURE — 74183 MRI ABD W/O CNTR FLWD CNTR: CPT

## 2021-01-05 PROCEDURE — A9585 GADOBUTROL INJECTION: HCPCS | Performed by: INTERNAL MEDICINE

## 2021-01-05 PROCEDURE — 99232 SBSQ HOSP IP/OBS MODERATE 35: CPT | Performed by: PSYCHIATRY & NEUROLOGY

## 2021-01-05 PROCEDURE — G1004 CDSM NDSC: HCPCS

## 2021-01-05 RX ADMIN — ENOXAPARIN SODIUM 40 MG: 40 INJECTION SUBCUTANEOUS at 09:31

## 2021-01-05 RX ADMIN — LOSARTAN POTASSIUM 100 MG: 50 TABLET, FILM COATED ORAL at 09:33

## 2021-01-05 RX ADMIN — LEVETIRACETAM 1000 MG: 500 TABLET, FILM COATED ORAL at 20:54

## 2021-01-05 RX ADMIN — ATORVASTATIN CALCIUM 40 MG: 40 TABLET, FILM COATED ORAL at 18:05

## 2021-01-05 RX ADMIN — LORAZEPAM 1 MG: 1 TABLET ORAL at 21:22

## 2021-01-05 RX ADMIN — GADOBUTROL 12 ML: 604.72 INJECTION INTRAVENOUS at 22:55

## 2021-01-05 RX ADMIN — ACETAMINOPHEN 650 MG: 325 TABLET, FILM COATED ORAL at 12:33

## 2021-01-05 RX ADMIN — ASPIRIN 81 MG CHEWABLE TABLET 81 MG: 81 TABLET CHEWABLE at 09:32

## 2021-01-05 RX ADMIN — LEVETIRACETAM 1000 MG: 500 TABLET, FILM COATED ORAL at 09:31

## 2021-01-05 RX ADMIN — HYDROCHLOROTHIAZIDE 12.5 MG: 12.5 TABLET ORAL at 09:32

## 2021-01-05 RX ADMIN — AMLODIPINE BESYLATE 10 MG: 10 TABLET ORAL at 09:33

## 2021-01-05 NOTE — PROGRESS NOTES
Progress Note - Radames Bruner 1955, 60 y o  male MRN: [id number]    Unit/Bed#: [facility] 723-01 Encounter: [id number]    Primary Care Provider: Klever Savage DO   Date and time admitted to hospital: 1/1/2021  3:32 PM        * Seizure-like activity [facility])  Assessment & Plan  Patient presents for acute dysarthria on 1/1; initially treated as CVA  Repeat brain MRI revealed:  Intense ring enhancement associated with T2/FLAIR signal abnormality lesion in the left temporal lobe  The appearance is strongly suggestive of parenchymal brain neoplasm, most likely glioma or metastasis  Alternatively, tumefactive demyelinating   lesion is possible, but considered less likely due to complete absence of diffusion restriction  Keppra 1 g BID      1/4-CT chest abdomen pelvis findings of 1 5 cm liver mass; Radiology recommend dedicated MRI liver  Patient somewhat resistant MRI given underlying claustrophobia; will give Ativan and reimage  Neurology following  Request Neurosurgery evaluation    Mass of left temporal lobe  Assessment & Plan  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  Presently on [address]  Neurology following  Will request Neurosurgery inputs will likely need tissue diagnosis    Prediabetes  Assessment & Plan  Lab Results   Component Value Date    HGBA1C 5 7 (H) 01/02/2021     Therapeutic lifestyle modification    Obstructive sleep apnea syndrome  Assessment & Plan  CPAP QHS    Essential hypertension  Assessment & Plan  Monitor blood pressures  Avoid hypotension    Hypercholesterolemia  Assessment & Plan  statin    Class 1 obesity due to excess calories with serious comorbidity and body mass index (BMI) of 32 0 to 32 9 in adult  Assessment & Plan  Therapeutic lifestyle modification        VTE Pharmacologic Prophylaxis:   Pharmacologic: Enoxaparin (Lovenox)  Mechanical VTE Prophylaxis in Place: Yes    Patient Centered Rounds: I have performed bedside rounds with nursing staff today      Discussions with Specialists or Other Care Team Provider:  Neurosurgery    Education and Discussions with Family / Patient:  Discussed with the patient, reports he is keeping his family updated    Time Spent for Care: 30 minutes  More than 50% of total time spent on counseling and coordination of care as described above  Current Length of Stay: 4 day(s)    Current Patient Status: Inpatient   Certification Statement: The patient will continue to require additional inpatient hospital stay due to As outlined    Discharge Plan:  Awaiting clinical and symptomatic improvement    Code Status: Level 1 - Full Code      Subjective:     Sitting up in bed  Reports feeling better  Ambulating the room and hallway    Objective:     Vitals:   Temp (24hrs), Av 9 °F (36 1 °C), Min:96 °F (35 6 °C), Max:97 7 °F (36 5 °C)    Temp:  [96 °F (35 6 °C)-97 7 °F (36 5 °C)] 96 °F (35 6 °C)  HR:  [66] 66  Resp:  [18] 18  BP: (124-139)/(82-84) 139/84  SpO2:  [92 %-96 %] 92 %  Body mass index is 32 79 kg/m²  Input and Output Summary (last 24 hours):        Intake/Output Summary (Last 24 hours) at 2021 1543  Last data filed at 2021 0931  Gross per 24 hour   Intake 1262 ml   Output 1800 ml   Net -538 ml       Physical Exam:     Physical Exam    Comfortably sitting up in bed  Neck supple  Obese  Lungs diminished at the bases no additional sounds  Heart sounds S1 and S2 noted  Abdomen soft nontender  Awake alert obeys simple commands  Pulses noted  No rash    Additional Data:     Labs:    Results from last 7 days   Lab Units 21  0955 21  0440   WBC Thousand/uL  --  7 37   HEMOGLOBIN g/dL  --  15 4   HEMATOCRIT %  --  47 3   PLATELETS Thousands/uL 201 214   NEUTROS PCT %  --  61   LYMPHS PCT %  --  28   MONOS PCT %  --  9   EOS PCT %  --  2     Results from last 7 days   Lab Units 21  0440   SODIUM mmol/L 142   POTASSIUM mmol/L 4 3   CHLORIDE mmol/L 108   CO2 mmol/L 30   BUN mg/dL 10   CREATININE mg/dL 0 81   ANION GAP mmol/L 4   CALCIUM mg/dL 9 0   GLUCOSE RANDOM mg/dL 115     Results from last 7 days   Lab Units 01/04/21  0955   INR  0 94     Results from last 7 days   Lab Units 01/01/21  1113   POC GLUCOSE mg/dl 86     Results from last 7 days   Lab Units 01/02/21  0440   HEMOGLOBIN A1C % 5 7*               * I Have Reviewed All Lab Data Listed Above  * Additional Pertinent Lab Tests Reviewed: All Labs Within Last 24 Hours Reviewed    Imaging:    Imaging Reports Reviewed Today Include:  Imaging studies noted  Imaging Personally Reviewed by Myself Includes:     Recent Cultures (last 7 days):     Results from last 7 days   Lab Units 01/04/21  1438   GRAM STAIN RESULT  Rare Mononuclear Cells  No Polys or Bacteria seen       Last 24 Hours Medication List:   Current Facility-Administered Medications   Medication Dose Route Frequency Provider Last Rate    acetaminophen  650 mg Oral Q6H PRN Rakan Aiad, DO      amLODIPine  10 mg Oral Daily Rakan Aiad, DO      aspirin  81 mg Oral Daily Rakan Aiad, DO      atorvastatin  40 mg Per NG Tube QPM Rakan Aiad, DO      enoxaparin  40 mg Subcutaneous Q24H [facility] Robbie Santana MD      hydrALAZINE  10 mg Intravenous Q6H PRN Rakan Aiad, DO      hydrochlorothiazide  12 5 mg Oral Daily Rakan Aiad, DO      levETIRAcetam  1,000 mg Oral Q12H [address], MD      lidocaine (PF)  5 mL Injection Once in imaging Frances Mora MD      LORazepam  1 mg Oral Once Robbie Santana MD      losartan  100 mg Oral Daily Thompson Cramp, DO          Today, Patient Was Seen By: Annika Merchant MD    ** Please Note: Dictation voice to text software may have been used in the creation of this document   **

## 2021-01-05 NOTE — PROGRESS NOTES
NEUROLOGY RESIDENCY PROGRESS NOTE     Name: Lou Hansen   Age & Sex: 60 y o  male   MRN: [id number]  Unit/Bed#: [facility] 723-01   Encounter: [id number]    ASSESSMENT & PLAN     * Seizure-like activity [facility])  Assessment & Plan  Lou Hansen is a 60 y o  male who presented as stroke alert to [facility] on 1/1/2021 at 11:13 AM with LKW at 8:30AM , initial BP was Blood Pressure: 135/99  Initial presenting deficits were dysarthria and expressive aphasia  CTH/CTA were negative for acute findings  Pt was found to be a tPA candidate within the appropriate time window and without obvious contraindication and tPA was given at 11:55AM  Initially symptoms appeared resolved however aphasia transiently reappeared and MRI was negative for acute findings however demonstrated unclear lesion on flair  Impression- Pt's aphasia resolved at this time  Reviewed MRI brain w contrast, pt has intense ring enhancing lesion in left temporal lobe, which localized pt's semiology of difficulty in speaking/aphasia  Unclear etiology of ring enhancing lesion at this time- Neoplastic vs infectious  Plan:   Lumbar puncture done, pending labs to rule out infectious vs neoplastic cause of lesion   Keppra 1gm bid as suspecting seizure at this time   If pt mental status declines by GCS > 2 in 1 hour, obtain CTH urgently   Medical management as per primary team     Workup:   CTH: unremarkable   CTA: unremarkable  · MRI: No evidence of acute infarct  T2/ FLAIR hyperintense focus at the left temporal juxtacortical region presumed to represent a developmental venous anomaly, however given patient's clinical history a subacute infarct cannot be excluded  Contrast-enhanced MR examination recommended for further characterization   Repeat CTH at 24 hours: negative for hemorrhage    MRI brain w contrast- Intense ring enhancement associated with T2/FLAIR signal abnormality lesion in the left temporal lobe       Echocardiogram: EF- 60%, Left atrium- mild to moderately enlarged   EEG- normal  · CT chest abdomen and pelvis with contrast to rule out primary neoplasm negative  · LP studies  · Abnormal: LDH 43, protein 61  · WNL: WBC, glucose, ME panel      SUBJECTIVE     Patient was seen and examined  No acute events overnight  LP done yesterday, tolerated well and has no complaints or concerns  D/w patient that LP labs have not been forthcoming thus far and we may eventually need to pursue brain biopsy vs watchful waiting with serial MRIs  Contacted neurosurgery and made them aware of this patient  Review of Systems   Constitutional: Negative for chills and fever  HENT: Negative for ear pain and sore throat  Eyes: Negative for pain and visual disturbance  Respiratory: Negative for cough and shortness of breath  Cardiovascular: Negative for chest pain and palpitations  Gastrointestinal: Negative for abdominal pain and vomiting  Genitourinary: Negative for dysuria and hematuria  Musculoskeletal: Negative for arthralgias and back pain  Skin: Negative for color change and rash  Neurological: Negative for seizures and syncope  All other systems reviewed and are negative  OBJECTIVE     Patient ID: Verónica Gamble is a 60 y o  male  Vitals:    21 0807 21 2229 21 2242 21 0730   BP: 127/83  124/82 128/82   BP Location:       Pulse:   66 66   Resp:   18    Temp:   97 7 °F (36 5 °C) (!) 96 °F (35 6 °C)   TempSrc:       SpO2:  96% 96% 95%   Weight:       Height:          Temperature:   Temp (24hrs), Av 9 °F (36 1 °C), Min:96 °F (35 6 °C), Max:97 7 °F (36 5 °C)    Temperature: (!) 96 °F (35 6 °C)      Physical Exam  Vitals signs and nursing note reviewed  Constitutional:       Appearance: He is well-developed  HENT:      Head: Normocephalic and atraumatic  Eyes:      Extraocular Movements: EOM normal       Conjunctiva/sclera: Conjunctivae normal       Pupils: Pupils are equal, round, and reactive to light  Neck:      Musculoskeletal: Neck supple  Cardiovascular:      Rate and Rhythm: Normal rate and regular rhythm  Heart sounds: No murmur  Pulmonary:      Effort: Pulmonary effort is normal  No respiratory distress  Breath sounds: Normal breath sounds  Abdominal:      Palpations: Abdomen is soft  Tenderness: There is no abdominal tenderness  Skin:     General: Skin is warm and dry  Neurological:      Mental Status: He is alert and oriented to person, place, and time  Coordination: Finger-Nose-Finger Test normal       Gait: Gait is intact  Deep Tendon Reflexes: Strength normal    Psychiatric:         Mood and Affect: Mood normal          Speech: Speech normal          Behavior: Behavior normal           Neurologic Exam     Mental Status   Oriented to person, place, and time  Attention: normal  Concentration: normal    Speech: speech is normal   Level of consciousness: alert  Knowledge: good  Able to name object  Normal comprehension  Cranial Nerves     CN II   Visual fields full to confrontation  CN III, IV, VI   Pupils are equal, round, and reactive to light  Extraocular motions are normal    Right pupil: Shape: regular  Reactivity: brisk  Consensual response: intact  Accommodation: intact  Left pupil: Shape: regular  Reactivity: brisk  Consensual response: intact  Accommodation: intact  CN III: no CN III palsy  CN VI: no CN VI palsy  Nystagmus: none   Diplopia: none  Ophthalmoparesis: none  Upgaze: normal  Downgaze: normal    CN V   Facial sensation intact  CN VII   Facial expression full, symmetric  CN VIII   CN VIII normal      CN IX, X   CN IX normal    CN X normal      CN XI   CN XI normal      CN XII   CN XII normal      Motor Exam   Muscle bulk: normal  Overall muscle tone: normal  Right arm pronator drift: absent  Left arm pronator drift: absent    Strength   Strength 5/5 throughout       Sensory Exam   Light touch normal    Pinprick normal      Gait, Coordination, and Reflexes     Gait  Gait: normal    Coordination   Finger to nose coordination: normal    Tremor   Resting tremor: absent  Intention tremor: absent  Action tremor: absent    Reflexes   Reflexes 2+ except as noted  Right plantar: normal  Left plantar: normal  Right ankle clonus: absent  Left ankle clonus: absent       LABORATORY DATA     Labs: I have personally reviewed pertinent reports  Results from last 7 days   Lab Units 01/04/21  0955 01/02/21  0440 01/01/21  1144   WBC Thousand/uL  --  7 37 7 90   HEMOGLOBIN g/dL  --  15 4 16 0   HEMATOCRIT %  --  47 3 47 1*   PLATELETS Thousands/uL 201 214 204   NEUTROS PCT %  --  61  --    MONOS PCT %  --  9  --       Results from last 7 days   Lab Units 01/02/21  0440 01/01/21  1144   POTASSIUM mmol/L 4 3 4 3   CHLORIDE mmol/L 108 102   CO2 mmol/L 30 27   BUN mg/dL 10 14   CREATININE mg/dL 0 81 0 68*   CALCIUM mg/dL 9 0 9 3              Results from last 7 days   Lab Units 01/04/21  0955 01/01/21  1144   INR  0 94 0 89   PTT seconds 29 27         Results from last 7 days   Lab Units 01/01/21  1144   TROPONIN I ng/mL <0 03       IMAGING & DIAGNOSTIC TESTING     Radiology Results: I have personally reviewed pertinent reports  and I have personally reviewed pertinent films in PACS    [facility] lumbar puncture diagnostic   Final Result by Emma White MD (01/04 1706)      Successful fluoroscopically guided lumbar puncture  Procedure was performed by ESTRELLITA Ford PA-C under the direct supervision of Dr Sue Perry  Workstation performed: [id number]         CT chest abdomen pelvis w contrast   Final Result by Irene Rod MD (01/04 0600)      CT chest:      No evidence of significant abnormal mass  Punctate mediastinal and hilar calcified granulomata  Trace bibasilar subsegmental atelectasis, left greater than right  CT abdomen and pelvis:      1 5 cm left hepatic indeterminate nodule   Although this may simply be a flash filling hemangioma or possibly benign AV shunt, advise follow-up with dedicated liver MRI with and without contrast for further characterization  Otherwise, no evidence of significant abnormal mass  Bilateral renal simple cysts and subcentimeter hypodensities too small to characterize statistically representing simple cysts  Mild prostatomegaly  Colonic diverticulosis  This study demonstrates a significant  finding and was documented as such in [facility] for liaison and referring practitioner notification  This study demonstrates a finding requiring imaging follow-up and was documented as such in Epic  Workstation performed: [id number]         MRI brain w wo contrast   Final Result by Rui Somers MD (01/03 6822)      Intense ring enhancement associated with T2/FLAIR signal abnormality lesion in the left temporal lobe  The appearance is strongly suggestive of parenchymal brain neoplasm, most likely glioma or metastasis  Alternatively, tumefactive demyelinating    lesion is possible, but considered less likely due to complete absence of diffusion restriction  I personally discussed this study with Dr Kayden Dorsey via secure text messaging on 1/3/2021 at 8:54 AM                Workstation performed: [id number]         CT head wo contrast   Final Result by Rui Somers MD (01/02 4477)      No acute intracranial abnormality  Specifically, no noncontrast CT evidence of acute intracranial hemorrhage or evolving acute intracranial ischemia  Workstation performed: [id number]         MRI brain wo contrast   Final Result by Karl Pearl MD (01/02 0019)      No evidence of acute infarct  T2/ FLAIR hyperintense focus at the left temporal juxtacortical region presumed to represent a developmental venous anomaly, however given patient's clinical history a subacute infarct cannot be excluded    Contrast-enhanced MR examination recommended for    further characterization  The study was marked in EPIC for significant notification  Workstation performed: [id number]         MRI abdomen w wo contrast    (Results Pending)       Other Diagnostic Testing: I have personally reviewed pertinent reports  ACTIVE MEDICATIONS     Current Facility-Administered Medications   Medication Dose Route Frequency    acetaminophen (TYLENOL) tablet 650 mg  650 mg Oral Q6H PRN    amLODIPine (NORVASC) tablet 10 mg  10 mg Oral Daily    aspirin chewable tablet 81 mg  81 mg Oral Daily    atorvastatin (LIPITOR) tablet 40 mg  40 mg Per NG Tube QPM    enoxaparin (LOVENOX) subcutaneous injection 40 mg  40 mg Subcutaneous Q24H ERICA    hydrALAZINE (APRESOLINE) injection 10 mg  10 mg Intravenous Q6H PRN    hydrochlorothiazide (HYDRODIURIL) tablet 12 5 mg  12 5 mg Oral Daily    levETIRAcetam (KEPPRA) tablet 1,000 mg  1,000 mg Oral Q12H ERICA    lidocaine (PF) (XYLOCAINE-MPF) 1 % injection 5 mL  5 mL Injection Once in imaging    LORazepam (ATIVAN) tablet 1 mg  1 mg Oral Once    losartan (COZAAR) tablet 100 mg  100 mg Oral Daily       Prior to Admission medications    Medication Sig Start Date End Date Taking?  Authorizing Provider   amLODIPine (NORVASC) 10 mg tablet Take 1 tablet (10 mg total) by mouth daily 11/5/20  Yes Ronit Smart,    aspirin 81 mg chewable tablet Chew 81 mg daily   Yes Historical Provider, MD   atorvastatin (LIPITOR) 10 mg tablet Take 10 mg by mouth daily   Yes Historical Provider, MD   hydrochlorothiazide (MICROZIDE) 12 5 mg capsule Take 12 5 mg by mouth daily   Yes Historical Provider, MD   valsartan (DIOVAN) 160 mg tablet Take 160 mg by mouth daily   Yes Historical Provider, MD         ==  MD Omar Mai [facility] Neurology Residency, PGY-2

## 2021-01-05 NOTE — ASSESSMENT & PLAN NOTE
Patient presents for acute dysarthria on 1/1; initially treated as CVA  Repeat brain MRI revealed:  Intense ring enhancement associated with T2/FLAIR signal abnormality lesion in the left temporal lobe  The appearance is strongly suggestive of parenchymal brain neoplasm, most likely glioma or metastasis  Alternatively, tumefactive demyelinating   lesion is possible, but considered less likely due to complete absence of diffusion restriction  Keppra 1 g BID      1/4-CT chest abdomen pelvis findings of 1 5 cm liver mass; Radiology recommend dedicated MRI liver  Patient somewhat resistant MRI given underlying claustrophobia; will give Ativan and reimage      Neurology following  Request Neurosurgery evaluation

## 2021-01-05 NOTE — ASSESSMENT & PLAN NOTE
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

## 2021-01-05 NOTE — ASSESSMENT & PLAN NOTE
Philomena Orellana is a 60 y o  male who presented as stroke alert to [facility] on 1/1/2021 at 11:13 AM with LKW at 8:30AM , initial BP was Blood Pressure: 135/99  Initial presenting deficits were dysarthria and expressive aphasia  CTH/CTA were negative for acute findings  Pt was found to be a tPA candidate within the appropriate time window and without obvious contraindication and tPA was given at 11:55AM  Initially symptoms appeared resolved however aphasia transiently reappeared and MRI was negative for acute findings however demonstrated unclear lesion on flair  Impression- Pt's aphasia resolved at this time  Reviewed MRI brain w contrast, pt has intense ring enhancing lesion in left temporal lobe, which localized pt's semiology of difficulty in speaking/aphasia  Unclear etiology of ring enhancing lesion at this time- Neoplastic vs infectious  Plan:   Lumbar puncture done, pending labs to rule out infectious vs neoplastic cause of lesion   Keppra 1gm bid as suspecting seizure at this time   If pt mental status declines by GCS > 2 in 1 hour, obtain CTH urgently   Medical management as per primary team     Workup:   CTH: unremarkable   CTA: unremarkable  · MRI: No evidence of acute infarct  T2/ FLAIR hyperintense focus at the left temporal juxtacortical region presumed to represent a developmental venous anomaly, however given patient's clinical history a subacute infarct cannot be excluded  Contrast-enhanced MR examination recommended for further characterization   Repeat CTH at 24 hours: negative for hemorrhage    MRI brain w contrast- Intense ring enhancement associated with T2/FLAIR signal abnormality lesion in the left temporal lobe   Echocardiogram: EF- 60%, Left atrium- mild to moderately enlarged      EEG- normal  · CT chest abdomen and pelvis with contrast to rule out primary neoplasm negative  · LP studies  · Abnormal: LDH 43, protein 61  · WNL: WBC, glucose, ME panel

## 2021-01-05 NOTE — PLAN OF CARE
Problem: Neurological Deficit  Goal: Neurological status is stable or improving  Description: Interventions:  - Monitor and assess patient's level of consciousness, motor function, sensory function, and level of assistance needed for ADLs  - Monitor and report changes from baseline  Collaborate with interdisciplinary team to initiate plan and implement interventions as ordered  - Provide and maintain a safe environment  - Consider seizure precautions  - Consider fall precautions  - Consider aspiration precautions  - Consider bleeding precautions  Outcome: Progressing     Problem: Communication Impairment  Goal: Ability to express needs and understand communication  Description: Assess patient's communication skills and ability to understand information  Patient will demonstrate use of effective communication techniques, alternative methods of communication and understanding even if not able to speak  - Encourage communication and provide alternate methods of communication as needed  - Collaborate with case management/ for discharge needs  - Include patient/family/caregiver in decisions related to communication  Outcome: Progressing     Problem: Potential for Aspiration  Goal: Non-ventilated patient's risk of aspiration is minimized  Description: Assess and monitor vital signs, respiratory status, and labs (WBC)  Monitor for signs of aspiration (tachypnea, cough, rales, wheezing, cyanosis, fever)  - Assess and monitor patient's ability to swallow  - Place patient up in chair to eat if possible  - HOB up at 90 degrees to eat if unable to get patient up into chair   - Supervise patient during oral intake  - Instruct patient/ family to take small bites  - Instruct patient/ family to take small single sips when taking liquids    - Follow patient-specific strategies generated by speech pathologist   Outcome: Progressing     Problem: Nutrition  Goal: Nutrition/Hydration status is improving  Description: Monitor and assess patient's nutrition/hydration status for malnutrition (ex- brittle hair, bruises, dry skin, pale skin and conjunctiva, muscle wasting, smooth red tongue, and disorientation)  Collaborate with interdisciplinary team and initiate plan and interventions as ordered  Monitor patient's weight and dietary intake as ordered or per policy  Utilize nutrition screening tool and intervene per policy  Determine patient's food preferences and provide high-protein, high-caloric foods as appropriate  - Assist patient with eating   - Allow adequate time for meals   - Encourage patient to take dietary supplement as ordered  - Collaborate with clinical nutritionist   - Include patient/family/caregiver in decisions related to nutrition  Outcome: Progressing     Problem: Prexisting or High Potential for Compromised Skin Integrity  Goal: Skin integrity is maintained or improved  Description: INTERVENTIONS:  - Identify patients at risk for skin breakdown  - Assess and monitor skin integrity  - Assess and monitor nutrition and hydration status  - Monitor labs   - Assist with mobility/ambulation  - Relieve pressure over bony prominences  - Avoid friction and shearing  - Provide appropriate hygiene as needed including keeping skin clean and dry  - Evaluate need for skin moisturizer/barrier cream  - Collaborate with interdisciplinary team   - Patient/family teaching  - Consider wound care consult   Outcome: Progressing     Problem: Potential for Falls  Goal: Patient will remain free of falls  Description: INTERVENTIONS:  - Assess patient frequently for physical needs  -  Identify cognitive and physical deficits and behaviors that affect risk of falls    -  [place] fall precautions as indicated by assessment   - Educate patient/family on patient safety including physical limitations  - Instruct patient to call for assistance with activity based on assessment  - Modify environment to reduce risk of injury  - Consider OT/PT consult to assist with strengthening/mobility  Outcome: Progressing     Problem: PAIN - ADULT  Goal: Verbalizes/displays adequate comfort level or baseline comfort level  Description: Interventions:  - Encourage patient to monitor pain and request assistance  - Assess pain using appropriate pain scale  - Administer analgesics based on type and severity of pain and evaluate response  - Implement non-pharmacological measures as appropriate and evaluate response  - Consider cultural and social influences on pain and pain management  - Notify physician/advanced practitioner if interventions unsuccessful or patient reports new pain  Outcome: Progressing     Problem: DISCHARGE PLANNING  Goal: Discharge to home or other facility with appropriate resources  Description: INTERVENTIONS:  - Identify barriers to discharge w/patient and caregiver  - Arrange for needed discharge resources and transportation as appropriate  - Identify discharge learning needs (meds, wound care, etc )  - Arrange for interpretive services to assist at discharge as needed  - Refer to Case Management Department for coordinating discharge planning if the patient needs post-hospital services based on physician/advanced practitioner order or complex needs related to functional status, cognitive ability, or social support system  Outcome: Progressing     Problem: Knowledge Deficit  Goal: Patient/family/caregiver demonstrates understanding of disease process, treatment plan, medications, and discharge instructions  Description: Complete learning assessment and assess knowledge base    Interventions:  - Provide teaching at level of understanding  - Provide teaching via preferred learning methods  Outcome: Progressing     Problem: Nutrition/Hydration-ADULT  Goal: Nutrient/Hydration intake appropriate for improving, restoring or maintaining nutritional needs  Description: Monitor and assess patient's nutrition/hydration status for malnutrition  Collaborate with interdisciplinary team and initiate plan and interventions as ordered  Monitor patient's weight and dietary intake as ordered or per policy  Utilize nutrition screening tool and intervene as necessary  Determine patient's food preferences and provide high-protein, high-caloric foods as appropriate       INTERVENTIONS:  - Monitor oral intake, urinary output, labs, and treatment plans  - Assess nutrition and hydration status and recommend course of action  - Evaluate amount of meals eaten  - Assist patient with eating if necessary   - Allow adequate time for meals  - Recommend/ encourage appropriate diets, oral nutritional supplements, and vitamin/mineral supplements  - Order, calculate, and assess calorie counts as needed  - Recommend, monitor, and adjust tube feedings and TPN/PPN based on assessed needs  - Assess need for intravenous fluids  - Provide specific nutrition/hydration education as appropriate  - Include patient/family/caregiver in decisions related to nutrition  Outcome: Progressing

## 2021-01-05 NOTE — NURSING NOTE
Follow up phone call made to floors as patient is an inpatient, patient had Lumbar puncture procedure yesterday  Offering no complaints resulting from the procedure, per RN [facility] ([id number])

## 2021-01-06 ENCOUNTER — TELEPHONE (OUTPATIENT)
Dept: NEUROSURGERY | Facility: CLINIC | Age: 66
End: 2021-01-06

## 2021-01-06 VITALS
BODY MASS INDEX: 32.62 KG/M2 | OXYGEN SATURATION: 95 % | WEIGHT: 262.35 LBS | HEART RATE: 69 BPM | SYSTOLIC BLOOD PRESSURE: 133 MMHG | HEIGHT: 75 IN | RESPIRATION RATE: 18 BRPM | TEMPERATURE: 97.6 F | DIASTOLIC BLOOD PRESSURE: 81 MMHG

## 2021-01-06 PROCEDURE — NC001 PR NO CHARGE: Performed by: PHYSICIAN ASSISTANT

## 2021-01-06 PROCEDURE — 99239 HOSP IP/OBS DSCHRG MGMT >30: CPT | Performed by: INTERNAL MEDICINE

## 2021-01-06 PROCEDURE — 99232 SBSQ HOSP IP/OBS MODERATE 35: CPT | Performed by: PSYCHIATRY & NEUROLOGY

## 2021-01-06 RX ORDER — LEVETIRACETAM 1000 MG/1
1000 TABLET ORAL EVERY 12 HOURS SCHEDULED
Qty: 60 TABLET | Refills: 0 | Status: SHIPPED | OUTPATIENT
Start: 2021-01-06 | End: 2021-02-03 | Stop reason: SDUPTHER

## 2021-01-06 RX ADMIN — LOSARTAN POTASSIUM 100 MG: 50 TABLET, FILM COATED ORAL at 08:25

## 2021-01-06 RX ADMIN — ENOXAPARIN SODIUM 40 MG: 40 INJECTION SUBCUTANEOUS at 08:25

## 2021-01-06 RX ADMIN — AMLODIPINE BESYLATE 10 MG: 10 TABLET ORAL at 08:25

## 2021-01-06 RX ADMIN — HYDROCHLOROTHIAZIDE 12.5 MG: 12.5 TABLET ORAL at 08:25

## 2021-01-06 RX ADMIN — ASPIRIN 81 MG CHEWABLE TABLET 81 MG: 81 TABLET CHEWABLE at 08:25

## 2021-01-06 RX ADMIN — LEVETIRACETAM 1000 MG: 500 TABLET, FILM COATED ORAL at 08:25

## 2021-01-06 NOTE — INCIDENTAL FINDINGS
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

## 2021-01-06 NOTE — DISCHARGE SUMMARY
Discharge- Verónica Gamble 1955, 60 y o  male MRN: [id number]    Unit/Bed[facility] 723-01 Encounter: [id number]    Primary Care Provider: Carlo Mccoy DO   Date and time admitted to hospital: 1/1/2021  3:32 PM        * Seizure-like activity [facility])  Assessment & Plan  Patient presents for acute dysarthria on 1/1; initially treated as CVA  Repeat brain MRI revealed:  Intense ring enhancement associated with T2/FLAIR signal abnormality lesion in the left temporal lobe  The appearance is strongly suggestive of parenchymal brain neoplasm, most likely glioma or metastasis  Alternatively, tumefactive demyelinating   lesion is possible, but considered less likely due to complete absence of diffusion restriction      Noted to have left temporal ring-enhancing lesion  Continue Keppra  Discussed with Neurology  Neurosurgery input noted  Outpatient follow-up with Neurosurgery    Mass of left temporal lobe  Assessment & Plan  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  Continue Keppra  Outpatient Neurosurgery follow-up    Prediabetes  Assessment & Plan  Lab Results   Component Value Date    HGBA1C 5 7 (H) 01/02/2021     Therapeutic lifestyle modification    Obstructive sleep apnea syndrome  Assessment & Plan  CPAP QHS    Essential hypertension  Assessment & Plan  Monitor blood pressures  Avoid hypotension    Hypercholesterolemia  Assessment & Plan  statin    Class 1 obesity due to excess calories with serious comorbidity and body mass index (BMI) of 32 0 to 32 9 in adult  Assessment & Plan  Therapeutic lifestyle modification          Discharge Summary - [facility]    Patient Information: Verónica Gamble 60 y o  male MRN: [id number]  [facility] Encounter: [id number]    Discharging Physician / Practitioner: Carolynn Romberg, MD  PCP: Carlo Mccoy DO  Admission Date: 1/1/2021  Discharge Date: 01/06/21    Disposition:     Home    Reason for Admission:  Slurred speech, transfer from [facility]    Discharge Diagnoses:     Principal Problem:    Seizure-like activity [facility])  Active Problems:    Mass of left temporal lobe    Class 1 obesity due to excess calories with serious comorbidity and body mass index (BMI) of 32 0 to 32 9 in adult    Hypercholesterolemia    Essential hypertension    Obstructive sleep apnea syndrome    Prediabetes  Resolved Problems:    * No resolved hospital problems  *      Consultations During Hospital Stay:  · Neurology  · Neurosurgery    Procedures Performed:     · MRI brain  Intense ring enhancement associated with T2/FLAIR signal abnormality lesion in the left temporal lobe  The appearance is strongly suggestive of parenchymal brain neoplasm, most likely glioma or metastasis  Alternatively, tumefactive demyelinating lesion is possible      CT chest abdomen pelvis  CT chest:     No evidence of significant abnormal mass      Punctate mediastinal and hilar calcified granulomata      Trace bibasilar subsegmental atelectasis, left greater than right      · CT abdomen and pelvis:     1 5 cm left hepatic indeterminate nodule  Although this may simply be a flash filling hemangioma or possibly benign AV shunt, advise follow-up with dedicated liver MRI with and without contrast for further characterization      Otherwise, no evidence of significant abnormal mass      Bilateral renal simple cysts and subcentimeter hypodensities too small to characterize statistically representing simple cysts      Mild prostatomegaly      Colonic diverticulosis  · MRI abdomen    1 3 cm cavernous hemangioma left hepatic lobe  No suspicious hepatic lesions  3 mm pancreatic neck cyst could represent a tiny sidebranch IPMN  For simple cyst(s) less than 1 5 cm, recommend yearly followup 5 times, then every 2 year for 2 times  If cyst(s) stable after 9 years, no further followups  Recommend next followup  MRI/MRCP in 1 year  Bilateral renal cysts      2D echo - EF 60%      Hospital Course:     Tracy Duran is a 60 y o  male patient who originally presented to the hospital on 1/1/2021 due to slurred speech  Patient transfer to ICU at [facility] for stroke workup  Patient noticed slurred speech, he reports taking 6 tablets of aspirin 81 mg each and presented to [facility] ED  He was a stroke alert, received tPA and transferred to [facility] ICU  He was placed on the stroke pathway evaluated by Neurology  Further workup revealed left temporal ring-enhancing lesion likely cause of his neurologic symptoms and clinical picture  He has been placed on Keppra for seizure prophylaxis  He was seen in consultation with Neurology Neurosurgery with plans for outpatient follow-up  Condition at Discharge: fair     Discharge Day Visit / Exam:     Subjective:      Comfortably sitting up in bed  Reports feeling better  Agreeable to discharge plan    Vitals: Blood Pressure: 133/81 (01/06/21 0802)  Pulse: 69 (01/06/21 0802)  Temperature: 97 6 °F (36 4 °C) (01/06/21 0802)  Temp Source: Oral (01/06/21 0802)  Respirations: 18 (01/06/21 0802)  Height: 6' 3" (190 5 cm) (01/02/21 1639)  Weight - Scale: 119 kg (262 lb 5 6 oz) (01/02/21 1638)  SpO2: 95 % (01/06/21 0802)  Exam:   Physical Exam    Comfortably sitting up in bed  Obese  Short thick neck  Lungs diminished breath sounds bilaterally no additional sounds  Heart sounds S1-S2 noted  Abdomen soft nontender  Awake obeys simple commands  Pulses noted  No rash    Discharge instructions/Information to patient and family:   See after visit summary for information provided to patient and family      Discharge plan discussed with Neurology  Discharge plan discussed the patient, reports he is keeping his family updated  Outpatient follow-up with Neurology, Neurosurgery, primary care physician      Provisions for Follow-Up Care:  See after visit summary for information related to follow-up care and any pertinent home health orders  Planned Readmission: no     Discharge Statement:  I spent 45 minutes discharging the patient  This time was spent on the day of discharge  I had direct contact with the patient on the day of discharge  Greater than 50% of the total time was spent examining patient, answering all patient questions, arranging and discussing plan of care with patient as well as directly providing post-discharge instructions  Additional time then spent on discharge activities  Discharge Medications:  See after visit summary for reconciled discharge medications provided to patient and family        ** Please Note: This note has been constructed using a voice recognition system **

## 2021-01-06 NOTE — PROGRESS NOTES
NEUROLOGY RESIDENCY PROGRESS NOTE     Name: Medardo Paniagua   Age & Sex: 60 y o  male   MRN: [id number]  Unit/Bed#: [facility] 723-01   Encounter: [id number]    ASSESSMENT & PLAN     * Seizure-like activity [facility])  Assessment & Plan  Medardo Paniagua is a 60 y o  male who presented as stroke alert to [facility] on 1/1/2021 at 11:13 AM with LKW at 8:30AM , initial BP was Blood Pressure: 135/99  Initial presenting deficits were dysarthria and expressive aphasia  CTH/CTA were negative for acute findings  Pt was found to be a tPA candidate within the appropriate time window and without obvious contraindication and tPA was given at 11:55AM  Initially symptoms appeared resolved however aphasia transiently reappeared and MRI was negative for acute findings however demonstrated unclear lesion on flair  Impression- Pt's aphasia resolved at this time  Reviewed MRI brain w contrast, pt has intense ring enhancing lesion in left temporal lobe, which localized pt's semiology of difficulty in speaking/aphasia  Unclear etiology of ring enhancing lesion at this time- Neoplastic vs infectious  Plan:   Lumbar puncture done, pending labs to rule out infectious vs neoplastic cause of lesion   Keppra 1gm bid as suspecting seizure at this time, continue until seen by neurology outpatient   If pt mental status declines by GCS > 2 in 1 hour, obtain CTH urgently   Medical management as per primary team    Follow up with neurology outpatient   Follow up with neurosurgery outpatient    Workup:   CTH: unremarkable   CTA: unremarkable  · MRI: No evidence of acute infarct  T2/ FLAIR hyperintense focus at the left temporal juxtacortical region presumed to represent a developmental venous anomaly, however given patient's clinical history a subacute infarct cannot be excluded  Contrast-enhanced MR examination recommended for further characterization     Repeat CTH at 24 hours: negative for hemorrhage    MRI brain w contrast- Intense ring enhancement associated with T2/FLAIR signal abnormality lesion in the left temporal lobe   Echocardiogram: EF- 60%, Left atrium- mild to moderately enlarged   Routine EEG- normal  · CT chest abdomen and pelvis with contrast to rule out primary neoplasm negative  · LP studies  · Abnormal: LDH 43, protein 61  · WNL: WBC, glucose, ME panel, flow cytometry, CSF culture, gram stain, AFB    Mass of left temporal lobe  Assessment & Plan  Discovered on MRI while being worked up for possible stroke, there is a ring enhancing lesion in the left temporal lobe  LP studies pending  Neurosurgery aware  Pt may ultimately require biopsy  Medardo Paniagua will need follow up in in 4 weeks with neurovascular attending  He will not require outpatient neurological testing  SUBJECTIVE     Patient was seen and examined  No acute events overnight  He has no acute concerns or complaints and feels fairly well  He still has his typical headache but he received tylenol this AM and feels better  Review of Systems   Constitutional: Negative for chills and fever  HENT: Negative for ear pain and sore throat  Eyes: Negative for pain and visual disturbance  Respiratory: Negative for cough and shortness of breath  Cardiovascular: Negative for chest pain and palpitations  Gastrointestinal: Negative for abdominal pain and vomiting  Genitourinary: Negative for dysuria and hematuria  Musculoskeletal: Negative for arthralgias and back pain  Skin: Negative for color change and rash  Neurological: Positive for speech difficulty  Negative for seizures and syncope  All other systems reviewed and are negative  OBJECTIVE     Patient ID: Medardo Paniagua is a 60 y o  male      Vitals:    01/05/21 2310 01/05/21 2315 01/05/21 2320 01/06/21 0802   BP:    133/81   BP Location:    Left arm   Pulse: 75 67 66 69   Resp:    18   Temp:    97 6 °F (36 4 °C)   TempSrc:    Oral   SpO2: 96% 97% 93% 95%   Weight:       Height: Temperature:   Temp (24hrs), Av 7 °F (36 5 °C), Min:97 6 °F (36 4 °C), Max:97 7 °F (36 5 °C)    Temperature: 97 6 °F (36 4 °C)      Physical Exam  Vitals signs and nursing note reviewed  Constitutional:       Appearance: He is well-developed  HENT:      Head: Normocephalic and atraumatic  Eyes:      Extraocular Movements: EOM normal       Conjunctiva/sclera: Conjunctivae normal       Pupils: Pupils are equal, round, and reactive to light  Neck:      Musculoskeletal: Neck supple  Cardiovascular:      Rate and Rhythm: Normal rate and regular rhythm  Heart sounds: No murmur  Pulmonary:      Effort: Pulmonary effort is normal  No respiratory distress  Breath sounds: Normal breath sounds  Abdominal:      Palpations: Abdomen is soft  Tenderness: There is no abdominal tenderness  Skin:     General: Skin is warm and dry  Neurological:      Mental Status: He is alert and oriented to person, place, and time  Coordination: Finger-Nose-Finger Test normal       Gait: Gait is intact  Deep Tendon Reflexes: Strength normal    Psychiatric:         Speech: Speech normal           Neurologic Exam     Mental Status   Oriented to person, place, and time  Attention: normal  Concentration: normal    Speech: speech is normal   Level of consciousness: alert  Knowledge: good  Able to name object  Normal comprehension  Cranial Nerves     CN II   Visual fields full to confrontation  CN III, IV, VI   Pupils are equal, round, and reactive to light  Extraocular motions are normal    Right pupil: Shape: regular  Reactivity: brisk  Consensual response: intact  Accommodation: intact  Left pupil: Shape: regular  Reactivity: brisk  Consensual response: intact  Accommodation: intact  CN III: no CN III palsy  CN VI: no CN VI palsy  Nystagmus: none   Diplopia: none  Ophthalmoparesis: none  Upgaze: normal  Downgaze: normal    CN V   Facial sensation intact       CN VII   Facial expression full, symmetric  CN VIII   CN VIII normal      CN IX, X   CN IX normal    CN X normal      CN XI   CN XI normal      CN XII   CN XII normal      Motor Exam   Muscle bulk: normal  Overall muscle tone: normal  Right arm pronator drift: absent  Left arm pronator drift: absent    Strength   Strength 5/5 throughout  Sensory Exam   Light touch normal    Pinprick normal      Gait, Coordination, and Reflexes     Gait  Gait: normal    Coordination   Finger to nose coordination: normal    Tremor   Resting tremor: absent  Intention tremor: absent  Action tremor: absent    Reflexes   Reflexes 2+ except as noted  Right plantar: normal  Left plantar: normal  Right ankle clonus: absent  Left ankle clonus: absent         LABORATORY DATA     Labs: I have personally reviewed pertinent reports  Results from last 7 days   Lab Units 01/04/21  0955 01/02/21  0440 01/01/21  1144   WBC Thousand/uL  --  7 37 7 90   HEMOGLOBIN g/dL  --  15 4 16 0   HEMATOCRIT %  --  47 3 47 1*   PLATELETS Thousands/uL 201 214 204   NEUTROS PCT %  --  61  --    MONOS PCT %  --  9  --       Results from last 7 days   Lab Units 01/02/21  0440 01/01/21  1144   POTASSIUM mmol/L 4 3 4 3   CHLORIDE mmol/L 108 102   CO2 mmol/L 30 27   BUN mg/dL 10 14   CREATININE mg/dL 0 81 0 68*   CALCIUM mg/dL 9 0 9 3              Results from last 7 days   Lab Units 01/04/21  0955 01/01/21  1144   INR  0 94 0 89   PTT seconds 29 27         Results from last 7 days   Lab Units 01/01/21  1144   TROPONIN I ng/mL <0 03       IMAGING & DIAGNOSTIC TESTING     Radiology Results: I have personally reviewed pertinent reports  and I have personally reviewed pertinent films in PACS    MRI abdomen w wo contrast   Final Result by Nicolasa Dumont DO (01/06 9870)      1  1 3 cm cavernous hemangioma left hepatic lobe  No suspicious hepatic lesions  2  3 mm pancreatic neck cyst could represent a tiny sidebranch IPMN    For simple cyst(s) less than 1 5 cm, recommend yearly followup 5 times, then every 2 year for 2 times  If cyst(s) stable after 9 years, no further followups  Recommend next followup    MRI/MRCP in 1 year  3  Bilateral renal cysts  The study was marked in Epic for follow-up  Workstation performed: [id number]         [facility] lumbar puncture diagnostic   Final Result by Rachael Dixon MD (01/04 1706)      Successful fluoroscopically guided lumbar puncture  Procedure was performed by ESTRELLITA Pacheco PA-C under the direct supervision of Dr Ladi North  Workstation performed: [id number]         CT chest abdomen pelvis w contrast   Final Result by Darryl Elizalde MD (01/04 0600)      CT chest:      No evidence of significant abnormal mass  Punctate mediastinal and hilar calcified granulomata  Trace bibasilar subsegmental atelectasis, left greater than right  CT abdomen and pelvis:      1 5 cm left hepatic indeterminate nodule  Although this may simply be a flash filling hemangioma or possibly benign AV shunt, advise follow-up with dedicated liver MRI with and without contrast for further characterization  Otherwise, no evidence of significant abnormal mass  Bilateral renal simple cysts and subcentimeter hypodensities too small to characterize statistically representing simple cysts  Mild prostatomegaly  Colonic diverticulosis  This study demonstrates a significant  finding and was documented as such in [facility] for liaison and referring practitioner notification  This study demonstrates a finding requiring imaging follow-up and was documented as such in Epic  Workstation performed: [id number]         MRI brain w wo contrast   Final Result by Rui Somers MD (01/03 1128)      Intense ring enhancement associated with T2/FLAIR signal abnormality lesion in the left temporal lobe  The appearance is strongly suggestive of parenchymal brain neoplasm, most likely glioma or metastasis  Alternatively, tumefactive demyelinating    lesion is possible, but considered less likely due to complete absence of diffusion restriction  I personally discussed this study with Dr Mel Ch via secure text messaging on 1/3/2021 at 8:54 AM                Workstation performed: [id number]         CT head wo contrast   Final Result by Toya Chavez MD (01/02 1403)      No acute intracranial abnormality  Specifically, no noncontrast CT evidence of acute intracranial hemorrhage or evolving acute intracranial ischemia  Workstation performed: [id number]         MRI brain wo contrast   Final Result by Tenisha Soares MD (01/02 0019)      No evidence of acute infarct  T2/ FLAIR hyperintense focus at the left temporal juxtacortical region presumed to represent a developmental venous anomaly, however given patient's clinical history a subacute infarct cannot be excluded  Contrast-enhanced MR examination recommended for    further characterization  The study was marked in EPIC for significant notification  Workstation performed: [id number]             Other Diagnostic Testing: I have personally reviewed pertinent reports        ACTIVE MEDICATIONS     Current Facility-Administered Medications   Medication Dose Route Frequency    acetaminophen (TYLENOL) tablet 650 mg  650 mg Oral Q6H PRN    amLODIPine (NORVASC) tablet 10 mg  10 mg Oral Daily    aspirin chewable tablet 81 mg  81 mg Oral Daily    atorvastatin (LIPITOR) tablet 40 mg  40 mg Per NG Tube QPM    enoxaparin (LOVENOX) subcutaneous injection 40 mg  40 mg Subcutaneous Q24H ERICA    hydrALAZINE (APRESOLINE) injection 10 mg  10 mg Intravenous Q6H PRN    hydrochlorothiazide (HYDRODIURIL) tablet 12 5 mg  12 5 mg Oral Daily    levETIRAcetam (KEPPRA) tablet 1,000 mg  1,000 mg Oral Q12H ERICA    lidocaine (PF) (XYLOCAINE-MPF) 1 % injection 5 mL  5 mL Injection Once in imaging    losartan (COZAAR) tablet 100 mg  100 mg Oral Daily Prior to Admission medications    Medication Sig Start Date End Date Taking?  Authorizing Provider   amLODIPine (NORVASC) 10 mg tablet Take 1 tablet (10 mg total) by mouth daily 11/5/20  Yes Nury Cueto DO   aspirin 81 mg chewable tablet Chew 81 mg daily   Yes Historical Provider, MD   atorvastatin (LIPITOR) 10 mg tablet Take 10 mg by mouth daily   Yes Historical Provider, MD   hydrochlorothiazide (MICROZIDE) 12 5 mg capsule Take 12 5 mg by mouth daily   Yes Historical Provider, MD   valsartan (DIOVAN) 160 mg tablet Take 160 mg by mouth daily   Yes Historical Provider, MD       ==  MD Omar Tan 74 Neurology Residency, PGY-2

## 2021-01-06 NOTE — ASSESSMENT & PLAN NOTE
Discovered on MRI while being worked up for possible stroke, there is a ring enhancing lesion in the left temporal lobe  LP studies pending  Neurosurgery aware  Pt may ultimately require biopsy

## 2021-01-06 NOTE — CONSULTS
Contacted by neurology regarding this patient  He is currently asymptomatic and his seizures are controlled  He would like to discharge home today and follow up in the office to discuss possible surgery or biopsy of left temporal mass  Appointment will be scheduled in the next week  Our office will contact the patient to schedule  Patient not physically seen today

## 2021-01-06 NOTE — TELEPHONE ENCOUNTER
01/06/2021-CALLED PT, LEFT MESSAGE ON MACHINE CONFIRMING 01/12/2021 APT IN [place]    ----- Message from Gita Jon PA-C sent at 1/6/2021 11:37 AM EST -----  Regarding: hospital follow up  Can you please schedule an appointment with either Zelda or Sidney Costello in the next week for this patient to discuss surgery? Solo appointment  Thanks! He is discharging from the hospital today

## 2021-01-06 NOTE — ASSESSMENT & PLAN NOTE
Lab Results   Component Value Date    HGBA1C 5 7 (H) 01/02/2021     Therapeutic lifestyle modification

## 2021-01-06 NOTE — ASSESSMENT & PLAN NOTE
Patient presents for acute dysarthria on 1/1; initially treated as CVA  Repeat brain MRI revealed:  Intense ring enhancement associated with T2/FLAIR signal abnormality lesion in the left temporal lobe  The appearance is strongly suggestive of parenchymal brain neoplasm, most likely glioma or metastasis  Alternatively, tumefactive demyelinating   lesion is possible, but considered less likely due to complete absence of diffusion restriction      Noted to have left temporal ring-enhancing lesion  Continue Keppra  Discussed with Neurology  Neurosurgery input noted  Outpatient follow-up with Neurosurgery

## 2021-01-06 NOTE — ASSESSMENT & PLAN NOTE
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  Continue Kera  Outpatient Neurosurgery follow-up

## 2021-01-06 NOTE — RESTORATIVE TECHNICIAN NOTE
Restorative Specialist Mobility Note       Activity: Ambulate in pedro, Ambulate in room, Bathroom privileges, Chair, Dangle, Stand at bedside(Educated/encouraged pt to ambulate with assistance 3-4 x's/day   Pt callbell, phone/tray within reach )     Assistive Device: None       Sabine ECHOLS, Restorative Technician, [employer] [facility]

## 2021-01-06 NOTE — ASSESSMENT & PLAN NOTE
Malick David is a 60 y o  male who presented as stroke alert to [facility] on 1/1/2021 at 11:13 AM with LKW at 8:30AM , initial BP was Blood Pressure: 135/99  Initial presenting deficits were dysarthria and expressive aphasia  CTH/CTA were negative for acute findings  Pt was found to be a tPA candidate within the appropriate time window and without obvious contraindication and tPA was given at 11:55AM  Initially symptoms appeared resolved however aphasia transiently reappeared and MRI was negative for acute findings however demonstrated unclear lesion on flair  Impression- Pt's aphasia resolved at this time  Reviewed MRI brain w contrast, pt has intense ring enhancing lesion in left temporal lobe, which localized pt's semiology of difficulty in speaking/aphasia  Unclear etiology of ring enhancing lesion at this time- Neoplastic vs infectious  Plan:   Lumbar puncture done, pending labs to rule out infectious vs neoplastic cause of lesion   Keppra 1gm bid as suspecting seizure at this time, continue until seen by neurology outpatient   If pt mental status declines by GCS > 2 in 1 hour, obtain CTH urgently   Medical management as per primary team    Follow up with neurology outpatient   Follow up with neurosurgery outpatient    Workup:   CTH: unremarkable   CTA: unremarkable  · MRI: No evidence of acute infarct  T2/ FLAIR hyperintense focus at the left temporal juxtacortical region presumed to represent a developmental venous anomaly, however given patient's clinical history a subacute infarct cannot be excluded  Contrast-enhanced MR examination recommended for further characterization   Repeat CTH at 24 hours: negative for hemorrhage    MRI brain w contrast- Intense ring enhancement associated with T2/FLAIR signal abnormality lesion in the left temporal lobe   Echocardiogram: EF- 60%, Left atrium- mild to moderately enlarged      Routine EEG- normal  · CT chest abdomen and pelvis with contrast to rule out primary neoplasm negative  · LP studies  · Abnormal: LDH 43, protein 61  · WNL: WBC, glucose, ME panel, flow cytometry, CSF culture, gram stain, AFB

## 2021-01-07 ENCOUNTER — TELEPHONE (OUTPATIENT)
Dept: NEUROLOGY | Facility: CLINIC | Age: 66
End: 2021-01-07

## 2021-01-07 ENCOUNTER — TRANSITIONAL CARE MANAGEMENT (OUTPATIENT)
Dept: FAMILY MEDICINE CLINIC | Facility: CLINIC | Age: 66
End: 2021-01-07

## 2021-01-07 LAB
ALBUMIN MFR CSF ELPH: 62.9 % (ref 56.8–76.4)
ALPHA1 GLOB MFR CSF ELPH: 4.3 % (ref 1.1–6.6)
ALPHA2 GLOB MFR CSF ELPH: 7.1 % (ref 3–12.6)
B BURGDOR DNA SPEC QL NAA+PROBE: NEGATIVE
B-GLOBULIN MFR CSF ELPH: 16.3 % (ref 7.3–17.9)
BACTERIA CSF CULT: NO GROWTH
GAMMA GLOB MFR CSF ELPH: 6.8 % (ref 3–13)
INTERPRETATION CSF IFE-IMP: ABNORMAL
OLIGOCLONAL BANDS CSF ELPH-IMP: ABNORMAL %
PREALB MFR CSF ELPH: 2.6 % (ref 2.2–7.1)
PROT CSF-MCNC: 51.9 MG/DL (ref 0–44)

## 2021-01-07 NOTE — UTILIZATION REVIEW
Notification of Discharge  This is a Notification of Discharge from our facility [address]  Please be advised that this patient has been discharge from our facility  Below you will find the admission and discharge date and time including the patients disposition  PRESENTATION DATE: 1/1/2021  3:32 PM  OBS ADMISSION DATE:   IP ADMISSION DATE: 1/1/21 1532   DISCHARGE DATE: 1/6/2021  1:30 PM  DISPOSITION: Home/Self Care Home/Self Care   Admission Orders listed below:  Admission Orders (From admission, onward)     Ordered        01/01/21 1537  Inpatient Admission  Once                   Please contact the UR Department if additional information is required to close this patient's authorization/case  [address] Utilization Review Department  Main: [phone] x carefully listen to the prompts  All voicemails are confidential   [email or web address]  Send all requests for admission clinical reviews, approved or denied determinations and any other requests to dedicated fax number below belonging to the campus where the patient is receiving treatment   List of dedicated fax numbers:  [address] DENIALS (Administrative/Medical Necessity) [phone]   [address] (Maternity/NICU/Pediatrics) [phone]   Red Gagnon [phone]   [facility] Caller [phone]   [facility] [phone]   Tiffany Romero [facility] [address] [facility] [phone]   [facility]  [phone]   [address] [facility] [address]  [address] [address] [address] [facility] [phone]

## 2021-01-07 NOTE — TELEPHONE ENCOUNTER
correction for hospital follow up  Received: Today     Schedule follow-up appointment  Message Contents   Bakari Link MD  [facility] [place] [facility],     I accidentally put that this patient should see neurovascular but really it should be epilepsy  Thanks!

## 2021-01-07 NOTE — TELEPHONE ENCOUNTER
1ST ATTEMPT Called twice, it sounded like someone picked up the phone but didn't speak both times  I will try again tomorrow  SLB/Presumes Stroke S/P tPA/VERIFY INSURANCE??  PMR/SLB/VERIFY INSURANCE???  PT NEEDS TO SEE EPILEPSY TEAM NOT NEUROVASCULAR!!    NOTE FROM CHART:  Reason for Consult / Principal Problem:   Presumed stroke s/p tPA  Elizabeth Maxwell will need follow up in in 6 weeks with neurovascular attending or advance practitioner  He will not require outpatient neurological testing

## 2021-01-08 NOTE — TELEPHONE ENCOUNTER
2ND ATTEMPT Called twice, it sounded like someone picked up the phone but didn't speak both times  I will try again tomorrow    SLB/Presumes Stroke S/P tPA/VERIFY INSURANCE??  PMR/SLB/VERIFY INSURANCE???  PT NEEDS TO SEE EPILEPSY TEAM NOT NEUROVASCULAR!!

## 2021-01-11 NOTE — TELEPHONE ENCOUNTER
3RD ATTEMPT Called twice, it sounded like someone picked up the phone but didn't speak both times  I will try again tomorrow  SLB/Presumes Stroke S/P tPA/VERIFY INSURANCE??  PMR/SLB/VERIFY INSURANCE???  PT NEEDS TO SEE EPILEPSY TEAM NOT NEUROVASCULAR!!   Mailed letter to pt's home

## 2021-01-12 ENCOUNTER — OFFICE VISIT (OUTPATIENT)
Dept: NEUROSURGERY | Facility: CLINIC | Age: 66
End: 2021-01-12
Payer: COMMERCIAL

## 2021-01-12 VITALS
RESPIRATION RATE: 16 BRPM | HEIGHT: 75 IN | DIASTOLIC BLOOD PRESSURE: 87 MMHG | BODY MASS INDEX: 32.83 KG/M2 | SYSTOLIC BLOOD PRESSURE: 155 MMHG | TEMPERATURE: 97.9 F | HEART RATE: 65 BPM | WEIGHT: 264 LBS

## 2021-01-12 DIAGNOSIS — D49.6 BRAIN TUMOR (HCC): Primary | ICD-10-CM

## 2021-01-12 DIAGNOSIS — Z11.59 SCREENING FOR VIRAL DISEASE: ICD-10-CM

## 2021-01-12 PROCEDURE — 99245 OFF/OP CONSLTJ NEW/EST HI 55: CPT | Performed by: NEUROLOGICAL SURGERY

## 2021-01-12 NOTE — H&P (VIEW-ONLY)
Neurosurgery Office Note  Lou Hansen 60 y o  male MRN: [id number]      Assessment/Plan      Diagnoses and all orders for this visit:    Brain tumor [facility])  -     Case request operating room: BIOPSY BRAIN IMAGE-GUIDED NEEDLE - LEFT TEMPORAL; Standing  -     Case request operating room: BIOPSY BRAIN IMAGE-GUIDED NEEDLE - LEFT TEMPORAL          Discussion:    73-year-old male who on new year's Day, was speaking to his mother, and she noted that his speech was fluent but nonsensical   He did not lose consciousness, was aware of this problem  He [facility] to the [facility] who in ER, was evaluated there, and called a stroke alert  CT scan was normal, he was transferred down to [place]  In the subsequent days, Neurology obtained an MRI scan of the brain, as well as did a lumbar puncture, CT chest and pelvis, and he has subsequently had an MRI scan of the abdomen  On exam today, he is neurologically normal   He has had no subsequent episodes, he is maintained on 1 g of Keppra b i d  His [place] is 23/30  MRI scan of brain demonstrates an approximately 1 5 cm rim enhancing lesion left temporal lobe with some minimal adjacent edema  There is minimal if any mass effect  CT chest and pelvis was negative for obvious primary source  LP showed some slightly increased protein, but no definitive lymphoma, malignancy, etcetera  MRI scan of the abdomen for slight abnormality of the liver suggested a cavernoma, and no primary source  Eeg 31 minutes normal standard study showed no seizures  I reviewed the patient the differential diagnosis for this lesion and options for management  I think we have exhausted other noninvasive methods to try and diagnose it  It is concerning, and I would not recommend observation with serial scanning  I have recommended needle biopsy  I am not recommending presently more aggressive measures such as resection, until we have a diagnosis on this mass/finding    I reviewed the patient his mother the logistics of needle biopsy, which we expect, an overnight hospital stay etcetera  I reviewed with him attendant risks  He would like to proceed and written consent was obtained  Plan:  Image guided needle brain biopsy left temporal     01/12/21 Metrics: [id number]=7 000; ; ECOG 0; ; [facility]    Chief Complaint   Patient presents with    Follow-up     1 wk hosp f/u to discuss sx       HISTORY    History of Present Illness     60y o  year old male     HPI    See Discussion    REVIEW OF SYSTEMS    Review of Systems   Constitutional: Negative  HENT: Negative  Eyes: Negative  Respiratory: Negative  Cardiovascular: Negative  Gastrointestinal: Negative  Endocrine: Negative  Genitourinary: Negative  Musculoskeletal: Negative  Skin: Negative  Allergic/Immunologic: Negative  Neurological: Positive for seizures (last episode Jan 1, hospitalized)  Negative for dizziness, syncope, weakness, numbness and headaches (due to LP, but better now)  Hematological: Does not bruise/bleed easily (patient on ASA 81)  Psychiatric/Behavioral: Negative  Meds/Allergies     Current Outpatient Medications   Medication Sig Dispense Refill    amLODIPine (NORVASC) 10 mg tablet Take 1 tablet (10 mg total) by mouth daily 90 tablet 3    aspirin 81 mg chewable tablet Chew 81 mg daily      atorvastatin (LIPITOR) 10 mg tablet Take 5 mg by mouth daily       hydrochlorothiazide (MICROZIDE) 12 5 mg capsule Take 12 5 mg by mouth daily      levETIRAcetam (KEPPRA) 1000 MG tablet Take 1 tablet (1,000 mg total) by mouth every 12 (twelve) hours 60 tablet 0    valsartan (DIOVAN) 160 mg tablet Take 160 mg by mouth daily       No current facility-administered medications for this visit          No Known Allergies    PAST HISTORY    Past Medical History:   Diagnosis Date    Bruxism (teeth grinding)     Hypertension     Obesity     Sleep apnea     CPAP nightly       Past Surgical History:   Procedure Laterality Date    FL LUMBAR PUNCTURE DIAGNOSTIC  1/4/2021    HAND FRACTURE REPAIR      left thumb    TONSILLECTOMY         Social History     Tobacco Use    Smoking status: Former Smoker     Types: Cigarettes    Smokeless tobacco: Never Used   Substance Use Topics    Alcohol use: Yes     Frequency: 2-4 times a month     Drinks per session: 3 or 4     Binge frequency: Never    Drug use: Never       Family History   Problem Relation Age of Onset    Heart disease Mother     Heart attack Father     Leukemia Brother          The following portions of the patient's history were reviewed in this encounter and updated as appropriate: Past medical, surgical, family, and social history, as well as medications, allergies, and review of systems  EXAM    Vitals:Blood pressure 155/87, pulse 65, temperature 97 9 °F (36 6 °C), resp  rate 16, height 6' 3" (1 905 m), weight 120 kg (264 lb)  ,Body mass index is 33 kg/m²  Physical Exam  Vitals signs reviewed  Constitutional:       Appearance: Normal appearance  He is well-developed  HENT:      Head: Normocephalic and atraumatic  Eyes:      General: No scleral icterus  Neck:      Musculoskeletal: Neck supple  Cardiovascular:      Rate and Rhythm: Normal rate  Pulmonary:      Effort: Pulmonary effort is normal    Skin:     General: Skin is warm and dry  Neurological:      Mental Status: He is alert and oriented to person, place, and time  Sensory: No sensory deficit  Psychiatric:         Speech: Speech normal          Behavior: Behavior normal          Neurologic Exam     Mental Status   Oriented to person, place, and time  Attention: normal    Speech: speech is normal   Level of consciousness: alert    [place] 23/30     Cranial Nerves     CN VII   Facial expression full, symmetric       Motor Exam   Muscle bulk: normal  Overall muscle tone: normal  Moves all extremities, grossly normal     Gait, Coordination, and Reflexes     Tremor   Resting tremor: absent  Intention tremor: absent  Action tremor: absent  No aids  MEDICAL DECISION MAKING    Imaging Studies:     Ct Head Wo Contrast    Result Date: 1/2/2021  Narrative: CT BRAIN - WITHOUT CONTRAST INDICATION:   Stroke, follow up  24 hour post tpa CT Head  COMPARISON:  January 1, 2021 TECHNIQUE:  CT examination of the brain was performed  In addition to axial images, sagittal and coronal 2D reformatted images were created and submitted for interpretation  Radiation dose length product (DLP) for this visit:  877 32 mGy-cm   This examination, like all CT scans performed in the [facility], was performed utilizing techniques to minimize radiation dose exposure, including the use of iterative  reconstruction and automated exposure control  IMAGE QUALITY:  Diagnostic  FINDINGS: PARENCHYMA:  No intracranial mass, mass effect or midline shift  No CT signs of acute infarction  No acute parenchymal hemorrhage  VENTRICLES AND EXTRA-AXIAL SPACES:  Normal for the patient's age  VISUALIZED ORBITS AND PARANASAL SINUSES:  Unremarkable  CALVARIUM AND EXTRACRANIAL SOFT TISSUES:  Normal      Impression: No acute intracranial abnormality  Specifically, no noncontrast CT evidence of acute intracranial hemorrhage or evolving acute intracranial ischemia  Workstation performed: [id number]     Mri Brain Wo Contrast    Result Date: 1/2/2021  Narrative: MRI BRAIN WITHOUT CONTRAST INDICATION: Acute Stroke  COMPARISON:   None  TECHNIQUE:  Sagittal T1, axial T2, axial FLAIR, axial T1, axial [place] and axial diffusion imaging  IMAGE QUALITY:  Diagnostic  FINDINGS: BRAIN PARENCHYMA:  There is no discrete mass, mass effect or midline shift  There is no intracranial hemorrhage  Focus of T2/FLAIR hyperintensity within the left temporal juxtacortical region with a few stellate foci of susceptibility artifact internally    Focus demonstrates increased DWI signal without corresponding hypointensity on ADC map  VENTRICLES:  Normal for the patient's age  SELLA AND PITUITARY GLAND:  Normal  ORBITS:  Normal  PARANASAL SINUSES:  Normal  VASCULATURE:  Evaluation of the major intracranial vasculature demonstrates appropriate flow voids  CALVARIUM AND SKULL BASE:  Normal  EXTRACRANIAL SOFT TISSUES:  Normal      Impression: No evidence of acute infarct  T2/ FLAIR hyperintense focus at the left temporal juxtacortical region presumed to represent a developmental venous anomaly, however given patient's clinical history a subacute infarct cannot be excluded  Contrast-enhanced MR examination recommended for  further characterization  The study was marked in EPIC for significant notification  Workstation performed: [id number]     Mri Brain W Wo Contrast    Result Date: 1/3/2021  Narrative: MRI BRAIN WITH AND WITHOUT CONTRAST INDICATION: Transient neurologic symptoms  Abnormal finding on precontrast brain MRI  COMPARISON:  Pre-contrast brain MR performed January 1, 2021  Head CT and CT angiography performed January 1, 2021  TECHNIQUE: Sagittal T1, axial T2, axial FLAIR, axial T1, axial [place], axial diffusion  Sagittal, axial T1 postcontrast   Axial bravo postcontrast with coronal reconstructions  IV Contrast:  11 mL of gadobenate dimeglumine (MULTIHANCE)  IMAGE QUALITY:   Diagnostic  FINDINGS: BRAIN PARENCHYMA: There is intense ring enhancement associated with a lesion in the left temporal lobe, 1 6 x 1 1 cm on image 62 of series 12, corresponding to the focal area of ring-shaped T2 signal abnormality identified on precontrast imaging  There is no significant mass effect and no associated diffusion restriction or surrounding vasogenic edema  No other enhancing lesions are identified  There is subtle focal T2 signal abnormality in the periventricular white matter adjacent to the atrium of left lateral ventricle  Otherwise white matter signal abnormality is identified   No diffusion restriction to suggest acute intracranial ischemia  No acute intracranial hemorrhage  VENTRICLES:  Normal for the patient's age  SELLA AND PITUITARY GLAND:  Normal  ORBITS:  Normal  PARANASAL SINUSES:  Mild scattered sinus mucosal thickening  VASCULATURE:  Evaluation of the major intracranial vasculature demonstrates appropriate flow voids  CALVARIUM AND SKULL BASE:  Normal  EXTRACRANIAL SOFT TISSUES:  Normal      Impression: Intense ring enhancement associated with T2/FLAIR signal abnormality lesion in the left temporal lobe  The appearance is strongly suggestive of parenchymal brain neoplasm, most likely glioma or metastasis  Alternatively, tumefactive demyelinating lesion is possible, but considered less likely due to complete absence of diffusion restriction  I personally discussed this study with Dr Avril Alaniz via secure text messaging on 1/3/2021 at 8:54 AM  Workstation performed: [id number]     Mri Abdomen W Wo Contrast    Result Date: 1/6/2021  Narrative: MRI - ABDOMEN - WITH AND WITHOUT CONTRAST INDICATION:  Indeterminate left hepatic lobe lesion, new brain lesion  COMPARISON: CT abdomen chest, and pelvis 1/3/2021 TECHNIQUE:  The following pulse sequences were obtained prior to and following the administration of intravenous contrast:   Coronal and axial T2 with TE of 90 and 180 respectively, axial T2 with fat saturation, axial FIESTA fat-sat, axial T1-weighted in-and-out-of phase, axial DWI/ADC, precontrast axial T1 with fat saturation, post-contrast dynamic axial T1 with fat saturation at 20, 70, and 180 seconds, coronal T1 with fat saturation and 7 minute delayed axial T1 with fat saturation  Pre- and postcontrast subtraction images were also obtained  IV Contrast:  12 mL of Gadobutrol injection (SINGLE-DOSE) FINDINGS: LOWER CHEST:   Unremarkable  LIVER: Normal in size and configuration   In the anterior left lobe (segment 2) there is a 1 3 x 1 1 x 1 0 cm T1 hypointense, T2 hyperintense nodule corresponding to the CT finding  This demonstrates irregular peripheral nodular enhancement on arterial phase imaging with centripetal filling and  uniform postcontrast enhancement on delayed phase imaging, consistent with cavernous hemangioma  No other focal hepatic lesions are identified  The hepatic veins and portal veins are patent  BILE DUCTS: No intrahepatic or extrahepatic bile duct dilation  No evidence of pancreatic divisum  GALLBLADDER:  Normal  PANCREAS: 3 mm cyst in the pancreatic neck best seen on series 7/24 and series 11 image 2  This could represent a tiny sidebranch intraductal papillary mucinous neoplasm (IPMN)  No main pancreatic ductal dilation  ADRENAL GLANDS:  Normal  SPLEEN:  Normal  KIDNEYS/PROXIMAL URETERS: No hydroureteronephrosis  Bilateral cortical and parapelvic cysts  Mild bilateral perinephric edema, nonspecific  BOWEL:  Colonic diverticulosis  PERITONEUM/RETROPERITONEUM: No ascites  LYMPH NODES: No pathologic lymphadenopathy  VASCULAR STRUCTURES:  No aneurysm  ABDOMINAL WALL:  Unremarkable  OSSEOUS STRUCTURES:  No suspicious osseous lesion  Impression: 1  1 3 cm cavernous hemangioma left hepatic lobe  No suspicious hepatic lesions  2  3 mm pancreatic neck cyst could represent a tiny sidebranch IPMN  For simple cyst(s) less than 1 5 cm, recommend yearly followup 5 times, then every 2 year for 2 times  If cyst(s) stable after 9 years, no further followups  Recommend next followup MRI/MRCP in 1 year  3  Bilateral renal cysts  The study was marked in Epic for follow-up  Workstation performed: [id number]     Ct Chest Abdomen Pelvis W Contrast    Result Date: 1/4/2021  Narrative: CT CHEST, ABDOMEN AND PELVIS WITH IV CONTRAST INDICATION:   Metastases suspected, unknown primary Rule out Malignancy  Ring enhancing lesion in brain    COMPARISON:  None  TECHNIQUE: CT examination of the chest, abdomen and pelvis was performed    In addition to portal venous phase postcontrast scanning through the abdomen and pelvis, delayed phase postcontrast scanning was performed through the upper abdominal viscera  Axial, sagittal, and coronal 2D reformatted images were created from the source data and submitted for interpretation  Radiation dose length product (DLP) for this visit:  2120 09 mGy-cm   This examination, like all CT scans performed in the [facility], was performed utilizing techniques to minimize radiation dose exposure, including the use of iterative reconstruction and automated exposure control  IV Contrast:  100 mL of iohexol (OMNIPAQUE)   350 Enteric Contrast: Enteric contrast was not administered  FINDINGS: CHEST LUNGS:  No infiltrate or lung mass  Trace bibasilar subsegmental atelectasis, left greater than right  Trace bibasilar subpleural reticular scarring  Central airways are clear  PLEURA:  Unremarkable  HEART/GREAT VESSELS:  Heart is not enlarged  No pericardial effusion  Aortic and coronary artery calcification  MEDIASTINUM AND AMINATA:  Punctate mediastinal and bilateral hilar calcified granulomata  Otherwise unremarkable  CHEST WALL AND LOWER NECK:   Unremarkable  ABDOMEN LIVER/BILIARY TREE:  1 5 x 1 3 cm left anterior subcapsular nodular enhancement seen prominently on the portal venous phase image 57 series 2 and faintly visualized on the delayed phase image 17 series 6  This may be a flash filling hemangioma or possibly benign AV shunt  Equivocal mild hepatic steatosis  Remainder of the liver is unremarkable  No duct dilation  GALLBLADDER:  No calcified gallstones  No pericholecystic inflammatory change  SPLEEN:  Unremarkable  PANCREAS:  Unremarkable  ADRENAL GLANDS:  Unremarkable  KIDNEYS/URETERS: One or more sharply circumscribed subcentimeter renal hypodensities are noted  These lesions are too small to accurately characterize, but are statistically most likely to represent benign cortical renal cyst(s)    According to the guidelines published in the White Paper of the ACR Incidental Findings Committee (Radiology 2010), no further workup of these lesions is recommended  Bilateral renal parapelvic simple cysts, the largest of which measures 5 1 cm on the left  Bilateral renal cortical simple cysts, the largest of which measures 4 1 cm on the left minimal bilateral perinephric stranding, left greater than right  Otherwise normal parenchymal enhancement of the kidneys  No loculated perinephric collection  Small left renal  posterior lateral interpolar cortical scar or developmental cleft  X-rays  Kidneys otherwise unremarkable  No perinephric collection  STOMACH AND BOWEL:  Ascending through sigmoid colonic diverticulosis without immediate adjacent stranding  Right ventral mid abdominal cecum and ileocecal junction suggestive of mobile cecum  No bowel obstruction  APPENDIX:  A normal appendix was visualized  ABDOMINOPELVIC CAVITY:  No ascites  No pneumoperitoneum  No lymphadenopathy  VESSELS:  Minimal aortoiliac calcification  No aneurysm  PELVIS REPRODUCTIVE ORGANS:  Mildly enlarged prostate gland protruding into the bladder base  URINARY BLADDER:  As above  Otherwise unremarkable  ABDOMINAL WALL/INGUINAL REGIONS:  Small fat-containing umbilical and left inguinal hernias  No bowel herniation  Minimal bilateral ventral mid abdominal wall subcutaneous plaque-like soft tissue density presumably due to prior injections  OSSEOUS STRUCTURES:  No acute fracture or osseous destructive lesion identified  Mild degenerative changes of the spine and hips  Scattered small bone islands  Impression: CT chest: No evidence of significant abnormal mass  Punctate mediastinal and hilar calcified granulomata  Trace bibasilar subsegmental atelectasis, left greater than right  CT abdomen and pelvis: 1 5 cm left hepatic indeterminate nodule   Although this may simply be a flash filling hemangioma or possibly benign AV shunt, advise follow-up with dedicated liver MRI with and without contrast for further characterization  Otherwise, no evidence of significant abnormal mass  Bilateral renal simple cysts and subcentimeter hypodensities too small to characterize statistically representing simple cysts  Mild prostatomegaly  Colonic diverticulosis  This study demonstrates a significant  finding and was documented as such in [facility] for liaison and referring practitioner notification  This study demonstrates a finding requiring imaging follow-up and was documented as such in Epic  Workstation performed: [id number]     Ct Stroke Alert Brain    Result Date: 1/1/2021  Narrative: CT BRAIN - STROKE ALERT PROTOCOL INDICATION:   Sudden onset of expressive aphasia at 8:30 AM  Patient has suspected COVID-19  COMPARISON:  None  TECHNIQUE:  CT examination of the brain was performed  In addition to axial images, coronal reformatted images were created and submitted for interpretation  Radiation dose length product (DLP) for this visit:  0699 646 28 85 mGy-cm   This examination, like all CT scans performed in the [facility], was performed utilizing techniques to minimize radiation dose exposure, including the use of iterative reconstruction and automated exposure control  IMAGE QUALITY:  Diagnostic  FINDINGS: PARENCHYMA:  No intracranial mass, mass effect or midline shift  No acute intracranial hemorrhage  No CT signs of acute infarction  Mild calcification of the cavernous internal carotid ateries  VENTRICLES AND EXTRA-AXIAL SPACES:  Normal for patient's age  VISUALIZED ORBITS AND PARANASAL SINUSES:  Unremarkable  CALVARIUM AND EXTRACRANIAL SOFT TISSUES:   Normal      Impression: No acute intracranial abnormality  Findings were directly discussed with Syed Reed on 1/1/2021 11:33 AM  Workstation performed: [id number]     Fl Lumbar Puncture Diagnostic    Result Date: 1/4/2021  Narrative: FLUOROSCOPICALLY GUIDED LUMBAR PUNCTURE  INDICATION:  Inpatient request for lumbar puncture    Recent the lesion noted on MRI brain  FLUOROSCOPY TIME:   21 MINUTES IMAGES:  2    TECHNIQUE:   Consent was obtained after fully explaining the procedure to the patient  Risks and benefits of procedure were described and understood  Precautions to avoid spinal headache were reviewed  1% lidocaine was infiltrated at the puncture site  Utilizing Left paravertebral approach, a 20 gauge spinal needle was advanced under fluoroscopic guidance into the subarachnoid space at the L3-L4 level, utilizing sterile technique  Once in position, approximately 16 cc of clear, colorless CSF were removed and placed into 4 tubes which subsequently were transported to the lab for requested analysis  The needle was removed  The patient tolerated the procedure well  The patient was discharged from the department with appropriate instructions  Impression: Successful fluoroscopically guided lumbar puncture  Procedure was performed by ESTRELLITA Tapia PA-C under the direct supervision of Dr Chiquita Banegas  Workstation performed: [id number]     Cta Stroke Alert (head/neck)    Result Date: 1/1/2021  Narrative: CTA NECK AND BRAIN WITH CONTRAST INDICATION: Sudden onset of expressive aphasia at 8:45 AM  Patient has suspected COVID-19  COMPARISON:   None  TECHNIQUE:   Post contrast imaging was performed after administration of iodinated contrast through the neck and brain  Post contrast axial 0 625 mm images timed to opacify the arterial system  3D rendering was performed on an independent workstation  MIP reconstructions performed  Coronal reconstructions were performed of the noncontrast portion of the brain  Radiation dose length product (DLP) for this visit:  898 mGy-cm   This examination, like all CT scans performed in the [facility], was performed utilizing techniques to minimize radiation dose exposure, including the use of iterative reconstruction and automated exposure control     In addition, limited axial imaging was performed through the chest, specifically to evaluate for possible pulmonary changes of COVID-19  IV Contrast:  85 mL of iohexol (OMNIPAQUE)  IMAGE QUALITY:   Diagnostic FINDINGS: CERVICAL VASCULATURE AORTIC ARCH AND GREAT VESSELS:  Mild athersclerotic disease of the arch, proximal great vessels and visualized subclavian vessels  No significant stenosis  RIGHT VERTEBRAL ARTERY CERVICAL SEGMENT:  Normal origin  The vessel is normal in caliber throughout the neck  LEFT VERTEBRAL ARTERY CERVICAL SEGMENT:  Normal origin  The vessel is normal in caliber throughout the neck  RIGHT EXTRACRANIAL CAROTID SEGMENT:  Normal caliber common carotid artery  Normal bifurcation and cervical internal carotid artery  No stenosis or dissection  LEFT EXTRACRANIAL CAROTID SEGMENT:  Normal caliber common carotid artery  Normal bifurcation and cervical internal carotid artery  No stenosis or dissection  NASCET criteria was used to determine the degree of internal carotid artery diameter stenosis  INTRACRANIAL VASCULATURE INTERNAL CAROTID ARTERIES:  Normal enhancement of the intracranial portions of the internal carotid arteries  Mild atherosclerotic changes in the distal cavernous and supraclinoid segments bilaterally  Normal ophthalmic artery origins  Normal ICA terminus  ANTERIOR CIRCULATION:  Symmetric A1 segments and anterior cerebral arteries with normal enhancement  Normal anterior communicating artery  MIDDLE CEREBRAL ARTERY CIRCULATION:  M1 segment and middle cerebral artery branches demonstrate normal enhancement bilaterally  DISTAL VERTEBRAL ARTERIES:  Normal distal vertebral arteries  Posterior inferior cerebellar artery origins are normal  Normal vertebral basilar junction  BASILAR ARTERY:  Basilar artery is normal in caliber  Normal superior cerebellar arteries  POSTERIOR CEREBRAL ARTERIES: The P1 segment of the right posterior cerebral artery is hypoplastic but patent    There is a dominant right posterior communicating artery, supplying the right P2 segment  Findings most compatible with near fetal origin of the right posterior cerebral artery  The left posterior cerebral artery is normal  DURAL VENOUS SINUSES:  Normal  NON VASCULAR ANATOMY BONY STRUCTURES:  Degenerative changes are present throughout the cervical spine  SOFT TISSUES OF THE NECK:  Unremarkable  VISUALIZED CHEST:  No clinically significant abnormality identified in the visualized portion of the thorax  The heart is enlarged  Coronary artery calcifications  Impression: 1  Unremarkable CTA of the neck and brain  No evidence of large vessel central occlusive disease involving the [place] or its branch vessels  If there is continued concern for acute cerebral ischemia, recommend follow-up MRI of the brain with  diffusion-weighted sequencing  2   No pulmonary parenchymal changes to suggest COVID19 infection  Findings were directly discussed with Yamil Ng on 1/1/2021 12:00 PM  Workstation performed: [id number]       I have personally reviewed pertinent reports     and I have personally reviewed pertinent films in PACS

## 2021-01-12 NOTE — PROGRESS NOTES
Neurosurgery Office Note  Greer Ferguson 60 y o  male MRN: [id number]      Assessment/Plan      Diagnoses and all orders for this visit:    Brain tumor [facility])  -     Case request operating room: BIOPSY BRAIN IMAGE-GUIDED NEEDLE - LEFT TEMPORAL; Standing  -     Case request operating room: BIOPSY BRAIN IMAGE-GUIDED NEEDLE - LEFT TEMPORAL          Discussion:    72-year-old male who on new year's Day, was speaking to his mother, and she noted that his speech was fluent but nonsensical   He did not lose consciousness, was aware of this problem  He presented to the [facility] who in ER, was evaluated there, and called a stroke alert  CT scan was normal, [place] [facility] [place]  In the subsequent days, Neurology obtained an MRI scan of the brain, as well as did a lumbar puncture, CT chest and pelvis, and he has subsequently had an MRI scan of the abdomen  On exam today, he is neurologically normal   He has had no subsequent episodes, he is maintained on 1 g of Keppra b i d  His [place] is 23/30  MRI scan of brain demonstrates an approximately 1 5 cm rim enhancing lesion left temporal lobe with some minimal adjacent edema  There is minimal if any mass effect  CT chest and pelvis was negative for obvious primary source  LP showed some slightly increased protein, but no definitive lymphoma, malignancy, etcetera  MRI scan of the abdomen for slight abnormality of the liver suggested a cavernoma, and no primary source  Eeg 31 minutes normal standard study showed no seizures  I reviewed the patient the differential diagnosis for this lesion and options for management  I think we have exhausted other noninvasive methods to try and diagnose it  It is concerning, and I would not recommend observation with serial scanning  I have recommended needle biopsy  I am not recommending presently more aggressive measures such as resection, until we have a diagnosis on this mass/finding    I reviewed the patient his mother the logistics of needle biopsy, which we expect, an overnight hospital stay etcetera  I reviewed with him attendant risks  He would like to proceed and written consent was obtained  Plan:  Image guided needle brain biopsy left temporal     01/12/21 Metrics: [id number]=9 000; ; ECOG 0; ; [facility]    Chief Complaint   Patient presents with    Follow-up     1 wk hosp f/u to discuss sx       HISTORY    History of Present Illness     60y o  year old male     HPI    See Discussion    REVIEW OF SYSTEMS    Review of Systems   Constitutional: Negative  HENT: Negative  Eyes: Negative  Respiratory: Negative  Cardiovascular: Negative  Gastrointestinal: Negative  Endocrine: Negative  Genitourinary: Negative  Musculoskeletal: Negative  Skin: Negative  Allergic/Immunologic: Negative  Neurological: Positive for seizures (last episode Jan 1, hospitalized)  Negative for dizziness, syncope, weakness, numbness and headaches (due to LP, but better now)  Hematological: Does not bruise/bleed easily (patient on ASA 81)  Psychiatric/Behavioral: Negative  Meds/Allergies     Current Outpatient Medications   Medication Sig Dispense Refill    amLODIPine (NORVASC) 10 mg tablet Take 1 tablet (10 mg total) by mouth daily 90 tablet 3    aspirin 81 mg chewable tablet Chew 81 mg daily      atorvastatin (LIPITOR) 10 mg tablet Take 5 mg by mouth daily       hydrochlorothiazide (MICROZIDE) 12 5 mg capsule Take 12 5 mg by mouth daily      levETIRAcetam (KEPPRA) 1000 MG tablet Take 1 tablet (1,000 mg total) by mouth every 12 (twelve) hours 60 tablet 0    valsartan (DIOVAN) 160 mg tablet Take 160 mg by mouth daily       No current facility-administered medications for this visit          No Known Allergies    PAST HISTORY    Past Medical History:   Diagnosis Date    Bruxism (teeth grinding)     Hypertension     Obesity     Sleep apnea     CPAP nightly       Past Surgical History:   Procedure Laterality Date    FL LUMBAR PUNCTURE DIAGNOSTIC  1/4/2021    HAND FRACTURE REPAIR      left thumb    TONSILLECTOMY         Social History     Tobacco Use    Smoking status: Former Smoker     Types: Cigarettes    Smokeless tobacco: Never Used   Substance Use Topics    Alcohol use: Yes     Frequency: 2-4 times a month     Drinks per session: 3 or 4     Binge frequency: Never    Drug use: Never       Family History   Problem Relation Age of Onset    Heart disease Mother     Heart attack Father     Leukemia Brother          The following portions of the patient's history were reviewed in this encounter and updated as appropriate: Past medical, surgical, family, and social history, as well as medications, allergies, and review of systems  EXAM    Vitals:Blood pressure 155/87, pulse 65, temperature 97 9 °F (36 6 °C), resp  rate 16, height 6' 3" (1 905 m), weight 120 kg (264 lb)  ,Body mass index is 33 kg/m²  Physical Exam  Vitals signs reviewed  Constitutional:       Appearance: Normal appearance  He is well-developed  HENT:      Head: Normocephalic and atraumatic  Eyes:      General: No scleral icterus  Neck:      Musculoskeletal: Neck supple  Cardiovascular:      Rate and Rhythm: Normal rate  Pulmonary:      Effort: Pulmonary effort is normal    Skin:     General: Skin is warm and dry  Neurological:      Mental Status: He is alert and oriented to person, place, and time  Sensory: No sensory deficit  Psychiatric:         Speech: Speech normal          Behavior: Behavior normal          Neurologic Exam     Mental Status   Oriented to person, place, and time  Attention: normal    Speech: speech is normal   Level of consciousness: alert    [place] 23/30     Cranial Nerves     CN VII   Facial expression full, symmetric       Motor Exam   Muscle bulk: normal  Overall muscle tone: normal  Moves all extremities, grossly normal     Gait, Coordination, and Reflexes     Tremor   Resting tremor: absent  Intention tremor: absent  Action tremor: absent  No aids  MEDICAL DECISION MAKING    Imaging Studies:     Ct Head Wo Contrast    Result Date: 1/2/2021  Narrative: CT BRAIN - WITHOUT CONTRAST INDICATION:   Stroke, follow up  24 hour post tpa CT Head  COMPARISON:  January 1, 2021 TECHNIQUE:  CT examination of the brain was performed  In addition to axial images, sagittal and coronal 2D reformatted images were created and submitted for interpretation  Radiation dose length product (DLP) for this visit:  877 32 mGy-cm   This examination, like all CT scans performed in the [facility], was performed utilizing techniques to minimize radiation dose exposure, including the use of iterative  reconstruction and automated exposure control  IMAGE QUALITY:  Diagnostic  FINDINGS: PARENCHYMA:  No intracranial mass, mass effect or midline shift  No CT signs of acute infarction  No acute parenchymal hemorrhage  VENTRICLES AND EXTRA-AXIAL SPACES:  Normal for the patient's age  VISUALIZED ORBITS AND PARANASAL SINUSES:  Unremarkable  CALVARIUM AND EXTRACRANIAL SOFT TISSUES:  Normal      Impression: No acute intracranial abnormality  Specifically, no noncontrast CT evidence of acute intracranial hemorrhage or evolving acute intracranial ischemia  Workstation performed: [id number]     Mri Brain Wo Contrast    Result Date: 1/2/2021  Narrative: MRI BRAIN WITHOUT CONTRAST INDICATION: Acute Stroke  COMPARISON:   None  TECHNIQUE:  Sagittal T1, axial T2, axial FLAIR, axial T1, axial [place] and axial diffusion imaging  IMAGE QUALITY:  Diagnostic  FINDINGS: BRAIN PARENCHYMA:  There is no discrete mass, mass effect or midline shift  There is no intracranial hemorrhage  Focus of T2/FLAIR hyperintensity within the left temporal juxtacortical region with a few stellate foci of susceptibility artifact internally    Focus demonstrates increased DWI signal without corresponding hypointensity on ADC map  VENTRICLES:  Normal for the patient's age  SELLA AND PITUITARY GLAND:  Normal  ORBITS:  Normal  PARANASAL SINUSES:  Normal  VASCULATURE:  Evaluation of the major intracranial vasculature demonstrates appropriate flow voids  CALVARIUM AND SKULL BASE:  Normal  EXTRACRANIAL SOFT TISSUES:  Normal      Impression: No evidence of acute infarct  T2/ FLAIR hyperintense focus at the left temporal juxtacortical region presumed to represent a developmental venous anomaly, however given patient's clinical history a subacute infarct cannot be excluded  Contrast-enhanced MR examination recommended for  further characterization  The study was marked in EPIC for significant notification  Workstation performed: [id number]     Mri Brain W Wo Contrast    Result Date: 1/3/2021  Narrative: MRI BRAIN WITH AND WITHOUT CONTRAST INDICATION: Transient neurologic symptoms  Abnormal finding on precontrast brain MRI  COMPARISON:  Pre-contrast brain MR performed January 1, 2021  Head CT and CT angiography performed January 1, 2021  TECHNIQUE: Sagittal T1, axial T2, axial FLAIR, axial T1, axial [place], axial diffusion  Sagittal, axial T1 postcontrast   Axial bravo postcontrast with coronal reconstructions  IV Contrast:  11 mL of gadobenate dimeglumine (MULTIHANCE)  IMAGE QUALITY:   Diagnostic  FINDINGS: BRAIN PARENCHYMA: There is intense ring enhancement associated with a lesion in the left temporal lobe, 1 6 x 1 1 cm on image 62 of series 12, corresponding to the focal area of ring-shaped T2 signal abnormality identified on precontrast imaging  There is no significant mass effect and no associated diffusion restriction or surrounding vasogenic edema  No other enhancing lesions are identified  There is subtle focal T2 signal abnormality in the periventricular white matter adjacent to the atrium of left lateral ventricle  Otherwise white matter signal abnormality is identified   No diffusion restriction to suggest acute intracranial ischemia  No acute intracranial hemorrhage  VENTRICLES:  Normal for the patient's age  SELLA AND PITUITARY GLAND:  Normal  ORBITS:  Normal  PARANASAL SINUSES:  Mild scattered sinus mucosal thickening  VASCULATURE:  Evaluation of the major intracranial vasculature demonstrates appropriate flow voids  CALVARIUM AND SKULL BASE:  Normal  EXTRACRANIAL SOFT TISSUES:  Normal      Impression: Intense ring enhancement associated with T2/FLAIR signal abnormality lesion in the left temporal lobe  The appearance is strongly suggestive of parenchymal brain neoplasm, most likely glioma or metastasis  Alternatively, tumefactive demyelinating lesion is possible, but considered less likely due to complete absence of diffusion restriction  I personally discussed this study with Dr Mt Leone via secure text messaging on 1/3/2021 at 8:54 AM  Workstation performed: [id number]     Mri Abdomen W Wo Contrast    Result Date: 1/6/2021  Narrative: MRI - ABDOMEN - WITH AND WITHOUT CONTRAST INDICATION:  Indeterminate left hepatic lobe lesion, new brain lesion  COMPARISON: CT abdomen chest, and pelvis 1/3/2021 TECHNIQUE:  The following pulse sequences were obtained prior to and following the administration of intravenous contrast:   Coronal and axial T2 with TE of 90 and 180 respectively, axial T2 with fat saturation, axial FIESTA fat-sat, axial T1-weighted in-and-out-of phase, axial DWI/ADC, precontrast axial T1 with fat saturation, post-contrast dynamic axial T1 with fat saturation at 20, 70, and 180 seconds, coronal T1 with fat saturation and 7 minute delayed axial T1 with fat saturation  Pre- and postcontrast subtraction images were also obtained  IV Contrast:  12 mL of Gadobutrol injection (SINGLE-DOSE) FINDINGS: LOWER CHEST:   Unremarkable  LIVER: Normal in size and configuration   In the anterior left lobe (segment 2) there is a 1 3 x 1 1 x 1 0 cm T1 hypointense, T2 hyperintense nodule corresponding to the CT finding  This demonstrates irregular peripheral nodular enhancement on arterial phase imaging with centripetal filling and  uniform postcontrast enhancement on delayed phase imaging, consistent with cavernous hemangioma  No other focal hepatic lesions are identified  The hepatic veins and portal veins are patent  BILE DUCTS: No intrahepatic or extrahepatic bile duct dilation  No evidence of pancreatic divisum  GALLBLADDER:  Normal  PANCREAS: 3 mm cyst in the pancreatic neck best seen on series 7/24 and series 11 image 2  This could represent a tiny sidebranch intraductal papillary mucinous neoplasm (IPMN)  No main pancreatic ductal dilation  ADRENAL GLANDS:  Normal  SPLEEN:  Normal  KIDNEYS/PROXIMAL URETERS: No hydroureteronephrosis  Bilateral cortical and parapelvic cysts  Mild bilateral perinephric edema, nonspecific  BOWEL:  Colonic diverticulosis  PERITONEUM/RETROPERITONEUM: No ascites  LYMPH NODES: No pathologic lymphadenopathy  VASCULAR STRUCTURES:  No aneurysm  ABDOMINAL WALL:  Unremarkable  OSSEOUS STRUCTURES:  No suspicious osseous lesion  Impression: 1  1 3 cm cavernous hemangioma left hepatic lobe  No suspicious hepatic lesions  2  3 mm pancreatic neck cyst could represent a tiny sidebranch IPMN  For simple cyst(s) less than 1 5 cm, recommend yearly followup 5 times, then every 2 year for 2 times  If cyst(s) stable after 9 years, no further followups  Recommend next followup MRI/MRCP in 1 year  3  Bilateral renal cysts  The study was marked in Epic for follow-up  Workstation performed: [id number]     Ct Chest Abdomen Pelvis W Contrast    Result Date: 1/4/2021  Narrative: CT CHEST, ABDOMEN AND PELVIS WITH IV CONTRAST INDICATION:   Metastases suspected, unknown primary Rule out Malignancy  Ring enhancing lesion in brain    COMPARISON:  None  TECHNIQUE: CT examination of the chest, abdomen and pelvis was performed    In addition to portal venous phase postcontrast scanning through the abdomen and pelvis, delayed phase postcontrast scanning was performed through the upper abdominal viscera  Axial, sagittal, and coronal 2D reformatted images were created from the source data and submitted for interpretation  Radiation dose length product (DLP) for this visit:  2120 09 mGy-cm   This examination, like all CT scans performed in the [facility], was performed utilizing techniques to minimize radiation dose exposure, including the use of iterative reconstruction and automated exposure control  IV Contrast:  100 mL of iohexol (OMNIPAQUE)   350 Enteric Contrast: Enteric contrast was not administered  FINDINGS: CHEST LUNGS:  No infiltrate or lung mass  Trace bibasilar subsegmental atelectasis, left greater than right  Trace bibasilar subpleural reticular scarring  Central airways are clear  PLEURA:  Unremarkable  HEART/GREAT VESSELS:  Heart is not enlarged  No pericardial effusion  Aortic and coronary artery calcification  MEDIASTINUM AND AMINATA:  Punctate mediastinal and bilateral hilar calcified granulomata  Otherwise unremarkable  CHEST WALL AND LOWER NECK:   Unremarkable  ABDOMEN LIVER/BILIARY TREE:  1 5 x 1 3 cm left anterior subcapsular nodular enhancement seen prominently on the portal venous phase image 57 series 2 and faintly visualized on the delayed phase image 17 series 6  This may be a flash filling hemangioma or possibly benign AV shunt  Equivocal mild hepatic steatosis  Remainder of the liver is unremarkable  No duct dilation  GALLBLADDER:  No calcified gallstones  No pericholecystic inflammatory change  SPLEEN:  Unremarkable  PANCREAS:  Unremarkable  ADRENAL GLANDS:  Unremarkable  KIDNEYS/URETERS: One or more sharply circumscribed subcentimeter renal hypodensities are noted  These lesions are too small to accurately characterize, but are statistically most likely to represent benign cortical renal cyst(s)    According to the guidelines published in the White Paper of the ACR Incidental Findings Committee (Radiology 2010), no further workup of these lesions is recommended  Bilateral renal parapelvic simple cysts, the largest of which measures 5 1 cm on the left  Bilateral renal cortical simple cysts, the largest of which measures 4 1 cm on the left minimal bilateral perinephric stranding, left greater than right  Otherwise normal parenchymal enhancement of the kidneys  No loculated perinephric collection  Small left renal  posterior lateral interpolar cortical scar or developmental cleft  X-rays  Kidneys otherwise unremarkable  No perinephric collection  STOMACH AND BOWEL:  Ascending through sigmoid colonic diverticulosis without immediate adjacent stranding  Right ventral mid abdominal cecum and ileocecal junction suggestive of mobile cecum  No bowel obstruction  APPENDIX:  A normal appendix was visualized  ABDOMINOPELVIC CAVITY:  No ascites  No pneumoperitoneum  No lymphadenopathy  VESSELS:  Minimal aortoiliac calcification  No aneurysm  PELVIS REPRODUCTIVE ORGANS:  Mildly enlarged prostate gland protruding into the bladder base  URINARY BLADDER:  As above  Otherwise unremarkable  ABDOMINAL WALL/INGUINAL REGIONS:  Small fat-containing umbilical and left inguinal hernias  No bowel herniation  Minimal bilateral ventral mid abdominal wall subcutaneous plaque-like soft tissue density presumably due to prior injections  OSSEOUS STRUCTURES:  No acute fracture or osseous destructive lesion identified  Mild degenerative changes of the spine and hips  Scattered small bone islands  Impression: CT chest: No evidence of significant abnormal mass  Punctate mediastinal and hilar calcified granulomata  Trace bibasilar subsegmental atelectasis, left greater than right  CT abdomen and pelvis: 1 5 cm left hepatic indeterminate nodule   Although this may simply be a flash filling hemangioma or possibly benign AV shunt, advise follow-up with dedicated liver MRI with and without contrast for further characterization  Otherwise, no evidence of significant abnormal mass  Bilateral renal simple cysts and subcentimeter hypodensities too small to characterize statistically representing simple cysts  Mild prostatomegaly  Colonic diverticulosis  This study demonstrates a significant  finding and was documented as such in [facility] for liaison and referring practitioner notification  This study demonstrates a finding requiring imaging follow-up and was documented as such in Epic  Workstation performed: [id number]     Ct Stroke Alert Brain    Result Date: 1/1/2021  Narrative: CT BRAIN - STROKE ALERT PROTOCOL INDICATION:   Sudden onset of expressive aphasia at 8:30 AM  Patient has suspected COVID-19  COMPARISON:  None  TECHNIQUE:  CT examination of the brain was performed  In addition to axial images, coronal reformatted images were created and submitted for interpretation  Radiation dose length product (DLP) for this visit:  0699 646 28 85 mGy-cm   This examination, like all CT scans performed in the [address] [place] [address], was performed utilizing techniques to minimize radiation dose exposure, including the use of iterative reconstruction and automated exposure control  IMAGE QUALITY:  Diagnostic  FINDINGS: PARENCHYMA:  No intracranial mass, mass effect or midline shift  No acute intracranial hemorrhage  No CT signs of acute infarction  Mild calcification of the cavernous internal carotid ateries  VENTRICLES AND EXTRA-AXIAL SPACES:  Normal for patient's age  VISUALIZED ORBITS AND PARANASAL SINUSES:  Unremarkable  CALVARIUM AND EXTRACRANIAL SOFT TISSUES:   Normal      Impression: No acute intracranial abnormality  Findings were directly discussed with Yamil Ng on 1/1/2021 11:33 AM  Workstation performed: [id number]     Fl Lumbar Puncture Diagnostic    Result Date: 1/4/2021  Narrative: FLUOROSCOPICALLY GUIDED LUMBAR PUNCTURE  INDICATION:  Inpatient request for lumbar puncture    Recent the lesion noted on MRI brain  FLUOROSCOPY TIME:   21 MINUTES IMAGES:  2    TECHNIQUE:   Consent was obtained after fully explaining the procedure to the patient  Risks and benefits of procedure were described and understood  Precautions to avoid spinal headache were reviewed  1% lidocaine was infiltrated at the puncture site  Utilizing Left paravertebral approach, a 20 gauge spinal needle was advanced under fluoroscopic guidance into the subarachnoid space at the L3-L4 level, utilizing sterile technique  Once in position, approximately 16 cc of clear, colorless CSF were removed and placed into 4 tubes which subsequently were transported to the lab for requested analysis  The needle was removed  The patient tolerated the procedure well  The patient was discharged from the department with appropriate instructions  Impression: Successful fluoroscopically guided lumbar puncture  Procedure was performed by ESTRELLITA Portillo PA-C under the direct supervision of Dr Eladio Martins  Workstation performed: [id number]     Cta Stroke Alert (head/neck)    Result Date: 1/1/2021  Narrative: CTA NECK AND BRAIN WITH CONTRAST INDICATION: Sudden onset of expressive aphasia at 8:45 AM  Patient has suspected COVID-19  COMPARISON:   None  TECHNIQUE:   Post contrast imaging was performed after administration of iodinated contrast through the neck and brain  Post contrast axial 0 625 mm images timed to opacify the arterial system  3D rendering was performed on an independent workstation  MIP reconstructions performed  Coronal reconstructions were performed of the noncontrast portion of the brain  Radiation dose length product (DLP) for this visit:  898 mGy-cm   This examination, like all CT scans performed in the [facility], was performed utilizing techniques to minimize radiation dose exposure, including the use of iterative reconstruction and automated exposure control     In addition, limited axial imaging was performed through the chest, specifically to evaluate for possible pulmonary changes of COVID-19  IV Contrast:  85 mL of iohexol (OMNIPAQUE)  IMAGE QUALITY:   Diagnostic FINDINGS: CERVICAL VASCULATURE AORTIC ARCH AND GREAT VESSELS:  Mild athersclerotic disease of the arch, proximal great vessels and visualized subclavian vessels  No significant stenosis  RIGHT VERTEBRAL ARTERY CERVICAL SEGMENT:  Normal origin  The vessel is normal in caliber throughout the neck  LEFT VERTEBRAL ARTERY CERVICAL SEGMENT:  Normal origin  The vessel is normal in caliber throughout the neck  RIGHT EXTRACRANIAL CAROTID SEGMENT:  Normal caliber common carotid artery  Normal bifurcation and cervical internal carotid artery  No stenosis or dissection  LEFT EXTRACRANIAL CAROTID SEGMENT:  Normal caliber common carotid artery  Normal bifurcation and cervical internal carotid artery  No stenosis or dissection  NASCET criteria was used to determine the degree of internal carotid artery diameter stenosis  INTRACRANIAL VASCULATURE INTERNAL CAROTID ARTERIES:  Normal enhancement of the intracranial portions of the internal carotid arteries  Mild atherosclerotic changes in the distal cavernous and supraclinoid segments bilaterally  Normal ophthalmic artery origins  Normal ICA terminus  ANTERIOR CIRCULATION:  Symmetric A1 segments and anterior cerebral arteries with normal enhancement  Normal anterior communicating artery  MIDDLE CEREBRAL ARTERY CIRCULATION:  M1 segment and middle cerebral artery branches demonstrate normal enhancement bilaterally  DISTAL VERTEBRAL ARTERIES:  Normal distal vertebral arteries  Posterior inferior cerebellar artery origins are normal  Normal vertebral basilar junction  BASILAR ARTERY:  Basilar artery is normal in caliber  Normal superior cerebellar arteries  POSTERIOR CEREBRAL ARTERIES: The P1 segment of the right posterior cerebral artery is hypoplastic but patent    There is a dominant right posterior communicating artery, supplying the right P2 segment  Findings most compatible with near fetal origin of the right posterior cerebral artery  The left posterior cerebral artery is normal  DURAL VENOUS SINUSES:  Normal  NON VASCULAR ANATOMY BONY STRUCTURES:  Degenerative changes are present throughout the cervical spine  SOFT TISSUES OF THE NECK:  Unremarkable  VISUALIZED CHEST:  No clinically significant abnormality identified in the visualized portion of the thorax  The heart is enlarged  Coronary artery calcifications  Impression: 1  Unremarkable CTA of the neck and brain  No evidence of large vessel central occlusive disease involving the [place] of [place] or its branch vessels  If there is continued concern for acute cerebral ischemia, recommend follow-up MRI of the brain with  diffusion-weighted sequencing  2   No pulmonary parenchymal changes to suggest COVID19 infection  Findings were directly discussed with Juan Noe on 1/1/2021 12:00 PM  Workstation performed: [id number]       I have personally reviewed pertinent reports     and I have personally reviewed pertinent films in PACS

## 2021-01-13 ENCOUNTER — LAB REQUISITION (OUTPATIENT)
Dept: LAB | Facility: HOSPITAL | Age: 66
End: 2021-01-13
Payer: COMMERCIAL

## 2021-01-13 ENCOUNTER — LAB (OUTPATIENT)
Dept: LAB | Facility: CLINIC | Age: 66
End: 2021-01-13
Payer: COMMERCIAL

## 2021-01-13 ENCOUNTER — ANESTHESIA EVENT (OUTPATIENT)
Dept: PERIOP | Facility: HOSPITAL | Age: 66
DRG: 026 | End: 2021-01-13
Payer: COMMERCIAL

## 2021-01-13 ENCOUNTER — TRANSCRIBE ORDERS (OUTPATIENT)
Dept: LAB | Facility: CLINIC | Age: 66
End: 2021-01-13

## 2021-01-13 DIAGNOSIS — D49.6 NEOPLASM OF UNSPECIFIED BEHAVIOR OF BRAIN (HCC): ICD-10-CM

## 2021-01-13 DIAGNOSIS — D49.6 BRAIN TUMOR (HCC): Primary | ICD-10-CM

## 2021-01-13 DIAGNOSIS — Z11.59 SCREENING FOR VIRAL DISEASE: ICD-10-CM

## 2021-01-13 DIAGNOSIS — D49.6 BRAIN TUMOR (HCC): ICD-10-CM

## 2021-01-13 LAB
ABO GROUP BLD: NORMAL
BILIRUB UR QL STRIP: NEGATIVE
BLD GP AB SCN SERPL QL: NEGATIVE
CLARITY UR: CLEAR
COLOR UR: YELLOW
GLUCOSE UR STRIP-MCNC: NEGATIVE MG/DL
HGB UR QL STRIP.AUTO: NEGATIVE
KETONES UR STRIP-MCNC: NEGATIVE MG/DL
LEUKOCYTE ESTERASE UR QL STRIP: NEGATIVE
NITRITE UR QL STRIP: NEGATIVE
PH UR STRIP.AUTO: 6.5 [PH]
PROT UR STRIP-MCNC: NEGATIVE MG/DL
RH BLD: POSITIVE
SP GR UR STRIP.AUTO: 1.02 (ref 1–1.03)
SPECIMEN EXPIRATION DATE: NORMAL
UROBILINOGEN UR QL STRIP.AUTO: 0.2 E.U./DL

## 2021-01-13 PROCEDURE — U0005 INFEC AGEN DETEC AMPLI PROBE: HCPCS | Performed by: NEUROLOGICAL SURGERY

## 2021-01-13 PROCEDURE — 86900 BLOOD TYPING SEROLOGIC ABO: CPT | Performed by: NEUROLOGICAL SURGERY

## 2021-01-13 PROCEDURE — U0003 INFECTIOUS AGENT DETECTION BY NUCLEIC ACID (DNA OR RNA); SEVERE ACUTE RESPIRATORY SYNDROME CORONAVIRUS 2 (SARS-COV-2) (CORONAVIRUS DISEASE [COVID-19]), AMPLIFIED PROBE TECHNIQUE, MAKING USE OF HIGH THROUGHPUT TECHNOLOGIES AS DESCRIBED BY CMS-2020-01-R: HCPCS | Performed by: NEUROLOGICAL SURGERY

## 2021-01-13 PROCEDURE — 86850 RBC ANTIBODY SCREEN: CPT | Performed by: NEUROLOGICAL SURGERY

## 2021-01-13 PROCEDURE — 86901 BLOOD TYPING SEROLOGIC RH(D): CPT | Performed by: NEUROLOGICAL SURGERY

## 2021-01-13 PROCEDURE — 36415 COLL VENOUS BLD VENIPUNCTURE: CPT

## 2021-01-13 PROCEDURE — 81003 URINALYSIS AUTO W/O SCOPE: CPT | Performed by: NEUROLOGICAL SURGERY

## 2021-01-13 NOTE — PRE-PROCEDURE INSTRUCTIONS
Pre-Surgery Instructions:    Have you had / have a sore throat? NO  have you had / have a cough less than 1 week? NO  Have you had / have a fever greater than 100 0 - 100  4? NO  Are you experiencing any shortness of breath? NO    Pre procedure instructions given, verbalizes understanding COVID VISITATION POLICY REVIEWED ON 5/08 WITH UNDERSTANDING ALL OTHER INSTRUCTIONS REVIEWED AT THIS TIME     Medication Instructions    amLODIPine (NORVASC) 10 mg tablet Instructed patient per Anesthesia Guidelines   atorvastatin (LIPITOR) 10 mg tablet Instructed patient per Anesthesia Guidelines   hydrochlorothiazide (MICROZIDE) 12 5 mg capsule Instructed patient per Anesthesia Guidelines  holding on 1/19    levETIRAcetam (KEPPRA) 1000 MG tablet Instructed patient per Anesthesia Guidelines   valsartan (DIOVAN) 160 mg tablet Instructed patient per Anesthesia Guidelines  holding on 1/19    /ARB Med Class    Continue this medication up to the evening before surgery/procedure, but do not take the morning of the day of surgery  Diuretic Med Class    Continue this medication up to the evening before surgery/procedure, but do not take the morning of the day of surgery  Antiepileptic Med Class    Continue to take this medication on your normal schedule  If this is an oral medication and you take it in the morning, then you may take this medicine with a sip of water  ASA Med Class: Aspirin    Should be discontinued at least one week prior to planned operation, unless specifically stated otherwise by surgical service  Your Surgeon may have patient stop taking aspirin up to a week before surgery if having intracranial, middle ear, posterior eye, spine surgery or prostate surgery  [Patients taking aspirin for coronary stents should be reviewed by an anesthesiologist in the optimization clinic    Please do not discontinue aspirin in patients with coronary stents unless given specific permission to do so by the cardiologist who prescribed medication ]   If your surgeon approves please continue to take this medication on your normal schedule  You may take this medication on the morning of your surgery with a sip of water  Calcium Channel Blocker Med Class    Continue to take this heart medication on your normal schedule  If this is an oral medication and you take it in the morning, then you may take this medicine with a sip of water  Statin Med Class    Continue to take this medication on your normal schedule  If this is an oral medication and you take it in the morning, then you may take this medicine with a sip of water

## 2021-01-14 LAB — SARS-COV-2 RNA SPEC QL NAA+PROBE: NOT DETECTED

## 2021-01-15 ENCOUNTER — OFFICE VISIT (OUTPATIENT)
Dept: FAMILY MEDICINE CLINIC | Facility: CLINIC | Age: 66
End: 2021-01-15
Payer: COMMERCIAL

## 2021-01-15 VITALS
DIASTOLIC BLOOD PRESSURE: 78 MMHG | WEIGHT: 265.4 LBS | HEART RATE: 69 BPM | BODY MASS INDEX: 33 KG/M2 | OXYGEN SATURATION: 98 % | TEMPERATURE: 97.1 F | SYSTOLIC BLOOD PRESSURE: 139 MMHG | HEIGHT: 75 IN

## 2021-01-15 DIAGNOSIS — D49.6 BRAIN TUMOR (HCC): Primary | ICD-10-CM

## 2021-01-15 DIAGNOSIS — Z13.31 NEGATIVE DEPRESSION SCREENING: ICD-10-CM

## 2021-01-15 DIAGNOSIS — E66.09 CLASS 1 OBESITY DUE TO EXCESS CALORIES WITH SERIOUS COMORBIDITY AND BODY MASS INDEX (BMI) OF 33.0 TO 33.9 IN ADULT: ICD-10-CM

## 2021-01-15 DIAGNOSIS — R56.9 SEIZURE-LIKE ACTIVITY (HCC): ICD-10-CM

## 2021-01-15 DIAGNOSIS — K76.89 LIVER NODULE: ICD-10-CM

## 2021-01-15 PROCEDURE — 99495 TRANSJ CARE MGMT MOD F2F 14D: CPT | Performed by: FAMILY MEDICINE

## 2021-01-15 NOTE — PROGRESS NOTES
Assessment/Plan:    No problem-specific Assessment & Plan notes found for this encounter  Diagnoses and all orders for this visit:    Brain tumor [facility])  Comments:  pt set up with nerosurgeon    Seizure-like activity ([facility] [address] )  Comments:  continue keppra    Class 1 obesity due to excess calories with serious comorbidity and body mass index (BMI) of 33 0 to 33 9 in adult    Negative depression screening    Liver nodule  -     MRI abdomen w wo contrast; Future          PHQ-9 Depression Screening    PHQ-9:   Frequency of the following problems over the past two weeks:      Little interest or pleasure in doing things: 0 - not at all  Feeling down, depressed, or hopeless: 0 - not at all  PHQ-2 Score: 0       TCM Call (since 12/15/2020)     Date and time call was made  1/7/2021  9:08 AM    Hospital care reviewed  Records reviewed    Patient was hospitialized at  [facility]        Date of Admission  01/01/21    Date of discharge  01/06/21    Diagnosis  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 Yes    Patients specialists  Neurologist    I have advised the patient to call PCP with any new or worsening symptoms  chrissy brewer         BMI Counseling: Body mass index is 33 17 kg/m²  The BMI is above normal  Nutrition recommendations include reducing portion sizes and 3-5 servings of fruits/vegetables daily  Exercise recommendations include moderate aerobic physical activity for 150 minutes/week and exercising 3-5 times per week  Subjective:      Patient ID: Kamar Saxena is a 60 y o  male      Hospital follow up for seizure activity with inability to speak for about 2 hours, pt did go through the stroke protocol, pt was discovered to have a temporal lobe brain mass on MRI, pt has seen the neurosurgeon and is set up for a brain biopsy, pt feels well now and no longer has any symptoms      The following portions of the patient's history were reviewed and updated as appropriate: allergies, current medications, past family history, past medical history, past social history, past surgical history and problem list     Review of Systems   Constitutional: Negative for chills and fever  Eyes: Negative for visual disturbance  Respiratory: Negative for shortness of breath and wheezing  Cardiovascular: Negative for chest pain and palpitations  Neurological: Negative for dizziness, speech difficulty, weakness, light-headedness and headaches  Objective:    /78   Pulse 69   Temp (!) 97 1 °F (36 2 °C) (Tympanic)   Ht 6' 3" (1 905 m)   Wt 120 kg (265 lb 6 4 oz)   SpO2 98%   BMI 33 17 kg/m²      Physical Exam  Vitals signs and nursing note reviewed  Constitutional:       General: He is not in acute distress  Appearance: Normal appearance  He is well-developed  He is not ill-appearing, toxic-appearing or diaphoretic  HENT:      Head: Normocephalic and atraumatic  Right Ear: Tympanic membrane, ear canal and external ear normal  There is no impacted cerumen  Left Ear: Tympanic membrane, ear canal and external ear normal  There is no impacted cerumen  Nose: Nose normal  No congestion or rhinorrhea  Mouth/Throat:      Mouth: Mucous membranes are moist       Pharynx: No oropharyngeal exudate or posterior oropharyngeal erythema  Eyes:      General: No scleral icterus  Extraocular Movements: Extraocular movements intact  Conjunctiva/sclera: Conjunctivae normal       Pupils: Pupils are equal, round, and reactive to light  Neck:      Musculoskeletal: Normal range of motion and neck supple  No neck rigidity  Cardiovascular:      Rate and Rhythm: Normal rate and regular rhythm  Heart sounds: Normal heart sounds  No murmur     Pulmonary:      Effort: Pulmonary effort is normal  No respiratory distress  Breath sounds: Normal breath sounds  No wheezing, rhonchi or rales  Abdominal:      Palpations: There is no mass  Musculoskeletal:      Right lower leg: No edema  Left lower leg: No edema  Lymphadenopathy:      Cervical: No cervical adenopathy  Skin:     General: Skin is warm and dry  Findings: No rash  Neurological:      Mental Status: He is alert and oriented to person, place, and time  Sensory: No sensory deficit  Motor: No weakness  Coordination: Coordination normal       Gait: Gait normal    Psychiatric:         Mood and Affect: Mood normal          Behavior: Behavior normal          Thought Content:  Thought content normal          Judgment: Judgment normal

## 2021-01-15 NOTE — PATIENT INSTRUCTIONS
January 15, 2021     Regi Mills    Dear Mr Roddy Mcleod: Thank you for contacting us! Our commitment to you is to keep you informed  [place] to let you know when its your turn for the COVID-19 vaccine  Please go under your questionnaires tab in [employer] (health tab on the web, questionnaire button on the adán ) and complete the [employer] questionnaire  This will help us communicate with you when vaccine spots are available for your phase  Please let us know if you have any further questions or concerns  Sincerely,    [facility] Patient Technical Services Desk     Additional Information:  If you have questions, call [phone]) choose option 5 to talk to our customer support staff  Remember, [employer] is NOT to be used for urgent needs  For medical emergencies, dial [phone]

## 2021-01-19 ENCOUNTER — ANESTHESIA (OUTPATIENT)
Dept: PERIOP | Facility: HOSPITAL | Age: 66
DRG: 026 | End: 2021-01-19
Payer: COMMERCIAL

## 2021-01-19 ENCOUNTER — APPOINTMENT (INPATIENT)
Dept: RADIOLOGY | Facility: HOSPITAL | Age: 66
DRG: 026 | End: 2021-01-19
Payer: COMMERCIAL

## 2021-01-19 ENCOUNTER — HOSPITAL ENCOUNTER (INPATIENT)
Facility: HOSPITAL | Age: 66
LOS: 1 days | Discharge: HOME | DRG: 026 | End: 2021-01-20
Attending: NEUROLOGICAL SURGERY | Admitting: NEUROLOGICAL SURGERY
Payer: COMMERCIAL

## 2021-01-19 VITALS — HEART RATE: 104 BPM

## 2021-01-19 DIAGNOSIS — Z98.890 POST-OPERATIVE STATE: ICD-10-CM

## 2021-01-19 DIAGNOSIS — D49.6 BRAIN TUMOR (HCC): Primary | ICD-10-CM

## 2021-01-19 LAB
ABO GROUP BLD: NORMAL
GLUCOSE SERPL-MCNC: 119 MG/DL (ref 65–140)
RH BLD: POSITIVE

## 2021-01-19 PROCEDURE — 70450 CT HEAD/BRAIN W/O DYE: CPT

## 2021-01-19 PROCEDURE — 82948 REAGENT STRIP/BLOOD GLUCOSE: CPT

## 2021-01-19 PROCEDURE — 88307 TISSUE EXAM BY PATHOLOGIST: CPT | Performed by: PATHOLOGY

## 2021-01-19 PROCEDURE — 88333 PATH CONSLTJ SURG CYTO XM 1: CPT | Performed by: PATHOLOGY

## 2021-01-19 PROCEDURE — 88331 PATH CONSLTJ SURG 1 BLK 1SPC: CPT | Performed by: PATHOLOGY

## 2021-01-19 PROCEDURE — 61750 INCISE SKULL/BRAIN BIOPSY: CPT | Performed by: NEUROLOGICAL SURGERY

## 2021-01-19 PROCEDURE — G1004 CDSM NDSC: HCPCS

## 2021-01-19 PROCEDURE — 99024 POSTOP FOLLOW-UP VISIT: CPT | Performed by: NEUROLOGICAL SURGERY

## 2021-01-19 PROCEDURE — 81287 MGMT GENE PRMTR MTHYLTN ALYS: CPT

## 2021-01-19 PROCEDURE — 8E09XBG COMPUTER ASSISTED PROCEDURE OF HEAD AND NECK REGION, WITH COMPUTERIZED TOMOGRAPHY: ICD-10-PCS | Performed by: NEUROLOGICAL SURGERY

## 2021-01-19 PROCEDURE — 00B73ZX EXCISION OF CEREBRAL HEMISPHERE, PERCUTANEOUS APPROACH, DIAGNOSTIC: ICD-10-PCS | Performed by: NEUROLOGICAL SURGERY

## 2021-01-19 RX ORDER — PROMETHAZINE HYDROCHLORIDE 25 MG/ML
12.5 INJECTION, SOLUTION INTRAMUSCULAR; INTRAVENOUS ONCE AS NEEDED
Status: DISCONTINUED | OUTPATIENT
Start: 2021-01-19 | End: 2021-01-19 | Stop reason: HOSPADM

## 2021-01-19 RX ORDER — DOCUSATE SODIUM 100 MG/1
100 CAPSULE, LIQUID FILLED ORAL 2 TIMES DAILY
Status: DISCONTINUED | OUTPATIENT
Start: 2021-01-19 | End: 2021-01-20 | Stop reason: HOSPADM

## 2021-01-19 RX ORDER — ATORVASTATIN CALCIUM 10 MG/1
5 TABLET, FILM COATED ORAL DAILY
Status: DISCONTINUED | OUTPATIENT
Start: 2021-01-20 | End: 2021-01-19

## 2021-01-19 RX ORDER — ROCURONIUM BROMIDE 10 MG/ML
INJECTION, SOLUTION INTRAVENOUS AS NEEDED
Status: DISCONTINUED | OUTPATIENT
Start: 2021-01-19 | End: 2021-01-19

## 2021-01-19 RX ORDER — METOCLOPRAMIDE HYDROCHLORIDE 5 MG/ML
10 INJECTION INTRAMUSCULAR; INTRAVENOUS ONCE AS NEEDED
Status: DISCONTINUED | OUTPATIENT
Start: 2021-01-19 | End: 2021-01-19 | Stop reason: HOSPADM

## 2021-01-19 RX ORDER — SODIUM CHLORIDE 9 MG/ML
125 INJECTION, SOLUTION INTRAVENOUS CONTINUOUS
Status: DISCONTINUED | OUTPATIENT
Start: 2021-01-19 | End: 2021-01-20

## 2021-01-19 RX ORDER — PANTOPRAZOLE SODIUM 40 MG/1
40 TABLET, DELAYED RELEASE ORAL
Status: DISCONTINUED | OUTPATIENT
Start: 2021-01-20 | End: 2021-01-20 | Stop reason: HOSPADM

## 2021-01-19 RX ORDER — MIDAZOLAM HYDROCHLORIDE 2 MG/2ML
INJECTION, SOLUTION INTRAMUSCULAR; INTRAVENOUS AS NEEDED
Status: DISCONTINUED | OUTPATIENT
Start: 2021-01-19 | End: 2021-01-19

## 2021-01-19 RX ORDER — SODIUM CHLORIDE, SODIUM LACTATE, POTASSIUM CHLORIDE, CALCIUM CHLORIDE 600; 310; 30; 20 MG/100ML; MG/100ML; MG/100ML; MG/100ML
50 INJECTION, SOLUTION INTRAVENOUS CONTINUOUS
Status: DISCONTINUED | OUTPATIENT
Start: 2021-01-19 | End: 2021-01-20

## 2021-01-19 RX ORDER — LEVETIRACETAM 500 MG/1
1000 TABLET ORAL EVERY 12 HOURS SCHEDULED
Status: DISCONTINUED | OUTPATIENT
Start: 2021-01-19 | End: 2021-01-20 | Stop reason: HOSPADM

## 2021-01-19 RX ORDER — LIDOCAINE HYDROCHLORIDE 10 MG/ML
INJECTION, SOLUTION EPIDURAL; INFILTRATION; INTRACAUDAL; PERINEURAL AS NEEDED
Status: DISCONTINUED | OUTPATIENT
Start: 2021-01-19 | End: 2021-01-19

## 2021-01-19 RX ORDER — SODIUM CHLORIDE 9 MG/ML
100 INJECTION, SOLUTION INTRAVENOUS CONTINUOUS
Status: DISCONTINUED | OUTPATIENT
Start: 2021-01-19 | End: 2021-01-20

## 2021-01-19 RX ORDER — CHLORHEXIDINE GLUCONATE 0.12 MG/ML
15 RINSE ORAL ONCE
Status: COMPLETED | OUTPATIENT
Start: 2021-01-19 | End: 2021-01-19

## 2021-01-19 RX ORDER — CEFAZOLIN SODIUM 2 G/50ML
2000 SOLUTION INTRAVENOUS ONCE
Status: COMPLETED | OUTPATIENT
Start: 2021-01-19 | End: 2021-01-19

## 2021-01-19 RX ORDER — ALBUTEROL SULFATE 90 UG/1
AEROSOL, METERED RESPIRATORY (INHALATION) AS NEEDED
Status: DISCONTINUED | OUTPATIENT
Start: 2021-01-19 | End: 2021-01-19

## 2021-01-19 RX ORDER — FENTANYL CITRATE 50 UG/ML
INJECTION, SOLUTION INTRAMUSCULAR; INTRAVENOUS AS NEEDED
Status: DISCONTINUED | OUTPATIENT
Start: 2021-01-19 | End: 2021-01-19

## 2021-01-19 RX ORDER — ATORVASTATIN CALCIUM 10 MG/1
5 TABLET, FILM COATED ORAL
Status: DISCONTINUED | OUTPATIENT
Start: 2021-01-19 | End: 2021-01-20 | Stop reason: HOSPADM

## 2021-01-19 RX ORDER — ONDANSETRON 2 MG/ML
4 INJECTION INTRAMUSCULAR; INTRAVENOUS ONCE AS NEEDED
Status: DISCONTINUED | OUTPATIENT
Start: 2021-01-19 | End: 2021-01-19 | Stop reason: HOSPADM

## 2021-01-19 RX ORDER — CEFAZOLIN SODIUM 2 G/50ML
2000 SOLUTION INTRAVENOUS EVERY 8 HOURS
Status: COMPLETED | OUTPATIENT
Start: 2021-01-19 | End: 2021-01-20

## 2021-01-19 RX ORDER — EPHEDRINE SULFATE 50 MG/ML
INJECTION INTRAVENOUS AS NEEDED
Status: DISCONTINUED | OUTPATIENT
Start: 2021-01-19 | End: 2021-01-19

## 2021-01-19 RX ORDER — HEPARIN SODIUM 5000 [USP'U]/ML
5000 INJECTION, SOLUTION INTRAVENOUS; SUBCUTANEOUS EVERY 8 HOURS SCHEDULED
Status: CANCELLED | OUTPATIENT
Start: 2021-01-19

## 2021-01-19 RX ORDER — SENNOSIDES 8.6 MG
1 TABLET ORAL DAILY
Status: DISCONTINUED | OUTPATIENT
Start: 2021-01-20 | End: 2021-01-20 | Stop reason: HOSPADM

## 2021-01-19 RX ORDER — PROPOFOL 10 MG/ML
INJECTION, EMULSION INTRAVENOUS AS NEEDED
Status: DISCONTINUED | OUTPATIENT
Start: 2021-01-19 | End: 2021-01-19

## 2021-01-19 RX ORDER — ALBUTEROL SULFATE 2.5 MG/3ML
2.5 SOLUTION RESPIRATORY (INHALATION) ONCE AS NEEDED
Status: DISCONTINUED | OUTPATIENT
Start: 2021-01-19 | End: 2021-01-19 | Stop reason: HOSPADM

## 2021-01-19 RX ORDER — LOSARTAN POTASSIUM 50 MG/1
100 TABLET ORAL DAILY
Status: DISCONTINUED | OUTPATIENT
Start: 2021-01-20 | End: 2021-01-20 | Stop reason: HOSPADM

## 2021-01-19 RX ORDER — HYDROMORPHONE HCL/PF 1 MG/ML
0.4 SYRINGE (ML) INJECTION
Status: DISCONTINUED | OUTPATIENT
Start: 2021-01-19 | End: 2021-01-19 | Stop reason: HOSPADM

## 2021-01-19 RX ORDER — BUPIVACAINE HYDROCHLORIDE AND EPINEPHRINE 5; 5 MG/ML; UG/ML
INJECTION, SOLUTION EPIDURAL; INTRACAUDAL; PERINEURAL AS NEEDED
Status: DISCONTINUED | OUTPATIENT
Start: 2021-01-19 | End: 2021-01-19 | Stop reason: HOSPADM

## 2021-01-19 RX ORDER — HYDROCHLOROTHIAZIDE 12.5 MG/1
12.5 TABLET ORAL DAILY
Status: DISCONTINUED | OUTPATIENT
Start: 2021-01-20 | End: 2021-01-20 | Stop reason: HOSPADM

## 2021-01-19 RX ORDER — ONDANSETRON 2 MG/ML
INJECTION INTRAMUSCULAR; INTRAVENOUS AS NEEDED
Status: DISCONTINUED | OUTPATIENT
Start: 2021-01-19 | End: 2021-01-19

## 2021-01-19 RX ORDER — AMLODIPINE BESYLATE 10 MG/1
10 TABLET ORAL DAILY
Status: DISCONTINUED | OUTPATIENT
Start: 2021-01-20 | End: 2021-01-20 | Stop reason: HOSPADM

## 2021-01-19 RX ORDER — FENTANYL CITRATE/PF 50 MCG/ML
50 SYRINGE (ML) INJECTION
Status: DISCONTINUED | OUTPATIENT
Start: 2021-01-19 | End: 2021-01-19 | Stop reason: HOSPADM

## 2021-01-19 RX ORDER — DEXAMETHASONE SODIUM PHOSPHATE 10 MG/ML
INJECTION, SOLUTION INTRAMUSCULAR; INTRAVENOUS AS NEEDED
Status: DISCONTINUED | OUTPATIENT
Start: 2021-01-19 | End: 2021-01-19

## 2021-01-19 RX ADMIN — ROCURONIUM BROMIDE 40 MG: 50 INJECTION, SOLUTION INTRAVENOUS at 07:45

## 2021-01-19 RX ADMIN — LIDOCAINE HYDROCHLORIDE 50 MG: 10 INJECTION, SOLUTION EPIDURAL; INFILTRATION; INTRACAUDAL; PERINEURAL at 07:42

## 2021-01-19 RX ADMIN — CEFAZOLIN SODIUM 2000 MG: 2 SOLUTION INTRAVENOUS at 17:44

## 2021-01-19 RX ADMIN — ATORVASTATIN CALCIUM 5 MG: 10 TABLET, FILM COATED ORAL at 18:54

## 2021-01-19 RX ADMIN — EPHEDRINE SULFATE 10 MG: 50 INJECTION, SOLUTION INTRAVENOUS at 08:17

## 2021-01-19 RX ADMIN — CEFAZOLIN SODIUM 2000 MG: 2 SOLUTION INTRAVENOUS at 07:38

## 2021-01-19 RX ADMIN — MIDAZOLAM 2 MG: 1 INJECTION INTRAMUSCULAR; INTRAVENOUS at 07:34

## 2021-01-19 RX ADMIN — SUGAMMADEX 200 MG: 100 INJECTION, SOLUTION INTRAVENOUS at 09:06

## 2021-01-19 RX ADMIN — ONDANSETRON 4 MG: 2 INJECTION INTRAMUSCULAR; INTRAVENOUS at 08:24

## 2021-01-19 RX ADMIN — SODIUM CHLORIDE: 0.9 INJECTION, SOLUTION INTRAVENOUS at 08:40

## 2021-01-19 RX ADMIN — LEVETIRACETAM 1000 MG: 500 TABLET, FILM COATED ORAL at 20:04

## 2021-01-19 RX ADMIN — CHLORHEXIDINE GLUCONATE 0.12% ORAL RINSE 15 ML: 1.2 LIQUID ORAL at 06:50

## 2021-01-19 RX ADMIN — DEXAMETHASONE SODIUM PHOSPHATE 10 MG: 10 INJECTION, SOLUTION INTRAMUSCULAR; INTRAVENOUS at 08:21

## 2021-01-19 RX ADMIN — SODIUM CHLORIDE: 0.9 INJECTION, SOLUTION INTRAVENOUS at 07:14

## 2021-01-19 RX ADMIN — SODIUM CHLORIDE 100 ML/HR: 0.9 INJECTION, SOLUTION INTRAVENOUS at 13:30

## 2021-01-19 RX ADMIN — PROPOFOL 200 MG: 10 INJECTION, EMULSION INTRAVENOUS at 07:42

## 2021-01-19 RX ADMIN — FENTANYL CITRATE 50 MCG: 50 INJECTION INTRAMUSCULAR; INTRAVENOUS at 07:42

## 2021-01-19 RX ADMIN — ALBUTEROL SULFATE 2 PUFF: 90 AEROSOL, METERED RESPIRATORY (INHALATION) at 09:32

## 2021-01-19 RX ADMIN — FENTANYL CITRATE 50 MCG: 50 INJECTION INTRAMUSCULAR; INTRAVENOUS at 07:59

## 2021-01-19 RX ADMIN — DOCUSATE SODIUM 100 MG: 100 CAPSULE, LIQUID FILLED ORAL at 17:43

## 2021-01-19 RX ADMIN — CEFAZOLIN SODIUM 2000 MG: 2 SOLUTION INTRAVENOUS at 23:51

## 2021-01-19 RX ADMIN — ROCURONIUM BROMIDE 10 MG: 50 INJECTION, SOLUTION INTRAVENOUS at 07:59

## 2021-01-19 NOTE — ANESTHESIA POSTPROCEDURE EVALUATION
Post-Op Assessment Note    CV Status:  Stable  Pain Score: 0    Pain management: adequate     Mental Status:  Alert and awake   Hydration Status:  Euvolemic   PONV Controlled:  Controlled   Airway Patency:  Patent      Post Op Vitals Reviewed: Yes      Staff: CRNA         No complications documented      /79 (01/19/21 1030)    Temp     Pulse 74 (01/19/21 1030)   Resp 13 (01/19/21 1030)    SpO2 95 % (01/19/21 1030)

## 2021-01-19 NOTE — OP NOTE
Operative Note     Pre-op Diagnosis:   Brain tumor ([address] ) [D49 6]    Post-op Dignosis:     Post-Op Diagnosis Codes:     * Brain tumor ([address] ) [D49 6]    Procedure:  Procedure(s):  BIOPSY BRAIN IMAGE-GUIDED NEEDLE - LEFT TEMPORAL    Surgeon: Surgeon(s) and Role:     * Horacio Rico MD - Primary      * Hunter Herring PA-C - Assistant    Anesthesia: General    Staff:   Circulator: Raj Rizo RN  Scrub Person: Afua Simmons CST  No qualified Resident was available  Estimated Blood Loss: Minimal    Specimens:                Order Name Source Comment Collection Info Order Time   82 Brittany DUNNEECK Arm, Left  Collected By: David Locke 1/19/2021  5:35 AM   TISSUE EXAM Brain  Collected By: Horacio Rico MD 1/19/2021  8:30 AM     Release to patient through Mychart   Immediate                   Findings:   As above  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, final dx deferred    Complications:  None    Anesthesia: General Endotracheal Anesthesia        DETAILS OF PROCEDURE    The patient was brought to the OR, and successfully induced with general endotracheal anesthesia, and cardiopulmonary monitoring placed as appropriate by anesthesia  The patient was positioned supine on the OR table, and their head placed in the Nichols head falcon  Preoperatively, the patient's stereotactic imaging study was uploaded to the Stealth Image guidance workstation  The workstation was used to identify the biopsy target, and thus design the desired entry point, and thus, the skin incision  The Stealth station was used to evaluate the proposed biopsy trajectory, and was made sure to avoid vessels, sulci, ventricular spaces, etc      The Stealth Navigation patient tracker was attached to the Nichols head falcon via a Vertek arm and the Stealth Navigation Unit brought to the operative field  The patient's Stealth preoperative imaging was registered to the patient's scalp   A successful registration with acceptable error was completed  The planned incision was marked and was located left temporal, just over the left pinna  The area was minimally clipped  The area was prepped and draped in standard fashion  The sterile Vertek arm was attached through the drapes to the Neil attachment, and aligned grossly with the intended target using the neuro navigation  A timeout was performed  The patient received antibiotics per protocol, as well as dexamethasone  The Vertek probe and Precision Aiming Device were used to finely tune the biopsy trajectory, achieving less than 1 0 mm error  The trajectory was locked, and the depth to target determined  At that point, the skin was infiltrated with 0 5% marcaine with epinephrine  Skin incision was made with a skin scalpel  Through the 2 6 mm Vertek adapter/reducer, the trauma drill using a 2 5 mm bit was then used to create a bur hole in the skull, also opening the dura  The 2 6 mm adapter/reducer was removed, and the 2 2 mm Vertek adapter/reducer was then used to hold the Stealth biopsy needle within the [employer] adapter  The Stealth biopsy needle had been preset at approximately 150 mm in total length, which took into account the Vertek adapter, distance to scalp and skull, and distance to target, which was 40 below the skin surface  The needle was advanced to the more superficial enhancing rim of the target in the closed confirmation, and then core biopsies were taken at 12:00, 3:00, 6:00, and 9:00 positions without difficulty  The first of these was sent as frozen pathology  It was reviewed with pathology by phone, as well as by telepathology  There was some increased cellular density here, but not definitively pathological tissue  Therefore, the needle was advance about 1 cm, into the core as well as the deeper rim enhancing aspect of the lesions, and 4 additional cores taken as above   The first was again sent for frozen pathology, and this was more definitively pathologic  There was a considerable density of what appeared to be glial tissue, but final diagnosis was deferred  The other 6 cores were sent as permanent pathology in formalin  The needle was withdrawn in the closed position, the reducer removed, the adapter removed, and the incision evaluated  Interrupted 3-0 Monocryl suture was used to close the incision  ExoFin was used to dress the incision  All instrument counts, sponge counts, and needle counts were correct prior to closure of the skin  The drapes were withdrawn, and the patient was taken out of the [place] head falcon, with no bleeding from the pin sites  The patient was awoken from general endotracheal anesthesia, extubated, and taken to the PACU in cardio vascularly stable condition  No qualified resident was available  Fish Fajardo PA-C, was present for the entire procedure, and provided essential assistance with with proper exposure, retraction, hemostasis, and wound closure, which was necessary secondary to the complex nature of this case       Tasneem Krueger MD

## 2021-01-19 NOTE — PROGRESS NOTES
Progress Note - Neurosurgery   Stephany Doyle 60 y o  male MRN: [id number]  Unit/Bed#: OR POOL Encounter: [id number]    Assessment/Plan:    · POD #0 from Procedure(s):  · BIOPSY BRAIN IMAGE-GUIDED NEEDLE - LEFT TEMPORAL  · Small ICH at biopsy site, appears asymptomatic  · OK for SD2, with q2h neurochecks  · Will recheck CTh in AM, or with any neuro change, prior to d/c      Subjective:     "I'm doing fine " Denies W/N/T or speech issues    Objective:     Vitals: Blood pressure 129/79, pulse 82, temperature (!) 97 °F (36 1 °C), resp  rate 18, height 6' 3" (1 905 m), weight 120 kg (265 lb), SpO2 94 %  ,Body mass index is 33 12 kg/m²      AA, FC  NAD  Fluent  Appropriate   FS  QUEZADA  CDI

## 2021-01-19 NOTE — ANESTHESIA PREPROCEDURE EVALUATION
Procedure:  BIOPSY BRAIN IMAGE-GUIDED NEEDLE - LEFT TEMPORAL (Left Head)    Relevant Problems   CARDIO   (+) Essential hypertension   (+) Hypercholesterolemia      PULMONARY   (+) Obstructive sleep apnea syndrome      Other   (+) Brain tumor (HCC)   (+) Class 1 obesity due to excess calories with serious comorbidity and body mass index (BMI) of 32 0 to 32 9 in adult   (+) Seizure-like activity (HCC)         LEFT VENTRICLE: Size was normal  Systolic function was normal  Ejection fraction was estimated to be 60 %  There were no regional wall motion abnormalities  Wall thickness was mildly increased  No evidence of apical thrombus  DOPPLER: Left  ventricular diastolic function parameters were normal      RIGHT VENTRICLE: The size was normal  Systolic function was normal  Wall thickness was normal      LEFT ATRIUM: The atrium was mildly to moderately dilated      RIGHT ATRIUM: Size was normal      MITRAL VALVE: Valve structure was normal  There was normal leaflet separation  DOPPLER: The transmitral velocity was within the normal range  There was no evidence for stenosis  There was no significant regurgitation      AORTIC VALVE: The valve was trileaflet  Leaflets exhibited normal thickness and normal cuspal separation  DOPPLER: Transaortic velocity was within the normal range  There was no evidence for stenosis  There was trace regurgitation      TRICUSPID VALVE: The valve structure was normal  There was normal leaflet separation  DOPPLER: The transtricuspid velocity was within the normal range  There was no evidence for stenosis  There was trace regurgitation  Pulmonary artery  systolic pressure was within the normal range      PULMONIC VALVE: Leaflets exhibited normal thickness, no calcification, and normal cuspal separation  DOPPLER: The transpulmonic velocity was within the normal range  There was trace regurgitation      PERICARDIUM: There was no pericardial effusion   The pericardium was normal in appearance      AORTA: The root exhibited mild dilatation  4 18 cm There was mild dilatation of the ascending aorta  4 05 cm     SYSTEMIC VEINS: IVC: The inferior vena cava was normal in size  Physical Exam    Airway    Mallampati score: II  TM Distance: >3 FB  Neck ROM: full     Dental   No notable dental hx     Cardiovascular  Cardiovascular exam normal    Pulmonary  Pulmonary exam normal Breath sounds clear to auscultation,     Other Findings      Sinus rhythm with 1st degree A-V block  Septal infarct , age undetermined  Abnormal ECG  No previous ECGs available  Confirmed by Hasmukh Curran ([id number]) on 1/4/2021 7:50:40 AM  Anesthesia Plan  ASA Score- 3     Anesthesia Type- general with ASA Monitors  Additional Monitors:   Airway Plan:           Plan Factors-Exercise tolerance (METS): >4 METS  Chart reviewed  EKG reviewed  Imaging results reviewed  Existing labs reviewed  Patient is not a current smoker  Patient instructed to abstain from smoking on day of procedure  Patient did not smoke on day of surgery  Obstructive sleep apnea risk education given perioperatively  Induction- intravenous and rapid sequence induction  Postoperative Plan- Plan for postoperative opioid use  Planned trial extubation    Informed Consent- Anesthetic plan and risks discussed with patient  I personally reviewed this patient with the CRNA  Discussed and agreed on the Anesthesia Plan with the CRNA             Lab Results   Component Value Date    WBC 7 37 01/02/2021    HGB 15 4 01/02/2021    HCT 47 3 01/02/2021    MCV 89 01/02/2021     01/04/2021     Lab Results   Component Value Date    CALCIUM 9 0 01/02/2021    K 4 3 01/02/2021    CO2 30 01/02/2021     01/02/2021    BUN 10 01/02/2021    CREATININE 0 81 01/02/2021     Lab Results   Component Value Date    INR 0 94 01/04/2021    INR 0 89 01/01/2021    PROTIME 12 6 01/04/2021    PROTIME 12 0 01/01/2021     Lab Results   Component Value Date    PTT 29 01/04/2021     Type and Screen:  O        Discussed with pt the benefits/alternatives and risks or General Anesthesia including breathing tube remaining in place if not strong enough, PONV, damage to lips and teeth, sore throat, eye injury or blindness    I, Dr Francy Foster, the attending physician, have personally seen and evaluated the patient prior to anesthetic care  I have reviewed the pre-anesthetic record, and other medical records if appropriate to the anesthetic care  If a CRNA is involved in the case, I have reviewed the CRNA assessment, if present, and agree  The patient is in a suitable condition to proceed with my formulated anesthetic plan

## 2021-01-20 ENCOUNTER — APPOINTMENT (INPATIENT)
Dept: RADIOLOGY | Facility: HOSPITAL | Age: 66
DRG: 026 | End: 2021-01-20
Payer: COMMERCIAL

## 2021-01-20 ENCOUNTER — TRANSITIONAL CARE MANAGEMENT (OUTPATIENT)
Dept: FAMILY MEDICINE CLINIC | Facility: CLINIC | Age: 66
End: 2021-01-20

## 2021-01-20 VITALS
WEIGHT: 263.67 LBS | DIASTOLIC BLOOD PRESSURE: 76 MMHG | BODY MASS INDEX: 32.78 KG/M2 | OXYGEN SATURATION: 93 % | TEMPERATURE: 97.8 F | HEIGHT: 75 IN | HEART RATE: 72 BPM | SYSTOLIC BLOOD PRESSURE: 124 MMHG | RESPIRATION RATE: 16 BRPM

## 2021-01-20 LAB
ANION GAP SERPL CALCULATED.3IONS-SCNC: 5 MMOL/L (ref 4–13)
APTT PPP: 24 SECONDS (ref 23–37)
BUN SERPL-MCNC: 16 MG/DL (ref 5–25)
CALCIUM SERPL-MCNC: 9 MG/DL (ref 8.3–10.1)
CHLORIDE SERPL-SCNC: 108 MMOL/L (ref 100–108)
CO2 SERPL-SCNC: 27 MMOL/L (ref 21–32)
CREAT SERPL-MCNC: 0.76 MG/DL (ref 0.6–1.3)
ERYTHROCYTE [DISTWIDTH] IN BLOOD BY AUTOMATED COUNT: 14.4 % (ref 11.6–15.1)
GFR SERPL CREATININE-BSD FRML MDRD: 96 ML/MIN/1.73SQ M
GLUCOSE SERPL-MCNC: 129 MG/DL (ref 65–140)
HCT VFR BLD AUTO: 49.4 % (ref 36.5–49.3)
HGB BLD-MCNC: 16.2 G/DL (ref 12–17)
INR PPP: 1.01 (ref 0.84–1.19)
MCH RBC QN AUTO: 29.5 PG (ref 26.8–34.3)
MCHC RBC AUTO-ENTMCNC: 32.8 G/DL (ref 31.4–37.4)
MCV RBC AUTO: 90 FL (ref 82–98)
PLATELET # BLD AUTO: 210 THOUSANDS/UL (ref 149–390)
PMV BLD AUTO: 10.3 FL (ref 8.9–12.7)
POTASSIUM SERPL-SCNC: 4 MMOL/L (ref 3.5–5.3)
PROTHROMBIN TIME: 13.3 SECONDS (ref 11.6–14.5)
RBC # BLD AUTO: 5.49 MILLION/UL (ref 3.88–5.62)
SODIUM SERPL-SCNC: 140 MMOL/L (ref 136–145)
WBC # BLD AUTO: 14.19 THOUSAND/UL (ref 4.31–10.16)

## 2021-01-20 PROCEDURE — 70450 CT HEAD/BRAIN W/O DYE: CPT

## 2021-01-20 PROCEDURE — 80048 BASIC METABOLIC PNL TOTAL CA: CPT | Performed by: PHYSICIAN ASSISTANT

## 2021-01-20 PROCEDURE — 97165 OT EVAL LOW COMPLEX 30 MIN: CPT

## 2021-01-20 PROCEDURE — 85027 COMPLETE CBC AUTOMATED: CPT | Performed by: PHYSICIAN ASSISTANT

## 2021-01-20 PROCEDURE — G1004 CDSM NDSC: HCPCS

## 2021-01-20 PROCEDURE — 85610 PROTHROMBIN TIME: CPT | Performed by: PHYSICIAN ASSISTANT

## 2021-01-20 PROCEDURE — 99024 POSTOP FOLLOW-UP VISIT: CPT | Performed by: NURSE PRACTITIONER

## 2021-01-20 PROCEDURE — 97162 PT EVAL MOD COMPLEX 30 MIN: CPT

## 2021-01-20 PROCEDURE — 85730 THROMBOPLASTIN TIME PARTIAL: CPT | Performed by: PHYSICIAN ASSISTANT

## 2021-01-20 RX ORDER — DOCUSATE SODIUM 100 MG/1
100 CAPSULE, LIQUID FILLED ORAL 2 TIMES DAILY
Refills: 0
Start: 2021-01-20 | End: 2021-02-03

## 2021-01-20 RX ADMIN — LOSARTAN POTASSIUM 100 MG: 50 TABLET, FILM COATED ORAL at 08:11

## 2021-01-20 RX ADMIN — HYDROCHLOROTHIAZIDE 12.5 MG: 12.5 TABLET ORAL at 08:11

## 2021-01-20 RX ADMIN — PANTOPRAZOLE SODIUM 40 MG: 40 TABLET, DELAYED RELEASE ORAL at 05:49

## 2021-01-20 RX ADMIN — LEVETIRACETAM 1000 MG: 500 TABLET, FILM COATED ORAL at 08:11

## 2021-01-20 RX ADMIN — AMLODIPINE BESYLATE 10 MG: 10 TABLET ORAL at 08:11

## 2021-01-20 RX ADMIN — SODIUM CHLORIDE 100 ML/HR: 0.9 INJECTION, SOLUTION INTRAVENOUS at 01:45

## 2021-01-20 NOTE — PHYSICAL THERAPY NOTE
PHYSICAL THERAPY EVALUATION  NAME:  Tracy Duran  DATE: 01/20/21    AGE:   60 y o    Mrn:   [id number]  ADMIT DX:  Brain tumor ([address] ) [D49 6]    Past Medical History:   Diagnosis Date    Bruxism (teeth grinding)     Hypertension     Obesity     Sleep apnea     CPAP nightly       Past Surgical History:   Procedure Laterality Date    FL LUMBAR PUNCTURE DIAGNOSTIC  1/4/2021    HAND FRACTURE REPAIR      left thumb    IN STEREO BX/ASPIR/EXCIS,INTRACRANIAL LESN Left 1/19/2021    Procedure: BIOPSY BRAIN IMAGE-GUIDED NEEDLE - LEFT TEMPORAL;  Surgeon: Luciana Emmanuel MD;  [facility];  Service: Neurosurgery    TONSILLECTOMY         Length Of Stay: 1    PHYSICAL THERAPY EVALUATION:        01/20/21 1004   Note Type   Note type Evaluation   Pain Assessment   Pain Assessment Tool Pain Assessment not indicated - pt denies pain   Home Living   Type of [address] One level;Stairs to enter with rails  (4 GRACIE )   Home Equipment Cane   Additional Comments Patient reports living with his elderly mother and reports he has a neighbor who is able to assist as well   Prior Function   Level of [place] Independent with ADLs and functional mobility   Lives With Family  (mother )   Receives Help From Family;Friend(s)   ADL Assistance Independent   Falls in the last 6 months 0   Comments Patient denies use of assistive device for ambulation prior to admission   Restrictions/Precautions   Weight Bearing Precautions Per Order No   Other Precautions Multiple lines   General   Family/Caregiver Present No   Cognition   Overall Cognitive Status WFL   Arousal/Participation Alert   Orientation Level Oriented X4   Memory Within functional limits   Following Commands Follows all commands and directions without difficulty   RUE Assessment   RUE Assessment WFL   LUE Assessment   LUE Assessment WFL   RLE Assessment   RLE Assessment WFL   LLE Assessment   LLE Assessment WFL   Bed Mobility   Additional Comments NA, patient seated edge of bed at time of PT eval   Transfers   Sit to Stand 6  Modified independent   Stand to Sit 6  Modified independent   Ambulation/Elevation   Gait pattern WNL   Gait Assistance 6  Modified independent   Assistive Device None   Distance 300ft    Stair Management Assistance 6  Modified independent   Stair Management Technique No rails   Number of Stairs 3   Balance   Static Sitting Good   Static Standing Good   Ambulatory Fair +   Endurance Deficit   Endurance Deficit No   Activity Tolerance   Activity Tolerance Patient tolerated treatment well   Medical Staff Made Aware SANDY Garcia; Terry Odom OT student    Nurse Made Aware Patient appropriate to be seen and mobilized per nursing   Assessment   Prognosis Good   Problem List Decreased mobility   Assessment Pt is 60 y o  male seen for PT evaluation s/p admit to [facility] on 1/19/2021  Two pt identifiers were used to confirm  Pt presented for scheduled BIOPSY BRAIN IMAGE-GUIDED NEEDLE - LEFT TEMPORAL which was performed on 01/19/2021  Pt was admitted with a primary dx of:  Brain tumor s/p BIOPSY BRAIN IMAGE-GUIDED NEEDLE - LEFT TEMPORAL  PT now consulted for assessment of mobility and d/c needs  Pts current co morbidities affecting treatment include:  HTN, obesity, sleep apnea  Pts current clinical presentation is Evolving (medium complexity) due to Ongoing medical management for primary dx, Continuous pulse oximetry monitoring , s/p surgical intervention    Prior to admission, pt was independent with ambulation without the use of assistive device as per patient  Upon evaluation, pt currently is requiring Mod I for bed mobility; Mod I for transfers and Mod I for ambulation w/ no AD  Pt presents at PT eval functioning at baseline level mobility  At conclusion of PT session pt returned BTB with phone and call bell within reach  Pt denies any further questions at this time  PT is currently recommending Home with family support    Pt agreeable to plan and goals as stated on evaluation  D/C acute care PT at this time due to pt being near baseline in terms of functional mobility  Pt denies any mobility or safety concerns about returning home at d/c  Recommend pt continues to mobilize during hospital stay  Barriers to Discharge None   Barriers to Discharge Comments Patient denies any mobility or safety concerns about returning home at time of discharge   Goals   Patient Goals " to go home"   Plan   PT Frequency   (D/C PT )   Recommendation   PT Discharge Recommendation Return to previous environment with no needs   Equipment Recommended   (None at this time)   PT - OK to Discharge Yes  (When medically cleared)   Modified [place] Scale   Modified [place] Scale 1   Barthel Index   Feeding 10   Bathing 5   Grooming Score 5   Dressing Score 10   Bladder Score 10   Bowels Score 10   Toilet Use Score 10   Transfers (Bed/Chair) Score 15   Mobility (Level Surface) Score 15   Stairs Score 10   Barthel Index Score 100   Portions of the documentation may have been created using voice recognition software  Occasional wrong word or sound alike substitutions may have occurred due to the inherent limitations of the voice recognition software  Read the chart carefully and recognize, using context, where substitutions have occurred      Edgard Villela, PT, DPT

## 2021-01-20 NOTE — ASSESSMENT & PLAN NOTE
POD 1 Imagine guided left temporal brain biopsy  · Preliminary pathology results Glial tissue with mild increased cellularity and scattered atypical nuclei  While may represent glial neoplasm, cannot entirely exclude lymphoma  Final pathology pending  · Patient originally presented to the ED as a stroke alert on 1/1/21 with expressive aphasia  · MRI demonstrated a 1 5 cm rim enhancing lesion in the left temporal lobe with minimal edema and minimal mass effect  CT chest abdomen and pelvis was negative for obvious primary source  LP was also performed with slightly increased protein but no definitive lymphoma, malignancy,etcetera  MRI abdomen demonstrated 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 and no primary source  Imaging:    CT head wo 01/20/2021:Anticipated postoperative changes, consistent with recent left temporal lobe biopsy  No acute intracranial abnormality  Plan:  · Continue neurological exam  · STAT CT head with any neurological decline including drop GCS of 2pts within 1 hr  · Reviewed imaging with patient  · Continue home [address]  · Continue to hold aspirin at least until 2 week follow-up  · Patient tolerating regular diet and voiding without difficulty, IVFs DC'd  · Patient denies any headaches  · No driving until cleared from neurosurgical standpoint  · Mobilize patient as tolerated  · PT/OT recommending home with no needs  · DVT PPX: SCDs    Neurosurgery will continue to follow as primary, patient is medically stable for discharge home today, call with any questions or concerns  Patient will follow-up in 2 weeks to review pathology with Dr Ndiaye

## 2021-01-20 NOTE — OCCUPATIONAL THERAPY NOTE
Occupational Therapy Evaluation     Patient Name: Opal Santo  KWNCW'B Date: 1/20/2021  Problem List  Principal Problem:    Brain tumor [facility])  Active Problems:    Class 1 obesity due to excess calories with serious comorbidity and body mass index (BMI) of 32 0 to 32 9 in adult    Hypercholesterolemia    Essential hypertension    Obstructive sleep apnea syndrome    Seizure-like activity [facility])    Past Medical History  Past Medical History:   Diagnosis Date    Bruxism (teeth grinding)     Hypertension     Obesity     Sleep apnea     CPAP nightly     Past Surgical History  Past Surgical History:   Procedure Laterality Date    FL LUMBAR PUNCTURE DIAGNOSTIC  1/4/2021    HAND FRACTURE REPAIR      left thumb    RI STEREO BX/ASPIR/EXCIS,INTRACRANIAL LESN Left 1/19/2021    Procedure: BIOPSY BRAIN IMAGE-GUIDED NEEDLE - LEFT TEMPORAL;  Surgeon: Tasneem Krueger MD;  [facility];  Service: Neurosurgery    TONSILLECTOMY               01/20/21 1003   OT Last Visit   OT Visit Date 01/20/21   Note Type   Note type Evaluation   Restrictions/Precautions   Weight Bearing Precautions Per Order No   Pain Assessment   Pain Assessment Tool 0-10   Pain Score No Pain   Home Living   Type of [address] [place] [address] One level;Stairs to enter with rails  (4 STEPS TO ENTER HOME)   Humberto   Additional Comments PT REPORTS NOT USING CANE AT [address] [place] [address]  Prior Function   Level of [place] Independent with ADLs and functional mobility   Lives With [employer] Help From Family; Neighbor   ADL Assistance Independent   IADLs Independent   Falls in the last 6 months 0   Vocational Retired   Lifestyle   [employer] PT [address] [place] [address] ADLS/IADLS/DRIVING AT [address]  Reciprocal Relationships PT LIVES WITH HIS MOTHER, WHO CAN PROVIDE LIMITED AMOUNT OF ASSISTANCE  IN ADDITION NEIGHBOR CAN PROVIDE SUPPORT AS NEEDED     Service to Others PT RETIRED BUT PREVIOUSLY WORKED AS A   Intrinsic Gratification PT ENJOYS OUTDOOR ACTIVITIES INCLUDING HIKING, WALKING, AND CHOPPING WOOD  Psychosocial   Psychosocial (WDL) WDL   Subjective   Subjective "I'M GOOD TO GO HOME "   ADL   Eating Assistance 7  Independent   Grooming Assistance 7  Independent   UB Bathing Assistance 7  Independent   LB Bathing Assistance 7  Independent   UB Dressing Assistance 7  Independent   LB Dressing Assistance 7  [address] SEATED ON THE EOB UPON ARRIVAL  Transfers   Sit to Stand 6  Modified independent   Stand to Sit 6  Modified independent   Functional Mobility   Functional Mobility 6  Modified independent   Balance   Static Sitting Good   Static Standing Good   Ambulatory Fair +   Activity Tolerance   Activity Tolerance Patient tolerated treatment well   Medical Staff Made Aware Vu Mcneil, PT   Nurse Made Aware RN REPORTED PT APPROPRIATE TO SEE   RUE Assessment   RUE Assessment WFL   LUE Assessment   LUE Assessment WFL   Cognition   Overall Cognitive Status WFL   Arousal/Participation Alert; Cooperative   Attention Within functional limits   Orientation Level Oriented X4   Memory Within functional limits   Following Commands Follows all commands and directions without difficulty   Assessment   Assessment PT 64 YO MALE SEEN FOR OT EVALUATION  PT ADMITTED TO [facility] ON 1/19/2021 FOR BRAIN TUMOR  PT HAS NO CURRENT RESTRICTIONS  PT HAS A HISTORY OF HYPERTENSION, OBESITY, AND SLEEP APNEA  PT REPORTED BEING INDEPENDENT IN ADLS/IADLS/DRIVING PRIOR TO ADMISSION  PT LIVES IN A 1 FLOOR HOUSE WITH HIS ELDERLY MOTHER  CURRENTLY, PT IS INDEPENDENT IN ADLS AND IADLS  PT IS MOD I FOR FUNCTIONAL TRANSFERS AND FUNCTIONAL MOBILITY  PT RECEIVED EDUCATION ON ENERGY CONSERVATION AND DEEP BREATHING TECHNIQUES  ALL QUESTIONS AND CONCERNS ADDRESSED   FROM OT PERSPECTIVE, PT CAN BE D/C TO HOME ENVIRONMENT WITH INCREASED FAMILY SUPPORT  PT AGREED WITH DISCUSSED D/C PLAN  RECOMMEND NO FURTHER OT SERVICES NEEDED DURING HOSPITAL STAY  Goals   Patient Goals  [address]   Recommendation   OT Discharge Recommendation Return to previous environment with no needs   OT - OK to Discharge Yes   Additional Comments  PT PLEASANT AND COOPERATIVE  PT HAS NO FURTHER NEED FOR CONT OT UPON D/C     Modified [place] Scale   Modified [place] Scale 1     Documentation completed by Carlton Greenberg, COLBY, OTR/L and Wilton Mares, OTS

## 2021-01-20 NOTE — DISCHARGE SUMMARY
Discharge- Willis Chávez 1955, 60 y o  male MRN: [id number]    Unit/Bed#: [facility] 621-01 Encounter: [id number]    Primary Care Provider: Mindy Monzon DO   Date and time admitted to hospital: 1/19/2021  5:17 AM        * Brain tumor [facility])  Assessment & Plan  POD 1 Imagine guided left temporal brain biopsy  · Preliminary pathology results Glial tissue with mild increased cellularity and scattered atypical nuclei  While may represent glial neoplasm, cannot entirely exclude lymphoma  Final pathology pending  · Patient originally presented to the ED as a stroke alert on 1/1/21 with expressive aphasia  · MRI demonstrated a 1 5 cm rim enhancing lesion in the left temporal lobe with minimal edema and minimal mass effect  CT chest abdomen and pelvis was negative for obvious primary source  LP was also performed with slightly increased protein but no definitive lymphoma, malignancy,etcetera  MRI abdomen demonstrated 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 and no primary source  Imaging:    CT head wo 01/20/2021:Anticipated postoperative changes, consistent with recent left temporal lobe biopsy  No acute intracranial abnormality  Plan:  · Continue neurological exam  · STAT CT head with any neurological decline including drop GCS of 2pts within 1 hr  · Reviewed imaging with patient  · Continue home [address]  · Continue to hold aspirin at least until 2 week follow-up  · Patient tolerating regular diet and voiding without difficulty, IVFs DC'd  · Patient denies any headaches  · No driving until cleared from neurosurgical standpoint  · Mobilize patient as tolerated  · PT/OT recommending home with no needs  · DVT PPX: SCDs    Neurosurgery will continue to follow as primary, patient is medically stable for discharge home today, call with any questions or concerns  Patient will follow-up in 2 weeks to review pathology with Dr Ndiaye        Seizure-like activity [facility])  Assessment & Plan  Continue home [facility] hypertension  Assessment & Plan  Continue home medications        Resolved Problems  Date Reviewed: 1/19/2021    None        Subjective/objective:    Subjective:  Patient currently has no complaints  He denies any headaches, dizziness, blurry vision, chest pain, shortness of breath, abdominal pain, nausea, vomiting, diarrhea, no problems with bowel or bladder, no new weakness or numbness/tingling  Patient states he had a BM today  Objective:  Patient comfortably lying in bed, NAD  Exam:    General appearance: alert, appears stated age, cooperative and no distress  Head: Normocephalic, left temporal incision clean, dry, intact with suture in place, no erythema or drainage noted  Some mild swelling and tenderness noted  Eyes: EOMI, PERRL, conjugate gaze  Lungs: non labored breathing  Heart: regular heart rate  Neurologic:   Mental status: Alert, oriented x3, thought content appropriate, speech is clear, following commands  Cranial nerves: grossly intact (Cranial nerves II-XII)  Sensory: normal to LT in all extremities x4, JPS and DST intact  Motor: moving all extremities without focal weakness, strength 5/5 throughout  Reflexes: 2+ and symmetric, no Rodrigues's or clonus appreciated  Coordination: finger to nose normal bilaterally, no drift bilaterally      Discharge Date: 1/20/21    Admitting Diagnosis: 1  Brain tumor  2  Seizure-like activity  3  Essential hypertension  4  S/p image guided left temporal brain biopsy    Discharge Diagnosis: 1  Brain tumor  2  Seizure-like activity  3  Essential hypertension  4  S/p image guided left temporal brain biopsy    Attending: Dr Matty Mandujano Physician(s): N/a    Procedures Performed:  Status post image guided left temporal brain biopsy on 1/19/21  Pathology:  Preliminary Glial tissue with mild increased cellularity and scattered atypical nuclei  While may represent glial neoplasm, cannot entirely exclude lymphoma    Final pathology pending     Hospital Course: Ben Daniels is a 59 y/o male who presented to the outpatient office for further evaluation of incidentally found meningioma  Patient was a stroke alert in the ED on 01/01/2021 with expressive aphasia  MRI brain at that time demonstrated a 1 5 cm rim enhancing lesion in the left temporal lobe with minimal edema and minimal mass effect  CT chest abdomen and pelvis was negative for obvious primary source  LP was also performed with slightly increased protein but no definitive lymphoma, malignancy,ectetera  MRI abdomen demonstrated 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 and no primary source  Patient underwent a image guided left temporal brain biopsy under general anesthesia with minimal blood loss and no complications  Patient was kept in the PACU until stable and then moved to the floor  Patient received CT head postoperatively which revealed anticipated postoperative changes, consistent with recent left temporal lobe biopsy  No acute intracranial abnormalities  PT and OT were consulted and recommend home with no needs  Patient was cleared medically for discharge  Prior to discharge, postoperative instructions were discussed with patient  During that time, all questions and concerns were addressed  Patient will follow up outpatient in 2 weeks for incision check and final pathology with Dr Darrow Kayser  Condition at Discharge: good     Discharge instructions/Information to patient and family:   See after visit summary for information provided to patient and family  Provisions for Follow-Up Care:  See after visit summary for information related to follow-up care and any pertinent home health orders  Disposition: Home        Planned Readmission: No    Discharge Statement   I spent 25 minutes discharging the patient  This time was spent on the day of discharge  I had direct contact with the patient on the day of discharge   Additional documentation is required if more than 30 minutes were spent on discharge  Discharge Medications:  See after visit summary for reconciled discharge medications provided to patient and family

## 2021-01-20 NOTE — CASE MANAGEMENT
Pt is a less than 30 day readmission  Not a bundle  Met with pt & explained CM role  Pt lives with mother in a ranch home with 5 myron  Pt was IPTA,NO dme  NO h/o vna or rhb  Denies any MH,D& A tx  Uses [facility] in [facility]  Emergency contact, Diana Leung [phone]  Will have ride home  CM reviewed d/c planning process including the following: identifying help at home, patient preference for d/c planning needs, Discharge Lounge, [facility] Meds to Bed program, availability of treatment team to discuss questions or concerns patient and/or family may have regarding understanding medications and recognizing signs and symptoms once discharged  CM also encouraged patient to follow up with all recommended appointments after discharge  Patient advised of importance for patient and family to participate in managing patients medical well being

## 2021-01-20 NOTE — PLAN OF CARE
Problem: PAIN - ADULT  Goal: Verbalizes/displays adequate comfort level or baseline comfort level  Description: Interventions:  - Encourage patient to monitor pain and request assistance  - Assess pain using appropriate pain scale  - Administer analgesics based on type and severity of pain and evaluate response  - Implement non-pharmacological measures as appropriate and evaluate response  - Consider cultural and social influences on pain and pain management  - Notify physician/advanced practitioner if interventions unsuccessful or patient reports new pain  Outcome: Progressing     Problem: INFECTION - ADULT  Goal: Absence or prevention of progression during hospitalization  Description: INTERVENTIONS:  - Assess and monitor for signs and symptoms of infection  - Monitor lab/diagnostic results  - Monitor all insertion sites, i e  indwelling lines, tubes, and drains  - Monitor endotracheal if appropriate and nasal secretions for changes in amount and color  - [place] appropriate cooling/warming therapies per order  - Administer medications as ordered  - Instruct and encourage patient and family to use good hand hygiene technique  - Identify and instruct in appropriate isolation precautions for identified infection/condition  Outcome: Progressing     Problem: SAFETY ADULT  Goal: Patient will remain free of falls  Description: INTERVENTIONS:  - Assess patient frequently for physical needs  -  Identify cognitive and physical deficits and behaviors that affect risk of falls    -  [place] fall precautions as indicated by assessment   - Educate patient/family on patient safety including physical limitations  - Instruct patient to call for assistance with activity based on assessment  - Modify environment to reduce risk of injury  - Consider OT/PT consult to assist with strengthening/mobility  Outcome: Progressing  Goal: Maintain or return to baseline ADL function  Description: INTERVENTIONS:  -  Assess patient's ability to carry out ADLs; assess patient's baseline for ADL function and identify physical deficits which impact ability to perform ADLs (bathing, care of mouth/teeth, toileting, grooming, dressing, etc )  - Assess/evaluate cause of self-care deficits   - Assess range of motion  - Assess patient's mobility; develop plan if impaired  - Assess patient's need for assistive devices and provide as appropriate  - Encourage maximum independence but intervene and supervise when necessary  - Involve family in performance of ADLs  - Assess for home care needs following discharge   - Consider OT consult to assist with ADL evaluation and planning for discharge  - Provide patient education as appropriate  Outcome: Progressing  Goal: Maintain or return mobility status to optimal level  Description: INTERVENTIONS:  - Assess patient's baseline mobility status (ambulation, transfers, stairs, etc )    - Identify cognitive and physical deficits and behaviors that affect mobility  - Identify mobility aids required to assist with transfers and/or ambulation (gait belt, sit-to-stand, lift, walker, cane, etc )  - [place] fall precautions as indicated by assessment  - Record patient progress and toleration of activity level on Mobility SBAR; progress patient to next Phase/Stage  - Instruct patient to call for assistance with activity based on assessment  - Consider rehabilitation consult to assist with strengthening/weightbearing, etc   Outcome: Progressing     Problem: DISCHARGE PLANNING  Goal: Discharge to home or other facility with appropriate resources  Description: INTERVENTIONS:  - Identify barriers to discharge w/patient and caregiver  - Arrange for needed discharge resources and transportation as appropriate  - Identify discharge learning needs (meds, wound care, etc )  - Arrange for interpretive services to assist at discharge as needed  - Refer to Case Management Department for coordinating discharge planning if the patient needs post-hospital services based on physician/advanced practitioner order or complex needs related to functional status, cognitive ability, or social support system  Outcome: Progressing     Problem: Knowledge Deficit  Goal: Patient/family/caregiver demonstrates understanding of disease process, treatment plan, medications, and discharge instructions  Description: Complete learning assessment and assess knowledge base    Interventions:  - Provide teaching at level of understanding  - Provide teaching via preferred learning methods  Outcome: Progressing

## 2021-01-20 NOTE — UTILIZATION REVIEW
Initial Clinical Review    Elective Inpatient surgical procedure  Age/Sex: 60 y o  male  Surgery Date: 1/19  Procedure: S/P BIOPSY BRAIN IMAGE-GUIDED NEEDLE - LEFT TEMPORAL  Anesthesia: General    POD#1 Progress Note:   Admission Orders: Date/Time/Statement:   Admission Orders (From admission, onward)     Ordered        01/19/21 0931  INPATIENT ADMISSION  Once                   Orders Placed This Encounter   Procedures    INPATIENT ADMISSION     Standing Status:   Standing     Number of Occurrences:   1     Order Specific Question:   Level of Care     Answer:   Level 2 Stepdown / HOT [14]     Order Specific Question:   Estimated length of stay     Answer:   Inpatient Only Surgery     Vital Signs: /76   Pulse 72   Temp 97 8 °F (36 6 °C)   Resp 16   Ht 6' 3" (1 905 m)   Wt 120 kg (263 lb 10 7 oz)   SpO2 93%   BMI 32 96 kg/m²      Diet: Cardiac diet  Mobility: OOB  DVT Prophylaxis: SCD    Medications/Pain Control:   Scheduled Medications:  amLODIPine, 10 mg, Oral, Daily  atorvastatin, 5 mg, Oral, Daily With Dinner  docusate sodium, 100 mg, Oral, BID  hydrochlorothiazide, 12 5 mg, Oral, Daily  levETIRAcetam, 1,000 mg, Oral, Q12H ERICA  losartan, 100 mg, Oral, Daily  pantoprazole, 40 mg, Oral, Early Morning  senna, 1 tablet, Oral, Daily      Continuous IV Infusions:  lactated ringers, 50 mL/hr, Intravenous, Continuous  sodium chloride, 125 mL/hr, Intravenous, Continuous  sodium chloride, 100 mL/hr, Intravenous, Continuous      PRN Meds: None     Network Utilization Review Department  ATTENTION: Please call with any questions or concerns to [phone] and carefully listen to the prompts so that you are directed to the right person  All voicemails are confidential   Nathan Guzmán all requests for admission clinical reviews, approved or denied determinations and any other requests to dedicated fax number below belonging to the campus where the patient is receiving treatment   List of dedicated fax numbers for the Facilities:  FACILITY NAME UR FAX NUMBER   ADMISSION DENIALS (Administrative/Medical Necessity) [phone]   [address] (Maternity/NICU/Pediatrics) 261 [facility],7Th Floor [facility] [address] [place] [address]  [phone]   Barrett Bliss 3667 (  Mckayla Araya "Thea" 103) [address] [place] [address] [phone]   [address] 951 [phone]

## 2021-01-20 NOTE — DISCHARGE INSTRUCTIONS
Discharge Instructions  Brain biopsy    Activity:  1  Do not lift, push or pull more than 10 pounds for 2 weeks  2  Avoid bending, lifting and twisting for 2 weeks  No running  No athletic activities until cleared  3  No driving for at least 2 weeks or until cleared by Neurosurgery  4  When able to shower, continue to use clean towel and washcloth for 2 weeks post-op  5  Do not use a hair dryer, and avoid hair products such as mousse, oils, and gels  Do not brush your hair away from the incision since this will put strain on the suture line  6  Do not dye or perm hair for 6 weeks or until cleared by physician  7  Continue to change bed linens and pajamas more frequently  Wear clean clothes daily  8  May walk as tolerated  Recommend 4 short walks daily  Surgical incision care:  1  Keep dressings in place for 3 days  After 3 days, incisions may be left open to air, but should remain clean  2  Keep incisions dry for 3 days  3  May shower after 3 days using a baby shampoo including head incision  Rinse off shampoo and pat dry  4  Avoid rubbing the incision but gently massage hair  5  Do not immerse the incisions in water for 6 weeks  6  Staples/suture will be removed at your 2 week postoperative visit  7  Do not apply any creams or ointments to the incision, unless otherwise instructed by [facility] Neurosurgical [facility]  8  Contact office if increasing redness, drainage, pain or swelling occurs around the incisions or if you develop a fever greater than 101F  9  Do not dye/perm hair or use any hair products until cleared by Neurosurgery  Postoperative medication:  1  [facility] will provide pain medication in the postoperative period  All prescriptions must come from a single practice  a  Take medications as prescribed  Call office with any questions/concerns  b  May use over the counter Tylenol  No NSAIDs (ie  Ibuprofen, Aleve, Advil, Naproxen)  2   Please contact office for questions regarding dosage and modifications  3  No antiplatelet or anticoagulation medication (ie  Coumadin, Aspirin, Plavix) until cleared by [facility], unless otherwise instructed  Please contact [facility] if you have any questions about the effects of any of your medications on blood clotting  4  Do not operate heavy machinery or vehicles while taking sedating medications  5  Use a bowel regimen while on opioids as they induce constipation  Ie  Senokot-S, Miralax, Colace, etc  Increase fiber and water intake  Follow-up as scheduled for a 2 week post-operative visit for an incision check and final pathology  ** Please notify the office if incision becomes red, swollen, tender, or has increased drainage, and temp>101  Return to the ER if you experience increased headache, drowsiness, weakness, nausea/vomiting, or seizures  **

## 2021-01-21 ENCOUNTER — TELEMEDICINE (OUTPATIENT)
Dept: NEUROSURGERY | Facility: CLINIC | Age: 66
End: 2021-01-21

## 2021-01-21 ENCOUNTER — TELEPHONE (OUTPATIENT)
Dept: NEUROSURGERY | Facility: CLINIC | Age: 66
End: 2021-01-21

## 2021-01-21 DIAGNOSIS — Z98.890 STATUS POST SURGERY: Primary | ICD-10-CM

## 2021-01-21 PROCEDURE — 99024 POSTOP FOLLOW-UP VISIT: CPT

## 2021-01-21 NOTE — TELEPHONE ENCOUNTER
01/21/2021-PT DISCHARGED TO HOME  02/03/2021-2 WK POV W/DR WANG IN [place]      01/20/2021-(BHAVNA) PATIENT DISCHARGED HOME TODAY WILL FOLLOW UP IN 2 WEEKS FOR INCISION CHECK AND PATHOLOGY REVIEW WITH DR WANG

## 2021-01-21 NOTE — PROGRESS NOTES
Virtual Brief Visit    Assessment/Plan:    Problem List Items Addressed This Visit     None      Visit Diagnoses     Status post surgery    -  Primary                Reason for visit is   Chief Complaint   Patient presents with    Virtual Brief Visit        Encounter provider Layla Mata RN    Provider located at [address] [place] [id number]    Recent Visits  Date Type Provider Dept   01/15/21 Office Visit Carmen Carmichael DO [facility] [address] recent visits within past 7 days and meeting all other requirements     Today's Visits  Date Type Provider Dept   01/21/21 Telemedicine Layla Mata RN [facility]   Showing today's visits and meeting all other requirements     Future Appointments  No visits were found meeting these conditions  Showing future appointments within next 150 days and meeting all other requirements        After connecting through telephone, the patient was identified by name and date of birth  Obdulia Puga was informed that this is a telemedicine visit and that the visit is being conducted through telephone  My office door was closed  No one else was in the room  He acknowledged consent and understanding of privacy and security of the platform  The patient has agreed to participate and understands he can discontinue the visit at any time  Patient is aware this is a billable service  Subjective    Obdulia Puga is a 60 y o  male s/p: BIOPSY BRAIN IMAGE-GUIDED NEEDLE - LEFT TEMPORAL  Spoke with patient to see how he is doing after surgery  Patient reports he is doing well overall and denies any incisional issues or fevers  Patient is able to ambulate around the house and complete ADLs  Educated the patient about the importance of preventing blood clots and provided measures how to prevent them   Patient also denies any headaches, dizziness, nausea, vomiting or any changes to his vision or mental status at this time  Patient has moved his bowels since the surgery  Encouraged patient to take an over the counter stool softener, if he is taking narcotic pain medication  Encouraged fiber intake and fluids  Reviewed incision care with the patient  Advised that after three days he may take a shower and gently wash the surgical site with soap and water  Use clean wash cloth, towels, and clothing  Do not submerge in water until cleared by the surgeon  Do not apply any creams, ointments, or lotions to the site  Patient is aware to call the office if any redness, swelling, drainage, dehiscence of incision, or fever >100 F occurs  Patient is aware to call the office if any concerns or questions may arise  Reminded patient of his upcoming appointments with the date/time/location  Patient was appreciative for the call           Past Medical History:   Diagnosis Date    Bruxism (teeth grinding)     Hypertension     Obesity     Sleep apnea     CPAP nightly       Past Surgical History:   Procedure Laterality Date    [place] LUMBAR PUNCTURE DIAGNOSTIC  1/4/2021    HAND FRACTURE REPAIR      left thumb    [place] STEREO BX/ASPIR/EXCIS,INTRACRANIAL LESN Left 1/19/2021    Procedure: BIOPSY BRAIN IMAGE-GUIDED NEEDLE - LEFT TEMPORAL;  Surgeon: Rupali Johnson MD;  [facility];  Service: Neurosurgery    TONSILLECTOMY         Current Outpatient Medications   Medication Sig Dispense Refill    amLODIPine (NORVASC) 10 mg tablet Take 1 tablet (10 mg total) by mouth daily 90 tablet 3    atorvastatin (LIPITOR) 10 mg tablet Take 5 mg by mouth daily       docusate sodium (COLACE) 100 mg capsule Take 1 capsule (100 mg total) by mouth 2 (two) times a day  0    hydrochlorothiazide (MICROZIDE) 12 5 mg capsule Take 12 5 mg by mouth daily      levETIRAcetam (KEPPRA) 1000 MG tablet Take 1 tablet (1,000 mg total) by mouth every 12 (twelve) hours 60 tablet 0    valsartan (DIOVAN) 160 mg tablet Take 160 mg by mouth daily       No current facility-administered medications for this visit  No Known Allergies    Review of Systems    There were no vitals filed for this visit  VIRTUAL VISIT DISCLAIMER    Regi Lina acknowledges that he has consented to an online visit or consultation  He understands that the online visit is based solely on information provided by him, and that, in the absence of a face-to-face physical evaluation by the physician, the diagnosis he receives is both limited and provisional in terms of accuracy and completeness  This is not intended to replace a full medical face-to-face evaluation by the physician  Regi Mills understands and accepts these terms

## 2021-01-21 NOTE — UTILIZATION REVIEW
Notification of Discharge  This is a Notification of Discharge from our facility [address]  Please be advised that this patient has been discharge from our facility  Below you will find the admission and discharge date and time including the patients disposition  PRESENTATION DATE: 1/19/2021  5:17 AM  OBS ADMISSION DATE:   IP ADMISSION DATE: 1/19/21 0931   DISCHARGE DATE: 1/20/2021  2:22 PM  DISPOSITION: Home/Self Care Home/Self Care   Admission Orders listed below:  Admission Orders (From admission, onward)     Ordered        01/19/21 0931  INPATIENT ADMISSION  Once                   Please contact the UR Department if additional information is required to close this patient's authorization/case  [address] Utilization Review Department  Main: [phone] x carefully listen to the prompts  All voicemails are confidential   [email or web address]  Send all requests for admission clinical reviews, approved or denied determinations and any other requests to dedicated fax number below belonging to the campus where the patient is receiving treatment   List of dedicated fax numbers:  [address] DENIALS (Administrative/Medical Necessity) [phone]   [address] (Maternity/NICU/Pediatrics) [phone]   Cher Etienne [phone]     [address]  [phone]   Darrius Robbins [phone]   [address] [phone]   [facility]  [phone]   [address] [facility]  24[facility] [address] [facility] [phone]

## 2021-01-28 ENCOUNTER — HOSPITAL ENCOUNTER (OUTPATIENT)
Facility: HOSPITAL | Age: 66
Setting detail: OBSERVATION
Discharge: HOME | End: 2021-01-29
Attending: EMERGENCY MEDICINE | Admitting: INTERNAL MEDICINE
Payer: COMMERCIAL

## 2021-01-28 ENCOUNTER — APPOINTMENT (EMERGENCY)
Dept: CT IMAGING | Facility: HOSPITAL | Age: 66
End: 2021-01-28
Payer: COMMERCIAL

## 2021-01-28 DIAGNOSIS — D49.6 BRAIN TUMOR (HCC): ICD-10-CM

## 2021-01-28 DIAGNOSIS — R41.0 CONFUSION: Primary | ICD-10-CM

## 2021-01-28 DIAGNOSIS — R56.9 SEIZURE-LIKE ACTIVITY (HCC): ICD-10-CM

## 2021-01-28 LAB
ANION GAP SERPL CALCULATED.3IONS-SCNC: 7 MMOL/L (ref 4–13)
APTT PPP: 26 SECONDS (ref 23–37)
BUN SERPL-MCNC: 12 MG/DL (ref 7–25)
CALCIUM SERPL-MCNC: 10.1 MG/DL (ref 8.6–10.5)
CHLORIDE SERPL-SCNC: 99 MMOL/L (ref 98–107)
CO2 SERPL-SCNC: 31 MMOL/L (ref 21–31)
CREAT SERPL-MCNC: 0.79 MG/DL (ref 0.7–1.3)
ERYTHROCYTE [DISTWIDTH] IN BLOOD BY AUTOMATED COUNT: 14.3 % (ref 11.5–14.5)
FLUAV RNA RESP QL NAA+PROBE: NEGATIVE
FLUBV RNA RESP QL NAA+PROBE: NEGATIVE
GFR SERPL CREATININE-BSD FRML MDRD: 95 ML/MIN/1.73SQ M
GLUCOSE SERPL-MCNC: 119 MG/DL (ref 65–140)
GLUCOSE SERPL-MCNC: 133 MG/DL (ref 65–99)
HCT VFR BLD AUTO: 49.6 % (ref 42–47)
HGB BLD-MCNC: 16.6 G/DL (ref 14–18)
INR PPP: 0.95 (ref 0.84–1.19)
MCH RBC QN AUTO: 29.5 PG (ref 26–34)
MCHC RBC AUTO-ENTMCNC: 33.5 G/DL (ref 31–37)
MCV RBC AUTO: 88 FL (ref 81–99)
PLATELET # BLD AUTO: 175 THOUSANDS/UL (ref 149–390)
PMV BLD AUTO: 8.7 FL (ref 8.6–11.7)
POTASSIUM SERPL-SCNC: 4 MMOL/L (ref 3.5–5.5)
PROTHROMBIN TIME: 12.6 SECONDS (ref 11.6–14.5)
RBC # BLD AUTO: 5.63 MILLION/UL (ref 4.3–5.9)
RSV RNA RESP QL NAA+PROBE: NEGATIVE
SARS-COV-2 RNA RESP QL NAA+PROBE: NEGATIVE
SODIUM SERPL-SCNC: 137 MMOL/L (ref 134–143)
TROPONIN I SERPL-MCNC: <0.03 NG/ML
WBC # BLD AUTO: 12.1 THOUSAND/UL (ref 4.8–10.8)

## 2021-01-28 PROCEDURE — 93005 ELECTROCARDIOGRAM TRACING: CPT

## 2021-01-28 PROCEDURE — 36415 COLL VENOUS BLD VENIPUNCTURE: CPT | Performed by: PHYSICIAN ASSISTANT

## 2021-01-28 PROCEDURE — NC001 PR NO CHARGE: Performed by: NEUROLOGICAL SURGERY

## 2021-01-28 PROCEDURE — 82948 REAGENT STRIP/BLOOD GLUCOSE: CPT

## 2021-01-28 PROCEDURE — 84484 ASSAY OF TROPONIN QUANT: CPT | Performed by: PHYSICIAN ASSISTANT

## 2021-01-28 PROCEDURE — 99220 PR INITIAL OBSERVATION CARE/DAY 70 MINUTES: CPT | Performed by: PHYSICIAN ASSISTANT

## 2021-01-28 PROCEDURE — 85027 COMPLETE CBC AUTOMATED: CPT | Performed by: PHYSICIAN ASSISTANT

## 2021-01-28 PROCEDURE — G1004 CDSM NDSC: HCPCS

## 2021-01-28 PROCEDURE — 99285 EMERGENCY DEPT VISIT HI MDM: CPT

## 2021-01-28 PROCEDURE — 0241U HB NFCT DS VIR RESP RNA 4 TRGT: CPT | Performed by: PHYSICIAN ASSISTANT

## 2021-01-28 PROCEDURE — 70498 CT ANGIOGRAPHY NECK: CPT

## 2021-01-28 PROCEDURE — 85730 THROMBOPLASTIN TIME PARTIAL: CPT | Performed by: PHYSICIAN ASSISTANT

## 2021-01-28 PROCEDURE — 96374 THER/PROPH/DIAG INJ IV PUSH: CPT

## 2021-01-28 PROCEDURE — 70496 CT ANGIOGRAPHY HEAD: CPT

## 2021-01-28 PROCEDURE — G0508 CRIT CARE TELEHEA CONSULT 60: HCPCS | Performed by: PSYCHIATRY & NEUROLOGY

## 2021-01-28 PROCEDURE — 99285 EMERGENCY DEPT VISIT HI MDM: CPT | Performed by: PHYSICIAN ASSISTANT

## 2021-01-28 PROCEDURE — 80048 BASIC METABOLIC PNL TOTAL CA: CPT | Performed by: PHYSICIAN ASSISTANT

## 2021-01-28 PROCEDURE — 80177 DRUG SCRN QUAN LEVETIRACETAM: CPT | Performed by: PHYSICIAN ASSISTANT

## 2021-01-28 PROCEDURE — 85610 PROTHROMBIN TIME: CPT | Performed by: PHYSICIAN ASSISTANT

## 2021-01-28 RX ORDER — ONDANSETRON 2 MG/ML
4 INJECTION INTRAMUSCULAR; INTRAVENOUS EVERY 6 HOURS PRN
Status: DISCONTINUED | OUTPATIENT
Start: 2021-01-28 | End: 2021-01-29 | Stop reason: HOSPADM

## 2021-01-28 RX ORDER — HYDROCHLOROTHIAZIDE 12.5 MG/1
12.5 TABLET ORAL DAILY
Status: DISCONTINUED | OUTPATIENT
Start: 2021-01-29 | End: 2021-01-29 | Stop reason: HOSPADM

## 2021-01-28 RX ORDER — LORAZEPAM 2 MG/ML
2 INJECTION INTRAMUSCULAR EVERY 4 HOURS PRN
Status: DISCONTINUED | OUTPATIENT
Start: 2021-01-28 | End: 2021-01-29 | Stop reason: HOSPADM

## 2021-01-28 RX ORDER — ATORVASTATIN CALCIUM 10 MG/1
5 TABLET, FILM COATED ORAL DAILY
Status: DISCONTINUED | OUTPATIENT
Start: 2021-01-29 | End: 2021-01-29 | Stop reason: HOSPADM

## 2021-01-28 RX ORDER — DEXAMETHASONE SODIUM PHOSPHATE 4 MG/ML
4 INJECTION, SOLUTION INTRA-ARTICULAR; INTRALESIONAL; INTRAMUSCULAR; INTRAVENOUS; SOFT TISSUE EVERY 4 HOURS
Status: DISCONTINUED | OUTPATIENT
Start: 2021-01-28 | End: 2021-01-29 | Stop reason: HOSPADM

## 2021-01-28 RX ORDER — DOCUSATE SODIUM 100 MG/1
100 CAPSULE, LIQUID FILLED ORAL 2 TIMES DAILY
Status: DISCONTINUED | OUTPATIENT
Start: 2021-01-28 | End: 2021-01-29 | Stop reason: HOSPADM

## 2021-01-28 RX ORDER — AMLODIPINE BESYLATE 10 MG/1
10 TABLET ORAL DAILY
Status: DISCONTINUED | OUTPATIENT
Start: 2021-01-29 | End: 2021-01-29 | Stop reason: HOSPADM

## 2021-01-28 RX ORDER — LEVETIRACETAM 500 MG/1
1000 TABLET ORAL EVERY 12 HOURS SCHEDULED
Status: DISCONTINUED | OUTPATIENT
Start: 2021-01-28 | End: 2021-01-29 | Stop reason: HOSPADM

## 2021-01-28 RX ORDER — ACETAMINOPHEN 325 MG/1
650 TABLET ORAL EVERY 6 HOURS PRN
Status: DISCONTINUED | OUTPATIENT
Start: 2021-01-28 | End: 2021-01-29 | Stop reason: HOSPADM

## 2021-01-28 RX ORDER — DEXAMETHASONE SODIUM PHOSPHATE 4 MG/ML
4 INJECTION, SOLUTION INTRA-ARTICULAR; INTRALESIONAL; INTRAMUSCULAR; INTRAVENOUS; SOFT TISSUE ONCE
Status: COMPLETED | OUTPATIENT
Start: 2021-01-28 | End: 2021-01-28

## 2021-01-28 RX ORDER — LOSARTAN POTASSIUM 50 MG/1
100 TABLET ORAL DAILY
Status: DISCONTINUED | OUTPATIENT
Start: 2021-01-29 | End: 2021-01-29 | Stop reason: HOSPADM

## 2021-01-28 RX ADMIN — LEVETIRACETAM 1000 MG: 500 TABLET, FILM COATED ORAL at 22:42

## 2021-01-28 RX ADMIN — IOHEXOL 85 ML: 350 INJECTION, SOLUTION INTRAVENOUS at 17:53

## 2021-01-28 RX ADMIN — DEXAMETHASONE SODIUM PHOSPHATE 4 MG: 4 INJECTION, SOLUTION INTRA-ARTICULAR; INTRALESIONAL; INTRAMUSCULAR; INTRAVENOUS; SOFT TISSUE at 19:38

## 2021-01-28 NOTE — CONSULTS
Elizabeth Maxwell 60 y o  male MRN: [id number]  Unit/Bed#: ED 08 Encounter: [id number]      REQUIRED DOCUMENTATION:     1  This service was provided via Telemedicine  2  Provider located at Home  3  TeleMed provider: Geoff Mayorga MD   4  Identify all parties in room with patient during tele consult: yes  5  After connecting through televideo, patient was identified by name and date of birth and assistant checked wristband  Patient was then informed that this was a Telemedicine visit and that the exam was being conducted confidentially over secure lines  My office door was closed  No one else was in the room  Patient acknowledged consent and understanding of privacy and security of the Telemedicine visit, and gave us permission to have the assistant stay in the room in order to assist with the history and to conduct the exam   I informed the patient that I have reviewed their record in Epic and presented the opportunity for them to ask any questions regarding the visit today  The patient agreed to participate  Assessment/Plan     Will admit to the hospital   Decadron 4 mg q 4 hours for 24 hours  Continue current seizure medication    Physician Requesting Consult: Kathi Waller , *  Reason for Consult / Principal Problem: Stroke Alert  Hx and PE limited by: none  Patient last known well: 12:30 pm  Stroke alert called: 5:30 pm  Neurology time of arrival/evaluation: 5;30 pm  TPA Decision: Patient not a TPA candidate  Unclear time of onset outside appropriate time window  HPI: Elizabeth Maxwell is a 60y o  year old (handedness not determinable) male who presents with  Confusion with nausea and vomiting  Patient states around 12:30 to 1 p m  he started feeling nauseous and had vomiting  His mother brought him to the ER because the confusion did not resolve  Patient still feels nauseous but confusion has improved with word finding difficulty noted      Of note: S/p brain bx on 1/19/2021 - note from NS"  · Preliminary pathology results Glial tissue with mild increased cellularity and scattered atypical nuclei  While may represent glial neoplasm, cannot entirely exclude lymphoma  Final pathology pending  · originally presented to the ED as a stroke alert on 1/1/21 with expressive aphasia  · MRI demonstrated a 1 5 cm rim enhancing lesion in the left temporal lobe with minimal edema and minimal mass effect  CT chest abdomen and pelvis was negative for obvious primary source  LP was also performed with slightly increased protein but no definitive lymphoma, malignancy,etcetera  MRI abdomen demonstrated 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 and no primary source "     NIHSS: 2 in the ER at 5:30 pm      Objective   [email or web address]  /76 (BP Location: Left arm)   Pulse 73   Temp (!) 97 1 °F (36 2 °C)   Resp 16   Wt 120 kg (264 lb 1 8 oz)   SpO2 95%   BMI 33 01 kg/m²   BP Readings from Last 3 Encounters:   01/28/21 128/76   01/20/21 124/76   01/15/21 139/78     Pulse Readings from Last 3 Encounters:   01/28/21 73   01/20/21 72   01/19/21 104         Lab Results: I have personally reviewed pertinent reports  Imaging Studies: I have personally reviewed pertinent films in PACS    EKG, Pathology, and Other Studies: I have personally reviewed pertinent reports            Historical Information   Past Medical History:   Diagnosis Date    Bruxism (teeth grinding)     Hypertension     Obesity     Sleep apnea     CPAP nightly     Past Surgical History:   Procedure Laterality Date    FL LUMBAR PUNCTURE DIAGNOSTIC  1/4/2021    HAND FRACTURE REPAIR      left thumb    [place] STEREO BX/ASPIR/EXCIS,INTRACRANIAL LESN Left 1/19/2021    Procedure: BIOPSY BRAIN IMAGE-GUIDED NEEDLE - LEFT TEMPORAL;  Surgeon: Rupali Johnson MD;  [facility];  Service: Neurosurgery    TONSILLECTOMY       Social History   Social History     Substance and Sexual Activity   Alcohol Use Yes    Frequency: 2-4 times a month    Drinks per session: 3 or 4    Binge frequency: Never     Social History     Substance and Sexual Activity   Drug Use Never     Social History     Tobacco Use   Smoking Status Former Smoker    Types: Cigarettes   Smokeless Tobacco Never Used       Meds/Allergies   all current active meds have been reviewed    No Known Allergies    Neurological Examination:   Mental status/cognitive function: Orientated to time, place and person  Word-finding difficulty noted  Cranial Nerves: 2 to 12 intact  Motor Exam:   Moving all extremities without any difficulty  Sensory: grossly intact light touch in all extremities     Reflexes:  Scaccia  Coordination: Finger to nose intact bilaterally, no tremor noted  Gait: not checked

## 2021-01-28 NOTE — ED PROVIDER NOTES
History  Chief Complaint   Patient presents with    Post-op Problem     brain biopsey, wife reports confusion, patient not answering questions approipriately for triage  unable to determine orientation    Altered Mental Status    Nausea     80-year-old male history of hypertension and recent biopsy of brain tumor discharged approximately 2 weeks ago presents accompanied by his mother complaining of confusion  Wife reports a last known normal of 12 noon and she states that shortly after she noticed that the patient was confused not understanding what she was talking about and not speaking in normal sentences  She denies him having any slurred speech, any abnormal gait, any facial droop or weakness on one side of the patient's body  The patient states "I just do not feel well"  Patient had multiple episodes of nonbloody nonbilious vomiting prior to arrival in the car  Denies feeling dizzy or lightheaded at this time or having any severe headache  Patient's only complaint at this time is what he appears to describes nausea  Patients mother does report she thought his "eyes were turned to one side" questioning seizure like activity however she denies any convulsions at that time and report patient was con    Reviewed previous note of Kelley SANTIAGO/Dr Ndiaye  "Brain tumor [facility])  Assessment & Plan  POD 1 Imagine guided left temporal brain biopsy  · Preliminary pathology results Glial tissue with mild increased cellularity and scattered atypical nuclei  While may represent glial neoplasm, cannot entirely exclude lymphoma  Final pathology pending  · Patient originally presented to the ED as a stroke alert on 1/1/21 with expressive aphasia  · MRI demonstrated a 1 5 cm rim enhancing lesion in the left temporal lobe with minimal edema and minimal mass effect  CT chest abdomen and pelvis was negative for obvious primary source    LP was also performed with slightly increased protein but no definitive lymphoma, malignancy,etcetera  MRI abdomen demonstrated 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 and no primary source      Imaging:     CT head wo 01/20/2021:Anticipated postoperative changes, consistent with recent left temporal lobe biopsy  No acute intracranial abnormality      Plan:  · Continue neurological exam  · STAT CT head with any neurological decline including drop GCS of 2pts within 1 hr  · Reviewed imaging with patient  · Continue home [address]  · Continue to hold aspirin at least until 2 week follow-up  · Patient tolerating regular diet and voiding without difficulty, IVFs DC'd  · Patient denies any headaches  · No driving until cleared from neurosurgical standpoint  · Mobilize patient as tolerated  · PT/OT recommending home with no needs  · DVT PPX: SCDs     Neurosurgery will continue to follow as primary, patient is medically stable for discharge home today, call with any questions or concerns  Patient will follow-up in 2 weeks to review pathology with Dr Ndiaye         Seizure-like activity ([facility] [address] )  Assessment & Plan  Continue home Keppra"             Prior to Admission Medications   Prescriptions Last Dose Informant Patient Reported? Taking?    amLODIPine (NORVASC) 10 mg tablet  Self No No   Sig: Take 1 tablet (10 mg total) by mouth daily   atorvastatin (LIPITOR) 10 mg tablet  Self Yes No   Sig: Take 5 mg by mouth daily    docusate sodium (COLACE) 100 mg capsule   No No   Sig: Take 1 capsule (100 mg total) by mouth 2 (two) times a day   hydrochlorothiazide (MICROZIDE) 12 5 mg capsule  Self Yes No   Sig: Take 12 5 mg by mouth daily   levETIRAcetam (KEPPRA) 1000 MG tablet  Self No No   Sig: Take 1 tablet (1,000 mg total) by mouth every 12 (twelve) hours   valsartan (DIOVAN) 160 mg tablet  Self Yes No   Sig: Take 160 mg by mouth daily      Facility-Administered Medications: None       Past Medical History:   Diagnosis Date    Bruxism (teeth grinding)     Hypertension  Obesity     Sleep apnea     CPAP nightly       Past Surgical History:   Procedure Laterality Date    [place] LUMBAR PUNCTURE DIAGNOSTIC  1/4/2021    HAND FRACTURE REPAIR      left thumb    [place] STEREO BX/ASPIR/EXCIS,INTRACRANIAL LESN Left 1/19/2021    Procedure: BIOPSY BRAIN IMAGE-GUIDED NEEDLE - LEFT TEMPORAL;  Surgeon: Tona Carlson MD;  [facility];  Service: Neurosurgery    TONSILLECTOMY         Family History   Problem Relation Age of Onset    Heart disease Mother     Heart attack Father     Leukemia Brother      I have reviewed and agree with the history as documented  E-Cigarette/Vaping    E-Cigarette Use Never User      E-Cigarette/Vaping Substances    Nicotine No     THC No     CBD No     Flavoring No     Other No     Unknown No      Social History     Tobacco Use    Smoking status: Former Smoker     Types: Cigarettes    Smokeless tobacco: Never Used   Substance Use Topics    Alcohol use: Yes     Frequency: 2-4 times a month     Drinks per session: 3 or 4     Binge frequency: Never    Drug use: Never       Review of Systems   Constitutional: Negative for chills, fatigue and fever  HENT: Negative for congestion and sore throat  Eyes: Negative for pain  Respiratory: Negative for cough, chest tightness, shortness of breath and wheezing  Cardiovascular: Negative for chest pain, palpitations and leg swelling  Gastrointestinal: Negative for abdominal pain, constipation, diarrhea, nausea and vomiting  Endocrine: Negative for polyuria  Genitourinary: Negative for dysuria  Musculoskeletal: Negative for arthralgias, back pain, myalgias and neck pain  Skin: Negative for rash  Neurological: Negative for dizziness, syncope, light-headedness and headaches  Psychiatric/Behavioral: Positive for confusion  All other systems reviewed and are negative  Physical Exam  Physical Exam  Vitals signs reviewed  Constitutional:       Appearance: He is well-developed  HENT:      Head: Normocephalic and atraumatic  Eyes:      General: No visual field deficit  Neck:      Musculoskeletal: Normal range of motion  Cardiovascular:      Rate and Rhythm: Normal rate and regular rhythm  Heart sounds: Normal heart sounds  Pulmonary:      Effort: Pulmonary effort is normal       Breath sounds: Normal breath sounds  Abdominal:      General: Bowel sounds are normal       Palpations: Abdomen is soft  Tenderness: There is no abdominal tenderness  Musculoskeletal: Normal range of motion  Skin:     General: Skin is warm and dry  Capillary Refill: Capillary refill takes less than 2 seconds  Neurological:      Mental Status: He is alert  He is confused  GCS: GCS eye subscore is 4  GCS verbal subscore is 4  GCS motor subscore is 6  Cranial Nerves: No cranial nerve deficit, dysarthria or facial asymmetry  Sensory: No sensory deficit  Motor: No weakness  Coordination: Coordination normal       Comments: See NIH  Expressive aphasia, confusion to month    No dysarthria, nystagmus, dysphagia, diplopia, vertigo, ataxia, visions loss, dysmetria    Normal finger to nose, gait, rapid alternating movement,  tandem gait, strength and sensation in bilat upper and lower extremities  Normal upper and lower reflexes  No pronator drift  Normal heel to shin  EOMI, no visual field defects  CN 2-12 intact  GCS 15  AOx2  Normal babinski              Vital Signs  ED Triage Vitals [01/28/21 1716]   Temperature Pulse Respirations Blood Pressure SpO2   (!) 97 1 °F (36 2 °C) 73 16 128/76 95 %      Temp src Heart Rate Source Patient Position - Orthostatic VS BP Location FiO2 (%)   -- -- Lying Left arm --      Pain Score       --           Vitals:    01/28/21 1716 01/28/21 1800   BP: 128/76 143/89   Pulse: 73 70   Patient Position - Orthostatic VS: Lying          Visual Acuity      ED Medications  Medications   iohexol (OMNIPAQUE) 350 MG/ML injection (SINGLE-DOSE) 100 mL (85 mL Intravenous Given 1/28/21 1753)   dexamethasone (DECADRON) injection 4 mg (4 mg Intravenous Given 1/28/21 1938)       Diagnostic Studies  Results Reviewed     Procedure Component Value Units Date/Time    COVID19, Influenza A/B, RSV PCR, [facility] [[id number]]  (Normal) Collected: 01/28/21 1756    Lab Status: Final result Specimen: Nasopharyngeal Swab Updated: 01/28/21 1917     SARS-CoV-2 Negative     INFLUENZA A PCR Negative     INFLUENZA B PCR Negative     RSV PCR Negative    Narrative: This test has been authorized by FDA under an EUA (Emergency Use Assay) for use by authorized laboratories  Clinical caution and judgement should be used with the interpretation of these results with consideration of the clinical impression and other laboratory testing  Testing reported as "Positive" or "Negative" has been proven to be accurate according to standard laboratory validation requirements  All testing is performed with control materials showing appropriate reactivity at standard intervals      Troponin I [[id number]]  (Normal) Collected: 01/28/21 1756    Lab Status: Final result Specimen: Blood from Arm, Left Updated: 01/28/21 1822     Troponin I <0 03 ng/mL     Basic metabolic panel [[id number]]  (Abnormal) Collected: 01/28/21 1755    Lab Status: Final result Specimen: Blood from Arm, Left Updated: 01/28/21 1819     Sodium 137 mmol/L      Potassium 4 0 mmol/L      Chloride 99 mmol/L      CO2 31 mmol/L      ANION GAP 7 mmol/L      BUN 12 mg/dL      Creatinine 0 79 mg/dL      Glucose 133 mg/dL      Calcium 10 1 mg/dL      eGFR 95 ml/min/1 73sq m     Narrative:      [facility] guidelines for Chronic Kidney Disease (CKD):     Stage 1 with normal or high GFR (GFR > 90 mL/min/1 73 square meters)    Stage 2 Mild CKD (GFR = 60-89 mL/min/1 73 square meters)    Stage 3A Moderate CKD (GFR = 45-59 mL/min/1 73 square meters)    Stage 3B Moderate CKD (GFR = 30-44 mL/min/1 73 square meters)   Stage 4 Severe CKD (GFR = 15-29 mL/min/1 73 square meters)    Stage 5 End Stage CKD (GFR <15 mL/min/1 73 square meters)  Note: GFR calculation is accurate only with a steady state creatinine    Protime-INR [[id number]]  (Normal) Collected: 01/28/21 1755    Lab Status: Final result Specimen: Blood from Arm, Left Updated: 01/28/21 1816     Protime 12 6 seconds      INR 0 95    APTT [[id number]]  (Normal) Collected: 01/28/21 1755    Lab Status: Final result Specimen: Blood from Arm, Left Updated: 01/28/21 1816     PTT 26 seconds     CBC and Platelet [[id number]]  (Abnormal) Collected: 01/28/21 1755    Lab Status: Final result Specimen: Blood from Arm, Left Updated: 01/28/21 1802     WBC 12 10 Thousand/uL      RBC 5 63 Million/uL      Hemoglobin 16 6 g/dL      Hematocrit 49 6 %      MCV 88 fL      MCH 29 5 pg      MCHC 33 5 g/dL      RDW 14 3 %      Platelets 921 Thousands/uL      MPV 8 7 fL     Fingerstick Glucose (POCT) [[id number]]  (Normal) Collected: 01/28/21 1729    Lab Status: Final result Updated: 01/28/21 1730     POC Glucose 119 mg/dl                  CTA stroke alert (head/neck)   Final Result by Sofaí Arnold MD (01/28 1817)         1  No stenosis, dissection or occlusion of the carotid or vertebral arteries or major vessels of the [place]  2   No acute cardial pulmonary process  Findings were directly discussed with DR Hermann De La Paz  on 1/28/2021 6:00 PM                      Workstation performed: [id number]         CT stroke alert brain   Final Result by Sofía Arnold MD (01/28 1817)      1  Mild interval increase in size of left temporal hematoma at the biopsy site, though without evidence of acute hemorrhage  Mildly increased surrounding vasogenic edema  2   No evidence of acute infarct  3   No significant mass effect        Findings were directly discussed with DR Hermann De La Paz on 1/28/2021 5:51 PM       Workstation performed: [id number]                    Procedures  ECG 12 Lead Documentation Only    Date/Time: 1/28/2021 8:21 PM  Performed by: Kimi Tobin PA-C  Authorized by: Kimi Tobin PA-C     ECG reviewed by me, the ED Provider: yes    Patient location:  ED  Previous ECG:     Previous ECG:  Compared to current    Similarity:  No change    Comparison to cardiac monitor: Yes    Interpretation:     Interpretation: normal    Rate:     ECG rate:  74    ECG rate assessment: normal    Rhythm:     Rhythm: sinus rhythm    Ectopy:     Ectopy: none    QRS:     QRS axis:  Normal  Conduction:     Conduction: normal    ST segments:     ST segments:  Normal  T waves:     T waves: normal    Comments:      No evidence of acute cardiac ischemia             ED Course                 Stroke Assessment     Row Name 01/28/21 1730 01/28/21 1935          NIH Stroke Scale    Interval  Baseline       Level of Consciousness (1a )  0  0     LOC Questions (1b )  1  0     LOC Commands (1c )  0  0     Best Gaze (2 )  0  0     Visual (3 )  0  0     Facial Palsy (4 )  0  0     Motor Arm, Left (5a )  0  0     Motor Arm, Right (5b )  0  0     Motor Leg, Left (6a )  0  0     Motor Leg, Right (6b )  0  0     Limb Ataxia (7 )  0  0     Sensory (8 )  0  0     Best Language (9 )  1  0     Dysarthria (10 )  0  0     Extinction and Inattention (11 ) (Formerly Neglect)  0  0     Total  2  0                                 MDM  Number of Diagnoses or Management Options  Confusion:   Diagnosis management comments: Patient presented with chief complaint of confusion that was acutely change since 12 noon today  Mother reports that the patient was completely normal at 12 than appeared confused  She denied any obvious seizure-like activity but did think that his eyes were to the side at 1 point  Given patient's abrupt return to baseline, I suspect patient was postictal at the time of my initial evaluation    When asked what month it was, the patient could not provide an answer and did not appear to understand what the word month med  When asked if it was the month of July or January he could not choose 1 and had a lot of difficulty with word finding as well as following directions on exam although there were no lateralizing deficits  CT a relatively stable however Dr Jovita Guerra would like MRI, decadron 4mg, q4 for 24 hours and observation  EEG if can be performed as in patient but if not, can be done as outpatient  Stated edema near biopsy site is to be expected with recent biopsy  Asked if patient [facility] be [facility] however Dr Jovita Guerra believes patient can be either discharged with outpatient EEG or admit for obs with MRI with close outpatient follow up  She states she will discuss with Dr Darrow Kayser in the morning  Patient agreeable to plan  Disposition  Final diagnoses:   Confusion     Time reflects when diagnosis was documented in both MDM as applicable and the Disposition within this note     Time User Action Codes Description Comment    1/28/2021  8:18 PM [address]  [R41 0] Confusion       ED Disposition     ED Disposition Condition Date/Time Comment    Admit Stable Thu Jan 28, 2021  8:18 PM Case was discussed with Socorro Perez and the patient's admission status was agreed to be Admission Status: observation status to the service of Dr Taisha Ang  Follow-up Information    None         Patient's Medications   Discharge Prescriptions    No medications on file     No discharge procedures on file      PDMP Review     None          ED Provider  Electronically Signed by           Hiram Castano PA-C  01/28/21 [id number] Cates PA-C  01/28/21 9986

## 2021-01-29 ENCOUNTER — APPOINTMENT (OUTPATIENT)
Dept: MRI IMAGING | Facility: HOSPITAL | Age: 66
End: 2021-01-29
Payer: COMMERCIAL

## 2021-01-29 VITALS
TEMPERATURE: 98.2 F | HEART RATE: 86 BPM | OXYGEN SATURATION: 94 % | HEIGHT: 75 IN | SYSTOLIC BLOOD PRESSURE: 148 MMHG | WEIGHT: 264.33 LBS | RESPIRATION RATE: 18 BRPM | BODY MASS INDEX: 32.87 KG/M2 | DIASTOLIC BLOOD PRESSURE: 89 MMHG

## 2021-01-29 LAB
ANION GAP SERPL CALCULATED.3IONS-SCNC: 9 MMOL/L (ref 4–13)
ATRIAL RATE: 74 BPM
BUN SERPL-MCNC: 15 MG/DL (ref 7–25)
CALCIUM SERPL-MCNC: 10 MG/DL (ref 8.6–10.5)
CHLORIDE SERPL-SCNC: 102 MMOL/L (ref 98–107)
CO2 SERPL-SCNC: 26 MMOL/L (ref 21–31)
CREAT SERPL-MCNC: 0.68 MG/DL (ref 0.7–1.3)
ERYTHROCYTE [DISTWIDTH] IN BLOOD BY AUTOMATED COUNT: 14.4 % (ref 11.5–14.5)
GFR SERPL CREATININE-BSD FRML MDRD: 101 ML/MIN/1.73SQ M
GLUCOSE SERPL-MCNC: 146 MG/DL (ref 65–99)
GLUCOSE SERPL-MCNC: 154 MG/DL (ref 65–140)
HCT VFR BLD AUTO: 47.6 % (ref 42–47)
HGB BLD-MCNC: 16.3 G/DL (ref 14–18)
MCH RBC QN AUTO: 30.1 PG (ref 26–34)
MCHC RBC AUTO-ENTMCNC: 34.2 G/DL (ref 31–37)
MCV RBC AUTO: 88 FL (ref 81–99)
P AXIS: 33 DEGREES
PLATELET # BLD AUTO: 176 THOUSANDS/UL (ref 149–390)
PMV BLD AUTO: 8.9 FL (ref 8.6–11.7)
POTASSIUM SERPL-SCNC: 3.8 MMOL/L (ref 3.5–5.5)
PR INTERVAL: 214 MS
QRS AXIS: -65 DEGREES
QRSD INTERVAL: 102 MS
QT INTERVAL: 420 MS
QTC INTERVAL: 466 MS
RBC # BLD AUTO: 5.41 MILLION/UL (ref 4.3–5.9)
SODIUM SERPL-SCNC: 137 MMOL/L (ref 134–143)
T WAVE AXIS: 52 DEGREES
VENTRICULAR RATE: 74 BPM
WBC # BLD AUTO: 7.7 THOUSAND/UL (ref 4.8–10.8)

## 2021-01-29 PROCEDURE — 93010 ELECTROCARDIOGRAM REPORT: CPT | Performed by: INTERNAL MEDICINE

## 2021-01-29 PROCEDURE — 85027 COMPLETE CBC AUTOMATED: CPT | Performed by: PHYSICIAN ASSISTANT

## 2021-01-29 PROCEDURE — 99217 PR OBSERVATION CARE DISCHARGE MANAGEMENT: CPT | Performed by: INTERNAL MEDICINE

## 2021-01-29 PROCEDURE — 80048 BASIC METABOLIC PNL TOTAL CA: CPT | Performed by: PHYSICIAN ASSISTANT

## 2021-01-29 PROCEDURE — A9585 GADOBUTROL INJECTION: HCPCS | Performed by: PHYSICIAN ASSISTANT

## 2021-01-29 PROCEDURE — 70553 MRI BRAIN STEM W/O & W/DYE: CPT

## 2021-01-29 PROCEDURE — 82948 REAGENT STRIP/BLOOD GLUCOSE: CPT

## 2021-01-29 PROCEDURE — G1004 CDSM NDSC: HCPCS

## 2021-01-29 RX ORDER — DEXAMETHASONE 4 MG/1
TABLET ORAL
Qty: 8 TABLET | Refills: 0 | Status: SHIPPED | OUTPATIENT
Start: 2021-01-30 | End: 2021-02-03 | Stop reason: ALTCHOICE

## 2021-01-29 RX ADMIN — LEVETIRACETAM 1000 MG: 500 TABLET, FILM COATED ORAL at 08:32

## 2021-01-29 RX ADMIN — AMLODIPINE BESYLATE 10 MG: 10 TABLET ORAL at 08:31

## 2021-01-29 RX ADMIN — DEXAMETHASONE SODIUM PHOSPHATE 4 MG: 4 INJECTION, SOLUTION INTRAMUSCULAR; INTRAVENOUS at 08:46

## 2021-01-29 RX ADMIN — LOSARTAN POTASSIUM 100 MG: 50 TABLET, FILM COATED ORAL at 08:32

## 2021-01-29 RX ADMIN — ATORVASTATIN CALCIUM 5 MG: 10 TABLET, FILM COATED ORAL at 08:32

## 2021-01-29 RX ADMIN — DEXAMETHASONE SODIUM PHOSPHATE 4 MG: 4 INJECTION, SOLUTION INTRAMUSCULAR; INTRAVENOUS at 01:43

## 2021-01-29 RX ADMIN — DEXAMETHASONE SODIUM PHOSPHATE 4 MG: 4 INJECTION, SOLUTION INTRAMUSCULAR; INTRAVENOUS at 05:23

## 2021-01-29 RX ADMIN — GADOBUTROL 12 ML: 604.72 INJECTION INTRAVENOUS at 07:10

## 2021-01-29 RX ADMIN — HYDROCHLOROTHIAZIDE 12.5 MG: 12.5 TABLET ORAL at 08:31

## 2021-01-29 NOTE — ASSESSMENT & PLAN NOTE
· Placed in observation telemetry  · Placed on seizure precautions  · Obtain Keppra level  · Continue pre-hospital Keppra 1000 mg p o  B i d   · Give Ativan 2 mg IV q 4 hours p r n  For seizure activity  · Placed on Decadron 4 mg IV q 4 hours times 24 hours  · Obtain EEG and MRI in a m    · Consult neurology  · I personally spoke with neuro surgery on-call Dr Mariela Albert who though he was unable to directly view  the CT images felt that there were is no urgent medical need from a neurosurgical standpoint and patient was okay to stay in our facility

## 2021-01-29 NOTE — PLAN OF CARE
Problem: Potential for Falls  Goal: Patient will remain free of falls  Description: INTERVENTIONS:  - Assess patient frequently for physical needs  -  Identify cognitive and physical deficits and behaviors that affect risk of falls  -  [place] fall precautions as indicated by assessment   - Educate patient/family on patient safety including physical limitations  - Instruct patient to call for assistance with activity based on assessment  - Modify environment to reduce risk of injury  - Consider OT/PT consult to assist with strengthening/mobility  Outcome: Progressing     Problem: Knowledge Deficit  Goal: Patient/family/caregiver demonstrates understanding of disease process, treatment plan, medications, and discharge instructions  Description: Complete learning assessment and assess knowledge base    Interventions:  - Provide teaching at level of understanding  - Provide teaching via preferred learning methods  Outcome: Progressing     Problem: NEUROSENSORY - ADULT  Goal: Remains free of injury related to seizures activity  Description: INTERVENTIONS  - Maintain airway, patient safety  and administer oxygen as ordered  - Monitor patient for seizure activity, document and report duration and description of seizure to physician/advanced practitioner  - If seizure occurs,  ensure patient safety during seizure  - Reorient patient post seizure  - Seizure pads on all 4 side rails  - Instruct patient/family to notify RN of any seizure activity including if an aura is experienced  - Instruct patient/family to call for assistance with activity based on nursing assessment  - Administer anti-seizure medications if ordered    Outcome: Progressing

## 2021-01-29 NOTE — ASSESSMENT & PLAN NOTE
· Increasing size of brain lesion noted on MRI today  · Discussed case with Neurosurgery and Neurology who recommended discharging patient with outpatient follow-up as scheduled on Wednesday  · Will discharge on Decadron taper  · Continue Keppra

## 2021-01-29 NOTE — ASSESSMENT & PLAN NOTE
Will place on [facility] step 2 diet, will obtain Accu-Cheks b i d   As patient is on Decadron addressed with insulin as needed

## 2021-01-29 NOTE — TREATMENT PLAN
Treatment plan    2021    Sindi Cho    Age 60 years     1955    Patient location [facility] [place]  [address]    Diagnoses:  1  New onset seizure disorder did 2021  2  Receptive and expressive dysphasia  3  Low-density abnormality left temporal lobe  4  Started on Keppra  5  MRI brain showed 1 1 cm x 1 6 cm enhancing necrotic mass posterior mid temporal lobe  6  Status post stereotactic biopsy 2021  7  Postop CT showed small amount of blood products in biopsy sites with extensive surrounding edema which had increased since CT 2021  8  Final path GBM  9  Nausea and vomiting and confusion around 1230 hours 2021  10  X-ray stiff dysphagia noted patient admitted  11  Likely had a seizure  12  CTA negative for stroke however increased hyperdense area of abnormality surrounded by extensive edema left temporal  13  Not likely to be hemorrhage, more likely to increased tumor growth  14  Overall dimensions 2 4 cm x 1 7 x 1 3 cm  15  Previous low-density enhancing abnormality in left temporal region measured 1 3 cm x 1 8 x 1 6 cm  16  Seizure medications increased with Keppra now 1000 b i d  and started on  17  Decadron 4 mg Q 4  18  PPI  19  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  20  Hypertension    Imaging reviewed by me:  1  CT head stroke 2021 as above  2   CTA head neck 2021  · Left posterior mid temporal mass 2 4 cm x 1 7 x 1 3 cm  · No stenosis, dissection occlusion of carotid or vertebral arteries in the head or neck  · Patent anterior and posterior circulations  · MRI of the brain with without contrast 2021  · Large ring-enhancing mass in the left temporal lobe significantly increased in size since last MRI from 2021  · Necrotic hyperdense sent with some internal hemorrhage  · Moderate extensive local vasogenic edema  · Findings most consistent with aggressive interval continued progression of underlying tumor    Presentation imaging discussed with Dr Mai Badillo neurology and later with Dr Christina Barton  If seizures under control okay to DC increased dose of Keppra 1000 mg b i d  And continue steroids in high-dose    PPI    Dr Christina Barton recommends seeing him in [address] [place] [address] in [facility] next Wednesday February 3rd to review pathology and discuss treatment options    1/29/2021 1:35 PM    Saqib Parrish MD, Attending Neurological Surgeon

## 2021-01-29 NOTE — DISCHARGE SUMMARY
Discharge- Gissel Arvizu 1955, 60 y o  male MRN: [id number]    Unit/Bed#: -01 Encounter: [id number]    Primary Care Provider: Princess Rajiv DO   Date and time admitted to hospital: 1/28/2021  5:10 PM        * Seizure-like activity [facility])  Assessment & Plan  · Likely secondary to intracranial mass  · Discussed case with Neurosurgery and Neurology  · Recommended by neurology and neurosurgery to discharge home with outpatient follow-up on Wednesday with Neurosurgery  · Decadron taper as prescribed  · Continue Keppra at current dose    Brain tumor [facility])  Assessment & Plan  · Increasing size of brain lesion noted on MRI today  · Discussed case with Neurosurgery and Neurology who recommended discharging patient with outpatient follow-up as scheduled on Wednesday  · Will discharge on Decadron taper  · Continue Keppra    Essential hypertension  Assessment & Plan  Blood pressure stable  Continue home medications    Class 1 obesity due to excess calories with serious comorbidity and body mass index (BMI) of 32 0 to 32 9 in adult  Assessment & Plan  Encourage healthy weight loss and supportive care      Discharging Physician / Practitioner: Horace Knight MD  PCP: Princess Rajiv DO  Admission Date:   Admission Orders (From admission, onward)     Ordered        01/28/21 2019  Place in Observation  Once                   Discharge Date: 01/29/21    Resolved Problems  Date Reviewed: 1/28/2021    None          Consultations During Hospital Stay:  · Neurology/neurosurgery    Procedures Performed:   · None    Significant Findings / Test Results:   · Increased size of brain lesion    Incidental Findings:   · None     Test Results Pending at Discharge (will require follow up):    · None     Outpatient Tests Requested:  · Routine labs with PCP    Complications:     None    Reason for Admission:  Seizure    Hospital Course:     Gissel Arvizu is a 60 y o  male patient who originally presented to the hospital on 1/28/2021 due to seizure  Patient was admitted for further evaluation  MRI brain showed increase in size of recently found intracranial lesion  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  Patient was evaluated by Neurology and Neurosurgery  Decision by neurological team was that patient can be discharged home with current Keppra dose  Discharged on steroid taper  Follow-up with Neurosurgery as scheduled on Wednesday  Counseled patient on avoiding driving until he is cleared by Neurosurgery    Please see above list of diagnoses and related plan for additional information  Condition at Discharge: stable     Discharge Day Visit / Exam:     Subjective:  No complaints at this time    Vitals: Blood Pressure: 148/89 (01/29/21 0743)  Pulse: 86 (01/29/21 0743)  Temperature: 98 2 °F (36 8 °C) (01/29/21 0743)  Temp Source: Temporal (01/29/21 0743)  Respirations: 18 (01/29/21 0743)  Height: 6' 3" (190 5 cm) (01/29/21 0120)  Weight - Scale: 120 kg (264 lb 5 3 oz) (01/29/21 0120)  SpO2: 94 % (01/29/21 0743)     Exam:   Physical Exam  Constitutional:       General: He is not in acute distress  HENT:      Head: Normocephalic and atraumatic  Nose: Nose normal       Mouth/Throat:      Mouth: Mucous membranes are moist    Eyes:      Extraocular Movements: Extraocular movements intact  Conjunctiva/sclera: Conjunctivae normal    Neck:      Musculoskeletal: Normal range of motion and neck supple  Cardiovascular:      Rate and Rhythm: Normal rate and regular rhythm  Pulmonary:      Effort: Pulmonary effort is normal  No respiratory distress  Abdominal:      Palpations: Abdomen is soft  Tenderness: There is no abdominal tenderness  Musculoskeletal: Normal range of motion  General: No swelling  Skin:     General: Skin is warm and dry  Neurological:      General: No focal deficit present  Mental Status: He is alert  Cranial Nerves: No cranial nerve deficit     Psychiatric: Mood and Affect: Mood normal          Behavior: Behavior normal          Discussion with Family:  Offered to call family however patient states that he will update his mother himself    Discharge instructions/Information to patient and family:   See after visit summary for information provided to patient and family  Provisions for Follow-Up Care:  See after visit summary for information related to follow-up care and any pertinent home health orders  Disposition:     Home      Planned Readmission:    no     Discharge Statement:  I spent 35 minutes discharging the patient  This time was spent on the day of discharge  I had direct contact with the patient on the day of discharge  Greater than 50% of the total time was spent examining patient, answering all patient questions, arranging and discussing plan of care with patient as well as directly providing post-discharge instructions  Additional time then spent on discharge activities  Discharge Medications:  See after visit summary for reconciled discharge medications provided to patient and family        ** Please Note: This note has been constructed using a voice recognition system **

## 2021-01-29 NOTE — H&P
H&P- Stephany Doyle 1955, 60 y o  male MRN: [id number]    Unit/Bed#: ED 04 Encounter: [id number]    Primary Care Provider: John Balderas DO   Date and time admitted to hospital: 1/28/2021  5:10 PM        * Seizure-like activity [facility])  Assessment & Plan  · Placed in observation telemetry  · Placed on seizure precautions  · Obtain Keppra level  · Continue pre-hospital Keppra 1000 mg p o  B i d   · Give Ativan 2 mg IV q 4 hours p r n  For seizure activity  · Placed on Decadron 4 mg IV q 4 hours times 24 hours  · Obtain EEG and MRI in a m  · Consult neurology  · I personally spoke with neuro surgery on-call Dr Sheldon Starks who though he was unable to directly view  the CT images felt that there were is no urgent medical need from a neurosurgical standpoint and patient was okay to stay in our facility    Prediabetes  Assessment & Plan  Will place on [facility] step 2 diet, will obtain Accu-Cheks b i d  As patient is on Decadron addressed with insulin as needed    Obstructive sleep apnea syndrome  Assessment & Plan  Placed on O2 protocol    Essential hypertension  Assessment & Plan  Continue pre-hospital Diovan 160 mg p o  Daily, hydrochlorothiazide 12 5 mg p o  Daily and Norvasc 10 mg p o  Daily    Hypercholesterolemia  Assessment & Plan  Continue pre-hospital Lipitor 5 mg p o  Daily    Class 1 obesity due to excess calories with serious comorbidity and body mass index (BMI) of 32 0 to 32 9 in adult  Assessment & Plan  Place on [facility] step 2 diet, encourage healthy weight loss and supportive care      VTE Prophylaxis: SCDs only due to recent surgery  Code Status:  Level 1  POLST: There is no POLST form on file for this patient (pre-hospital)  Discussion with family:  None present at bedside at time of exam    Anticipated Length of Stay:  Patient will be admitted on an Observation basis with an anticipated length of stay of  < 2 midnights     Justification for Hospital Stay:  Seizure-like activity requiring further neurological evaluation    Chief Complaint:   Seizure-like activity x1 today    History of Present Illness:    Opal Santo is a 60 y o  male who presents with seizure-like activity x1 today  Patient presents ER for further evaluation treatment of 1 day history of seizure-like activity  Patient has poor recollection for events so history is obtained through review of ER documentation  As per ER chart patient had acute onset of expressive and receptive aphasia accompanied with some nausea and vomiting  There is no noted focal neurological complaint though apparently patient's mother relate to the ER staff that she felt that he had a gaze preference to 1 side  There is no reported tonic-clonic activity, no loss of bladder or bowel control  Patient does have a known seizure disorder and was diagnosed with a seizure on 01/01/2021 which was felt to be secondary to a left temporal mass which was recently biopsied on 01/19/2021  Case was discussed by ER staff with Neurology on-call and I personally discussed case with Neurosurgery on-call Dr Elvia Bermeo who viewed the images and felt that no neurosurgical intervention was required at this time  Patient is resting comfortably in bed at time of exam, he does continue with some mild expressive aphasia with word-finding issues but other than that is without complaint  Review of Systems:  Review of Systems   Constitutional: Negative for chills and fever  Respiratory: Negative for cough, shortness of breath and wheezing  Cardiovascular: Negative for chest pain and palpitations  Gastrointestinal: Positive for nausea and vomiting  Negative for diarrhea  Genitourinary: Negative for dysuria, frequency, hematuria and urgency  Neurological: Positive for speech difficulty  Negative for tremors, seizures, syncope, facial asymmetry, weakness, light-headedness and headaches  All other systems reviewed and are negative        Past Medical and Surgical History:   Past Medical History:   Diagnosis Date    Bruxism (teeth grinding)     Hypertension     Obesity     Sleep apnea     CPAP nightly       Past Surgical History:   Procedure Laterality Date    FL LUMBAR PUNCTURE DIAGNOSTIC  1/4/2021    HAND FRACTURE REPAIR      left thumb    NE STEREO BX/ASPIR/EXCIS,INTRACRANIAL LESN Left 1/19/2021    Procedure: BIOPSY BRAIN IMAGE-GUIDED NEEDLE - LEFT TEMPORAL;  Surgeon: Joaquin Cragi MD;  Location: [facility];  Service: Neurosurgery    TONSILLECTOMY         Meds/Allergies:  Prior to Admission medications    Medication Sig Start Date End Date Taking? Authorizing Provider   amLODIPine (NORVASC) 10 mg tablet Take 1 tablet (10 mg total) by mouth daily 11/5/20   Dixie Crook DO   atorvastatin (LIPITOR) 10 mg tablet Take 5 mg by mouth daily     Historical Provider, MD   docusate sodium (COLACE) 100 mg capsule Take 1 capsule (100 mg total) by mouth 2 (two) times a day 1/20/21   PAMELA Santiago   hydrochlorothiazide (MICROZIDE) 12 5 mg capsule Take 12 5 mg by mouth daily    Historical Provider, MD   levETIRAcetam (KEPPRA) 1000 MG tablet Take 1 tablet (1,000 mg total) by mouth every 12 (twelve) hours 1/6/21 2/5/21  Derrick Shirley MD   valsartan (DIOVAN) 160 mg tablet Take 160 mg by mouth daily    Historical Provider, MD     I have reviewed home medications with patient personally      Allergies: No Known Allergies    Social History:  Marital Status: Single   Occupation:  Retired   Patient Pre-hospital Living Situation:  Resides at home with mother  Patient Pre-hospital Level of Mobility:  Full without assist  Patient Pre-hospital Diet Restrictions:  None  Substance Use History:   Social History     Substance and Sexual Activity   Alcohol Use Yes    Frequency: 2-4 times a month    Drinks per session: 3 or 4    Binge frequency: Never     Social History     Tobacco Use   Smoking Status Former Smoker    Types: Cigarettes   Smokeless Tobacco Never Used     Social History     Substance and Sexual Activity   Drug Use Never       Family History:  I have reviewed the patients family history    Physical Exam:   Vitals:   Blood Pressure: 143/89 (01/28/21 1800)  Pulse: 70 (01/28/21 1800)  Temperature: (!) 97 1 °F (36 2 °C) (01/28/21 1716)  Respirations: 16 (01/28/21 1800)  Weight - Scale: 120 kg (264 lb 1 8 oz) (01/28/21 1716)  SpO2: 96 % (01/28/21 1800)    Physical Exam  Vitals signs and nursing note reviewed  Constitutional:       General: He is not in acute distress  Appearance: Normal appearance  HENT:      Head: Normocephalic and atraumatic  Right Ear: Tympanic membrane normal       Left Ear: Tympanic membrane normal       Nose: Nose normal       Mouth/Throat:      Mouth: Mucous membranes are moist       Pharynx: No oropharyngeal exudate or posterior oropharyngeal erythema  Eyes:      Extraocular Movements: Extraocular movements intact  Pupils: Pupils are equal, round, and reactive to light  Neck:      Musculoskeletal: Normal range of motion and neck supple  Cardiovascular:      Rate and Rhythm: Normal rate and regular rhythm  Pulses: Normal pulses  Heart sounds: Normal heart sounds  Pulmonary:      Effort: Pulmonary effort is normal  No respiratory distress  Breath sounds: Normal breath sounds  Abdominal:      General: Abdomen is flat  Bowel sounds are normal       Palpations: Abdomen is soft  Musculoskeletal: Normal range of motion  Left lower leg: No edema  Skin:     General: Skin is warm and dry  Capillary Refill: Capillary refill takes less than 2 seconds  Neurological:      General: No focal deficit present  Mental Status: He is alert and oriented to person, place, and time  Cranial Nerves: Cranial nerves are intact  Sensory: Sensation is intact  Motor: Motor function is intact  Coordination: Coordination is intact  Gait: Gait is intact        Deep Tendon Reflexes: Reflexes are normal and symmetric  Comments: Mild expressive aphasia with word-finding problem         Additional Data:   Lab Results: I have personally reviewed pertinent reports  Results from last 7 days   Lab Units 01/28/21  1755   WBC Thousand/uL 12 10*   HEMOGLOBIN g/dL 16 6   HEMATOCRIT % 49 6*   PLATELETS Thousands/uL 175     Results from last 7 days   Lab Units 01/28/21  1755   SODIUM mmol/L 137   POTASSIUM mmol/L 4 0   CHLORIDE mmol/L 99   CO2 mmol/L 31   BUN mg/dL 12   CREATININE mg/dL 0 79   CALCIUM mg/dL 10 1     Results from last 7 days   Lab Units 01/28/21  1755   INR  0 95     Results from last 7 days   Lab Units 01/28/21  1729   POC GLUCOSE mg/dl 119           Imaging: I have personally reviewed pertinent reports  CTA stroke alert (head/neck)   Final Result by Rimma Huang MD (01/28 1817)         1  No stenosis, dissection or occlusion of the carotid or vertebral arteries or major vessels of the [place]  2   No acute cardial pulmonary process  Findings were directly discussed with DR Marisa De Oliveira  on 1/28/2021 6:00 PM                      Workstation performed: [id number]         CT stroke alert brain   Final Result by Rimma Huang MD (01/28 1817)      1  Mild interval increase in size of left temporal hematoma at the biopsy site, though without evidence of acute hemorrhage  Mildly increased surrounding vasogenic edema  2   No evidence of acute infarct  3   No significant mass effect  Findings were directly discussed with DR Marisa De Oliveira on 1/28/2021 5:51 PM       Workstation performed: [id number]         MRI inpatient order    (Results Pending)       EKG, Pathology, and Other Studies Reviewed on Admission:   · EKG: N/A    Epic Records Reviewed: Yes     ** Please Note: This note has been constructed using a voice recognition system   **

## 2021-01-29 NOTE — ASSESSMENT & PLAN NOTE
· Likely secondary to intracranial mass  · Discussed case with Neurosurgery and Neurology  · Recommended by neurology and neurosurgery to discharge home with outpatient follow-up on Wednesday with Neurosurgery  · Decadron taper as prescribed  · Continue Keppra at current dose

## 2021-01-29 NOTE — DISCHARGE INSTR - AVS FIRST PAGE
Dear Philomena Orellana,     It was our pleasure to care for you here at [facility], [facility]  It is our hope that we were always able to exceed the expected standards for your care during your stay  You were hospitalized due to seizure  You were cared for on the medical floor by Angel Peres MD with the [address] [place] [address] [facility] who covers for your primary care physician (PCP), Omer Carlson DO, while you were hospitalized  If you have any questions or concerns related to this hospitalization, you may contact us at [phone]  For follow up as well as any medication refills, we recommend that you follow up with your primary care physician  A registered nurse will reach out to you by phone within a few days after your discharge to answer any additional questions that you may have after going home  However, at this time we provide for you here, the most important instructions / recommendations at discharge:     · Notable Medication Adjustments -   · Decadron 4 mg tablets (take 1 tablet tonight and then take as directed starting tomorrow)  · Testing Required after Discharge -   · Routine labs with PCP  · Important follow up information -   · Follow-up with neurosurgery as scheduled on Wednesday  · Other Instructions -   · Please see discharge instructions  · No driving until neurosurgery follow-up  · Please review this entire after visit summary as additional general instructions including medication list, appointments, activity, diet, any pertinent wound care, and other additional recommendations from your care team that may be provided for you        Sincerely,     Angel Peres MD

## 2021-01-29 NOTE — UTILIZATION REVIEW
Initial Clinical Review    Admission: Date/Time/Statement:   Admission Orders (From admission, onward)     Ordered        01/28/21 2019  Place in Observation  Once                   Orders Placed This Encounter   Procedures    Place in Observation     Standing Status:   Standing     Number of Occurrences:   1     Order Specific Question:   Level of Care     Answer:   Med Surg [16]     ED Arrival Information     Expected Arrival Acuity Means of Arrival Escorted By Service Admission Type    - 1/28/2021 16:12 Emergent Walk-In Family Member General Medicine Emergency    Arrival Complaint    post op brain surg, confusion        Chief Complaint   Patient presents with    Post-op Problem     brain biopsey, wife reports confusion, patient not answering questions approipriately for triage  unable to determine orientation    Altered Mental Status    Nausea     Assessment/Plan: 61 yo male to ED from home admitted observation d/t seizure-like activity  presents with seizure-like activity x1 today  poor recollection for events  acute onset of expressive and receptive aphasia accompanied with some nausea and vomiting   had a gaze preference to 1 side  no reported tonic-clonic activity, no loss of bladder or bowel control  does have a known seizure disorder and was diagnosed with a seizure on 01/01/2021 which was felt to be secondary to a left temporal mass which was recently biopsied on 01/19/2021  pmh prediabetes, OSAS, htn, obesity  Tele  Observation  seizure precautions  Keppra level ordered  EEG and MRI brain ordered  Keppra  IV ativan  IV decadron  Diovan  hydrochlorothiazide  Norvasc  accu checks  Neuorlogy and neurosurgery consult  1/28 Neurology consult:Confusion with nausea and vomiting  confusion has improved with word finding difficulty noted  S/p brain bx on 1/19/2021 showing Glial tissue with mild increased cellularity and scattered atypical nuclei  While may represent glial neoplasm, cannot entirely exclude lymphoma   Final pathology pending  Kj Blankenship  1/28 Neurosurgery consult: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  no neurosurgical intervention required at this time  optimize seizure meds under direction of neurology          ED Triage Vitals   Temperature Pulse Respirations Blood Pressure SpO2   01/28/21 1716 01/28/21 1716 01/28/21 1716 01/28/21 1716 01/28/21 1716   (!) 97 1 °F (36 2 °C) 73 16 128/76 95 %      Temp Source Heart Rate Source Patient Position - Orthostatic VS BP Location FiO2 (%)   01/29/21 0130 -- 01/28/21 1716 01/28/21 1716 --   Temporal  Lying Left arm       Pain Score       01/29/21 0120       No Pain          Wt Readings from Last 1 Encounters:   01/29/21 120 kg (264 lb 5 3 oz)     Additional Vital Signs:   01/29/21 0743  98 2 °F (36 8 °C)  86  18  148/89    94 %  None (Room air)  Sitting   01/29/21 0133              None (Room air)     01/29/21 0130  97 7 °F (36 5 °C)  82  18  135/83    92 %  None (Room air)  Lying   01/28/21 1800    70  16  143/89  112  96 %       01/28/21 1716  97 1 °F (36 2 °C)Abnormal   73  16  128/76    95 %  None (Room air)  Lying       Pertinent Labs/Diagnostic Test Results:   Results from last 7 days   Lab Units 01/28/21  1756   SARS-COV-2  Negative     Results from last 7 days   Lab Units 01/29/21  0454 01/28/21  1755   WBC Thousand/uL 7 70 12 10*   HEMOGLOBIN g/dL 16 3 16 6   HEMATOCRIT % 47 6* 49 6*   PLATELETS Thousands/uL 176 175         Results from last 7 days   Lab Units 01/29/21  0454 01/28/21  1755   SODIUM mmol/L 137 137   POTASSIUM mmol/L 3 8 4 0   CHLORIDE mmol/L 102 99   CO2 mmol/L 26 31   ANION GAP mmol/L 9 7   BUN mg/dL 15 12   CREATININE mg/dL 0 68* 0 79   EGFR ml/min/1 73sq m 101 95   CALCIUM mg/dL 10 0 10 1         Results from last 7 days   Lab Units 01/28/21  1729   POC GLUCOSE mg/dl 119     Results from last 7 days   Lab Units 01/29/21  0454 01/28/21  1755   GLUCOSE RANDOM mg/dL 146* 133*       Results from last 7 days   Lab Units 01/28/21  1756   TROPONIN I ng/mL <0 03         Results from last 7 days   Lab Units 01/28/21  1755   PROTIME seconds 12 6   INR  0 95   PTT seconds 26       Results from last 7 days   Lab Units 01/28/21  1756   INFLUENZA A PCR  Negative   INFLUENZA B PCR  Negative   RSV PCR  Negative     1/29 MRI BRAIN:PENDING    1/28 CTA HEAD AND NECK:1   No stenosis, dissection or occlusion of the carotid or vertebral arteries or major vessels of the [place]  2   No acute cardial pulmonary process  1/28 CT BRAIN:   1   Mild interval increase in size of left temporal hematoma at the biopsy site, though without evidence of acute hemorrhage  Mildly increased surrounding vasogenic edema  2   No evidence of acute infarct  3   No significant mass effect      1/28 EKG:  ECG 12 Lead Documentation Only   Date/Time: 1/28/2021 8:21 PM  Performed by: Mounika Alberts PA-C  Authorized by:  Mounika Alberts PA-C     ECG reviewed by me, the ED Provider: yes    Patient location:  ED  Previous ECG:     Previous ECG:  Compared to current    Similarity:  No change    Comparison to cardiac monitor: Yes    Interpretation:     Interpretation: normal    Rate:     ECG rate:  74    ECG rate assessment: normal    Rhythm:     Rhythm: sinus rhythm    Ectopy:     Ectopy: none    QRS:     QRS axis:  Normal  Conduction:     Conduction: normal    ST segments:     ST segments:  Normal  T waves:     T waves: normal    Comments:      No evidence of acute cardiac ischemia    ED Treatment:   Medication Administration from 01/28/2021 1612 to 01/29/2021 0116       Date/Time Order Dose Route Action Action by Comments                01/28/2021 1938 dexamethasone (DECADRON) injection 4 mg 4 mg Intravenous Given                  01/28/2021 2242 levETIRAcetam (KEPPRA) tablet 1,000 mg 1,000 mg Oral Given          Past Medical History:   Diagnosis Date    Bruxism (teeth grinding)     Hypertension     Obesity     Sleep apnea     CPAP nightly Present on Admission:   Seizure-[facility] activity ([facility] )   Obstructive sleep apnea syndrome   Hypercholesterolemia   Essential hypertension   Prediabetes      Admitting Diagnosis: Confusion [R41 0]  Brain tumor ([facility] ) [D49 6]  [facility] [R56 9]  Age/Sex: 60 y o  male  Admission Orders:  Scheduled Medications:  amLODIPine, 10 mg, Oral, Daily  atorvastatin, 5 mg, Oral, Daily  dexamethasone, 4 mg, Intravenous, Q4H  docusate sodium, 100 mg, Oral, BID  hydrochlorothiazide, 12 5 mg, Oral, Daily  levETIRAcetam, 1,000 mg, Oral, Q12H ERICA  losartan, 100 mg, Oral, Daily      PRN Meds:  acetaminophen, 650 mg, Oral, Q6H PRN  LORazepam, 2 mg, Intravenous, Q4H PRN  ondansetron, 4 mg, Intravenous, Q6H PRN    SCD  DAILY WEIGHTS  REG DIET  OOB  SEIZURE PRECAUTIONS  CONTACT AND AIRBORNE ISOLATION  DYSPHAGIA ASSESSMENT  NEURO CHECKS Q15MIN    IP CONSULT TO NEUROLOGY  IP CONSULT TO NEUROLOGY    Network Utilization Review Department  ATTENTION: Please call with any questions or concerns to [phone] and carefully listen to the prompts so that you are directed to the right person  All voicemails are confidential   Aminata Del Valle all requests for admission clinical reviews, approved or denied determinations and any other requests to dedicated fax number below belonging to the campus where the patient is receiving treatment   List of dedicated fax numbers for the Facilities:  [address] DENIALS (Administrative/Medical Necessity) [phone]   [address] (Maternity/NICU/Pediatrics) [phone]   [address] [place] [address] Dr Virginia Bliss 6342 (Camelia Araya "Thea" 103) [address] [address]  [address] [facility] Michela Cordero 1481 [address] Linda Diaz) [facility] [phone]

## 2021-01-29 NOTE — ASSESSMENT & PLAN NOTE
Continue pre-hospital Diovan 160 mg p o  Daily, hydrochlorothiazide 12 5 mg p o  Daily and Norvasc 10 mg p o   Daily

## 2021-01-29 NOTE — CASE MANAGEMENT
LOS: 1, URR score: n/a, pt is not a 30 day re-admit  CM met with pt at bedside and explained role  Pt lives with mother Minerva Zazueta in a ranch home;5 [address]  Pt is completely independent with ADLs and ambulation  No DME use reported  Pt PCP is Princess Vance  He uses [facility] and denies any barriers to obtaining medications  Pt denies any history of HHC, STR, MH and DA& treatment  Pt denies any current d c needs  His mother will transport him home on discharge  CM to follow  CM reviewed d/c planning process including the following: identifying help at home, patient preference for d/c planning needs, availability of treatment team to discuss questions or concerns patient and/or family may have regarding understanding medications and recognizing signs and symptoms once discharged  CM also encouraged patient to follow up with all recommended appointments after discharge   Patient advised of importance for patient and family to participate in managing patients medical well being

## 2021-01-29 NOTE — TELEMEDICINE
On-Call Telephone Note    Contacted by TWAN Pollack Novamirta 60 y o  male MRN: [id number]  Unit/Bed#: ED 04 Encounter: [id number]    Per provider report, patient presents with confusion/word finding issues 9 days post tmeporal biopsy for glioma  Available past medical history,social history, surgical history, medication list, drug allergies and review of systems were reviewed  /89   Pulse 70   Temp (!) 97 1 °F (36 2 °C)   Resp 16   Wt 120 kg (264 lb 1 8 oz)   SpO2 96%   BMI 33 01 kg/m²      Clinical exam per provider report, improving, no focal deficit  Imaging personally reviewed  Ct head Jan 28 2021, compared to previous  Interval resolution of pnuemocepahlus/hemorrhage in left temporal lobe  Some increase in edema  Assessment and Plan  1  59 yo with 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  No neurosurgical intervention anticipated  Recommend optimize seizure meds under direction of neurology  2  FUP as outpatient as planned  All questions answered  Provider is in agreement with the course of action  A total of 8 minutes was spent discussing the presentation, physical exam and formulating a management plan with the provider

## 2021-01-29 NOTE — QUICK NOTE
Updated Dr Bennie De La Cruz on patient admission  He is okay with discharging patient home on current treatment and will see pt back on Wednesday to discuss path and further treatment/work up

## 2021-01-29 NOTE — ASSESSMENT & PLAN NOTE
Place on [facility] step 2 diet, encourage healthy weight loss and supportive care

## 2021-02-01 PROBLEM — C71.9 GLIOBLASTOMA (HCC): Status: ACTIVE | Noted: 2021-01-19

## 2021-02-01 LAB — LEVETIRACETAM SERPL-MCNC: 6.9 UG/ML (ref 10–40)

## 2021-02-03 ENCOUNTER — TELEPHONE (OUTPATIENT)
Dept: NEUROLOGY | Facility: CLINIC | Age: 66
End: 2021-02-03

## 2021-02-03 ENCOUNTER — OFFICE VISIT (OUTPATIENT)
Dept: NEUROSURGERY | Facility: CLINIC | Age: 66
End: 2021-02-03

## 2021-02-03 ENCOUNTER — TELEPHONE (OUTPATIENT)
Dept: NEUROSURGERY | Facility: CLINIC | Age: 66
End: 2021-02-03

## 2021-02-03 ENCOUNTER — TELEPHONE (OUTPATIENT)
Dept: HEMATOLOGY ONCOLOGY | Facility: CLINIC | Age: 66
End: 2021-02-03

## 2021-02-03 ENCOUNTER — CLINICAL SUPPORT (OUTPATIENT)
Dept: RADIATION ONCOLOGY | Facility: HOSPITAL | Age: 66
End: 2021-02-03
Attending: RADIOLOGY
Payer: COMMERCIAL

## 2021-02-03 VITALS
TEMPERATURE: 98.2 F | DIASTOLIC BLOOD PRESSURE: 80 MMHG | SYSTOLIC BLOOD PRESSURE: 142 MMHG | RESPIRATION RATE: 16 BRPM | BODY MASS INDEX: 33.32 KG/M2 | HEIGHT: 75 IN | HEART RATE: 63 BPM | WEIGHT: 268 LBS

## 2021-02-03 VITALS
RESPIRATION RATE: 18 BRPM | SYSTOLIC BLOOD PRESSURE: 148 MMHG | BODY MASS INDEX: 33.32 KG/M2 | DIASTOLIC BLOOD PRESSURE: 80 MMHG | TEMPERATURE: 98.2 F | HEIGHT: 75 IN | WEIGHT: 268 LBS | HEART RATE: 63 BPM

## 2021-02-03 DIAGNOSIS — R56.9 SEIZURE-LIKE ACTIVITY (HCC): ICD-10-CM

## 2021-02-03 DIAGNOSIS — C71.9 GLIOBLASTOMA (HCC): Primary | ICD-10-CM

## 2021-02-03 DIAGNOSIS — G93.89: ICD-10-CM

## 2021-02-03 DIAGNOSIS — I10 ESSENTIAL HYPERTENSION: Primary | ICD-10-CM

## 2021-02-03 PROBLEM — D49.6 BRAIN TUMOR (HCC): Status: RESOLVED | Noted: 2021-01-05 | Resolved: 2021-02-03

## 2021-02-03 PROCEDURE — 99211 OFF/OP EST MAY X REQ PHY/QHP: CPT | Performed by: RADIOLOGY

## 2021-02-03 PROCEDURE — 99024 POSTOP FOLLOW-UP VISIT: CPT | Performed by: NEUROLOGICAL SURGERY

## 2021-02-03 RX ORDER — HYDROCHLOROTHIAZIDE 12.5 MG/1
12.5 CAPSULE, GELATIN COATED ORAL DAILY
Qty: 90 CAPSULE | Refills: 3 | Status: SHIPPED | OUTPATIENT
Start: 2021-02-03 | End: 2022-02-07

## 2021-02-03 RX ORDER — LEVETIRACETAM 1000 MG/1
1000 TABLET ORAL EVERY 12 HOURS SCHEDULED
Qty: 60 TABLET | Refills: 2 | Status: SHIPPED | OUTPATIENT
Start: 2021-02-03 | End: 2021-02-18 | Stop reason: SDUPTHER

## 2021-02-03 RX ORDER — VALSARTAN 160 MG/1
160 TABLET ORAL DAILY
Qty: 90 TABLET | Refills: 3 | Status: SHIPPED | OUTPATIENT
Start: 2021-02-03 | End: 2021-03-19

## 2021-02-03 NOTE — TELEPHONE ENCOUNTER
New Patient Encounter    New Patient Intake Form   Patient Details:  Lizandro Saini  1955  [id number]    Background Information:  [address] starts by opening a telephone encounter and gathering the following information   Who is calling to schedule? If not self, relationship to patient? office   Referring Provider Sushma Burns   What is the diagnosis? GBM   Is this diagnosis confirmed? Yes   When was the diagnosis? 1/2021   Is there a confirmed diagnosis from a biopsy/tissue reviewed by pathology? yes   Were outside slides requested? NA   Is patient aware of diagnosis? Yes   Is there a personal history and what kind? Is there a family history and what kind? Reason for visit? New Diagnosis   Have you had any imaging or labs done? If so: when, where? yes  [facility]   Are records in [facility]? yes   If patient has a prior history of breast cancer were old records obtained? Was the patient told to bring a disk? Does the patient smoke or Vape? If yes, how many packs or cartridges per day? Scheduling Information:   Preferred [place]:  [place]     Are there any dates/time the patient cannot be seen? Miscellaneous:    After completing the above information, please route to Financial Counselor and the appropriate Nurse Navigator for review

## 2021-02-03 NOTE — PROGRESS NOTES
Lizandro Saini 1955 is a 60 y o  male    Patient presents to neuro-oncology clinic for radiation consult for newly diagnosed GBM  60year old male with past medical history of hypertension, obesity, hyperlipidemia, and sleep apnea  He presented to the ED on 1/1/21 with sudden onset of expressive aphasia  He was a stroke alert, received tPA and transferred to [facility] ICU  He was placed on the stroke pathway evaluated by Neurology  Further workup revealed left temporal ring-enhancing lesion likely cause of his neurologic symptoms and clinical picture  He was been placed on Keppra for seizure prophylaxis  He was discharged on 1/6/21 with plan for neurosurgery follow up as outpatient  1/1/21 CT stroke alert brain  IMPRESSION: No acute intracranial abnormality  1/1/21 MRI brain wo contrast  IMPRESSION:   No evidence of acute infarct  T2/ FLAIR hyperintense focus at the left temporal juxtacortical region presumed to represent a developmental venous anomaly, however given patient's clinical history a subacute infarct cannot be excluded  Contrast-enhanced MR examination recommended for further characterization  1/2/21 MRI brain w wo contrast  IMPRESSION:   Intense ring enhancement associated with T2/FLAIR signal abnormality lesion in the left temporal lobe  The appearance is strongly suggestive of parenchymal brain neoplasm, most likely glioma or metastasis  Alternatively, tumefactive demyelinating lesion is possible, but considered less likely due to complete absence of diffusion restriction  1/3/21 CT chest abdomen pelvis w contrast  IMPRESSION:   CT chest:   No evidence of significant abnormal mass  Punctate mediastinal and hilar calcified granulomata  Trace bibasilar subsegmental atelectasis, left greater than right  CT abdomen and pelvis:   1 5 cm left hepatic indeterminate nodule   Although this may simply be a flash filling hemangioma or possibly benign AV shunt, advise follow-up with dedicated liver MRI with and without contrast for further characterization  Otherwise, no evidence of significant abnormal mass  Bilateral renal simple cysts and subcentimeter hypodensities too small to characterize statistically representing simple cysts  Mild prostatomegaly  Colonic diverticulosis  1/5/21 MRI abdomen w wo contrast  IMPRESSION:   1  1 3 cm cavernous hemangioma left hepatic lobe  No suspicious hepatic lesions  2  3 mm pancreatic neck cyst could represent a tiny sidebranch IPMN  For simple cyst(s) less than 1 5 cm, recommend yearly followup 5 times, then every 2 year for 2 times  If cyst(s) stable after 9 years, no further followups  Recommend next followup MRI/MRCP in 1 year  3  Bilateral renal cysts  1/12/21 Dr Brenda Myers, neurosurgery follow up - needle biopsy recommended    1/19/21 BIOPSY BRAIN IMAGE-GUIDED NEEDLE - LEFT TEMPORAL   Pathology: Temporal mass:  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)      1/28/21 presented to ED with seizure-like activity, patient's mother reported that she felt that he had a gaze preference to 1 side  He was admitted overnight for observation  No change made to pre-hospital Keppra 1000 mg PO BID      1/28/21 CT stroke alert brain  IMPRESSION:   1  Mild interval increase in size of left temporal hematoma at the biopsy site, though without evidence of acute hemorrhage  Mildly increased surrounding vasogenic edema  2   No evidence of acute infarct  3   No significant mass effect  1/29/21 MRI brain seizure wo and w contrast  IMPRESSION:   Since the prior study dated 1/2/2021, the previously seen ring-enhancing lesion within the left temporal lobe has significantly increased in size and now demonstrates some internal hemorrhage and moderate focal surrounding vasogenic edema  Although some of these changes could be related to the recent biopsy, findings suggest rapid progression of glial neoplasm          Patient is accompanied by his mother, Gaudencio Reese for today's consult  He completed dexamethasone taper yesterday  He continues on Keppra every 12 hours  He reports feeling well  He denies headaches, seizures  Oncology History   Brain tumor ([facility] )   1/19/2021 Biopsy    BIOPSY BRAIN IMAGE-GUIDED NEEDLE - LEFT TEMPORAL  Surgeon: Dr Arabella Nixon    Temporal mass:  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)  Note    This case was seen in consultation with Dr Darryl Marie, an expert in Neuropathology from [facility]  Glioblastoma ([facility] )   1/19/2021 Initial Diagnosis    [facility]     1/19/2021 Biopsy    BIOPSY BRAIN IMAGE-GUIDED NEEDLE - LEFT TEMPORAL  Surgeon: Dr Arabella Nixon    Temporal mass:  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)  Note    This case was seen in consultation with Dr Darryl Marie, an expert in Neuropathology from [facility]                Clinical Trial: no      Health Maintenance   Topic Date Due    Pneumococcal Vaccine: Pediatrics (0 to 5 Years) and At-Risk Patients (6 to 59 Years) (1 of 1 - PPSV23) 07/02/1962    Annual Physical  07/02/1974    Colorectal Cancer Screening  07/02/2006    Influenza Vaccine (1) 06/30/2021 (Originally 9/1/2020)    DTaP,Tdap,and Td Vaccines (1 - Tdap) 11/05/2021 (Originally 7/2/1977)    Depression Screening PHQ  01/15/2022    BMI: Followup Plan  01/15/2022    BMI: Adult  02/03/2022    HIV Screening  Completed    Hepatitis C Screening  Completed    HIB Vaccine  Aged Out    Hepatitis B Vaccine  Aged Out    IPV Vaccine  Aged Out    Hepatitis A Vaccine  Aged Out    Meningococcal ACWY Vaccine  Aged Out    HPV Vaccine  Aged Out       Past Medical History:   Diagnosis Date    Bruxism (teeth grinding)     GBM (glioblastoma multiforme) ([facility] )     Hypercholesterolemia     Hypertension     Obesity     Sleep apnea     CPAP nightly       Past Surgical History:   Procedure Laterality Date    FL LUMBAR PUNCTURE DIAGNOSTIC  1/4/2021    HAND FRACTURE REPAIR      left thumb    [place] STEREO BX/ASPIR/EXCIS,INTRACRANIAL LESN Left 2021    Procedure: BIOPSY BRAIN IMAGE-GUIDED NEEDLE - LEFT TEMPORAL;  Surgeon: Luciana Emmanuel MD;  [facility];  Service: Neurosurgery    TONSILLECTOMY         Family History   Problem Relation Age of Onset    Heart disease Mother     Heart attack Father     Leukemia Brother        Social History     Tobacco Use    Smoking status: Former Smoker     Types: Cigarettes     Quit date:      Years since quittin 1    Smokeless tobacco: Never Used   Substance Use Topics    Alcohol use: Not Currently     Frequency: Never     Binge frequency: Never    Drug use: Not Currently          Current Outpatient Medications:     amLODIPine (NORVASC) 10 mg tablet, Take 1 tablet (10 mg total) by mouth daily, Disp: 90 tablet, Rfl: 3    atorvastatin (LIPITOR) 10 mg tablet, Take 5 mg by mouth daily , Disp: , Rfl:     hydrochlorothiazide (MICROZIDE) 12 5 mg capsule, Take 12 5 mg by mouth daily, Disp: , Rfl:     levETIRAcetam (KEPPRA) 1000 MG tablet, Take 1 tablet (1,000 mg total) by mouth every 12 (twelve) hours, Disp: 60 tablet, Rfl: 0    valsartan (DIOVAN) 160 mg tablet, Take 160 mg by mouth daily, Disp: , Rfl:     No Known Allergies     Review of Systems:  Review of Systems   Constitutional: Negative for activity change, appetite change, fatigue, fever and unexpected weight change  HENT: Positive for dental problem (having trouble with flossing teeth ) and tinnitus (chronic, bilateral)  Negative for ear pain, hearing loss, postnasal drip, sinus pressure, sinus pain, sore throat and trouble swallowing  Eyes: Positive for visual disturbance (some blurriness)  Negative for photophobia  Wears glasses   Respiratory: Positive for shortness of breath (ASKEW)  Negative for cough and chest tightness  Cardiovascular: Negative  Negative for chest pain and leg swelling  Gastrointestinal: Negative      Endocrine: Negative  Genitourinary: Negative  Musculoskeletal: Negative for gait problem  Skin: Negative  Left scalp incision above left ear is closed and dry   Allergic/Immunologic: Negative  Neurological: Positive for light-headedness (occasional)  Negative for seizures, speech difficulty, weakness and headaches  Hematological: Negative  Psychiatric/Behavioral: Negative for confusion, decreased concentration, dysphoric mood and sleep disturbance  The patient is nervous/anxious (feeling anxious)  Short term memory loss       Vitals:    02/03/21 0910   BP: 148/80   Pulse: 63   Resp: 18   Temp: 98 2 °F (36 8 °C)   TempSrc: Temporal   Weight: 122 kg (268 lb)   Height: 6' 3" (1 905 m)       Pain Score: 0-No pain    Pain assessment: 0    PFT: n/a    Imaging:No images are attached to the encounter       Teaching: NCI RT packet, RT to brain    MST: completed    Implantable Devices (Port, pacemaker, pain stimulator): none    Hip Replacement: none

## 2021-02-03 NOTE — TELEPHONE ENCOUNTER
----- Message from Korina Sheets sent at 1/29/2021  9:52 AM EST -----  [place] please schedule hfu as directed below  ----- Message -----  From: Keren Rowe MD  Sent: 1/29/2021   9:43 AM EST  To: PAMELA Edwards, Asim Hutchison     Good morning ladies,   Brain tumor patient is status post biopsy  1/19/21, admitted yesterday for seizure  Started him on Decadron, will be discharged today  On Keppra  1 g twice a day  Would like for Farhana to see the patient in 1-2 weeks if possible  Will order EEG as outpatient    Thank you

## 2021-02-03 NOTE — PROGRESS NOTES
Neurosurgery Office Note  Lizandro Saini 60 y o  male MRN: [id number]      Assessment/Plan      Diagnoses and all orders for this visit:    Glioblastoma ([address] )    Mass of temporal lobe  -     levETIRAcetam (KEPPRA) 1000 MG tablet; Take 1 tablet (1,000 mg total) by mouth every 12 (twelve) hours    Seizure-like activity (HCC)          Discussion:      Two weeks status post image guided left temporal biopsy on 1/19/21  Pathology is GBM  Patient had another episode on 1/29/21, we had issues of confusion, speech difficulty, that was not as severe as his 1 beginning of the year, but lasted a bit longer  He was at not who in had a follow-up MRI scan of the brain  This imaging showed some slight changes consistent with hemorrhage at the biopsy site, but also progression of the mass, in keeping with the diagnosis of glioblastoma  It has expanded, and is encroaching upon the superior temporal gyrus, near Heschl's etcetera  Otherwise, the patient states he is feeling quite well, no headaches, incision healing well etcetera  His case was reviewed at Neuro-Oncology working group today   We discussed options for management, including up-front resection  My concern with that, is that we would need a functional MRI scan, not to mention need to do the surgery awake  Even with those measures, expect I would need to leave some amount of tumor, either present on imaging, or visible by 5ALA, adjacent to  posteriorsuperior temporal gyrus  At the rate it progressed between his 2 prior scans, by the time he would recover from that surgery, and likely will be well within the superior temporal gyrus, affecting speech etcetera  In short, I expect a subtotal resection or even gross total resection would likely end up doing more harm than good up-front if not in short order    Consensus at [facility] was to proceed presently with chemo are T in attempt to achieve control of this site, and utilize either open traditional resect of surgery or ALICIA in future should areas progression declare themselves  I reviewed this with the patient and his mother, and they are in agreement  Dr Otoniel Macedo will see them today to discuss radiation therapy  Her nurse oncology navigator will coordinate for the patient to seen by Dr Heather Echeverria as soon as possible, to initiate temozolomide therapy  The patient has weaned off of steroids as of yesterday  He is holding his 81 mg aspirin, which I encouraged him to continue to hold, as there was some evolving hemorrhage at the tumor site on his recent scans  He is nearing the end of his supply of Keppra, which I will refill  We will also contact Neurology, to have the patient follow-up with  Epilepsy Neurology, who can take over management of this medication going forward  I will plan to see the patient back via the [facility]  Mechanism in future as needed  01/12/21 Metrics: EQ5D5L 54268=0 000; ; ECOG 0; ; MOCA 23/30 02/03/21 Metrics: EQ5D5L 61954=0 876; ; ECOG 0;         CHIEF COMPLAINT    Chief Complaint   Patient presents with    Post-op     2 wk pov, ig brain bx       HISTORY    History of Present Illness     60y o  year old male     HPI    See Discussion    REVIEW OF SYSTEMS    Review of Systems   Constitutional: Negative  HENT: Positive for tinnitus (bilateral ears, chronic)  Having a hard time flossing, feels like teeth are tighter for the last couple of days   Eyes: Positive for visual disturbance (little blurry only with distance)  Respiratory: Positive for shortness of breath (for the last couple of days only on exertion)  Cardiovascular: Negative  Gastrointestinal: Negative  Endocrine: Negative  Genitourinary: Negative  Musculoskeletal: Negative  Skin: Negative  Allergic/Immunologic: Negative      Neurological: Positive for seizures (last episode Jan 1, hospitalized) and speech difficulty (in regards to memory, so more short term memory loss, trouble remember the words)  Negative for dizziness, syncope, weakness, numbness and headaches (due to LP, but better now)  Hematological: Does not bruise/bleed easily (patient on ASA 81)  Psychiatric/Behavioral: Negative for confusion (short term memory loss)  The patient is nervous/anxious  Meds/Allergies     Current Outpatient Medications   Medication Sig Dispense Refill    amLODIPine (NORVASC) 10 mg tablet Take 1 tablet (10 mg total) by mouth daily 90 tablet 3    atorvastatin (LIPITOR) 10 mg tablet Take 5 mg by mouth daily       hydrochlorothiazide (MICROZIDE) 12 5 mg capsule Take 12 5 mg by mouth daily      levETIRAcetam (KEPPRA) 1000 MG tablet Take 1 tablet (1,000 mg total) by mouth every 12 (twelve) hours 60 tablet 0    valsartan (DIOVAN) 160 mg tablet Take 160 mg by mouth daily       No current facility-administered medications for this visit          No Known Allergies    PAST HISTORY    Past Medical History:   Diagnosis Date    Bruxism (teeth grinding)     GBM (glioblastoma multiforme) (HCC)     Hypercholesterolemia     Hypertension     Obesity     Sleep apnea     CPAP nightly       Past Surgical History:   Procedure Laterality Date    FL LUMBAR PUNCTURE DIAGNOSTIC  2021    HAND FRACTURE REPAIR      left thumb    KS STEREO BX/ASPIR/EXCIS,INTRACRANIAL LESN Left 2021    Procedure: BIOPSY BRAIN IMAGE-GUIDED NEEDLE - LEFT TEMPORAL;  Surgeon: Betsy Taveras MD;  Location: [facility];  Service: Neurosurgery    TONSILLECTOMY         Social History     Tobacco Use    Smoking status: Former Smoker     Types: Cigarettes     Quit date:      Years since quittin 1    Smokeless tobacco: Never Used   Substance Use Topics    Alcohol use: Not Currently     Frequency: Never     Binge frequency: Never    Drug use: Not Currently       Family History   Problem Relation Age of Onset    Heart disease Mother     Heart attack Father     Leukemia Brother          The following portions of the patient's history were reviewed in this encounter and updated as appropriate: Past medical, surgical, family, and social history, as well as medications, allergies, and review of systems  EXAM    Vitals:Blood pressure 142/80, pulse 63, temperature 98 2 °F (36 8 °C), resp  rate 16, height 6' 3" (1 905 m), weight 122 kg (268 lb)  ,Body mass index is 33 5 kg/m²  Physical Exam    Neurologic Exam      MEDICAL DECISION MAKING    Imaging Studies:     Ct Head Wo Contrast    Result Date: 1/20/2021  Narrative: CT BRAIN - WITHOUT CONTRAST INDICATION:   Brain mass or lesion, follow-up; Post op  Initially presented with mental status change January 1, 2021  Found to have a ring-enhancing lesion in the left temporal lobe  Status post image guided left temporal lobe biopsy, postoperative day #1  COMPARISON:  Numerous prior studies, most recent of which is a noncontrast CT brain January 19, 2021, MR brain with contrast January 2, 2021 and CTA stroke alert January 1, 2021  TECHNIQUE:  CT examination of the brain was performed  In addition to axial images, sagittal and coronal 2D reformatted images were created and submitted for interpretation  Radiation dose length product (DLP) for this visit:  897 63 mGy-cm   This examination, like all CT scans performed in the [facility], was performed utilizing techniques to minimize radiation dose exposure, including the use of iterative  reconstruction and automated exposure control  IMAGE QUALITY:  Diagnostic  FINDINGS: PARENCHYMA:  Anticipated postoperative changes are present in the posterior left temporal lobe, consistent with recent biopsy  Fluid, air and blood products are present in the posterior left temporal lobe  Mild surrounding vasogenic edema  No acute intraparenchymal hemorrhage or extra-axial fluid collections  No acute infarctions   VENTRICLES AND EXTRA-AXIAL SPACES:  Enlargement of ventricles and extra-axial CSF spaces, out of proportion to the patient's age most consistent with cerebral and cerebellar atrophy  VISUALIZED ORBITS AND PARANASAL SINUSES:  No acute abnormality involving the orbits  Mild scattered sinus mucosal thickening is noted  No fluid levels are seen  CALVARIUM AND EXTRACRANIAL SOFT TISSUES:  Normal      Impression: Anticipated postoperative changes, consistent with recent left temporal lobe biopsy  No acute intracranial abnormality  Workstation performed: [id number]     Ct Head Wo Contrast    Result Date: 1/19/2021  Narrative: CT BRAIN - WITHOUT CONTRAST INDICATION:   Brain mass or lesion, follow-up Post op  COMPARISON:  1/2/2021 TECHNIQUE:  CT examination of the brain was performed  In addition to axial images, sagittal and coronal 2D reformatted images were created and submitted for interpretation  Radiation dose length product (DLP) for this visit:  929 32 mGy-cm   This examination, like all CT scans performed in the [facility], was performed utilizing techniques to minimize radiation dose exposure, including the use of iterative  reconstruction and automated exposure control  IMAGE QUALITY:  Diagnostic  FINDINGS: PARENCHYMA:  Postoperative changes involving the right temporal lobe are identified  1 8 x 1 7 cm focus of hemorrhage is identified within the operative bed  Surrounding edema is identified  Associated pneumocephalus is present  VENTRICLES AND EXTRA-AXIAL SPACES:  Normal for the patient's age  VISUALIZED ORBITS AND PARANASAL SINUSES:  Unremarkable  CALVARIUM AND EXTRACRANIAL SOFT TISSUES:  Normal      Impression: Postoperative changes involving the left temporal lobe with associated focus of hemorrhage in pneumocephalus  Workstation performed: [id number]     Mri Abdomen W Wo Contrast    Result Date: 1/6/2021  Narrative: MRI - ABDOMEN - WITH AND WITHOUT CONTRAST INDICATION:  Indeterminate left hepatic lobe lesion, new brain lesion   COMPARISON: CT abdomen chest, and pelvis 1/3/2021 TECHNIQUE:  The following pulse sequences were obtained prior to and following the administration of intravenous contrast:   Coronal and axial T2 with TE of 90 and 180 respectively, axial T2 with fat saturation, axial FIESTA fat-sat, axial T1-weighted in-and-out-of phase, axial DWI/ADC, precontrast axial T1 with fat saturation, post-contrast dynamic axial T1 with fat saturation at 20, 70, and 180 seconds, coronal T1 with fat saturation and 7 minute delayed axial T1 with fat saturation  Pre- and postcontrast subtraction images were also obtained  IV Contrast:  12 mL of Gadobutrol injection (SINGLE-DOSE) FINDINGS: LOWER CHEST:   Unremarkable  LIVER: Normal in size and configuration  In the anterior left lobe (segment 2) there is a 1 3 x 1 1 x 1 0 cm T1 hypointense, T2 hyperintense nodule corresponding to the CT finding  This demonstrates irregular peripheral nodular enhancement on arterial phase imaging with centripetal filling and  uniform postcontrast enhancement on delayed phase imaging, consistent with cavernous hemangioma  No other focal hepatic lesions are identified  The hepatic veins and portal veins are patent  BILE DUCTS: No intrahepatic or extrahepatic bile duct dilation  No evidence of pancreatic divisum  GALLBLADDER:  Normal  PANCREAS: 3 mm cyst in the pancreatic neck best seen on series 7/24 and series 11 image 2  This could represent a tiny sidebranch intraductal papillary mucinous neoplasm (IPMN)  No main pancreatic ductal dilation  ADRENAL GLANDS:  Normal  SPLEEN:  Normal  KIDNEYS/PROXIMAL URETERS: No hydroureteronephrosis  Bilateral cortical and parapelvic cysts  Mild bilateral perinephric edema, nonspecific  BOWEL:  Colonic diverticulosis  PERITONEUM/RETROPERITONEUM: No ascites  LYMPH NODES: No pathologic lymphadenopathy  VASCULAR STRUCTURES:  No aneurysm  ABDOMINAL WALL:  Unremarkable  OSSEOUS STRUCTURES:  No suspicious osseous lesion       Impression: 1  1 3 cm cavernous hemangioma left hepatic lobe  No suspicious hepatic lesions  2  3 mm pancreatic neck cyst could represent a tiny sidebranch IPMN  For simple cyst(s) less than 1 5 cm, recommend yearly followup 5 times, then every 2 year for 2 times  If cyst(s) stable after 9 years, no further followups  Recommend next followup MRI/MRCP in 1 year  3  Bilateral renal cysts  The study was marked in Epic for follow-up  Workstation performed: [id number]     Ct Stroke Alert Brain    Result Date: 1/28/2021  Narrative: CT BRAIN - STROKE ALERT PROTOCOL INDICATION:   Altered mental status  COMPARISON:  1/20/2021  TECHNIQUE:  CT examination of the brain was performed  In addition to axial images, coronal reformatted images were created and submitted for interpretation  Radiation dose length product (DLP) for this visit:  825 1 mGy-cm   This examination, like all CT scans performed in the [facility], was performed utilizing techniques to minimize radiation dose exposure, including the use of iterative reconstruction and automated exposure control  IMAGE QUALITY:  Diagnostic  FINDINGS:  PARENCHYMA: Decreased attenuation within left posterior superior temporal biopsy site, compatible with interval evolution of blood products within the hematoma  Hematoma measures 2 4 x 1 7 x 1 3 cm, previously measuring 1 3 x 1 8 x 1 6 cm, suggesting mild interval increase in size  No evidence of acute hemorrhage  Vasogenic edema surrounding the left temporal hematoma is mildly increased  Effacement of left temporal sulci  Intact basal cisterns  VENTRICLES AND EXTRA-AXIAL SPACES:  Stable effacement of the left temporal horn  No hydrocephalus  VISUALIZED ORBITS AND PARANASAL SINUSES:  Intact globes and orbits  Clear paranasal sinuses  CALVARIUM AND EXTRACRANIAL SOFT TISSUES:  Small radha hole in the squamous portion of the left temporal bone  Impression: 1    Mild interval increase in size of left temporal hematoma at the biopsy site, though without evidence of acute hemorrhage  Mildly increased surrounding vasogenic edema  2   No evidence of acute infarct  3   No significant mass effect  Findings were directly discussed with DR Corey Delay on 1/28/2021 5:51 PM  Workstation performed: [id number]       Mri Brain Seizure Wo And W Contrast    Result Date: 1/29/2021  Narrative: MRI  BRAIN  - WITH AND WITHOUT CONTRAST INDICATION: Seizure-like activity with recent brain biopsy  Seizure disorder  COMPARISON: 1/2/2021 TECHNIQUE:  Sagittal BRAVO, axial T2, axial FLAIR, axial T1, axial DWI, axial Gradient, coronal T2, and coronal FLAIR  Coronal T1 and axial T1   3-D T1 in the coronal plane with sagittal and axial reconstructions  IV Contrast:  12 mL of Gadobutrol injection (SINGLE-DOSE) IMAGE QUALITY:   Diagnostic  FINDINGS: BRAIN PARENCHYMA:  Previously identified ring-enhancing lesion within the left posterior lateral middle temporal gyrus has significantly increased in size  The mass now measures 3 1 cm in transverse diameter, compared to 1 4 cm previously  Moderate surrounding vasogenic edema is present, new since prior exam   There has been interval hemorrhage within the lesion, layering posteriorly but also seen peripherally within the inferolateral aspect of the lesion  There are no new enhancing lesions identified  No acute ischemia  Symmetric hippocampal formations with regard to size and signal  VENTRICLES:  Normal  SELLA AND PITUITARY GLAND:  Normal  ORBITS:  Normal  PARANASAL SINUSES:  Mild mucosal thickening of the maxillary sinuses  VASCULATURE:  Evaluation of the major intracranial vasculature demonstrates appropriate flow voids   CALVARIUM AND SKULL BASE:  Normal  EXTRACRANIAL SOFT TISSUES:  Normal      Impression: Since the prior study dated 1/2/2021, the previously seen ring-enhancing lesion within the left temporal lobe has significantly increased in size and now demonstrates some internal hemorrhage and moderate focal surrounding vasogenic edema  Although some  of these changes could be related to the recent biopsy, findings suggest rapid progression of glial neoplasm  I personally discussed this study with LEE COLLAZO on 1/29/2021 at 10:28 AM    Workstation performed: [id number]     Cta Stroke Alert (head/neck)    Result Date: 1/28/2021  Narrative: CTA NECK AND BRAIN WITH CONTRAST INDICATION: COMPARISON:   None  TECHNIQUE:   Post contrast imaging was performed after administration of iodinated contrast through the neck and brain  Post contrast axial 0 625 mm images timed to opacify the arterial system  3D rendering was performed on an independent workstation  MIP reconstructions performed  Coronal reconstructions were performed of the noncontrast portion of the brain  Radiation dose length product (DLP) for this visit:  797 mGy-cm   This examination, like all CT scans performed in the [facility], was performed utilizing techniques to minimize radiation dose exposure, including the use of iterative reconstruction and automated exposure control  IV Contrast:  85 mL of iohexol (OMNIPAQUE)  IMAGE QUALITY:   Diagnostic FINDINGS: CERVICAL VASCULATURE AORTIC ARCH AND GREAT VESSELS:  Three-vessel configuration aortic arch  No stenosis in the subclavian arteries  RIGHT VERTEBRAL ARTERY CERVICAL SEGMENT:  Normal origin  The vessel is normal in caliber throughout the neck  LEFT VERTEBRAL ARTERY CERVICAL SEGMENT:  Normal origin  The vessel is normal in caliber throughout the neck  RIGHT EXTRACRANIAL CAROTID SEGMENT:  Normal caliber common carotid artery  Normal bifurcation and cervical internal carotid artery  No stenosis or dissection  LEFT EXTRACRANIAL CAROTID SEGMENT:  Normal caliber common carotid artery  Normal bifurcation and cervical internal carotid artery  No stenosis or dissection  NASCET criteria was used to determine the degree of internal carotid artery diameter stenosis   INTRACRANIAL VASCULATURE INTERNAL CAROTID ARTERIES:  Normal enhancement of the intracranial portions of the internal carotid arteries  Normal ophthalmic artery origins  Normal ICA terminus  ANTERIOR CIRCULATION:  Symmetric A1 segments and anterior cerebral arteries with normal enhancement  Normal anterior communicating artery  MIDDLE CEREBRAL ARTERY CIRCULATION:  M1 segment and middle cerebral artery branches demonstrate normal enhancement bilaterally  DISTAL VERTEBRAL ARTERIES:  Normal distal vertebral arteries  Posterior inferior cerebellar artery origins are normal  Normal vertebral basilar junction  BASILAR ARTERY:  Basilar artery is normal in caliber  Normal superior cerebellar arteries  POSTERIOR CEREBRAL ARTERIES: Both posterior cerebral arteries arises from the basilar tip  Both arteries demonstrate normal enhancement  Normal posterior communicating arteries  DURAL VENOUS SINUSES:  Nondiagnostic due to contrast bolus timing  NON VASCULAR ANATOMY BONY STRUCTURES:  No lytic or blastic lesion  SOFT TISSUES OF THE NECK:  No mass or lymphadenopathy  VISUALIZED CHEST:  Cardiomegaly  No pleural effusion  Clear lungs  Impression: 1  No stenosis, dissection or occlusion of the carotid or vertebral arteries or major vessels of the [place]  2   No acute cardial pulmonary process  Findings were directly discussed with DR Charles Iglesias  on 1/28/2021 6:00 PM  Workstation performed: [id number]       I have personally reviewed pertinent reports     and I have personally reviewed pertinent films in PACS

## 2021-02-03 NOTE — PROGRESS NOTES
Consultation - Radiation Oncology      CAN:[id number] : 1955  Encounter: [id number]  Patient Information: [address]  Chief Complaint   Patient presents with   Emaline Folds Consult     radiation oncology     Cancer Staging  No matching staging information was found for the patient  History of Present Illness     Patient presents to neuro-oncology clinic for radiation consult for newly diagnosed GBM     60year old male with past medical history of hypertension, obesity, hyperlipidemia, and sleep apnea  He presented to the ED on 21 with sudden onset of expressive aphasia  He was a stroke alert, received tPA and transferred to [facility] was placed on the stroke pathway evaluated by Neurology  Further workup revealed left temporal ring-enhancing lesion likely cause of his neurologic symptoms and clinical picture   He was been placed on Keppra for seizure prophylaxis   He was discharged on 21 with plan for neurosurgery follow up as outpatient      21 CT stroke alert brain  IMPRESSION: No acute intracranial abnormality      21 MRI brain wo contrast  IMPRESSION:   No evidence of acute infarct      T2/ FLAIR hyperintense focus at the left temporal juxtacortical region presumed to represent a developmental venous anomaly, however given patient's clinical history a subacute infarct cannot be excluded   Contrast-enhanced MR examination recommended for further characterization      21 MRI brain w wo contrast  IMPRESSION:   Intense ring enhancement associated with T2/FLAIR signal abnormality lesion in the left temporal lobe  The appearance is strongly suggestive of parenchymal brain neoplasm, most likely glioma or metastasis   Alternatively, tumefactive demyelinating lesion is possible, but considered less likely due to complete absence of diffusion restriction         1/3/21 CT chest abdomen pelvis w contrast  IMPRESSION:   CT chest:   No evidence of significant abnormal mass    Punctate mediastinal and hilar calcified granulomata    Trace bibasilar subsegmental atelectasis, left greater than right      CT abdomen and pelvis:   1 5 cm left hepatic indeterminate nodule  Although this may simply be a flash filling hemangioma or possibly benign AV shunt, advise follow-up with dedicated liver MRI with and without contrast for further characterization    Otherwise, no evidence of significant abnormal mass    Bilateral renal simple cysts and subcentimeter hypodensities too small to characterize statistically representing simple cysts    Mild prostatomegaly    Colonic diverticulosis     1/5/21 MRI abdomen w wo contrast  IMPRESSION:   1  1 3 cm cavernous hemangioma left hepatic lobe   No suspicious hepatic lesions    2  3 mm pancreatic neck cyst could represent a tiny sidebranch IPMN   For simple cyst(s) less than 1 5 cm, recommend yearly followup 5 times, then every 2 year for 2 times  If cyst(s) stable after 9 years, no further followups  Recommend next followup MRI/MRCP in 1 year    3  Bilateral renal cysts      1/12/21 Dr Robe Prater, neurosurgery follow up - needle biopsy recommended     1/19/21 BIOPSY BRAIN IMAGE-GUIDED NEEDLE - LEFT TEMPORAL   Pathology: Temporal mass:  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)        1/28/21 presented to ED with seizure-like activity, patient's mother reported that she felt that he had a gaze preference to 1 side  He was admitted overnight for observation  No change made to pre-hospital Keppra 1000 mg PO BID       1/28/21 CT stroke alert brain  IMPRESSION:   1   Mild interval increase in size of left temporal hematoma at the biopsy site, though without evidence of acute hemorrhage   Mildly increased surrounding vasogenic edema  2   No evidence of acute infarct    3   No significant mass effect      1/29/21 MRI brain seizure wo and w contrast  IMPRESSION:   Since the prior study dated 1/2/2021, the previously seen ring-enhancing lesion within the left temporal lobe has significantly increased in size and now demonstrates some internal hemorrhage and moderate focal surrounding vasogenic edema   Although some of these changes could be related to the recent biopsy, findings suggest rapid progression of glial neoplasm           Patient is accompanied by his mother, Marie Kowalski for today's consult  He completed dexamethasone taper yesterday  He continues on Keppra every 12 hours  He reports feeling well  He denies headaches  He states he has had 2 partial seizures and remains on Keppra          Historical Information   Oncology History   Brain tumor ([facility] )   1/19/2021 Biopsy    BIOPSY BRAIN IMAGE-GUIDED NEEDLE - LEFT TEMPORAL  Surgeon: Dr Nolvia Adamson    Temporal mass:  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)  Note    This case was seen in consultation with Dr Aminta Gomez, an expert in Neuropathology from [facility]  Glioblastoma ([place] [address] )   1/19/2021 Initial Diagnosis    Glioblastoma ([facility] )     1/19/2021 Biopsy    BIOPSY BRAIN IMAGE-GUIDED NEEDLE - LEFT TEMPORAL  Surgeon: Dr Nolvia Adamson    Temporal mass:  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)  Note    This case was seen in consultation with Dr Aminta Gomez, an expert in Neuropathology from [facility]                  Past Medical History:   Diagnosis Date    Bruxism (teeth grinding)     GBM (glioblastoma multiforme) (HCC)     Hypercholesterolemia     Hypertension     Obesity     Sleep apnea     CPAP nightly     Past Surgical History:   Procedure Laterality Date    [place] LUMBAR PUNCTURE DIAGNOSTIC  1/4/2021    HAND FRACTURE REPAIR      left thumb    [place] STEREO BX/ASPIR/EXCIS,INTRACRANIAL LESN Left 1/19/2021    Procedure: BIOPSY BRAIN IMAGE-GUIDED NEEDLE - LEFT TEMPORAL;  Surgeon: Nolvia Adamson MD;  [facility]: [facility];  Service: Neurosurgery    TONSILLECTOMY         Family History   Problem Relation Age of Onset    Heart disease Mother     Heart attack Father     Leukemia Brother        Social History   Social History     Substance and Sexual Activity   Alcohol Use Not Currently    Frequency: Never    Binge frequency: Never     Social History     Substance and Sexual Activity   Drug Use Not Currently     Social History     Tobacco Use   Smoking Status Former Smoker    Types: Cigarettes    Quit date: 12    Years since quittin 1   Smokeless Tobacco Never Used         Meds/Allergies     Current Outpatient Medications:     amLODIPine (NORVASC) 10 mg tablet, Take 1 tablet (10 mg total) by mouth daily, Disp: 90 tablet, Rfl: 3    atorvastatin (LIPITOR) 10 mg tablet, Take 5 mg by mouth daily , Disp: , Rfl:     hydrochlorothiazide (MICROZIDE) 12 5 mg capsule, Take 12 5 mg by mouth daily, Disp: , Rfl:     levETIRAcetam (KEPPRA) 1000 MG tablet, Take 1 tablet (1,000 mg total) by mouth every 12 (twelve) hours, Disp: 60 tablet, Rfl: 0    valsartan (DIOVAN) 160 mg tablet, Take 160 mg by mouth daily, Disp: , Rfl:   No Known Allergies      Review of Systems   Constitutional: Negative for activity change, appetite change, fatigue, fever and unexpected weight change  HENT: Positive for dental problem (having trouble with flossing teeth ) and tinnitus (chronic, bilateral)  Negative for ear pain, hearing loss, postnasal drip, sinus pressure, sinus pain, sore throat and trouble swallowing  Eyes: Positive for visual disturbance (some blurriness)  Negative for photophobia  Wears glasses   Respiratory: Positive for shortness of breath (ASKEW)  Negative for cough and chest tightness  Cardiovascular: Negative  Negative for chest pain and leg swelling  Gastrointestinal: Negative  Endocrine: Negative  Genitourinary: Negative  Musculoskeletal: Negative for gait problem  Skin: Negative  Left scalp incision above left ear is closed and dry   Allergic/Immunologic: Negative  Neurological: Positive for light-headedness (occasional)   Negative for seizures, speech difficulty, weakness and headaches  Hematological: Negative  Psychiatric/Behavioral: Negative for confusion, decreased concentration, dysphoric mood and sleep disturbance  The patient is nervous/anxious (feeling anxious)  Short term memory loss         OBJECTIVE:   /80   Pulse 63   Temp 98 2 °F (36 8 °C) (Temporal)   Resp 18   Ht 6' 3" (1 905 m)   Wt 122 kg (268 lb)   BMI 33 50 kg/m²   Pain Assessment:  0  Performance Status: ECOG/Zubrod/WHO: 0 - Asymptomatic    Physical Exam    his biopsy incision is healing well no surrounding erythema or drainage noted  He is conversing appropriately  No focal muscle weakness  Ambulating independently  His breathing is unlabored  RESULTS  Lab Results    Chemistry        Component Value Date/Time    K 3 8 01/29/2021 0454     01/29/2021 0454    CO2 26 01/29/2021 0454    BUN 15 01/29/2021 0454    CREATININE 0 68 (L) 01/29/2021 0454        Component Value Date/Time    CALCIUM 10 0 01/29/2021 0454            Lab Results   Component Value Date    WBC 7 70 01/29/2021    HGB 16 3 01/29/2021    HCT 47 6 (H) 01/29/2021    MCV 88 01/29/2021     01/29/2021         Imaging Studies  Ct Head Wo Contrast    Result Date: 1/20/2021  Narrative: CT BRAIN - WITHOUT CONTRAST INDICATION:   Brain mass or lesion, follow-up; Post op  Initially presented with mental status change January 1, 2021  Found to have a ring-enhancing lesion in the left temporal lobe  Status post image guided left temporal lobe biopsy, postoperative day #1  COMPARISON:  Numerous prior studies, most recent of which is a noncontrast CT brain January 19, 2021, MR brain with contrast January 2, 2021 and CTA stroke alert January 1, 2021  TECHNIQUE:  CT examination of the brain was performed  In addition to axial images, sagittal and coronal 2D reformatted images were created and submitted for interpretation  Radiation dose length product (DLP) for this visit:  897 63 mGy-cm     This examination, like all CT scans performed in the [facility], was performed utilizing techniques to minimize radiation dose exposure, including the use of iterative  reconstruction and automated exposure control  IMAGE QUALITY:  Diagnostic  FINDINGS: PARENCHYMA:  Anticipated postoperative changes are present in the posterior left temporal lobe, consistent with recent biopsy  Fluid, air and blood products are present in the posterior left temporal lobe  Mild surrounding vasogenic edema  No acute intraparenchymal hemorrhage or extra-axial fluid collections  No acute infarctions  VENTRICLES AND EXTRA-AXIAL SPACES:  Enlargement of ventricles and extra-axial CSF spaces, out of proportion to the patient's age most consistent with cerebral and cerebellar atrophy  VISUALIZED ORBITS AND PARANASAL SINUSES:  No acute abnormality involving the orbits  Mild scattered sinus mucosal thickening is noted  No fluid levels are seen  CALVARIUM AND EXTRACRANIAL SOFT TISSUES:  Normal      Impression: Anticipated postoperative changes, consistent with recent left temporal lobe biopsy  No acute intracranial abnormality  Workstation performed: [id number]     Ct Head Wo Contrast    Result Date: 1/19/2021  Narrative: CT BRAIN - WITHOUT CONTRAST INDICATION:   Brain mass or lesion, follow-up Post op  COMPARISON:  1/2/2021 TECHNIQUE:  CT examination of the brain was performed  In addition to axial images, sagittal and coronal 2D reformatted images were created and submitted for interpretation  Radiation dose length product (DLP) for this visit:  929 32 mGy-cm   This examination, like all CT scans performed in the [facility], was performed utilizing techniques to minimize radiation dose exposure, including the use of iterative  reconstruction and automated exposure control  IMAGE QUALITY:  Diagnostic  FINDINGS: PARENCHYMA:  Postoperative changes involving the right temporal lobe are identified    1 8 x 1 7 cm focus of hemorrhage is identified within the operative bed  Surrounding edema is identified  Associated pneumocephalus is present  VENTRICLES AND EXTRA-AXIAL SPACES:  Normal for the patient's age  VISUALIZED ORBITS AND PARANASAL SINUSES:  Unremarkable  CALVARIUM AND EXTRACRANIAL SOFT TISSUES:  Normal      Impression: Postoperative changes involving the left temporal lobe with associated focus of hemorrhage in pneumocephalus  Workstation performed: [id number]     Mri Abdomen W Wo Contrast    Result Date: 1/6/2021  Narrative: MRI - ABDOMEN - WITH AND WITHOUT CONTRAST INDICATION:  Indeterminate left hepatic lobe lesion, new brain lesion  COMPARISON: CT abdomen chest, and pelvis 1/3/2021 TECHNIQUE:  The following pulse sequences were obtained prior to and following the administration of intravenous contrast:   Coronal and axial T2 with TE of 90 and 180 respectively, axial T2 with fat saturation, axial FIESTA fat-sat, axial T1-weighted in-and-out-of phase, axial DWI/ADC, precontrast axial T1 with fat saturation, post-contrast dynamic axial T1 with fat saturation at 20, 70, and 180 seconds, coronal T1 with fat saturation and 7 minute delayed axial T1 with fat saturation  Pre- and postcontrast subtraction images were also obtained  IV Contrast:  12 mL of Gadobutrol injection (SINGLE-DOSE) FINDINGS: LOWER CHEST:   Unremarkable  LIVER: Normal in size and configuration  In the anterior left lobe (segment 2) there is a 1 3 x 1 1 x 1 0 cm T1 hypointense, T2 hyperintense nodule corresponding to the CT finding  This demonstrates irregular peripheral nodular enhancement on arterial phase imaging with centripetal filling and  uniform postcontrast enhancement on delayed phase imaging, consistent with cavernous hemangioma  No other focal hepatic lesions are identified  The hepatic veins and portal veins are patent  BILE DUCTS: No intrahepatic or extrahepatic bile duct dilation  No evidence of pancreatic divisum   GALLBLADDER: Normal  PANCREAS: 3 mm cyst in the pancreatic neck best seen on series 7/24 and series 11 image 2  This could represent a tiny sidebranch intraductal papillary mucinous neoplasm (IPMN)  No main pancreatic ductal dilation  ADRENAL GLANDS:  Normal  SPLEEN:  Normal  KIDNEYS/PROXIMAL URETERS: No hydroureteronephrosis  Bilateral cortical and parapelvic cysts  Mild bilateral perinephric edema, nonspecific  BOWEL:  Colonic diverticulosis  PERITONEUM/RETROPERITONEUM: No ascites  LYMPH NODES: No pathologic lymphadenopathy  VASCULAR STRUCTURES:  No aneurysm  ABDOMINAL WALL:  Unremarkable  OSSEOUS STRUCTURES:  No suspicious osseous lesion  Impression: 1  1 3 cm cavernous hemangioma left hepatic lobe  No suspicious hepatic lesions  2  3 mm pancreatic neck cyst could represent a tiny sidebranch IPMN  For simple cyst(s) less than 1 5 cm, recommend yearly followup 5 times, then every 2 year for 2 times  If cyst(s) stable after 9 years, no further followups  Recommend next followup MRI/MRCP in 1 year  3  Bilateral renal cysts  The study was marked in Epic for follow-up  Workstation performed: [id number]     Ct Stroke Alert Brain    Result Date: 1/28/2021  Narrative: CT BRAIN - STROKE ALERT PROTOCOL INDICATION:   Altered mental status  COMPARISON:  1/20/2021  TECHNIQUE:  CT examination of the brain was performed  In addition to axial images, coronal reformatted images were created and submitted for interpretation  Radiation dose length product (DLP) for this visit:  825 1 mGy-cm   This examination, like all CT scans performed in the [facility], was performed utilizing techniques to minimize radiation dose exposure, including the use of iterative reconstruction and automated exposure control  IMAGE QUALITY:  Diagnostic  FINDINGS:  PARENCHYMA: Decreased attenuation within left posterior superior temporal biopsy site, compatible with interval evolution of blood products within the hematoma    Hematoma measures 2 4 x 1 7 x 1 3 cm, previously measuring 1 3 x 1 8 x 1 6 cm, suggesting mild interval increase in size  No evidence of acute hemorrhage  Vasogenic edema surrounding the left temporal hematoma is mildly increased  Effacement of left temporal sulci  Intact basal cisterns  VENTRICLES AND EXTRA-AXIAL SPACES:  Stable effacement of the left temporal horn  No hydrocephalus  VISUALIZED ORBITS AND PARANASAL SINUSES:  Intact globes and orbits  Clear paranasal sinuses  CALVARIUM AND EXTRACRANIAL SOFT TISSUES:  Small radha hole in the squamous portion of the left temporal bone  Impression: 1  Mild interval increase in size of left temporal hematoma at the biopsy site, though without evidence of acute hemorrhage  Mildly increased surrounding vasogenic edema  2   No evidence of acute infarct  3   No significant mass effect  Findings were directly discussed with DR Zuleika Coy on 1/28/2021 5:51 PM  Workstation performed: [id number]     Fl Lumbar Puncture Diagnostic    Result Date: 1/4/2021  Narrative: FLUOROSCOPICALLY GUIDED LUMBAR PUNCTURE  INDICATION:  Inpatient request for lumbar puncture  Recent the lesion noted on MRI brain  FLUOROSCOPY TIME:   21 MINUTES IMAGES:  2    TECHNIQUE:   Consent was obtained after fully explaining the procedure to the patient  Risks and benefits of procedure were described and understood  Precautions to avoid spinal headache were reviewed  1% lidocaine was infiltrated at the puncture site  Utilizing Left paravertebral approach, a 20 gauge spinal needle was advanced under fluoroscopic guidance into the subarachnoid space at the L3-L4 level, utilizing sterile technique  Once in position, approximately 16 cc of clear, colorless CSF were removed and placed into 4 tubes which subsequently were transported to the lab for requested analysis  The needle was removed  The patient tolerated the procedure well  The patient was discharged from the department with appropriate instructions  Impression: Successful fluoroscopically guided lumbar puncture  Procedure was performed by ESTRELLITA Aparicio PA-C under the direct supervision of Dr Rojas Guerrero  Workstation performed: [id number]     Mri Brain Seizure Wo And W Contrast    Result Date: 1/29/2021  Narrative: MRI  BRAIN  - WITH AND WITHOUT CONTRAST INDICATION: Seizure-like activity with recent brain biopsy  Seizure disorder  COMPARISON: 1/2/2021 TECHNIQUE:  Sagittal BRAVO, axial T2, axial FLAIR, axial T1, axial DWI, axial Gradient, coronal T2, and coronal FLAIR  Coronal T1 and axial T1   3-D T1 in the coronal plane with sagittal and axial reconstructions  IV Contrast:  12 mL of Gadobutrol injection (SINGLE-DOSE) IMAGE QUALITY:   Diagnostic  FINDINGS: BRAIN PARENCHYMA:  Previously identified ring-enhancing lesion within the left posterior lateral middle temporal gyrus has significantly increased in size  The mass now measures 3 1 cm in transverse diameter, compared to 1 4 cm previously  Moderate surrounding vasogenic edema is present, new since prior exam   There has been interval hemorrhage within the lesion, layering posteriorly but also seen peripherally within the inferolateral aspect of the lesion  There are no new enhancing lesions identified  No acute ischemia  Symmetric hippocampal formations with regard to size and signal  VENTRICLES:  Normal  SELLA AND PITUITARY GLAND:  Normal  ORBITS:  Normal  PARANASAL SINUSES:  Mild mucosal thickening of the maxillary sinuses  VASCULATURE:  Evaluation of the major intracranial vasculature demonstrates appropriate flow voids  CALVARIUM AND SKULL BASE:  Normal  EXTRACRANIAL SOFT TISSUES:  Normal      Impression: Since the prior study dated 1/2/2021, the previously seen ring-enhancing lesion within the left temporal lobe has significantly increased in size and now demonstrates some internal hemorrhage and moderate focal surrounding vasogenic edema    Although some  of these changes could be related to the recent biopsy, findings suggest rapid progression of glial neoplasm  I personally discussed this study with LEE COLLAZO on 1/29/2021 at 10:28 AM    Workstation performed: [id number]     Cta Stroke Alert (head/neck)    Result Date: 1/28/2021  Narrative: CTA NECK AND BRAIN WITH CONTRAST INDICATION: COMPARISON:   None  TECHNIQUE:   Post contrast imaging was performed after administration of iodinated contrast through the neck and brain  Post contrast axial 0 625 mm images timed to opacify the arterial system  3D rendering was performed on an independent workstation  MIP reconstructions performed  Coronal reconstructions were performed of the noncontrast portion of the brain  Radiation dose length product (DLP) for this visit:  797 mGy-cm   This examination, like all CT scans performed in the [place], was performed utilizing techniques to minimize radiation dose exposure, including the use of iterative reconstruction and automated exposure control  IV Contrast:  85 mL of iohexol (OMNIPAQUE)  IMAGE QUALITY:   Diagnostic FINDINGS: CERVICAL VASCULATURE AORTIC ARCH AND GREAT VESSELS:  Three-vessel configuration aortic arch  No stenosis in the subclavian arteries  RIGHT VERTEBRAL ARTERY CERVICAL SEGMENT:  Normal origin  The vessel is normal in caliber throughout the neck  LEFT VERTEBRAL ARTERY CERVICAL SEGMENT:  Normal origin  The vessel is normal in caliber throughout the neck  RIGHT EXTRACRANIAL CAROTID SEGMENT:  Normal caliber common carotid artery  Normal bifurcation and cervical internal carotid artery  No stenosis or dissection  LEFT EXTRACRANIAL CAROTID SEGMENT:  Normal caliber common carotid artery  Normal bifurcation and cervical internal carotid artery  No stenosis or dissection  NASCET criteria was used to determine the degree of internal carotid artery diameter stenosis   INTRACRANIAL VASCULATURE INTERNAL CAROTID ARTERIES:  Normal enhancement of the intracranial portions of the internal carotid arteries  Normal ophthalmic artery origins  Normal ICA terminus  ANTERIOR CIRCULATION:  Symmetric A1 segments and anterior cerebral arteries with normal enhancement  Normal anterior communicating artery  MIDDLE CEREBRAL ARTERY CIRCULATION:  M1 segment and middle cerebral artery branches demonstrate normal enhancement bilaterally  DISTAL VERTEBRAL ARTERIES:  Normal distal vertebral arteries  Posterior inferior cerebellar artery origins are normal  Normal vertebral basilar junction  BASILAR ARTERY:  Basilar artery is normal in caliber  Normal superior cerebellar arteries  POSTERIOR CEREBRAL ARTERIES: Both posterior cerebral arteries arises from the basilar tip  Both arteries demonstrate normal enhancement  Normal posterior communicating arteries  DURAL VENOUS SINUSES:  Nondiagnostic due to contrast bolus timing  NON VASCULAR ANATOMY BONY STRUCTURES:  No lytic or blastic lesion  SOFT TISSUES OF THE NECK:  No mass or lymphadenopathy  VISUALIZED CHEST:  Cardiomegaly  No pleural effusion  Clear lungs  Impression: 1  No stenosis, dissection or occlusion of the carotid or vertebral arteries or major vessels of the [place]  2   No acute cardial pulmonary process  Findings were directly discussed with DR Rebecca Gilman  on 1/28/2021 6:00 PM  Workstation performed: [id number]         Pathology:  Final Diagnosis   A, B, and C  Temporal mass:  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)  Electronically signed by Teressa Camp MD on 1/26/2021 at [phone]   Comments: This is an appended report  These results have been appended to a previously preliminary verified report  Preliminary Diagnosis   A, B, and C  Temporal mass:       - Cellular glial neoplasm with cytologic atypia, mitoses, and vascular proliferation, favor high grade       - Final diagnosis pending outside expert consultation       Preliminary result electronically signed by Teressa Camp MD on 1/20/2021 at [phone] Comments: This is an appended report  These results have been appended to a previously preliminary verified report  Note    This case was seen in consultation with Dr Lior Vázquez, an expert in Neuropathology from [facility]  The contents of his consultation letter are given below  Dr Joni Guevara original report is on file in Pathology Department, [facility]  Dear Dr Sridevi Tate,      I agree  This represents a glioblastoma (WHO grade lV)  I will initiate the requisite immunohistochemical and  MGMT promoter methylation studies and will keep you abreast  lf I need an additional block, I will let you know                 ASSESSMENT  1  Glioblastoma ([facility] [address] )       Cancer Staging  No matching staging information was found for the patient  PLAN/DISCUSSION  No orders of the defined types were placed in this encounter  Greer Ferguson is a 60y o  year old male   Is status post  Biopsy of a left temporal mass with pathology consistent with GBM  He was seen today in Neuro-Oncology Clinic along with Dr Rush Shoemaker  I reviewed his pathology and natural course of GBM  We discussed that this is an aggressive primary brain tumor  He did discuss with Dr Rush Shoemaker the pros and cons of a any additional surgery and the decision was not to pursue any further resection  Therefore I reviewed with him and his mother a course of radiation therapy given in conjunction with Temodar  He will also be referred for Medical Oncology consult  We discussed a partial brain external beam radiation plan  This will be treated to a dose of 4600 cGy followed by a cone-down to bring the total dose to 6000 cGy  Possible side effects were reviewed with the patient and his mother  This can include but not limited to fatigue, partial hair loss which may be permanent, swelling which may require steroids, injury to surrounding brain tissue, radionecrosis    He is agreeable to proceeding with the above outlined plan  He has been scheduled at our [facility] as this is closest to his home  Holly Santiago MD  8/5/8553,5:50 AM      Portions of the record may have been created with voice recognition software  Occasional wrong word or "sound a like" substitutions may have occurred due to the inherent limitations of voice recognition software  Read the chart carefully and recognize, using context, where substitutions have occurred

## 2021-02-03 NOTE — TELEPHONE ENCOUNTER
Sched HFU appt 2/18/2021 with Dr Ramsey Lamb in [facility]  Mailed NP paperwork to pt's home  Sent in basket message to Dr Veronica Gallegos to have her put in referral for EEG

## 2021-02-03 NOTE — TELEPHONE ENCOUNTER
Patient called the office inquiring if he is allowed to drink alcohol  Informed patient he can drink alcohol in moderation e g  one drink with dinner  He is aware he cannot drink when he starts chemo and RT  Patient was appreciative

## 2021-02-04 ENCOUNTER — DOCUMENTATION (OUTPATIENT)
Dept: HEMATOLOGY ONCOLOGY | Facility: CLINIC | Age: 66
End: 2021-02-04

## 2021-02-04 NOTE — PROGRESS NOTES
Pt called me back & I went over the benefits & we talked about ma & serafin sánchez that he makes to much for that  Told him about the poss of open enrollment on 02/15/21 & serafin sd that he would be interested in that he's going to call me back at that time  Margie Santana

## 2021-02-04 NOTE — PROGRESS NOTES
Called ins & spoke to ron call ref# [id number]  He sd that pt has a jose membership effective 03/01/20  We are par & in the network    He sd that its a sharing type of plan   pt will pay the 1st $7500 then the claims go to health ministries & if the procedure is eligible then they will share it & decide on the amount that will be pd he sd that he doesn't know what procedures are on the list that hey pay for  he did say the for a pcp & specialist & urgent care they all fall under the office visits & the pt is allowed 6 of thos per year w/the pt resp of $300  That was the jason benefit that he could really tell me he did say to go to [employer]  org to see if claims were recvd & processed  Called the pt to go over this info   Got his voicemail left him a message to call me back

## 2021-02-04 NOTE — TELEPHONE ENCOUNTER
MD Apollo Quintero   Cc:  Tata Feliz my last conversation with you I found out more that patient may have malignant brain tumor  Alessandra Medina is to see Neurosurgery this week for further assessment   I am not quite sure if there is an urgency to see Neurology or get EEG at this time    However saying that I would keep the appointment with the neurologist for now   Would hold off on the EEG unless suggested by epileptologist    Thank you

## 2021-02-06 ENCOUNTER — APPOINTMENT (OUTPATIENT)
Dept: RADIATION ONCOLOGY | Facility: CLINIC | Age: 66
End: 2021-02-06
Attending: RADIOLOGY
Payer: COMMERCIAL

## 2021-02-06 PROCEDURE — 77334 RADIATION TREATMENT AID(S): CPT | Performed by: RADIOLOGY

## 2021-02-06 PROCEDURE — 77470 SPECIAL RADIATION TREATMENT: CPT | Performed by: RADIOLOGY

## 2021-02-08 LAB — FUNGUS SPEC CULT: NORMAL

## 2021-02-10 ENCOUNTER — TELEPHONE (OUTPATIENT)
Dept: HEMATOLOGY ONCOLOGY | Facility: CLINIC | Age: 66
End: 2021-02-10

## 2021-02-10 ENCOUNTER — CONSULT (OUTPATIENT)
Dept: HEMATOLOGY ONCOLOGY | Facility: CLINIC | Age: 66
End: 2021-02-10
Payer: COMMERCIAL

## 2021-02-10 VITALS
WEIGHT: 265.2 LBS | DIASTOLIC BLOOD PRESSURE: 76 MMHG | OXYGEN SATURATION: 96 % | SYSTOLIC BLOOD PRESSURE: 128 MMHG | BODY MASS INDEX: 32.97 KG/M2 | TEMPERATURE: 97.3 F | HEIGHT: 75 IN | RESPIRATION RATE: 17 BRPM | HEART RATE: 71 BPM

## 2021-02-10 DIAGNOSIS — C71.9 GLIOBLASTOMA (HCC): Primary | ICD-10-CM

## 2021-02-10 PROCEDURE — 99245 OFF/OP CONSLTJ NEW/EST HI 55: CPT | Performed by: INTERNAL MEDICINE

## 2021-02-10 RX ORDER — ONDANSETRON HYDROCHLORIDE 8 MG/1
8 TABLET, FILM COATED ORAL EVERY 8 HOURS PRN
Qty: 30 TABLET | Refills: 3 | Status: SHIPPED | OUTPATIENT
Start: 2021-02-10 | End: 2021-05-14 | Stop reason: SDUPTHER

## 2021-02-10 NOTE — LETTER
February 10, 2021     Miranda Naranjo, [address] [facility] [address]    Patient: Kayden Mata   YOB: 1955   Date of Visit: 2/10/2021       Dear Dr Esther Duvall:    Thank you for referring Kayden Mata to me for evaluation  Below are my notes for this consultation  If you have questions, please do not hesitate to call me  I look forward to following your patient along with you  Sincerely,        Judi Grant DO        CC: MD Wolf Justice MD Buel Dadds, DO  2/10/2021 11:27 AM  Sign when Signing Visit    Kayden Mata  1955  [address] [facility]  [phone]    Chief Complaint   Patient presents with    Consult         Oncology History   Brain tumor [facility]) (Resolved)   1/19/2021 Biopsy    BIOPSY BRAIN IMAGE-GUIDED NEEDLE - LEFT TEMPORAL  Surgeon: Dr Tona Carlson    Temporal mass:  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)  Note    This case was seen in consultation with Dr Emma Valdez, an expert in Neuropathology from [facility]  Glioblastoma ([facility] )   1/19/2021 Initial Diagnosis    Glioblastoma ([facility] )     1/19/2021 Biopsy    BIOPSY BRAIN IMAGE-GUIDED NEEDLE - LEFT TEMPORAL  Surgeon: Dr Tona Carlson    Temporal mass:  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)  Note    This case was seen in consultation with Dr Emma Valdez, an expert in Neuropathology from [facility]  History of Present Illness:  January 2021 patient presented with slurred speech  Brain MRI showed hyperintense focus at the left temporal region, question subacute infarct versus venous anomaly  Subsequent image was suggestive of neoplasm  Central necrosis noted  CT chest abdomen pelvis showed 1 5 cm left hepatic indeterminate nodule, hemangioma versus shunt  Bilateral renal cysts    January 19, 2021 patient underwent left temporal biopsy which showed GBM Case was reviewed in Neuro-Oncology Working group and it was felt that radiation and chemo upfront would provide better risk benefit ratio than surgery  Review of Systems   Constitutional: Negative for chills and fever  HENT: Negative for nosebleeds  Eyes: Negative for discharge  Respiratory: Negative for cough and shortness of breath  Cardiovascular: Negative for chest pain  Gastrointestinal: Negative for abdominal pain, constipation and diarrhea  Endocrine: Negative for polydipsia  Genitourinary: Negative for hematuria  Musculoskeletal: Negative for arthralgias  Skin: Negative for color change  Allergic/Immunologic: Negative for immunocompromised state  Neurological: Negative for dizziness and headaches  Hematological: Negative for adenopathy  Psychiatric/Behavioral: Negative for agitation         Patient Active Problem List   Diagnosis    Class 1 obesity due to excess calories with serious comorbidity and body mass index (BMI) of 32 0 to 32 9 in adult    Negative depression screening    Hypercholesterolemia    Essential hypertension    Obstructive sleep apnea syndrome    Sleep disturbance    Daytime sleepiness    Prediabetes    Seizure-like activity (HCC)    Glioblastoma (HCC)     Past Medical History:   Diagnosis Date    Brain tumor ([address] ) 1/5/2021    Bruxism (teeth grinding)     GBM (glioblastoma multiforme) (HCC)     Hypercholesterolemia     Hypertension     Obesity     Sleep apnea     CPAP nightly     Past Surgical History:   Procedure Laterality Date    [place] LUMBAR PUNCTURE DIAGNOSTIC  1/4/2021    HAND FRACTURE REPAIR      left thumb    [place] STEREO BX/ASPIR/EXCIS,INTRACRANIAL LESN Left 1/19/2021    Procedure: BIOPSY BRAIN IMAGE-GUIDED NEEDLE - LEFT TEMPORAL;  Surgeon: Dedra Cleveland MD;  [facility]: [facility];  Service: Neurosurgery    TONSILLECTOMY       Family History   Problem Relation Age of Onset    Heart disease Mother     Heart attack Father  Leukemia Brother      Social History     Socioeconomic History    Marital status: Single     Spouse name: Not on file    Number of children: Not on file    Years of education: Not on file    Highest education level: Not on file   Occupational History    Not on file   Social Needs    Financial resource strain: Not on file    Food insecurity     Worry: Not on file     Inability: Not on file    Transportation needs     Medical: Not on file     Non-medical: Not on file   Tobacco Use    Smoking status: Former Smoker     Types: Cigarettes     Quit date:      Years since quittin 1    Smokeless tobacco: Never Used   Substance and Sexual Activity    Alcohol use: Not Currently     Frequency: Never     Binge frequency: Never    Drug use: Not Currently    Sexual activity: Not Currently   Lifestyle    Physical activity     Days per week: Not on file     Minutes per session: Not on file    Stress: Not on file   Relationships    Social connections     Talks on phone: Not on file     Gets together: Not on file     Attends Taoist service: Not on file     Active member of club or organization: Not on file     Attends meetings of clubs or organizations: Not on file     Relationship status: Not on file    Intimate partner violence     Fear of current or ex partner: Not on file     Emotionally abused: Not on file     Physically abused: Not on file     Forced sexual activity: Not on file   Other Topics Concern    Not on file   Social History Narrative    Daily caffeine use- 2 cups of coffee       Current Outpatient Medications:     amLODIPine (NORVASC) 10 mg tablet, Take 1 tablet (10 mg total) by mouth daily, Disp: 90 tablet, Rfl: 3    atorvastatin (LIPITOR) 10 mg tablet, Take 5 mg by mouth daily , Disp: , Rfl:     hydrochlorothiazide (MICROZIDE) 12 5 mg capsule, Take 1 capsule (12 5 mg total) by mouth daily, Disp: 90 capsule, Rfl: 3    levETIRAcetam (KEPPRA) 1000 MG tablet, Take 1 tablet (1,000 mg total) by mouth every 12 (twelve) hours, Disp: 60 tablet, Rfl: 2    valsartan (DIOVAN) 160 mg tablet, Take 1 tablet (160 mg total) by mouth daily, Disp: 90 tablet, Rfl: 3  No Known Allergies  Vitals:    02/10/21 1031   BP: 128/76   Pulse: 71   Resp: 17   Temp: (!) 97 3 °F (36 3 °C)   SpO2: 96%       Physical Exam  Constitutional:       Appearance: He is well-developed  HENT:      Head: Normocephalic and atraumatic  Eyes:      Pupils: Pupils are equal, round, and reactive to light  Neck:      Musculoskeletal: Neck supple  Cardiovascular:      Rate and Rhythm: Normal rate and regular rhythm  Heart sounds: No murmur  Pulmonary:      Breath sounds: Normal breath sounds  No wheezing or rales  Abdominal:      Palpations: Abdomen is soft  Tenderness: There is no abdominal tenderness  Musculoskeletal: Normal range of motion  General: No tenderness  Lymphadenopathy:      Cervical: No cervical adenopathy  Skin:     Findings: No erythema or rash  Neurological:      Mental Status: He is alert and oriented to person, place, and time  Cranial Nerves: No cranial nerve deficit  Deep Tendon Reflexes: Reflexes are normal and symmetric  Psychiatric:         Behavior: Behavior normal            Labs:  CBC, Coags, BMP, Mg, Phos    Imaging  Ct Head Wo Contrast    Result Date: 1/20/2021  Narrative: CT BRAIN - WITHOUT CONTRAST INDICATION:   Brain mass or lesion, follow-up; Post op  Initially presented with mental status change January 1, 2021  Found to have a ring-enhancing lesion in the left temporal lobe  Status post image guided left temporal lobe biopsy, postoperative day #1  COMPARISON:  Numerous prior studies, most recent of which is a noncontrast CT brain January 19, 2021, MR brain with contrast January 2, 2021 and CTA stroke alert January 1, 2021  TECHNIQUE:  CT examination of the brain was performed    In addition to axial images, sagittal and coronal 2D reformatted images were created and submitted for interpretation  Radiation dose length product (DLP) for this visit:  897 63 mGy-cm   This examination, like all CT scans performed in the [facility], was performed utilizing techniques to minimize radiation dose exposure, including the use of iterative  reconstruction and automated exposure control  IMAGE QUALITY:  Diagnostic  FINDINGS: PARENCHYMA:  Anticipated postoperative changes are present in the posterior left temporal lobe, consistent with recent biopsy  Fluid, air and blood products are present in the posterior left temporal lobe  Mild surrounding vasogenic edema  No acute intraparenchymal hemorrhage or extra-axial fluid collections  No acute infarctions  VENTRICLES AND EXTRA-AXIAL SPACES:  Enlargement of ventricles and extra-axial CSF spaces, out of proportion to the patient's age most consistent with cerebral and cerebellar atrophy  VISUALIZED ORBITS AND PARANASAL SINUSES:  No acute abnormality involving the orbits  Mild scattered sinus mucosal thickening is noted  No fluid levels are seen  CALVARIUM AND EXTRACRANIAL SOFT TISSUES:  Normal      Impression: Anticipated postoperative changes, consistent with recent left temporal lobe biopsy  No acute intracranial abnormality  Workstation performed: [id number]     Ct Head Wo Contrast    Result Date: 1/19/2021  Narrative: CT BRAIN - WITHOUT CONTRAST INDICATION:   Brain mass or lesion, follow-up Post op  COMPARISON:  1/2/2021 TECHNIQUE:  CT examination of the brain was performed  In addition to axial images, sagittal and coronal 2D reformatted images were created and submitted for interpretation  Radiation dose length product (DLP) for this visit:  929 32 mGy-cm   This examination, like all CT scans performed in the [facility], was performed utilizing techniques to minimize radiation dose exposure, including the use of iterative  reconstruction and automated exposure control    IMAGE QUALITY: Diagnostic  FINDINGS: PARENCHYMA:  Postoperative changes involving the right temporal lobe are identified  1 8 x 1 7 cm focus of hemorrhage is identified within the operative bed  Surrounding edema is identified  Associated pneumocephalus is present  VENTRICLES AND EXTRA-AXIAL SPACES:  Normal for the patient's age  VISUALIZED ORBITS AND PARANASAL SINUSES:  Unremarkable  CALVARIUM AND EXTRACRANIAL SOFT TISSUES:  Normal      Impression: Postoperative changes involving the left temporal lobe with associated focus of hemorrhage in pneumocephalus  Workstation performed: [id number]     Ct Stroke Alert Brain    Result Date: 1/28/2021  Narrative: CT BRAIN - STROKE ALERT PROTOCOL INDICATION:   Altered mental status  COMPARISON:  1/20/2021  TECHNIQUE:  CT examination of the brain was performed  In addition to axial images, coronal reformatted images were created and submitted for interpretation  Radiation dose length product (DLP) for this visit:  825 1 mGy-cm   This examination, like all CT scans performed in the [facility], was performed utilizing techniques to minimize radiation dose exposure, including the use of iterative reconstruction and automated exposure control  IMAGE QUALITY:  Diagnostic  FINDINGS:  PARENCHYMA: Decreased attenuation within left posterior superior temporal biopsy site, compatible with interval evolution of blood products within the hematoma  Hematoma measures 2 4 x 1 7 x 1 3 cm, previously measuring 1 3 x 1 8 x 1 6 cm, suggesting mild interval increase in size  No evidence of acute hemorrhage  Vasogenic edema surrounding the left temporal hematoma is mildly increased  Effacement of left temporal sulci  Intact basal cisterns  VENTRICLES AND EXTRA-AXIAL SPACES:  Stable effacement of the left temporal horn  No hydrocephalus  VISUALIZED ORBITS AND PARANASAL SINUSES:  Intact globes and orbits  Clear paranasal sinuses   CALVARIUM AND EXTRACRANIAL SOFT TISSUES:  Small radha hole in the squamous portion of the left temporal bone  Impression: 1  Mild interval increase in size of left temporal hematoma at the biopsy site, though without evidence of acute hemorrhage  Mildly increased surrounding vasogenic edema  2   No evidence of acute infarct  3   No significant mass effect  Findings were directly discussed with DR Renetta Whitman on 1/28/2021 5:51 PM  Workstation performed: [id number]     Mri Brain Seizure Wo And W Contrast    Result Date: 1/29/2021  Narrative: MRI  BRAIN  - WITH AND WITHOUT CONTRAST INDICATION: Seizure-like activity with recent brain biopsy  Seizure disorder  COMPARISON: 1/2/2021 TECHNIQUE:  Sagittal BRAVO, axial T2, axial FLAIR, axial T1, axial DWI, axial Gradient, coronal T2, and coronal FLAIR  Coronal T1 and axial T1   3-D T1 in the coronal plane with sagittal and axial reconstructions  IV Contrast:  12 mL of Gadobutrol injection (SINGLE-DOSE) IMAGE QUALITY:   Diagnostic  FINDINGS: BRAIN PARENCHYMA:  Previously identified ring-enhancing lesion within the left posterior lateral middle temporal gyrus has significantly increased in size  The mass now measures 3 1 cm in transverse diameter, compared to 1 4 cm previously  Moderate surrounding vasogenic edema is present, new since prior exam   There has been interval hemorrhage within the lesion, layering posteriorly but also seen peripherally within the inferolateral aspect of the lesion  There are no new enhancing lesions identified  No acute ischemia  Symmetric hippocampal formations with regard to size and signal  VENTRICLES:  Normal  SELLA AND PITUITARY GLAND:  Normal  ORBITS:  Normal  PARANASAL SINUSES:  Mild mucosal thickening of the maxillary sinuses  VASCULATURE:  Evaluation of the major intracranial vasculature demonstrates appropriate flow voids   CALVARIUM AND SKULL BASE:  Normal  EXTRACRANIAL SOFT TISSUES:  Normal      Impression: Since the prior study dated 1/2/2021, the previously seen ring-enhancing lesion within the left temporal lobe has significantly increased in size and now demonstrates some internal hemorrhage and moderate focal surrounding vasogenic edema  Although some  of these changes could be related to the recent biopsy, findings suggest rapid progression of glial neoplasm  I personally discussed this study with LEE COLLAZO on 1/29/2021 at 10:28 AM    Workstation performed: [id number]     Cta Stroke Alert (head/neck)    Result Date: 1/28/2021  Narrative: CTA NECK AND BRAIN WITH CONTRAST INDICATION: COMPARISON:   None  TECHNIQUE:   Post contrast imaging was performed after administration of iodinated contrast through the neck and brain  Post contrast axial 0 625 mm images timed to opacify the arterial system  3D rendering was performed on an independent workstation  MIP reconstructions performed  Coronal reconstructions were performed of the noncontrast portion of the brain  Radiation dose length product (DLP) for this visit:  797 mGy-cm   This examination, like all CT scans performed in the [facility], was performed utilizing techniques to minimize radiation dose exposure, including the use of iterative reconstruction and automated exposure control  IV Contrast:  85 mL of iohexol (OMNIPAQUE)  IMAGE QUALITY:   Diagnostic FINDINGS: CERVICAL VASCULATURE AORTIC ARCH AND GREAT VESSELS:  Three-vessel configuration aortic arch  No stenosis in the subclavian arteries  RIGHT VERTEBRAL ARTERY CERVICAL SEGMENT:  Normal origin  The vessel is normal in caliber throughout the neck  LEFT VERTEBRAL ARTERY CERVICAL SEGMENT:  Normal origin  The vessel is normal in caliber throughout the neck  RIGHT EXTRACRANIAL CAROTID SEGMENT:  Normal caliber common carotid artery  Normal bifurcation and cervical internal carotid artery  No stenosis or dissection  LEFT EXTRACRANIAL CAROTID SEGMENT:  Normal caliber common carotid artery  Normal bifurcation and cervical internal carotid artery    No stenosis or dissection  NASCET criteria was used to determine the degree of internal carotid artery diameter stenosis  INTRACRANIAL VASCULATURE INTERNAL CAROTID ARTERIES:  Normal enhancement of the intracranial portions of the internal carotid arteries  Normal ophthalmic artery origins  Normal ICA terminus  ANTERIOR CIRCULATION:  Symmetric A1 segments and anterior cerebral arteries with normal enhancement  Normal anterior communicating artery  MIDDLE CEREBRAL ARTERY CIRCULATION:  M1 segment and middle cerebral artery branches demonstrate normal enhancement bilaterally  DISTAL VERTEBRAL ARTERIES:  Normal distal vertebral arteries  Posterior inferior cerebellar artery origins are normal  Normal vertebral basilar junction  BASILAR ARTERY:  Basilar artery is normal in caliber  Normal superior cerebellar arteries  POSTERIOR CEREBRAL ARTERIES: Both posterior cerebral arteries arises from the basilar tip  Both arteries demonstrate normal enhancement  Normal posterior communicating arteries  DURAL VENOUS SINUSES:  Nondiagnostic due to contrast bolus timing  NON VASCULAR ANATOMY BONY STRUCTURES:  No lytic or blastic lesion  SOFT TISSUES OF THE NECK:  No mass or lymphadenopathy  VISUALIZED CHEST:  Cardiomegaly  No pleural effusion  Clear lungs  Impression: 1  No stenosis, dissection or occlusion of the carotid or vertebral arteries or major vessels of the [place]  2   No acute cardial pulmonary process  Findings were directly discussed with DR Moisés Beach  on 1/28/2021 6:00 PM  Workstation performed: [id number]     I reviewed the above laboratory and imaging data  Discussion/Summary:  In summary, this is a 21-year-old male history of recently biopsied GBM, left temporal   We reviewed that radiation/Temodar recommended  Surgical resection may be considered as supplement  We reviewed potential toxicities of Temodar including but not limited to nausea, vomiting, constipation, cytopenias, fatigue    We reviewed prophylactic measures taken routinely as well as monitoring to allow for early intervention as appropriate  Our chemotherapy nurse review the above information as well as providing written information in this regard  We reviewed that the goal of treatment is disease control and that cure is not likely  The patient voiced understanding and agreement with the above plan will be proceeding as outlined

## 2021-02-10 NOTE — PROGRESS NOTES
Rich Pereira  1955  [address] [facility] [address] [address] [phone]    Chief Complaint   Patient presents with    Consult         Oncology History   Brain tumor [facility]) (Resolved)   1/19/2021 Biopsy    BIOPSY BRAIN IMAGE-GUIDED NEEDLE - LEFT TEMPORAL  Surgeon: Dr Marisela Mcmillan    Temporal mass:  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)  Note    This case was seen in consultation with Dr Edouard Munoz, an expert in Neuropathology from [facility]  Glioblastoma ([facility] [address] )   1/19/2021 Initial Diagnosis    Glioblastoma ([facility] [address] )     1/19/2021 Biopsy    BIOPSY BRAIN IMAGE-GUIDED NEEDLE - LEFT TEMPORAL  Surgeon: Dr Marisela Mcmillan    Temporal mass:  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)  Note    This case was seen in consultation with Dr Edouard Munoz, an expert in Neuropathology from [facility]  History of Present Illness:  January 2021 patient presented with slurred speech  Brain MRI showed hyperintense focus at the left temporal region, question subacute infarct versus venous anomaly  Subsequent image was suggestive of neoplasm  Central necrosis noted  CT chest abdomen pelvis showed 1 5 cm left hepatic indeterminate nodule, hemangioma versus shunt  Bilateral renal cysts  January 19, 2021 patient underwent left temporal biopsy which showed GBM Case was reviewed in Neuro-Oncology Working group and it was felt that radiation and chemo upfront would provide better risk benefit ratio than surgery  Review of Systems   Constitutional: Negative for chills and fever  HENT: Negative for nosebleeds  Eyes: Negative for discharge  Respiratory: Negative for cough and shortness of breath  Cardiovascular: Negative for chest pain  Gastrointestinal: Negative for abdominal pain, constipation and diarrhea  Endocrine: Negative for polydipsia  Genitourinary: Negative for hematuria  Musculoskeletal: Negative for arthralgias  Skin: Negative for color change  Allergic/Immunologic: Negative for immunocompromised state  Neurological: Negative for dizziness and headaches  Hematological: Negative for adenopathy  Psychiatric/Behavioral: Negative for agitation         Patient Active Problem List   Diagnosis    Class 1 obesity due to excess calories with serious comorbidity and body mass index (BMI) of 32 0 to 32 9 in adult    Negative depression screening    Hypercholesterolemia    Essential hypertension    Obstructive sleep apnea syndrome    Sleep disturbance    Daytime sleepiness    Prediabetes    Seizure-like activity (HCC)    Glioblastoma (HCC)     Past Medical History:   Diagnosis Date    Brain tumor ([address] ) 1/5/2021    Bruxism (teeth grinding)     GBM (glioblastoma multiforme) (HCC)     Hypercholesterolemia     Hypertension     Obesity     Sleep apnea     CPAP nightly     Past Surgical History:   Procedure Laterality Date    FL LUMBAR PUNCTURE DIAGNOSTIC  1/4/2021    HAND FRACTURE REPAIR      left thumb    LA STEREO BX/ASPIR/EXCIS,INTRACRANIAL LESN Left 1/19/2021    Procedure: BIOPSY BRAIN IMAGE-GUIDED NEEDLE - LEFT TEMPORAL;  Surgeon: Tony Riddle MD;  [facility]: [facility];  Service: Neurosurgery    TONSILLECTOMY       Family History   Problem Relation Age of Onset    Heart disease Mother     Heart attack Father     Leukemia Brother      Social History     Socioeconomic History    Marital status: Single     Spouse name: Not on file    Number of children: Not on file    Years of education: Not on file    Highest education level: Not on file   Occupational History    Not on file   Social Needs    Financial resource strain: Not on file    Food insecurity     Worry: Not on file     Inability: Not on file    Transportation needs     Medical: Not on file     Non-medical: Not on file   Tobacco Use    Smoking status: Former Smoker     Types: Cigarettes Quit date: 12     Years since quittin 1    Smokeless tobacco: Never Used   Substance and Sexual Activity    Alcohol use: Not Currently     Frequency: Never     Binge frequency: Never    Drug use: Not Currently    Sexual activity: Not Currently   Lifestyle    Physical activity     Days per week: Not on file     Minutes per session: Not on file    Stress: Not on file   Relationships    Social connections     Talks on phone: Not on file     Gets together: Not on file     Attends Jew service: Not on file     Active member of club or organization: Not on file     Attends meetings of clubs or organizations: Not on file     Relationship status: Not on file    Intimate partner violence     Fear of current or ex partner: Not on file     Emotionally abused: Not on file     Physically abused: Not on file     Forced sexual activity: Not on file   Other Topics Concern    Not on file   Social History Narrative    Daily caffeine use- 2 cups of coffee       Current Outpatient Medications:     amLODIPine (NORVASC) 10 mg tablet, Take 1 tablet (10 mg total) by mouth daily, Disp: 90 tablet, Rfl: 3    atorvastatin (LIPITOR) 10 mg tablet, Take 5 mg by mouth daily , Disp: , Rfl:     hydrochlorothiazide (MICROZIDE) 12 5 mg capsule, Take 1 capsule (12 5 mg total) by mouth daily, Disp: 90 capsule, Rfl: 3    levETIRAcetam (KEPPRA) 1000 MG tablet, Take 1 tablet (1,000 mg total) by mouth every 12 (twelve) hours, Disp: 60 tablet, Rfl: 2    valsartan (DIOVAN) 160 mg tablet, Take 1 tablet (160 mg total) by mouth daily, Disp: 90 tablet, Rfl: 3  No Known Allergies  Vitals:    02/10/21 1031   BP: 128/76   Pulse: 71   Resp: 17   Temp: (!) 97 3 °F (36 3 °C)   SpO2: 96%       Physical Exam  Constitutional:       Appearance: He is well-developed  HENT:      Head: Normocephalic and atraumatic  Eyes:      Pupils: Pupils are equal, round, and reactive to light  Neck:      Musculoskeletal: Neck supple     Cardiovascular: Rate and Rhythm: Normal rate and regular rhythm  Heart sounds: No murmur  Pulmonary:      Breath sounds: Normal breath sounds  No wheezing or rales  Abdominal:      Palpations: Abdomen is soft  Tenderness: There is no abdominal tenderness  Musculoskeletal: Normal range of motion  General: No tenderness  Lymphadenopathy:      Cervical: No cervical adenopathy  Skin:     Findings: No erythema or rash  Neurological:      Mental Status: He is alert and oriented to person, place, and time  Cranial Nerves: No cranial nerve deficit  Deep Tendon Reflexes: Reflexes are normal and symmetric  Psychiatric:         Behavior: Behavior normal            Labs:  CBC, Coags, BMP, Mg, Phos    Imaging  Ct Head Wo Contrast    Result Date: 1/20/2021  Narrative: CT BRAIN - WITHOUT CONTRAST INDICATION:   Brain mass or lesion, follow-up; Post op  Initially presented with mental status change January 1, 2021  Found to have a ring-enhancing lesion in the left temporal lobe  Status post image guided left temporal lobe biopsy, postoperative day #1  COMPARISON:  Numerous prior studies, most recent of which is a noncontrast CT brain January 19, 2021, MR brain with contrast January 2, 2021 and CTA stroke alert January 1, 2021  TECHNIQUE:  CT examination of the brain was performed  In addition to axial images, sagittal and coronal 2D reformatted images were created and submitted for interpretation  Radiation dose length product (DLP) for this visit:  897 63 mGy-cm   This examination, like all CT scans performed in the [place], was performed utilizing techniques to minimize radiation dose exposure, including the use of iterative  reconstruction and automated exposure control  IMAGE QUALITY:  Diagnostic  FINDINGS: PARENCHYMA:  Anticipated postoperative changes are present in the posterior left temporal lobe, consistent with recent biopsy    Fluid, air and blood products are present in the posterior left temporal lobe  Mild surrounding vasogenic edema  No acute intraparenchymal hemorrhage or extra-axial fluid collections  No acute infarctions  VENTRICLES AND EXTRA-AXIAL SPACES:  Enlargement of ventricles and extra-axial CSF spaces, out of proportion to the patient's age most consistent with cerebral and cerebellar atrophy  VISUALIZED ORBITS AND PARANASAL SINUSES:  No acute abnormality involving the orbits  Mild scattered sinus mucosal thickening is noted  No fluid levels are seen  CALVARIUM AND EXTRACRANIAL SOFT TISSUES:  Normal      Impression: Anticipated postoperative changes, consistent with recent left temporal lobe biopsy  No acute intracranial abnormality  Workstation performed: [id number]     Ct Head Wo Contrast    Result Date: 1/19/2021  Narrative: CT BRAIN - WITHOUT CONTRAST INDICATION:   Brain mass or lesion, follow-up Post op  COMPARISON:  1/2/2021 TECHNIQUE:  CT examination of the brain was performed  In addition to axial images, sagittal and coronal 2D reformatted images were created and submitted for interpretation  Radiation dose length product (DLP) for this visit:  929 32 mGy-cm   This examination, like all CT scans performed in the [facility], was performed utilizing techniques to minimize radiation dose exposure, including the use of iterative  reconstruction and automated exposure control  IMAGE QUALITY:  Diagnostic  FINDINGS: PARENCHYMA:  Postoperative changes involving the right temporal lobe are identified  1 8 x 1 7 cm focus of hemorrhage is identified within the operative bed  Surrounding edema is identified  Associated pneumocephalus is present  VENTRICLES AND EXTRA-AXIAL SPACES:  Normal for the patient's age  VISUALIZED ORBITS AND PARANASAL SINUSES:  Unremarkable  CALVARIUM AND EXTRACRANIAL SOFT TISSUES:  Normal      Impression: Postoperative changes involving the left temporal lobe with associated focus of hemorrhage in pneumocephalus  Workstation performed: [id number]     Ct Stroke Alert Brain    Result Date: 1/28/2021  Narrative: CT BRAIN - STROKE ALERT PROTOCOL INDICATION:   Altered mental status  COMPARISON:  1/20/2021  TECHNIQUE:  CT examination of the brain was performed  In addition to axial images, coronal reformatted images were created and submitted for interpretation  Radiation dose length product (DLP) for this visit:  825 1 mGy-cm   This examination, like all CT scans performed in the [facility], was performed utilizing techniques to minimize radiation dose exposure, including the use of iterative reconstruction and automated exposure control  IMAGE QUALITY:  Diagnostic  FINDINGS:  PARENCHYMA: Decreased attenuation within left posterior superior temporal biopsy site, compatible with interval evolution of blood products within the hematoma  Hematoma measures 2 4 x 1 7 x 1 3 cm, previously measuring 1 3 x 1 8 x 1 6 cm, suggesting mild interval increase in size  No evidence of acute hemorrhage  Vasogenic edema surrounding the left temporal hematoma is mildly increased  Effacement of left temporal sulci  Intact basal cisterns  VENTRICLES AND EXTRA-AXIAL SPACES:  Stable effacement of the left temporal horn  No hydrocephalus  VISUALIZED ORBITS AND PARANASAL SINUSES:  Intact globes and orbits  Clear paranasal sinuses  CALVARIUM AND EXTRACRANIAL SOFT TISSUES:  Small radha hole in the squamous portion of the left temporal bone  Impression: 1  Mild interval increase in size of left temporal hematoma at the biopsy site, though without evidence of acute hemorrhage  Mildly increased surrounding vasogenic edema  2   No evidence of acute infarct  3   No significant mass effect   Findings were directly discussed with DR Charles Iglesias on 1/28/2021 5:51 PM  Workstation performed: [id number]     Mri Brain Seizure Wo And W Contrast    Result Date: 1/29/2021  Narrative: MRI  BRAIN  - WITH AND WITHOUT CONTRAST INDICATION: Seizure-like activity with recent brain biopsy  Seizure disorder  COMPARISON: 1/2/2021 TECHNIQUE:  Sagittal BRAVO, axial T2, axial FLAIR, axial T1, axial DWI, axial Gradient, coronal T2, and coronal FLAIR  Coronal T1 and axial T1   3-D T1 in the coronal plane with sagittal and axial reconstructions  IV Contrast:  12 mL of Gadobutrol injection (SINGLE-DOSE) IMAGE QUALITY:   Diagnostic  FINDINGS: BRAIN PARENCHYMA:  Previously identified ring-enhancing lesion within the left posterior lateral middle temporal gyrus has significantly increased in size  The mass now measures 3 1 cm in transverse diameter, compared to 1 4 cm previously  Moderate surrounding vasogenic edema is present, new since prior exam   There has been interval hemorrhage within the lesion, layering posteriorly but also seen peripherally within the inferolateral aspect of the lesion  There are no new enhancing lesions identified  No acute ischemia  Symmetric hippocampal formations with regard to size and signal  VENTRICLES:  Normal  SELLA AND PITUITARY GLAND:  Normal  ORBITS:  Normal  PARANASAL SINUSES:  Mild mucosal thickening of the maxillary sinuses  VASCULATURE:  Evaluation of the major intracranial vasculature demonstrates appropriate flow voids  CALVARIUM AND SKULL BASE:  Normal  EXTRACRANIAL SOFT TISSUES:  Normal      Impression: Since the prior study dated 1/2/2021, the previously seen ring-enhancing lesion within the left temporal lobe has significantly increased in size and now demonstrates some internal hemorrhage and moderate focal surrounding vasogenic edema  Although some  of these changes could be related to the recent biopsy, findings suggest rapid progression of glial neoplasm  I personally discussed this study with LEE COLLAZO on 1/29/2021 at 10:28 AM    Workstation performed: [id number]     Cta Stroke Alert (head/neck)    Result Date: 1/28/2021  Narrative: CTA NECK AND BRAIN WITH CONTRAST INDICATION: COMPARISON:   None  TECHNIQUE:   Post contrast imaging was performed after administration of iodinated contrast through the neck and brain  Post contrast axial 0 625 mm images timed to opacify the arterial system  3D rendering was performed on an independent workstation  MIP reconstructions performed  Coronal reconstructions were performed of the noncontrast portion of the brain  Radiation dose length product (DLP) for this visit:  797 mGy-cm   This examination, like all CT scans performed in the [facility], was performed utilizing techniques to minimize radiation dose exposure, including the use of iterative reconstruction and automated exposure control  IV Contrast:  85 mL of iohexol (OMNIPAQUE)  IMAGE QUALITY:   Diagnostic FINDINGS: CERVICAL VASCULATURE AORTIC ARCH AND GREAT VESSELS:  Three-vessel configuration aortic arch  No stenosis in the subclavian arteries  RIGHT VERTEBRAL ARTERY CERVICAL SEGMENT:  Normal origin  The vessel is normal in caliber throughout the neck  LEFT VERTEBRAL ARTERY CERVICAL SEGMENT:  Normal origin  The vessel is normal in caliber throughout the neck  RIGHT EXTRACRANIAL CAROTID SEGMENT:  Normal caliber common carotid artery  Normal bifurcation and cervical internal carotid artery  No stenosis or dissection  LEFT EXTRACRANIAL CAROTID SEGMENT:  Normal caliber common carotid artery  Normal bifurcation and cervical internal carotid artery  No stenosis or dissection  NASCET criteria was used to determine the degree of internal carotid artery diameter stenosis  INTRACRANIAL VASCULATURE INTERNAL CAROTID ARTERIES:  Normal enhancement of the intracranial portions of the internal carotid arteries  Normal ophthalmic artery origins  Normal ICA terminus  ANTERIOR CIRCULATION:  Symmetric A1 segments and anterior cerebral arteries with normal enhancement  Normal anterior communicating artery   MIDDLE CEREBRAL ARTERY CIRCULATION:  M1 segment and middle cerebral artery branches demonstrate normal enhancement bilaterally  DISTAL VERTEBRAL ARTERIES:  Normal distal vertebral arteries  Posterior inferior cerebellar artery origins are normal  Normal vertebral basilar junction  BASILAR ARTERY:  Basilar artery is normal in caliber  Normal superior cerebellar arteries  POSTERIOR CEREBRAL ARTERIES: Both posterior cerebral arteries arises from the basilar tip  Both arteries demonstrate normal enhancement  Normal posterior communicating arteries  DURAL VENOUS SINUSES:  Nondiagnostic due to contrast bolus timing  NON VASCULAR ANATOMY BONY STRUCTURES:  No lytic or blastic lesion  SOFT TISSUES OF THE NECK:  No mass or lymphadenopathy  VISUALIZED CHEST:  Cardiomegaly  No pleural effusion  Clear lungs  Impression: 1  No stenosis, dissection or occlusion of the carotid or vertebral arteries or major vessels of the [place]  2   No acute cardial pulmonary process  Findings were directly discussed with DR Evangelist Dove  on 1/28/2021 6:00 PM  Workstation performed: [id number]     I reviewed the above laboratory and imaging data  Discussion/Summary:  In summary, this is a 44-year-old male history of recently biopsied GBM, left temporal   We reviewed that radiation/Temodar recommended  Surgical resection may be considered as supplement  We reviewed potential toxicities of Temodar including but not limited to nausea, vomiting, constipation, cytopenias, fatigue  We reviewed prophylactic measures taken routinely as well as monitoring to allow for early intervention as appropriate  Our chemotherapy nurse review the above information as well as providing written information in this regard  We reviewed that the goal of treatment is disease control and that cure is not likely  The patient voiced understanding and agreement with the above plan will be proceeding as outlined

## 2021-02-10 NOTE — TELEPHONE ENCOUNTER
Chemo teaching provided for temodar  Discussed symptom mgmt, s/e and when to call with adverse reactions and questions  Chemo consent signed

## 2021-02-11 ENCOUNTER — DOCUMENTATION (OUTPATIENT)
Dept: HEMATOLOGY ONCOLOGY | Facility: CLINIC | Age: 66
End: 2021-02-11

## 2021-02-11 ENCOUNTER — HOSPITAL ENCOUNTER (OUTPATIENT)
Dept: MRI IMAGING | Facility: HOSPITAL | Age: 66
Discharge: HOME | End: 2021-02-11
Payer: COMMERCIAL

## 2021-02-11 DIAGNOSIS — D49.6 BRAIN NEOPLASM (HCC): ICD-10-CM

## 2021-02-11 PROCEDURE — A9585 GADOBUTROL INJECTION: HCPCS | Performed by: RADIOLOGY

## 2021-02-11 RX ADMIN — GADOBUTROL 12 ML: 604.72 INJECTION INTRAVENOUS at 06:28

## 2021-02-11 NOTE — PROGRESS NOTES
2/11/2021  Received notification from clinical pt will be starting temozolomide 185 mg q d w/rt    Pt has [facility]  ID # [id number]  BIN # [id number]  N # [id number]  [facility] # [id number]    [phone]    Completed the auth request forms for both 180mg and 5 mg  Sent them to dr Alicia Degroot for review and signature    2/12/2021  Resent the forms clinical was having issues with the printer yesterday    2/15/2021  Resent the forms for dr review and signature     2/16/2021  Resent the forms again for dr Chacha Gagnon and review    2/17/2021  Again, the forms were sent twice today for dr Chacha Gagnon and review    Received the forms back and faxed for auth ()      Received notification from [facility] stating that no [place] is needed  [facility] @ 4:11( [phone]) As long as the pt uses an in-network pharmacy, no [place] is needed  Per Lena Hunter, [facility] is in network  Notified clinical, [facility], finance, and [facility]    Forwarded the pt information over to [facility] and requested the rx be sent there as well

## 2021-02-12 ENCOUNTER — TELEPHONE (OUTPATIENT)
Dept: NEUROSURGERY | Facility: CLINIC | Age: 66
End: 2021-02-12

## 2021-02-12 DIAGNOSIS — E78.00 HYPERCHOLESTEROLEMIA: Primary | ICD-10-CM

## 2021-02-12 RX ORDER — ATORVASTATIN CALCIUM 10 MG/1
5 TABLET, FILM COATED ORAL DAILY
Qty: 30 TABLET | Refills: 2 | Status: SHIPPED | OUTPATIENT
Start: 2021-02-12 | End: 2021-08-17

## 2021-02-12 NOTE — TELEPHONE ENCOUNTER
Patient called the office requesting for medication for a rash on his left buttock  Patient reports he has about 8 little red bumps on his left buttock  He reports how he has struggled with this in the past  Recommended for patient to call his PCP office  Patient said he will  He was appreciative

## 2021-02-15 PROCEDURE — 77338 DESIGN MLC DEVICE FOR IMRT: CPT | Performed by: RADIOLOGY

## 2021-02-15 PROCEDURE — 77300 RADIATION THERAPY DOSE PLAN: CPT | Performed by: RADIOLOGY

## 2021-02-15 PROCEDURE — 77301 RADIOTHERAPY DOSE PLAN IMRT: CPT | Performed by: RADIOLOGY

## 2021-02-16 ENCOUNTER — TELEPHONE (OUTPATIENT)
Dept: HEMATOLOGY ONCOLOGY | Facility: CLINIC | Age: 66
End: 2021-02-16

## 2021-02-16 PROCEDURE — 77321 SPECIAL TELETX PORT PLAN: CPT | Performed by: RADIOLOGY

## 2021-02-16 PROCEDURE — 77332 RADIATION TREATMENT AID(S): CPT | Performed by: RADIOLOGY

## 2021-02-16 NOTE — TELEPHONE ENCOUNTER
Patient is calling because he has not gotten any phone call about his medication  This would be his chemotherapy pill  Please call patient back as he is waiting on his medication, [phone]

## 2021-02-16 NOTE — TELEPHONE ENCOUNTER
Progress Notes           2/11/2021  Received notification from clinical pt will be starting temozolomide 185 mg q d w/rt     Pt has Maykel Baumann  ID # [id number]  BIN # [id number]  N # [id number]  [facility] # [id number]     [phone]     Completed the auth request forms for both 180mg and 5 mg    Sent them to dr Cherrie Edmonds for review and signature     2/15/2021  Resent the forms for  review and signature       Electronically signed by Thaddeus Jack at 2/11/2021  4:43 PM        Will send to Oncology [facility] to update patient, an update Dr Herminio Trujillo

## 2021-02-16 NOTE — TELEPHONE ENCOUNTER
Patient called again to check on status of temodar  Message sent to Oncology finance earlier today  Alerted that there is a 48hr turnaround time   Patient is asking to be called back today with status if possible

## 2021-02-17 ENCOUNTER — TELEPHONE (OUTPATIENT)
Dept: NEUROLOGY | Facility: CLINIC | Age: 66
End: 2021-02-17

## 2021-02-17 ENCOUNTER — TELEPHONE (OUTPATIENT)
Dept: HEMATOLOGY ONCOLOGY | Facility: CLINIC | Age: 66
End: 2021-02-17

## 2021-02-17 NOTE — TELEPHONE ENCOUNTER
Attempted to c/b pt re temodar  Left  stating his medication is still pending  Will f/u with Oncology Financing about the status

## 2021-02-17 NOTE — TELEPHONE ENCOUNTER
Blayne May from Radiation called and need's clarification on patient's medication and treatment  Blayne May can be reached at [phone]  Call placed to Blayne May in Radiation Oncology  Advised Blayne May that I would task Oncology finance to follow up on status of Temodar  Patient is on for a filming tomorrow  They need to know when patient will receive his Temodar for treatment purposes  Please update patient and team on this task message

## 2021-02-17 NOTE — TELEPHONE ENCOUNTER
Rima Wood from Radiation called and need's clarification on patient's medication and treatment   Rima Wood can be reached at [phone]

## 2021-02-17 NOTE — TELEPHONE ENCOUNTER
Me         1:14 PM  Note     Varinder Hadley from Radiation called and need's clarification on patient's medication and treatment  Varinder Hadley can be reached at [phone]      Call placed to Varinder Hadley in Radiation Oncology  Advised Varinder Hadley that I would task Oncology finance to follow up on status of Temodar      Patient is on for a filming tomorrow    They need to know when patient will receive his Temodar for treatment purposes        Please update patient and team on this task message                1:14 PM  You routed this conversation to [address] [place] [address], RN  [address] Patient states she fell down 3 steps just prior to arrival. States she stumbled while carrying a laundry basket. Complains of right ankle pain. Also complains of right elbow and side pain. Denies LOC.

## 2021-02-17 NOTE — TELEPHONE ENCOUNTER
Called and spoke with Kendra/Radiation  Discussed that I was just in touch with Ev Aleman and we just completed and sent additional authorization paperwork  XRT to be rescheduled  Adilene Jacobs will be calling the pt

## 2021-02-18 ENCOUNTER — TELEMEDICINE (OUTPATIENT)
Dept: NEUROLOGY | Facility: CLINIC | Age: 66
End: 2021-02-18
Payer: COMMERCIAL

## 2021-02-18 DIAGNOSIS — G93.89: ICD-10-CM

## 2021-02-18 DIAGNOSIS — G40.909 EPILEPSY DUE TO INTRACRANIAL TUMOR (HCC): Primary | ICD-10-CM

## 2021-02-18 DIAGNOSIS — D49.6 EPILEPSY DUE TO INTRACRANIAL TUMOR (HCC): Primary | ICD-10-CM

## 2021-02-18 DIAGNOSIS — C71.9 GLIOBLASTOMA (HCC): ICD-10-CM

## 2021-02-18 PROCEDURE — G2012 BRIEF CHECK IN BY MD/QHP: HCPCS | Performed by: PSYCHIATRY & NEUROLOGY

## 2021-02-18 RX ORDER — LEVETIRACETAM 250 MG/1
250 TABLET ORAL 2 TIMES DAILY
Qty: 180 TABLET | Refills: 1 | Status: SHIPPED | OUTPATIENT
Start: 2021-02-18 | End: 2021-05-13 | Stop reason: SDUPTHER

## 2021-02-18 RX ORDER — LEVETIRACETAM 1000 MG/1
1000 TABLET ORAL EVERY 12 HOURS SCHEDULED
Qty: 180 TABLET | Refills: 1 | Status: SHIPPED | OUTPATIENT
Start: 2021-02-18 | End: 2021-05-13 | Stop reason: SDUPTHER

## 2021-02-18 NOTE — PATIENT INSTRUCTIONS
1  Increase levetiracetam to 1250 mg twice daily (1000 mg pill plus 250 mg pill)  2  Let us know if there are seizures  3  Have blood work (levetiracetam level) drawn in about one week  4  Return in about 3 months  FIRST AID FOR SEIZURES    What to Do If You Witness a Seizure:     Generalized convulsive (called a generalized tonic-clonic or grand mal seizure)  During this seizure, the person may cry out, suddenly stiffen up, make jerking movements, and fall  The person may turn pale or blue from difficulty breathing  Actions:  1  Stay calm  Talk in a soothing voice and if possible keep onlookers away  2  Prevent injury  Move objects away that the person might hit while jerking uncontrollably  3  Time when the seizure starts and ends  Most seizures stop after only a few minutes  4  Turn him or her gently onto one side  This will help keep the airway clear  5  Never place anything in his/her mouth or give him/her anything by mouth during a seizure  -- Do not give the person water, pills, or food until fully alert  6  Loosen tight clothing or jewelry around his/her neck  7  Make the person as comfortable as possible  8  Place something soft under their head  9  Do not hold the person down  If the person having a seizure thrashes around there is no need for you to restrain them  They are more likely to be combative if restrained  Remember to consider your safety as well  10  Keep onlookers away  11  Be sensitive and supportive, and ask others to do the same  12  Stay with the person until he/she is fully alert  Complex partial seizure (confusional spells)  During this kind of seizure, the person may have a glassy stare; give no response or inappropriate responses when questioned; sit, stand, or walk about aimlessly; make lip smacking or chewing motions; fidget with clothes; appear to be drunk, drugged, or confused  Actions:  1   Make sure the person is safe and wont harm themselves  2  Try to remove harmful objects from around the person (tools, utensils, glasses)  3  Do NOT be aggressive or attempt to restrain the person  They are more likely to be combative if restrained  Remember to consider your safety as well  4  Help prevent the person from wandering, and direct the person to chair or safe position  5  Never place anything in his/her mouth or give him/her anything by mouth during a seizure  -- Do not give the person water, pills, or food until fully alert  6  Keep onlookers away  7  Be sensitive and supportive, and ask others to do the same  8  Stay with the person until he/she is fully alert  CALL 911 if:  1  A convulsive seizure lasts more than 5 minutes  2  The person turns blue during the seizure  3  The person does not start breathing after the seizure  Begin mouth to mouth resuscitation if this would occur  4  The person has one seizure right after the other without coming back to normal consciousness between the seizures  5  The person has not regained consciousness or is still confused after 30 minutes  6  You know the person does not have epilepsy  7  You know the person has diabetes or low blood sugar  8  The person is pregnant, ill, or injured  9  The seizure occurred in water, because the person may have inhaled water  10  The person requests an ambulance or medical help  Rescue medication  Your doctor may prescribe a rescue medication such as lorazepam (Ativan), diazepam (Valium / Diastat), or clonazepam (Klonopin) to terminate a seizure or if you have a history of cluster of seizures   Follow the instructions given by your doctor for these medications    Recognizing Common Seizures (examples)   · Simple partial seizures: Isolated twitching, numbness, sweating, dizziness, nausea/vomiting, disturbances to hearing, vision, smell or taste  No loss of consciousness occurs, and the person remains aware of his/her environment     · Complex partial seizures: Staring, motionless, picking at clothes, smacking lips, swallowing repeatedly or wandering around  The person is not aware of their surroundings and is not fully responsive  · Atonic seizures: Drop attacks or sudden, rapid fall to ground with rapid recovery  · Myoclonic seizures: Brief forceful jerks which can affect the whole body or just part of it  · Absence seizures: May appear to be daydreaming or spacing out   The person is momentarily unresponsive and unaware of what is happening around him/her  · Tonic seizures: Stiffening of part or of the entire body  · Generalized Tonic-Clonic Seizures  Grand-mal seizure   Sudden loss of consciousness with body stiffening followed by continuous jerking movements  A blue tinge around the mouth is likely but lack of oxygen is rare  Loss of bladder and/or bowel control may occur  SEIZURE SAFETY    Dont let fear of seizures keep you at home  Be smart about your activities to make sure you are safe  The guidelines below can help you be as safe as possible  Make sure the people around you are aware of:   What happens when you have a seizure   Correct seizure first aid   First aid for choking (consider taking a basic life support class)   When they should know to call 911 or for medical help    Avoid common triggers of seizures:   Missing your medications   Not getting enough sleep   Drinking alcohol   Using illegal drugs    Safety measures for at home:  In the bathroom:    Do not take baths in the bathtub  Instead, take only showers  Try to have a family member available while you are in the shower   Make sure the drain in the bathtub/shower is working properly to avoid pooling of water   You can consider a fitted shower seat  Recessed soap trays can minimize injury if you would happen to fall in the shower   Bathroom doors can be hung to open outwards, so that the door can still be opened if you fall against the door   Do not lock the bathroom door  Use an Occupied sign on the door or other signal to let others know you are in the bathroom  o Safety locks can be obtained from the [facility]   On your water heater, set your water temperature to a warm temperature that is not scalding to avoid burns from very hot water   Put non-skid strips/ in the bathtub   Use an electric shaver   Avoid any electrical appliances (including electric shaver) in the bathroom or near water   Use shatterproof glass for mirrors  In the kitchen:    When possible, cook using a microwave   Only cook or use electrical appliances when someone else is in the house and available   As much as possible, grill food and avoid fryers or frying food   Use the back burners of the stove and turn the pot handles toward the back of the stove   Avoid carrying hot pans, pots of boiling water, or very hot food  Serve food or liquids directly from the stove  At the least, minimize the distance hot food is carried   Use precut foods or food processers to limit the need to use knives   Use plastic or durable cups, dishes, and containers instead of breakable glass items  In the bedroom   Low level and wide beds (like a futon) can reduce risk of injury of falling out of bed  If there is a high risk of falling out of bed, you can consider simply putting the mattress on the floor   Avoid sleeping on top bunks of bunk beds   Place a soft carpet on the floor  Around the house   Pad sharp corners of tables, chairs, etc  Round tables and furniture can be considered to avoid sharp corners   Avoid open flames  Place a screen in front of fireplaces and dont build a fire alone   Allow for open spaces with furniture   Avoid loose throw rugs or slippery floors  Non-slip ashley or cushioned ashley can help reduce injury from a fall      Fireproof fabrics and furniture are best, and especially important if you smoke   Doors and windows with safety glass are safer if someone falls against them   Avoid lights and heaters that could easily be knocked over   Use curling irons or clothing irons that have automatic shut off switches   Make sure motor driven equipment or lawn mowers have dead mans handles or seats so they will turn off if you release pressure  Safety measures when away from home  [address] law mandates that you cannot drive for 6 months after your most recent seizure   [place] law mandates that you cannot drive for 6 months after your most recent seizure  Work/Travel   Wear a medical alert bracelet or necklace at all times   Wear a helmet and use protective clothing/equipment when appropriate   Consider telling co-workers and travel companions that you have epilepsy and what to do if you have a seizure   Avoid irregular shifts or disruptions of a regular sleep pattern   Take your medications on time and keep an updated list of medications in your purse or wallet   Do not climb to heights or operate heavy machinery   Stand back from the edges of roads or bus/train platforms when traveling   If you wander when you have a seizure, take a friend along for the trip  Recreation  [employer] can be dangerous  Do not swim alone, in open water, or in murky water that you cannot see the bottom  o Caregivers should be with you in the pool at all times and must be physically able to get you out of the water   Use a flotation device   Scuba diving is not recommended since during a seizure people are unaware of their surroundings  o Having a seizure underwater can be deadly and can endanger the lives of others   Kayaking and canoeing can be especially dangerous  o People with epilepsy are at a higher risk of becoming trapped underneath a canoe or kayak   Wear a helmet when playing contact sports, biking, or if there is a risk of falling    o Patients with epilepsy are at a higher risk of head injury when playing contact sports   Avoid riding a bike, running, or other activities around busy roads, steep hills, or secluded areas   Exercise on soft surfaces   Theme [place]: many people with epilepsy can go on rides depending on their type of seizures  o For some people, stress or excitement can trigger a seizure  o Rollercoasters (especially if you are upside-down) are more dangerous for people with epilepsy  Medications   Take your medications on time  If you have trouble remembering, set alarms on your phone  You can visit www  aadgasu2qrceqqz  com to set up reminders through text message to help you remember to take your medications   Use a pillbox to help you keep track of your medications   When out of the house, take any needed medications with you  Consider keeping one or two extra doses with you in case you are unexpectedly away from home longer than planned   If you realize you missed a dose of your medications and it is less than 2 hours until your next dose, skip the missed dose  Do not double up your medication dose  If it is more than 2 hours until your next dose, you can take the missed dose   Avoid drinking alcohol, since this can enhance effects of your seizure medications   Be aware of common and major side effects of your medications   Keep your medications out of the reach of children  Parenting:  Darryl Eagles your home as much as possible   It is possible that you could drop your baby if you have a seizure while holding or feeding them   If you are nursing a baby, sit on the floor or bed with your back supported so the baby will not fall far if you should lose consciousness   Feed the baby while he or she is seated in an infant seat   Dress, change, and sponge bathe the baby on the floor   Move the baby around in a stroller or small crib    Lee Sprcarli Keep a young baby in a playpen when you are alone, and a toddler in an indoor play yard, or childproof one room and use safety araiza at the doors   When out of the house, use a bungee-type cord or restraint harness so your child cannot wander away if you have a seizure that affects your awareness

## 2021-02-18 NOTE — PROGRESS NOTES
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 ([facility] [facility] ) - Primary    Relevant Medications    levETIRAcetam (KEPPRA) 1000 MG tablet    levETIRAcetam (KEPPRA) 250 mg tablet    Other Relevant Orders    Levetiracetam level      Other Visit Diagnoses     Mass of temporal lobe        Relevant Medications    levETIRAcetam (KEPPRA) 1000 MG tablet               Reason for visit is   Chief Complaint   Patient presents with    Virtual Regular Visit        Encounter provider Vasile Dawson MD    Provider located at [facility] [address]      Recent Visits  Date Type Provider Dept   02/17/21 Telephone Vasile Dawson MD [facility] recent visits within past 7 days and meeting all other requirements     Today's Visits  Date Type Provider Dept   02/18/21 Telemedicine Vasile Dawson MD  [facility] [place]   Showing today's visits and meeting all other requirements     Future Appointments  No visits were found meeting these conditions  Showing future appointments within next 150 days and meeting all other requirements        The patient was identified by name and date of birth  Tracy Duran was informed that this is a telemedicine visit and that the visit is being conducted through telephone  My office door was closed  No one else was in the room  He acknowledged consent and understanding of privacy and security of the video platform  The patient has agreed to participate and understands they can discontinue the visit at any time  Patient is aware this is a billable service  It was my intent to perform this visit via video technology but the patient was not able to do a video connection so the visit was completed via audio telephone only         [facility]  Initial Consultation    Impression/Plan    Mr Elzbieta Donahue is a 60 y o  male with 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  Virtual visit today did not include video and exam was limited  While the 2 episodes of aphasia persisted for longer than is typical for seizure, the sudden onset of his expressive aphasia argues for seizure as the cause  There may have been additional focal seizures that caused his waxing/waning language exam during his first admission in early January  The event leading to his repeat admission in late January could have also represented seizure and occurred while he was on his current dose of levetiracetam 1000 mg bid  His levetiracetam level was quite low when he was in the hospital  Will increase levetiracetam to 1250 mg bid today  We discussed the pathophysiology of epilepsy/seizure and seizure safety/precautions  We discussed factors that can lower seizure threshold and the side effects of antiepileptic medications  Discussed driving restrictions  Patient Instructions   1  Increase levetiracetam to 1250 mg twice daily (1000 mg pill plus 250 mg pill)  2  Let us know if there are seizures  3  Have blood work (levetiracetam level) drawn in about one week  4  Return in about 3 months  Diagnoses and all orders for this visit:    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 ([address] )  -     levETIRAcetam (KEPPRA) 250 mg tablet; Take 1 tablet (250 mg total) by mouth 2 (two) times a day  -     Levetiracetam level; Future    Mass of temporal lobe  -     levETIRAcetam (KEPPRA) 1000 MG tablet; Take 1 tablet (1,000 mg total) by mouth every 12 (twelve) hours    Glioblastoma ([address] )        Nena    Christofer Rock is a 60 y o  male presenting to the [place] [address] [facility] for follow up after hospitalization   He was seen by our neurohospitalist team during 2 admissions in January 2021 for 2 separate episodes of acute aphasia with the first leading to discovery of left temporal mass that has since been determined to be GBM  History is from the patient, his mother and review of records from 2 hospitalizations and subsequent outpatient records  On 1/1/2021 he was talking to his mother at about 9 am  He had sudden onset trouble getting his words out correctly  In the ED he was dysarthric with mixed aphasia  By the next morning he was noted to be near baseline with waxing/waning mixed aphasia (significant variability in language performance between neurology exams)  He was discharged home on levetiracetam 1000 mg bid and had a biopsy completed on 1/19  On 1/28 he presented with nausea, vomiting, aphasia and confusion  Repeat MRI on 1/29/2021 showed significant increase in tumor size with some internal hemorrhage and moderate surrounding vasogenic edema  He was given a course of dexamethasone  The current plan is for radiation and Temodar  He did not miss any doses of levetiracetam prior to the event  His levetiracetam level the evening of the event was 6 9 at 2236 prior to getting his evening dose  The patient and his mother feel symptoms of aphasia lasted an hour or more for each event  There have been no additional events concerning for seizure  He has some word finding problems, but not episodes similar to the discrete episodes described above  Current AEDs:  Levetiracetam 1000 mg bid  Medication side effects: None  Medication adherence: Yes    Event/Seizure semiology:  Sudden onset expressive aphasia    Special Features  Status epilepticus: no  Self Injury Seizures: no  Precipitating Factors: none    Epilepsy Risk Factors:  CNS neoplasm (left temporal GBM)    Prior AEDs:  None    Prior Evaluation:  REEG 1/4/2021: normal  Awake and asleep     Brain MRI 2/112021: Continued enlargement of the left temporal lobe centrally necrotic, irregularly peripherally enhancing neoplasm now measuring 3 7 cm in transverse diameter, compared to 3 1 cm approximately 2 weeks ago  Stable mild surrounding vasogenic edema  (images personally reviewed 2/18/2021)    History Reviewed: The following were reviewed and updated as appropriate: allergies, current medications, past family history, past medical history, past social history, past surgical history and problem list  Joselo, htn, hld     Psychiatric History:  None    Social History:   Driving: No  Lives Alone: No        Objective    There were no vitals taken for this visit  General Exam  General: well developed, no acute distress  Neck:  good ROM  Neurological Exam  Mental Status: awake, alert, and fully oriented to person, place, time, and situation  Attention  intact  Fund of knowledge is appropriate for age and education  Language:  comprehension normal   Occasional word finding problems during the interview  Jo Ann Gamble MD   [facility] [address] [facility], [facility]          I spent 31 minutes directly with the patient during this visit    Total time spent on day of encounter: 45 minutes  The time was spent reviewing testing, obtaining/reviewing history, performing an exam, counseling/educating, ordering medication/tests/procedures, referring/communicating with other healthcare providers, documenting clinical information in the EMR, independently interpreting EEG/imaging results, and/ or coordinating care  VIRTUAL VISIT DISCLAIMER    Valdez Uribe acknowledges that he has consented to an online visit or consultation  He understands that the online visit is based solely on information provided by him, and that, in the absence of a face-to-face physical evaluation by the physician, the diagnosis he receives is both limited and provisional in terms of accuracy and completeness  This is not intended to replace a full medical face-to-face evaluation by the physician  Valdez Jojo understands and accepts these terms

## 2021-02-19 DIAGNOSIS — C71.9 GLIOBLASTOMA (HCC): Primary | ICD-10-CM

## 2021-02-19 RX ORDER — TEMOZOLOMIDE 180 MG/1
CAPSULE ORAL
Qty: 42 CAPSULE | Refills: 0 | Status: SHIPPED | OUTPATIENT
Start: 2021-02-19 | End: 2021-03-22

## 2021-02-19 RX ORDER — TEMOZOLOMIDE 5 MG/1
CAPSULE ORAL
Qty: 42 CAPSULE | Refills: 0 | Status: SHIPPED | OUTPATIENT
Start: 2021-02-19 | End: 2021-03-22

## 2021-02-22 ENCOUNTER — DOCUMENTATION (OUTPATIENT)
Dept: HEMATOLOGY ONCOLOGY | Facility: CLINIC | Age: 66
End: 2021-02-22

## 2021-02-22 LAB
MYCOBACTERIUM SPEC CULT: NORMAL
RHODAMINE-AURAMINE STN SPEC: NORMAL

## 2021-02-22 NOTE — PROGRESS NOTES
2-10-21  Received new oral chemo start- TEMODAR  Per Darylene Mosses is needed  2-17-21  Per Abhinav Ochoa team, no Abhinav Ochoa is required if using an in-network specialty pharmacy  Auth team sent email to team, notified providers office that Abhinav Ochoa is not required and prescription can be sent to pharmacy    2-22-21  Received email from [facility] with Copay information:  Temodar 5mg #30 = $161 56  Temodar  180mg #30 = $1718 22  with Mr Maeve Julian and he does not wish to pursue  assistance at this time  He states he has been waiting far to long for this to be resolved and would just pay the amount due  He is expecting a call within the hour  [phone]  Email sent to team requesting someone please give him a call  He states he has the Credit Card in his hand ready to pay  I advised patient I would reach notify the team     Received call from patient very happy he received a call from pharmacy and he should receive his medication by wednesday 2-23-21  Received call from patient stating he would like to proceed with funding in hopes he can be reimbursed for monies he paid  Completed [employer] application, forwarded to provider for signature, forwarded patient his portion for signature and proof of purchase for reimbursement form

## 2021-02-23 ENCOUNTER — TELEPHONE (OUTPATIENT)
Dept: HEMATOLOGY ONCOLOGY | Facility: CLINIC | Age: 66
End: 2021-02-23

## 2021-02-24 ENCOUNTER — APPOINTMENT (OUTPATIENT)
Dept: RADIATION ONCOLOGY | Facility: CLINIC | Age: 66
End: 2021-02-24
Attending: RADIOLOGY
Payer: COMMERCIAL

## 2021-02-24 PROCEDURE — 77417 THER RADIOLOGY PORT IMAGE(S): CPT | Performed by: RADIOLOGY

## 2021-02-24 PROCEDURE — 77386 HB NTSTY MODUL RAD TX DLVR CPLX: CPT | Performed by: RADIOLOGY

## 2021-02-25 ENCOUNTER — APPOINTMENT (OUTPATIENT)
Dept: RADIATION ONCOLOGY | Facility: CLINIC | Age: 66
End: 2021-02-25
Attending: RADIOLOGY
Payer: COMMERCIAL

## 2021-02-25 ENCOUNTER — TELEPHONE (OUTPATIENT)
Dept: NEUROLOGY | Facility: CLINIC | Age: 66
End: 2021-02-25

## 2021-02-25 PROCEDURE — 77386 HB NTSTY MODUL RAD TX DLVR CPLX: CPT | Performed by: RADIOLOGY

## 2021-02-25 NOTE — TELEPHONE ENCOUNTER
Typically seizures are clear transient events with a defined start/stop and are not usually the direct cause of more persistent memory/cognitive problems like he describes  Given the progression on the recent MRI I think that the tumor is the likely cause of cognitive problems  The tumor is located in a region of the brain that is responsible for language/speech function and progression of speech difficulties would not be unexpected  Rarely seizures can be subtle and if occurring intermittently can be perceived as persistent symptoms  A 24 hour ambulatory EEG could be obtained to evaluate for this less likely possibility  If there are more defined transient symptoms seizure would be more likely and I would more strongly recommend ambulatory EEG or increase in seizure medication dose  I cannot predict how tumor treatment will impact his symptoms in the short term, but I hope that decrease in tumor size will result in improved symptoms  His Oncology team may also have some more guidance on expected response to therapy

## 2021-02-25 NOTE — TELEPHONE ENCOUNTER
Patient calling in with questions for Dr Hope Milton  Patient was questioning when he was supposed to have his labs done  I informed patient, based on office note that he was to have Keppra level drawn about 1 week after the visit  Patient states he will be getting his labs drawn on Monday while he is at [place] location for radiation  Patient also says that his memory has gotten much worse since his seizures  He wants to know if his chemotherapy will either help better his memory in time or will it make it worse? Patient also asking if it is his tumor or seizures that are likely causing these memory issues? Please advise       cb#: [phone], okay to leave detailed message

## 2021-02-26 ENCOUNTER — APPOINTMENT (OUTPATIENT)
Dept: RADIATION ONCOLOGY | Facility: CLINIC | Age: 66
End: 2021-02-26
Attending: RADIOLOGY
Payer: COMMERCIAL

## 2021-02-26 DIAGNOSIS — C71.9 GLIOBLASTOMA (HCC): Primary | ICD-10-CM

## 2021-02-26 PROCEDURE — 77386 HB NTSTY MODUL RAD TX DLVR CPLX: CPT | Performed by: RADIOLOGY

## 2021-03-01 ENCOUNTER — LAB (OUTPATIENT)
Dept: LAB | Facility: CLINIC | Age: 66
End: 2021-03-01
Payer: COMMERCIAL

## 2021-03-01 ENCOUNTER — APPOINTMENT (OUTPATIENT)
Dept: RADIATION ONCOLOGY | Facility: CLINIC | Age: 66
End: 2021-03-01
Attending: RADIOLOGY
Payer: COMMERCIAL

## 2021-03-01 DIAGNOSIS — D49.6 EPILEPSY DUE TO INTRACRANIAL TUMOR (HCC): ICD-10-CM

## 2021-03-01 DIAGNOSIS — G40.909 EPILEPSY DUE TO INTRACRANIAL TUMOR (HCC): ICD-10-CM

## 2021-03-01 LAB
ALBUMIN SERPL BCP-MCNC: 3.8 G/DL (ref 3.5–5.7)
ALP SERPL-CCNC: 80 U/L (ref 46–116)
ALT SERPL W P-5'-P-CCNC: 37 U/L (ref 12–78)
ANION GAP SERPL CALCULATED.3IONS-SCNC: 7 MMOL/L (ref 4–13)
AST SERPL W P-5'-P-CCNC: 38 U/L (ref 5–45)
BASOPHILS # BLD AUTO: 0.03 THOUSANDS/ΜL (ref 0–0.1)
BASOPHILS NFR BLD AUTO: 1 % (ref 0–1)
BILIRUB SERPL-MCNC: 0.7 MG/DL (ref 0.2–1)
BUN SERPL-MCNC: 12 MG/DL (ref 5–25)
CALCIUM SERPL-MCNC: 9.9 MG/DL (ref 8.3–10.1)
CHLORIDE SERPL-SCNC: 108 MMOL/L (ref 98–108)
CO2 SERPL-SCNC: 28 MMOL/L (ref 21–32)
CREAT SERPL-MCNC: 0.9 MG/DL (ref 0.6–1.3)
EOSINOPHIL # BLD AUTO: 0.04 THOUSAND/ΜL (ref 0–0.61)
EOSINOPHIL NFR BLD AUTO: 1 % (ref 0–6)
ERYTHROCYTE [DISTWIDTH] IN BLOOD BY AUTOMATED COUNT: 14.7 % (ref 11.6–15.1)
GFR SERPL CREATININE-BSD FRML MDRD: 90 ML/MIN/1.73SQ M
GLUCOSE SERPL-MCNC: 114 MG/DL (ref 65–140)
HCT VFR BLD AUTO: 47.8 % (ref 36.5–49.3)
HGB BLD-MCNC: 16.1 G/DL (ref 12–17)
LYMPHOCYTES # BLD AUTO: 1.63 THOUSANDS/ΜL (ref 0.6–4.47)
LYMPHOCYTES NFR BLD AUTO: 26 % (ref 14–44)
MCH RBC QN AUTO: 29.3 PG (ref 26.8–34.3)
MCHC RBC AUTO-ENTMCNC: 33.7 G/DL (ref 31.4–37.4)
MCV RBC AUTO: 87 FL (ref 82–98)
MONOCYTES # BLD AUTO: 0.52 THOUSAND/ΜL (ref 0.17–1.22)
MONOCYTES NFR BLD AUTO: 8 % (ref 4–12)
NEUTROPHILS # BLD AUTO: 3.99 THOUSANDS/ΜL (ref 1.85–7.62)
NEUTS SEG NFR BLD AUTO: 64 % (ref 43–75)
PLATELET # BLD AUTO: 223 THOUSANDS/UL (ref 149–390)
PMV BLD AUTO: 9.9 FL (ref 8.9–12.7)
POTASSIUM SERPL-SCNC: 4.4 MMOL/L (ref 3.5–5.3)
PROT SERPL-MCNC: 6.6 G/DL (ref 6.4–8.2)
RBC # BLD AUTO: 5.49 MILLION/UL (ref 3.88–5.62)
SODIUM SERPL-SCNC: 143 MMOL/L (ref 136–145)
WBC # BLD AUTO: 6.21 THOUSAND/UL (ref 4.31–10.16)

## 2021-03-01 PROCEDURE — 77386 HB NTSTY MODUL RAD TX DLVR CPLX: CPT | Performed by: RADIOLOGY

## 2021-03-01 PROCEDURE — 36415 COLL VENOUS BLD VENIPUNCTURE: CPT

## 2021-03-01 PROCEDURE — 80053 COMPREHEN METABOLIC PANEL: CPT

## 2021-03-01 PROCEDURE — 80177 DRUG SCRN QUAN LEVETIRACETAM: CPT

## 2021-03-01 PROCEDURE — 85025 COMPLETE CBC W/AUTO DIFF WBC: CPT

## 2021-03-02 ENCOUNTER — APPOINTMENT (OUTPATIENT)
Dept: RADIATION ONCOLOGY | Facility: CLINIC | Age: 66
End: 2021-03-02
Attending: RADIOLOGY
Payer: COMMERCIAL

## 2021-03-02 PROCEDURE — 77386 HB NTSTY MODUL RAD TX DLVR CPLX: CPT | Performed by: RADIOLOGY

## 2021-03-02 PROCEDURE — 77336 RADIATION PHYSICS CONSULT: CPT | Performed by: RADIOLOGY

## 2021-03-02 PROCEDURE — 77417 THER RADIOLOGY PORT IMAGE(S): CPT | Performed by: RADIOLOGY

## 2021-03-02 NOTE — TELEPHONE ENCOUNTER
Patient returning call and discussed message with patient  He states understanding and will reach out to his oncology team to discuss further  He will contact the office if he has any seizures  Letter removed

## 2021-03-03 ENCOUNTER — APPOINTMENT (OUTPATIENT)
Dept: RADIATION ONCOLOGY | Facility: CLINIC | Age: 66
End: 2021-03-03
Attending: RADIOLOGY
Payer: COMMERCIAL

## 2021-03-03 ENCOUNTER — NURSE TRIAGE (OUTPATIENT)
Dept: HEMATOLOGY ONCOLOGY | Facility: CLINIC | Age: 66
End: 2021-03-03

## 2021-03-03 DIAGNOSIS — C71.9 GLIOBLASTOMA (HCC): Primary | ICD-10-CM

## 2021-03-03 PROCEDURE — 77386 HB NTSTY MODUL RAD TX DLVR CPLX: CPT | Performed by: RADIOLOGY

## 2021-03-03 RX ORDER — ZOLPIDEM TARTRATE 5 MG/1
5 TABLET ORAL
Qty: 30 TABLET | Refills: 0 | Status: SHIPPED | OUTPATIENT
Start: 2021-03-03 | End: 2021-08-17 | Stop reason: ALTCHOICE

## 2021-03-03 NOTE — TELEPHONE ENCOUNTER
Patient called to report that he has been experiencing constipation that started 3 days ago  Patient stated that he usually experienced constipation 2 days post start on Zofran and Temodar on 2/24/21  Patient reports that he has had small hard bowel movements  Patient uses 1 enema on Sunday, Monday, and Tuesday  Patient is using Colace three times a day  Patient took milk of magnesia this morning  Denies fevers, abdominal pain, hematochezia, naused, or vomiting  Patient's normally has a bowel movement daily  Reviewed dietary and hydration advise  Patient verbalized understanding of above  Reason for Disposition   Mild constipation    Protocols used: CONSTIPATION-ADULT-[place]    Please review with Dr Celina Wang if he would like patient to continue colace three times a day  What laxative would he recommend if patient is unsuccessful in having a bowel movement

## 2021-03-03 NOTE — TELEPHONE ENCOUNTER
[facility]  Pt is aware  To c/b in a couple of days to let us know if he is sleeping better with this RX

## 2021-03-03 NOTE — TELEPHONE ENCOUNTER
Discussed with Vaibhav Vasquez's issues with sleep  Dr Avril Finley suggests melatonin  Lila Kee states that melatonin does not work for him  Will review with Dr Avril Finley

## 2021-03-03 NOTE — TELEPHONE ENCOUNTER
Patient is questioning if Dr Felisha Shields can order something for his difficulty sleeping  Patient does wear a CPAP machine at [facility]  Please review request with Dr Felisha Shields

## 2021-03-03 NOTE — TELEPHONE ENCOUNTER
Called and spoke with Siobhan Banegas and discussed his constipation  Suggested that he introduce foods that have increased fiber intake &  increase his fluid intake  Discussed that if he did not have a bowel movement by the end of the day, he could try 1/2 bottle of magnesium citrate tonight  Instructed pt to c/b if he does not get relief  Will discuss his difficulty sleeping with Dr Peter Hamman and c/b pt today

## 2021-03-04 ENCOUNTER — APPOINTMENT (OUTPATIENT)
Dept: RADIATION ONCOLOGY | Facility: CLINIC | Age: 66
End: 2021-03-04
Attending: RADIOLOGY
Payer: COMMERCIAL

## 2021-03-04 PROCEDURE — 77386 HB NTSTY MODUL RAD TX DLVR CPLX: CPT | Performed by: RADIOLOGY

## 2021-03-05 ENCOUNTER — APPOINTMENT (OUTPATIENT)
Dept: RADIATION ONCOLOGY | Facility: CLINIC | Age: 66
End: 2021-03-05
Attending: RADIOLOGY
Payer: COMMERCIAL

## 2021-03-05 PROCEDURE — 77386 HB NTSTY MODUL RAD TX DLVR CPLX: CPT | Performed by: RADIOLOGY

## 2021-03-06 LAB — LEVETIRACETAM SERPL-MCNC: 31.3 UG/ML (ref 10–40)

## 2021-03-08 ENCOUNTER — NURSE TRIAGE (OUTPATIENT)
Dept: HEMATOLOGY ONCOLOGY | Facility: CLINIC | Age: 66
End: 2021-03-08

## 2021-03-08 ENCOUNTER — APPOINTMENT (OUTPATIENT)
Dept: RADIATION ONCOLOGY | Facility: CLINIC | Age: 66
End: 2021-03-08
Attending: RADIOLOGY
Payer: COMMERCIAL

## 2021-03-08 ENCOUNTER — TELEPHONE (OUTPATIENT)
Dept: NUTRITION | Facility: CLINIC | Age: 66
End: 2021-03-08

## 2021-03-08 PROCEDURE — 77386 HB NTSTY MODUL RAD TX DLVR CPLX: CPT | Performed by: RADIOLOGY

## 2021-03-08 NOTE — TELEPHONE ENCOUNTER
Patient is calling in to inform that he is experiencing constipation and has not been able to use the bathroom   He can be reached back at [phone]

## 2021-03-08 NOTE — TELEPHONE ENCOUNTER
Patient is calling to report that he is having daily medium hard bowel movements with effort  Patient continues to use Colace 3 times a day  Patient stated he is drinking 8 glasses of water daily  He has increased his dietary fiber  Denies fevers  Denies nausea, vomiting, hematochezia, or abdominal     I will make a referral to Oncology Dietician  Would Dr Makeda Saxena recommend a different antiemetic due to Zofran sometimes causing constipation? Pharmacy:  [address], [place] [address]   Phone:  [phone]       Reason for Disposition   MILD-MODERATE constipation    Protocols used: CANCER - CONSTIPATION-ADULT-    Will forward to Dr Jamie Lee RN

## 2021-03-08 NOTE — TELEPHONE ENCOUNTER
Attempted to contact Dyana Junior to discuss his nutrition after receiving notification by Felicita Jerry, RN  on 3/8/21 that pt is appropriate for oncology nutrition care (reason for referral: pt expereincing constipation)  No answer, LVM with reason for call and RD contact info asking that he call back as able/desired

## 2021-03-08 NOTE — TELEPHONE ENCOUNTER
Please advise patient to add Miralax to his regimen once a day  Advise him to let us know if this helps

## 2021-03-09 ENCOUNTER — APPOINTMENT (OUTPATIENT)
Dept: RADIATION ONCOLOGY | Facility: CLINIC | Age: 66
End: 2021-03-09
Attending: RADIOLOGY
Payer: COMMERCIAL

## 2021-03-09 ENCOUNTER — LAB (OUTPATIENT)
Dept: LAB | Facility: CLINIC | Age: 66
End: 2021-03-09
Attending: RADIOLOGY
Payer: COMMERCIAL

## 2021-03-09 PROCEDURE — 77417 THER RADIOLOGY PORT IMAGE(S): CPT | Performed by: RADIOLOGY

## 2021-03-09 PROCEDURE — 77336 RADIATION PHYSICS CONSULT: CPT | Performed by: RADIOLOGY

## 2021-03-09 PROCEDURE — 77386 HB NTSTY MODUL RAD TX DLVR CPLX: CPT | Performed by: RADIOLOGY

## 2021-03-10 ENCOUNTER — APPOINTMENT (OUTPATIENT)
Dept: RADIATION ONCOLOGY | Facility: CLINIC | Age: 66
End: 2021-03-10
Attending: RADIOLOGY
Payer: COMMERCIAL

## 2021-03-10 ENCOUNTER — TELEPHONE (OUTPATIENT)
Dept: HEMATOLOGY ONCOLOGY | Facility: CLINIC | Age: 66
End: 2021-03-10

## 2021-03-10 PROCEDURE — 77386 HB NTSTY MODUL RAD TX DLVR CPLX: CPT | Performed by: RADIOLOGY

## 2021-03-11 ENCOUNTER — DOCUMENTATION (OUTPATIENT)
Dept: HEMATOLOGY ONCOLOGY | Facility: CLINIC | Age: 66
End: 2021-03-11

## 2021-03-11 ENCOUNTER — RADIATION THERAPY TREATMENT (OUTPATIENT)
Dept: RADIATION ONCOLOGY | Facility: CLINIC | Age: 66
End: 2021-03-11
Payer: COMMERCIAL

## 2021-03-11 ENCOUNTER — TELEPHONE (OUTPATIENT)
Dept: NEUROLOGY | Facility: CLINIC | Age: 66
End: 2021-03-11

## 2021-03-11 ENCOUNTER — TELEPHONE (OUTPATIENT)
Dept: HEMATOLOGY ONCOLOGY | Facility: CLINIC | Age: 66
End: 2021-03-11

## 2021-03-11 PROCEDURE — 77386 HB NTSTY MODUL RAD TX DLVR CPLX: CPT | Performed by: RADIOLOGY

## 2021-03-11 NOTE — PROGRESS NOTES
3-11-21  [facility] stating:  Patient called to request a call back from [facility]  Patient would like assistance with copays and coverage, Patient states he has left several messages and has not received a return call  Patient can be reached at [phone]  Notified hospitals Last communication with patient was 2-22  And Reimbursement form was sent to the patient as he requested  Jessie Stein called me twice today on my cell number and left me 1 message  I have placed a return call and no response  Received call from patient and he stated he doesn't remember how he received his medication Temodar  Notified patient he paid for his medication on his CC and I emailed the reimbursement form as he requested to his mothers email address  He stated he wanted to try funding with the foundation now however there is not funding for that foundation at this time but would continue to monitor grants   Patient was grateful and apologized for forgetting our previous conversation

## 2021-03-11 NOTE — TELEPHONE ENCOUNTER
Patient called to request a call back from [facility]  Patient would like assistance with copays and coverage, Patient states he has left several messages and has not received a return call   Patient can be reached at [phone]

## 2021-03-12 ENCOUNTER — OFFICE VISIT (OUTPATIENT)
Dept: HEMATOLOGY ONCOLOGY | Facility: CLINIC | Age: 66
End: 2021-03-12
Payer: COMMERCIAL

## 2021-03-12 ENCOUNTER — APPOINTMENT (OUTPATIENT)
Dept: RADIATION ONCOLOGY | Facility: CLINIC | Age: 66
End: 2021-03-12
Attending: RADIOLOGY
Payer: COMMERCIAL

## 2021-03-12 VITALS
BODY MASS INDEX: 32.83 KG/M2 | HEART RATE: 83 BPM | HEIGHT: 75 IN | RESPIRATION RATE: 17 BRPM | WEIGHT: 264 LBS | TEMPERATURE: 97.4 F | DIASTOLIC BLOOD PRESSURE: 74 MMHG | SYSTOLIC BLOOD PRESSURE: 136 MMHG | OXYGEN SATURATION: 93 %

## 2021-03-12 DIAGNOSIS — C71.9 GLIOBLASTOMA (HCC): Primary | ICD-10-CM

## 2021-03-12 PROCEDURE — 99214 OFFICE O/P EST MOD 30 MIN: CPT | Performed by: INTERNAL MEDICINE

## 2021-03-12 PROCEDURE — 77386 HB NTSTY MODUL RAD TX DLVR CPLX: CPT | Performed by: RADIOLOGY

## 2021-03-12 RX ORDER — MINOCYCLINE HYDROCHLORIDE 100 MG/1
100 TABLET ORAL DAILY
COMMUNITY
End: 2021-08-17 | Stop reason: ALTCHOICE

## 2021-03-12 NOTE — PROGRESS NOTES
[facility] [place]  [address] [id number] [phone]    Viraj Hamilton [id number]  03/12/21    Discussion:   In summary, this is a 30-year-old male history of GBM as outlined  Clinically he is essentially stable  He is generally able to communicate although he does have some speech difficulties  He has had 13 radiation treatments so far with concomitant daily Temodar  He has some slight increase in memory difficulties in the past few days  Recent CBC and chemistry are entirely normal   Continuation of treatment is recommended  I discussed the above with the patient  The patient  voiced understanding and agreement   ______________________________________________________________________    Chief Complaint   Patient presents with    Follow-up       HPI:  Oncology History   Brain tumor [facility]) (Resolved)   1/19/2021 Biopsy    BIOPSY BRAIN IMAGE-GUIDED NEEDLE - LEFT TEMPORAL  Surgeon: Dr Dedra Cleveland    Temporal mass:  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)  Note    This case was seen in consultation with Dr Zayra Light, an expert in Neuropathology from [facility]  Glioblastoma ([facility] [address] )   1/19/2021 Initial Diagnosis    Glioblastoma ([facility] [address] )     1/19/2021 Biopsy    BIOPSY BRAIN IMAGE-GUIDED NEEDLE - LEFT TEMPORAL  Surgeon: Dr Dedra Cleveland    Temporal mass:  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)  Note    This case was seen in consultation with Dr Zayra Light, an expert in Neuropathology from [facility]  Interval History:  Clinically stable  ECOG-  1 - Symptomatic but completely ambulatory    Review of Systems   Constitutional: Positive for fatigue (mild)  Negative for chills and fever  HENT: Negative for nosebleeds  Eyes: Negative for discharge  Respiratory: Negative for cough and shortness of breath  Cardiovascular: Negative for chest pain     Gastrointestinal: Negative for abdominal pain, constipation and diarrhea  Endocrine: Negative for polydipsia  Genitourinary: Negative for hematuria  Musculoskeletal: Negative for arthralgias  Skin: Negative for color change  Allergic/Immunologic: Negative for immunocompromised state  Neurological: Positive for speech difficulty  Negative for dizziness and headaches  Hematological: Negative for adenopathy  Psychiatric/Behavioral: Negative for agitation         Past Medical History:   Diagnosis Date    Brain tumor ([facility] [address] ) 1/5/2021    Bruxism (teeth grinding)     GBM (glioblastoma multiforme) (HCC)     Hypercholesterolemia     Hypertension     Obesity     Sleep apnea     CPAP nightly     Patient Active Problem List   Diagnosis    Class 1 obesity due to excess calories with serious comorbidity and body mass index (BMI) of 32 0 to 32 9 in adult    Negative depression screening    Hypercholesterolemia    Essential hypertension    Obstructive sleep apnea syndrome    Sleep disturbance    Daytime sleepiness    Prediabetes    Glioblastoma (HCC)    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 (HCC)       Current Outpatient Medications:     amLODIPine (NORVASC) 10 mg tablet, Take 1 tablet (10 mg total) by mouth daily, Disp: 90 tablet, Rfl: 3    atorvastatin (LIPITOR) 10 mg tablet, Take 0 5 tablets (5 mg total) by mouth daily, Disp: 30 tablet, Rfl: 2    hydrochlorothiazide (MICROZIDE) 12 5 mg capsule, Take 1 capsule (12 5 mg total) by mouth daily, Disp: 90 capsule, Rfl: 3    levETIRAcetam (KEPPRA) 1000 MG tablet, Take 1 tablet (1,000 mg total) by mouth every 12 (twelve) hours, Disp: 180 tablet, Rfl: 1    levETIRAcetam (KEPPRA) 250 mg tablet, Take 1 tablet (250 mg total) by mouth 2 (two) times a day, Disp: 180 tablet, Rfl: 1    minocycline (DYNACIN) 100 MG tablet, Take 100 mg by mouth daily, Disp: , Rfl:     ondansetron (ZOFRAN) 8 mg tablet, Take 1 tablet (8 mg total) by mouth every 8 (eight) hours as needed for nausea or vomiting, Disp: 30 tablet, Rfl: 3    temozolomide (TEMODAR) 180 mg capsule, Take one 180 mg Caps for 42 days, Disp: 42 capsule, Rfl: 0    temozolomide (TEMODAR) 5 mg capsule, Take one 5 mg caps for 42 days, Disp: 42 capsule, Rfl: 0    valsartan (DIOVAN) 160 mg tablet, Take 1 tablet (160 mg total) by mouth daily, Disp: 90 tablet, Rfl: 3    zolpidem (AMBIEN) 5 mg tablet, Take 1 tablet (5 mg total) by mouth daily at bedtime as needed for sleep, Disp: 30 tablet, Rfl: 0  No Known Allergies  Past Surgical History:   Procedure Laterality Date    FL LUMBAR PUNCTURE DIAGNOSTIC  1/4/2021    HAND FRACTURE REPAIR      left thumb    VA STEREO BX/ASPIR/EXCIS,INTRACRANIAL LESN Left 1/19/2021    Procedure: BIOPSY BRAIN IMAGE-GUIDED NEEDLE - LEFT TEMPORAL;  Surgeon: Susy Isbell MD;  Location: [facility];  Service: Neurosurgery    TONSILLECTOMY       Social History     Objective:  Vitals:    03/12/21 1510   BP: 136/74   BP Location: Left arm   Patient Position: Sitting   Pulse: 83   Resp: 17   Temp: (!) 97 4 °F (36 3 °C)   TempSrc: Tympanic   SpO2: 93%   Weight: 120 kg (264 lb)   Height: 6' 3" (1 905 m)     Physical Exam  Constitutional:       Appearance: He is well-developed  HENT:      Head: Normocephalic and atraumatic  Eyes:      Pupils: Pupils are equal, round, and reactive to light  Neck:      Musculoskeletal: Neck supple  Cardiovascular:      Rate and Rhythm: Normal rate and regular rhythm  Heart sounds: No murmur  Pulmonary:      Breath sounds: Normal breath sounds  No wheezing or rales  Abdominal:      Palpations: Abdomen is soft  Tenderness: There is no abdominal tenderness  Musculoskeletal: Normal range of motion  General: No tenderness  Lymphadenopathy:      Cervical: No cervical adenopathy  Skin:     Findings: No erythema or rash  Neurological:      Mental Status: He is alert and oriented to person, place, and time  Cranial Nerves: No cranial nerve deficit        Deep Tendon Reflexes: Reflexes are normal and symmetric  Psychiatric:         Behavior: Behavior normal            Labs: I personally reviewed the labs and imaging pertinent to this patient care

## 2021-03-15 ENCOUNTER — APPOINTMENT (OUTPATIENT)
Dept: RADIATION ONCOLOGY | Facility: CLINIC | Age: 66
End: 2021-03-15
Attending: RADIOLOGY
Payer: COMMERCIAL

## 2021-03-15 PROCEDURE — 77417 THER RADIOLOGY PORT IMAGE(S): CPT | Performed by: RADIOLOGY

## 2021-03-15 PROCEDURE — 77386 HB NTSTY MODUL RAD TX DLVR CPLX: CPT | Performed by: RADIOLOGY

## 2021-03-16 ENCOUNTER — APPOINTMENT (OUTPATIENT)
Dept: RADIATION ONCOLOGY | Facility: CLINIC | Age: 66
End: 2021-03-16
Attending: RADIOLOGY
Payer: COMMERCIAL

## 2021-03-16 ENCOUNTER — APPOINTMENT (OUTPATIENT)
Dept: LAB | Facility: CLINIC | Age: 66
End: 2021-03-16
Attending: RADIOLOGY
Payer: COMMERCIAL

## 2021-03-16 PROCEDURE — 77386 HB NTSTY MODUL RAD TX DLVR CPLX: CPT | Performed by: RADIOLOGY

## 2021-03-16 PROCEDURE — 77336 RADIATION PHYSICS CONSULT: CPT | Performed by: RADIOLOGY

## 2021-03-17 ENCOUNTER — APPOINTMENT (OUTPATIENT)
Dept: RADIATION ONCOLOGY | Facility: CLINIC | Age: 66
End: 2021-03-17
Attending: RADIOLOGY
Payer: COMMERCIAL

## 2021-03-17 PROCEDURE — 77386 HB NTSTY MODUL RAD TX DLVR CPLX: CPT | Performed by: RADIOLOGY

## 2021-03-18 ENCOUNTER — APPOINTMENT (OUTPATIENT)
Dept: RADIATION ONCOLOGY | Facility: CLINIC | Age: 66
End: 2021-03-18
Payer: COMMERCIAL

## 2021-03-18 ENCOUNTER — APPOINTMENT (OUTPATIENT)
Dept: RADIATION ONCOLOGY | Facility: CLINIC | Age: 66
End: 2021-03-18
Attending: RADIOLOGY
Payer: COMMERCIAL

## 2021-03-18 ENCOUNTER — TELEPHONE (OUTPATIENT)
Dept: RADIATION ONCOLOGY | Facility: CLINIC | Age: 66
End: 2021-03-18

## 2021-03-18 ENCOUNTER — TELEPHONE (OUTPATIENT)
Dept: HEMATOLOGY ONCOLOGY | Facility: CLINIC | Age: 66
End: 2021-03-18

## 2021-03-18 ENCOUNTER — TELEPHONE (OUTPATIENT)
Dept: FAMILY MEDICINE CLINIC | Facility: CLINIC | Age: 66
End: 2021-03-18

## 2021-03-18 DIAGNOSIS — C71.9 GLIOBLASTOMA (HCC): Primary | ICD-10-CM

## 2021-03-18 PROCEDURE — 77386 HB NTSTY MODUL RAD TX DLVR CPLX: CPT | Performed by: RADIOLOGY

## 2021-03-18 RX ORDER — DEXAMETHASONE 2 MG/1
2 TABLET ORAL 2 TIMES DAILY WITH MEALS
Qty: 60 TABLET | Refills: 1 | Status: SHIPPED | OUTPATIENT
Start: 2021-03-18 | End: 2021-05-20 | Stop reason: ALTCHOICE

## 2021-03-18 NOTE — TELEPHONE ENCOUNTER
Spoke with Vaibhav's mother  Clifton Men is very sleepy, has a very poor appetite and has not been drinking  Reviewed use of zofran, to encourage po intake  Discussed with Dr Aydin Kaiser  Pt to have have IV fluids and will have pt call back in one week for status update

## 2021-03-18 NOTE — TELEPHONE ENCOUNTER
Oncology provider sent a telephone message to the provider earlier today with no response  Concened with patient's response to BP mmedications  BP has been very low at rest and standing  Patient vomited this morning and is not sure he kept his meds down  Had to miss tx this morning  Please call back and speak with oncology provider's office concerning patient's decline in status

## 2021-03-18 NOTE — TELEPHONE ENCOUNTER
Sent message to patient's pcp regarding his lower BP readings today (108/67 sitting, 98/68 standing) to see if his BP meds need to be adjusted  Then called the office and was told they will call back  Awaiting their direction

## 2021-03-18 NOTE — TELEPHONE ENCOUNTER
Reviewed a call from patients mother  On Tuesday patient developed fatigue  Wednesday after patient got home from RT patient fell asleep, slept the whole day and not eat or drink anything  Patient saw Dr Arvin Albert today and was prescribed decadron 2mg PO BID  Mother is concerned about patients extreme fatigue and lack of appetite  I reviewed that dexamethasone can help with appetite  Mother is asking if there is anything else that Dr Haile Milton would recommend    Mother also reports patient had "low blood pressure" today, has become more confused and also vomited this morning in the car    Confirmed patient is taking Zofran as prescribed     Patient is on BP medication prescribed by PCP - mother will be reaching out to Dr Kj Ovalle regarding low BP and possible med adjustment     Will send to RN to review with Dr Haile Milton   Call back number

## 2021-03-19 ENCOUNTER — OFFICE VISIT (OUTPATIENT)
Dept: FAMILY MEDICINE CLINIC | Facility: CLINIC | Age: 66
End: 2021-03-19
Payer: COMMERCIAL

## 2021-03-19 ENCOUNTER — APPOINTMENT (OUTPATIENT)
Dept: RADIATION ONCOLOGY | Facility: CLINIC | Age: 66
End: 2021-03-19
Attending: RADIOLOGY
Payer: COMMERCIAL

## 2021-03-19 ENCOUNTER — TELEPHONE (OUTPATIENT)
Dept: RADIATION ONCOLOGY | Facility: CLINIC | Age: 66
End: 2021-03-19

## 2021-03-19 VITALS
HEIGHT: 75 IN | HEART RATE: 86 BPM | BODY MASS INDEX: 31.83 KG/M2 | WEIGHT: 256 LBS | SYSTOLIC BLOOD PRESSURE: 102 MMHG | DIASTOLIC BLOOD PRESSURE: 70 MMHG | TEMPERATURE: 96.8 F | OXYGEN SATURATION: 96 %

## 2021-03-19 DIAGNOSIS — E66.09 CLASS 1 OBESITY DUE TO EXCESS CALORIES WITH SERIOUS COMORBIDITY AND BODY MASS INDEX (BMI) OF 33.0 TO 33.9 IN ADULT: ICD-10-CM

## 2021-03-19 DIAGNOSIS — C71.9 GLIOBLASTOMA (HCC): Primary | ICD-10-CM

## 2021-03-19 DIAGNOSIS — Z13.31 NEGATIVE DEPRESSION SCREENING: ICD-10-CM

## 2021-03-19 DIAGNOSIS — Z00.00 ANNUAL PHYSICAL EXAM: Primary | ICD-10-CM

## 2021-03-19 DIAGNOSIS — E78.00 HYPERCHOLESTEROLEMIA: ICD-10-CM

## 2021-03-19 DIAGNOSIS — I10 ESSENTIAL HYPERTENSION: ICD-10-CM

## 2021-03-19 PROCEDURE — 77386 HB NTSTY MODUL RAD TX DLVR CPLX: CPT | Performed by: RADIOLOGY

## 2021-03-19 PROCEDURE — 99396 PREV VISIT EST AGE 40-64: CPT | Performed by: FAMILY MEDICINE

## 2021-03-19 RX ORDER — TRIAMCINOLONE ACETONIDE 1 MG/G
CREAM TOPICAL
COMMUNITY
Start: 2021-03-01 | End: 2021-08-17 | Stop reason: ALTCHOICE

## 2021-03-19 RX ORDER — VALSARTAN 80 MG/1
80 TABLET ORAL DAILY
Qty: 30 TABLET | Refills: 6 | Status: SHIPPED | OUTPATIENT
Start: 2021-03-19 | End: 2021-10-18 | Stop reason: SDUPTHER

## 2021-03-19 RX ORDER — MINOCYCLINE HYDROCHLORIDE 100 MG/1
100 CAPSULE ORAL DAILY
COMMUNITY
Start: 2021-03-01 | End: 2021-08-17 | Stop reason: ALTCHOICE

## 2021-03-19 NOTE — PROGRESS NOTES
Assessment/Plan:    No problem-specific Assessment & Plan notes found for this encounter  Diagnoses and all orders for this visit:    Annual physical exam    Essential hypertension  -     valsartan (DIOVAN) 80 mg tablet; Take 1 tablet (80 mg total) by mouth daily    Hypercholesterolemia    Class 1 obesity due to excess calories with serious comorbidity and body mass index (BMI) of 33 0 to 33 9 in adult    Negative depression screening    Other orders  -     minocycline (MINOCIN) 100 mg capsule; Take 100 mg by mouth daily  -     triamcinolone (KENALOG) 0 1 % cream; APPLY CREAM EXTERNALLY TWICE DAILY TO BODY RASH AS PRESCRIBED          PHQ-9 Depression Screening    PHQ-9:   Frequency of the following problems over the past two weeks:      Little interest or pleasure in doing things: 0 - not at all  Feeling down, depressed, or hopeless: 0 - not at all  PHQ-2 Score: 0        BMI Counseling: Body mass index is 32 kg/m²  The BMI is above normal  Nutrition recommendations include reducing portion sizes and 3-5 servings of fruits/vegetables daily  Exercise recommendations include moderate aerobic physical activity for 150 minutes/week and exercising 3-5 times per week  Subjective:      Patient ID: Sindi Cho is a 60 y o  male  Pt her for annual physical, pt is undergoing radiation and chemotherapy for a gliablastoma      The following portions of the patient's history were reviewed and updated as appropriate: allergies, current medications, past family history, past medical history, past social history, past surgical history and problem list     Review of Systems   Constitutional: Positive for fatigue  Negative for chills and fever  HENT: Negative  Eyes: Negative  Respiratory: Positive for shortness of breath  Negative for wheezing  Cardiovascular: Negative for chest pain and palpitations  Gastrointestinal: Positive for constipation   Negative for abdominal pain, blood in stool, diarrhea, nausea and vomiting  Endocrine: Negative  Genitourinary: Negative for difficulty urinating and dysuria  Musculoskeletal: Negative for arthralgias and myalgias  Skin: Negative  Allergic/Immunologic: Negative  Neurological: Negative for seizures and syncope  Hematological: Negative for adenopathy  Psychiatric/Behavioral: Negative  Objective:    /70   Pulse 86   Temp (!) 96 8 °F (36 °C)   Ht 6' 3" (1 905 m)   Wt 116 kg (256 lb)   SpO2 96%   BMI 32 00 kg/m²      Physical Exam  Vitals signs and nursing note reviewed  Constitutional:       General: He is not in acute distress  Appearance: Normal appearance  He is well-developed  He is not ill-appearing, toxic-appearing or diaphoretic  HENT:      Head: Normocephalic and atraumatic  Right Ear: Tympanic membrane, ear canal and external ear normal  There is no impacted cerumen  Left Ear: Tympanic membrane, ear canal and external ear normal  There is no impacted cerumen  Nose: Nose normal  No congestion or rhinorrhea  Mouth/Throat:      Mouth: Mucous membranes are moist       Pharynx: No oropharyngeal exudate or posterior oropharyngeal erythema  Eyes:      General: No scleral icterus  Right eye: No discharge  Left eye: No discharge  Extraocular Movements: Extraocular movements intact  Conjunctiva/sclera: Conjunctivae normal       Pupils: Pupils are equal, round, and reactive to light  Neck:      Musculoskeletal: Normal range of motion and neck supple  No neck rigidity  Cardiovascular:      Rate and Rhythm: Normal rate and regular rhythm  Pulses: Normal pulses  Heart sounds: Normal heart sounds  No murmur  No friction rub  No gallop  Pulmonary:      Effort: Pulmonary effort is normal  No respiratory distress  Breath sounds: Normal breath sounds  No wheezing, rhonchi or rales  Abdominal:      General: Bowel sounds are normal  There is no distension        Palpations: Abdomen is soft  There is no mass  Tenderness: There is no abdominal tenderness  There is no guarding or rebound  Musculoskeletal: Normal range of motion  General: No swelling or deformity  Right lower leg: No edema  Left lower leg: No edema  Lymphadenopathy:      Cervical: No cervical adenopathy  Skin:     General: Skin is warm and dry  Findings: No rash  Neurological:      General: No focal deficit present  Mental Status: He is alert and oriented to person, place, and time  Sensory: No sensory deficit  Motor: No weakness  Coordination: Coordination normal       Gait: Gait normal       Deep Tendon Reflexes: Reflexes are normal and symmetric  Reflexes normal    Psychiatric:         Mood and Affect: Mood normal          Behavior: Behavior normal          Thought Content:  Thought content normal          Judgment: Judgment normal

## 2021-03-19 NOTE — TELEPHONE ENCOUNTER
Spoke with patient's mother, Aurea Alpers, informed her that Dr Birgit Galvin ordered palliative care consult  She reports that patient will be seeing PCP today regarding his low BP readings and BP medications  Offered referral to  to help with any needs she or the patient may have at this time  She declined, she states that she wants to wait and see if the steroids that were started today help with patient's forgetfulness and his increased fatigue  Radiation phone number given  Support given  Ivana appreciative of call

## 2021-03-22 ENCOUNTER — APPOINTMENT (OUTPATIENT)
Dept: RADIATION ONCOLOGY | Facility: CLINIC | Age: 66
End: 2021-03-22
Attending: RADIOLOGY
Payer: COMMERCIAL

## 2021-03-22 ENCOUNTER — HOSPITAL ENCOUNTER (OUTPATIENT)
Dept: INFUSION CENTER | Facility: CLINIC | Age: 66
Discharge: HOME | End: 2021-03-22
Payer: COMMERCIAL

## 2021-03-22 VITALS
HEART RATE: 82 BPM | TEMPERATURE: 98.5 F | SYSTOLIC BLOOD PRESSURE: 111 MMHG | RESPIRATION RATE: 18 BRPM | DIASTOLIC BLOOD PRESSURE: 72 MMHG

## 2021-03-22 DIAGNOSIS — C71.9 GLIOBLASTOMA (HCC): Primary | ICD-10-CM

## 2021-03-22 DIAGNOSIS — C71.9 GLIOBLASTOMA (HCC): ICD-10-CM

## 2021-03-22 LAB
ALBUMIN SERPL BCP-MCNC: 3.4 G/DL (ref 3.5–5.7)
ALP SERPL-CCNC: 110 U/L (ref 46–116)
ALT SERPL W P-5'-P-CCNC: 128 U/L (ref 12–78)
ANION GAP SERPL CALCULATED.3IONS-SCNC: 11 MMOL/L (ref 4–13)
AST SERPL W P-5'-P-CCNC: 66 U/L (ref 5–45)
BASOPHILS # BLD AUTO: 0.04 THOUSANDS/ΜL (ref 0–0.1)
BASOPHILS NFR BLD AUTO: 0 % (ref 0–1)
BILIRUB SERPL-MCNC: 0.8 MG/DL (ref 0.2–1)
BUN SERPL-MCNC: 23 MG/DL (ref 5–25)
CALCIUM ALBUM COR SERPL-MCNC: 10.1 MG/DL (ref 8.3–10.1)
CALCIUM SERPL-MCNC: 9.6 MG/DL (ref 8.3–10.1)
CHLORIDE SERPL-SCNC: 98 MMOL/L (ref 98–108)
CO2 SERPL-SCNC: 28 MMOL/L (ref 21–32)
CREAT SERPL-MCNC: 0.9 MG/DL (ref 0.6–1.3)
EOSINOPHIL # BLD AUTO: 0.08 THOUSAND/ΜL (ref 0–0.61)
EOSINOPHIL NFR BLD AUTO: 1 % (ref 0–6)
ERYTHROCYTE [DISTWIDTH] IN BLOOD BY AUTOMATED COUNT: 14.9 % (ref 11.6–15.1)
GFR SERPL CREATININE-BSD FRML MDRD: 90 ML/MIN/1.73SQ M
GLUCOSE SERPL-MCNC: 147 MG/DL (ref 65–140)
HCT VFR BLD AUTO: 46.8 % (ref 36.5–49.3)
HGB BLD-MCNC: 16.1 G/DL (ref 12–17)
LYMPHOCYTES # BLD AUTO: 0.96 THOUSANDS/ΜL (ref 0.6–4.47)
LYMPHOCYTES NFR BLD AUTO: 8 % (ref 14–44)
MCH RBC QN AUTO: 29.5 PG (ref 26.8–34.3)
MCHC RBC AUTO-ENTMCNC: 34.4 G/DL (ref 31.4–37.4)
MCV RBC AUTO: 86 FL (ref 82–98)
MONOCYTES # BLD AUTO: 0.94 THOUSAND/ΜL (ref 0.17–1.22)
MONOCYTES NFR BLD AUTO: 8 % (ref 4–12)
NEUTROPHILS # BLD AUTO: 9.76 THOUSANDS/ΜL (ref 1.85–7.62)
NEUTS SEG NFR BLD AUTO: 83 % (ref 43–75)
PLATELET # BLD AUTO: 224 THOUSANDS/UL (ref 149–390)
PMV BLD AUTO: 10.1 FL (ref 8.9–12.7)
POTASSIUM SERPL-SCNC: 4.5 MMOL/L (ref 3.5–5.3)
PROT SERPL-MCNC: 6.4 G/DL (ref 6.4–8.2)
RBC # BLD AUTO: 5.46 MILLION/UL (ref 3.88–5.62)
SODIUM SERPL-SCNC: 137 MMOL/L (ref 136–145)
WBC # BLD AUTO: 11.78 THOUSAND/UL (ref 4.31–10.16)

## 2021-03-22 PROCEDURE — 96361 HYDRATE IV INFUSION ADD-ON: CPT

## 2021-03-22 PROCEDURE — 80053 COMPREHEN METABOLIC PANEL: CPT

## 2021-03-22 PROCEDURE — 85025 COMPLETE CBC W/AUTO DIFF WBC: CPT

## 2021-03-22 PROCEDURE — 96360 HYDRATION IV INFUSION INIT: CPT

## 2021-03-22 PROCEDURE — 77386 HB NTSTY MODUL RAD TX DLVR CPLX: CPT | Performed by: RADIOLOGY

## 2021-03-22 RX ORDER — TEMOZOLOMIDE 180 MG/1
CAPSULE ORAL
Qty: 30 CAPSULE | Refills: 1 | Status: SHIPPED | OUTPATIENT
Start: 2021-03-22 | End: 2021-06-11 | Stop reason: ALTCHOICE

## 2021-03-22 RX ORDER — TEMOZOLOMIDE 5 MG/1
CAPSULE ORAL
Qty: 30 CAPSULE | Refills: 1 | Status: SHIPPED | OUTPATIENT
Start: 2021-03-22 | End: 2021-06-11 | Stop reason: ALTCHOICE

## 2021-03-22 RX ADMIN — SODIUM CHLORIDE 1000 ML: 0.9 INJECTION, SOLUTION INTRAVENOUS at 09:35

## 2021-03-22 NOTE — PROGRESS NOTES
Patient tolerated hydration without complication  AVS provided, patient left unit in stable condition

## 2021-03-22 NOTE — PLAN OF CARE
Problem: Potential for Falls  Goal: Patient will remain free of falls  Description: INTERVENTIONS:  - Assess patient frequently for physical needs  -  Identify cognitive and physical deficits and behaviors that affect risk of falls    -  [place] fall precautions as indicated by assessment   - Educate patient/family on patient safety including physical limitations  - Instruct patient to call for assistance with activity based on assessment  - Modify environment to reduce risk of injury  - Consider OT/PT consult to assist with strengthening/mobility  Outcome: Progressing

## 2021-03-23 ENCOUNTER — APPOINTMENT (OUTPATIENT)
Dept: RADIATION ONCOLOGY | Facility: CLINIC | Age: 66
End: 2021-03-23
Attending: RADIOLOGY
Payer: COMMERCIAL

## 2021-03-23 ENCOUNTER — HOSPITAL ENCOUNTER (OUTPATIENT)
Dept: INFUSION CENTER | Facility: CLINIC | Age: 66
End: 2021-03-23

## 2021-03-23 PROCEDURE — 77386 HB NTSTY MODUL RAD TX DLVR CPLX: CPT | Performed by: RADIOLOGY

## 2021-03-23 PROCEDURE — 77300 RADIATION THERAPY DOSE PLAN: CPT | Performed by: RADIOLOGY

## 2021-03-23 PROCEDURE — 77338 DESIGN MLC DEVICE FOR IMRT: CPT | Performed by: RADIOLOGY

## 2021-03-23 PROCEDURE — 77417 THER RADIOLOGY PORT IMAGE(S): CPT | Performed by: RADIOLOGY

## 2021-03-23 PROCEDURE — 77336 RADIATION PHYSICS CONSULT: CPT | Performed by: RADIOLOGY

## 2021-03-24 ENCOUNTER — APPOINTMENT (OUTPATIENT)
Dept: RADIATION ONCOLOGY | Facility: CLINIC | Age: 66
End: 2021-03-24
Attending: RADIOLOGY
Payer: COMMERCIAL

## 2021-03-24 PROCEDURE — 77386 HB NTSTY MODUL RAD TX DLVR CPLX: CPT | Performed by: RADIOLOGY

## 2021-03-25 ENCOUNTER — APPOINTMENT (OUTPATIENT)
Dept: RADIATION ONCOLOGY | Facility: CLINIC | Age: 66
End: 2021-03-25
Attending: RADIOLOGY
Payer: COMMERCIAL

## 2021-03-25 ENCOUNTER — TELEPHONE (OUTPATIENT)
Dept: HEMATOLOGY ONCOLOGY | Facility: CLINIC | Age: 66
End: 2021-03-25

## 2021-03-25 PROCEDURE — 77386 HB NTSTY MODUL RAD TX DLVR CPLX: CPT | Performed by: RADIOLOGY

## 2021-03-25 NOTE — TELEPHONE ENCOUNTER
Call from patient  Patient would like to take less decadron   Patient will discuss with RT therapist to discuss with Dr Chao Gave   Patient aware of plan

## 2021-03-26 ENCOUNTER — APPOINTMENT (OUTPATIENT)
Dept: RADIATION ONCOLOGY | Facility: CLINIC | Age: 66
End: 2021-03-26
Attending: RADIOLOGY
Payer: COMMERCIAL

## 2021-03-26 ENCOUNTER — HOSPITAL ENCOUNTER (OUTPATIENT)
Dept: INFUSION CENTER | Facility: CLINIC | Age: 66
Discharge: HOME | End: 2021-03-26
Payer: COMMERCIAL

## 2021-03-26 VITALS
HEART RATE: 69 BPM | DIASTOLIC BLOOD PRESSURE: 74 MMHG | TEMPERATURE: 97 F | RESPIRATION RATE: 18 BRPM | SYSTOLIC BLOOD PRESSURE: 133 MMHG

## 2021-03-26 DIAGNOSIS — C71.9 GLIOBLASTOMA (HCC): Primary | ICD-10-CM

## 2021-03-26 PROCEDURE — 77386 HB NTSTY MODUL RAD TX DLVR CPLX: CPT | Performed by: RADIOLOGY

## 2021-03-26 PROCEDURE — 96361 HYDRATE IV INFUSION ADD-ON: CPT

## 2021-03-26 PROCEDURE — 96360 HYDRATION IV INFUSION INIT: CPT

## 2021-03-26 RX ADMIN — SODIUM CHLORIDE 1000 ML: 0.9 INJECTION, SOLUTION INTRAVENOUS at 08:30

## 2021-03-26 NOTE — PROGRESS NOTES
Pt without complaint, tolerated IV hydration well  Pt states he feels better after his hydration  Pt given AVS, aware of next appt for same on Monday  Pt will have weekly blood work drawn at that visit

## 2021-03-26 NOTE — PLAN OF CARE
Problem: Potential for Falls  Goal: Patient will remain free of falls  Description: INTERVENTIONS:  - Assess patient frequently for physical needs  -  Identify cognitive and physical deficits and behaviors that affect risk of falls    -  Interior fall precautions as indicated by assessment   - Educate patient/family on patient safety including physical limitations  - Instruct patient to call for assistance with activity based on assessment  - Modify environment to reduce risk of injury  - Consider OT/PT consult to assist with strengthening/mobility  Outcome: Progressing

## 2021-03-29 ENCOUNTER — HOSPITAL ENCOUNTER (OUTPATIENT)
Dept: INFUSION CENTER | Facility: CLINIC | Age: 66
Discharge: HOME | End: 2021-03-29
Payer: COMMERCIAL

## 2021-03-29 ENCOUNTER — APPOINTMENT (OUTPATIENT)
Dept: RADIATION ONCOLOGY | Facility: CLINIC | Age: 66
End: 2021-03-29
Attending: RADIOLOGY
Payer: COMMERCIAL

## 2021-03-29 VITALS
SYSTOLIC BLOOD PRESSURE: 125 MMHG | RESPIRATION RATE: 18 BRPM | DIASTOLIC BLOOD PRESSURE: 72 MMHG | TEMPERATURE: 98.7 F | HEART RATE: 66 BPM

## 2021-03-29 DIAGNOSIS — C71.9 GLIOBLASTOMA (HCC): Primary | ICD-10-CM

## 2021-03-29 LAB
ALBUMIN SERPL BCP-MCNC: 3.1 G/DL (ref 3.5–5.7)
ALP SERPL-CCNC: 87 U/L (ref 46–116)
ALT SERPL W P-5'-P-CCNC: 62 U/L (ref 12–78)
ANION GAP SERPL CALCULATED.3IONS-SCNC: 10 MMOL/L (ref 4–13)
AST SERPL W P-5'-P-CCNC: 23 U/L (ref 5–45)
BASOPHILS # BLD MANUAL: 0 THOUSAND/UL (ref 0–0.1)
BASOPHILS NFR MAR MANUAL: 0 % (ref 0–1)
BILIRUB SERPL-MCNC: 0.8 MG/DL (ref 0.2–1)
BUN SERPL-MCNC: 18 MG/DL (ref 5–25)
CALCIUM ALBUM COR SERPL-MCNC: 9.5 MG/DL (ref 8.3–10.1)
CALCIUM SERPL-MCNC: 8.8 MG/DL (ref 8.3–10.1)
CHLORIDE SERPL-SCNC: 101 MMOL/L (ref 98–108)
CO2 SERPL-SCNC: 25 MMOL/L (ref 21–32)
CREAT SERPL-MCNC: 0.9 MG/DL (ref 0.6–1.3)
EOSINOPHIL # BLD MANUAL: 0 THOUSAND/UL (ref 0–0.4)
EOSINOPHIL NFR BLD MANUAL: 0 % (ref 0–6)
ERYTHROCYTE [DISTWIDTH] IN BLOOD BY AUTOMATED COUNT: 15.2 % (ref 11.6–15.1)
GFR SERPL CREATININE-BSD FRML MDRD: 90 ML/MIN/1.73SQ M
GLUCOSE SERPL-MCNC: 231 MG/DL (ref 65–140)
HCT VFR BLD AUTO: 45.7 % (ref 36.5–49.3)
HGB BLD-MCNC: 15.6 G/DL (ref 12–17)
LG PLATELETS BLD QL SMEAR: PRESENT
LYMPHOCYTES # BLD AUTO: 1.57 THOUSAND/UL (ref 0.6–4.47)
LYMPHOCYTES # BLD AUTO: 9 % (ref 14–44)
MCH RBC QN AUTO: 29.6 PG (ref 26.8–34.3)
MCHC RBC AUTO-ENTMCNC: 34.1 G/DL (ref 31.4–37.4)
MCV RBC AUTO: 87 FL (ref 82–98)
MONOCYTES # BLD AUTO: 0.52 THOUSAND/UL (ref 0–1.22)
MONOCYTES NFR BLD: 3 % (ref 4–12)
NEUTROPHILS # BLD MANUAL: 15.36 THOUSAND/UL (ref 1.85–7.62)
NEUTS SEG NFR BLD AUTO: 88 % (ref 43–75)
PLATELET # BLD AUTO: 315 THOUSANDS/UL (ref 149–390)
PLATELET BLD QL SMEAR: ADEQUATE
PMV BLD AUTO: 9.2 FL (ref 8.9–12.7)
POTASSIUM SERPL-SCNC: 4.5 MMOL/L (ref 3.5–5.3)
PROT SERPL-MCNC: 6.1 G/DL (ref 6.4–8.2)
RBC # BLD AUTO: 5.27 MILLION/UL (ref 3.88–5.62)
RBC MORPH BLD: NORMAL
SODIUM SERPL-SCNC: 136 MMOL/L (ref 136–145)
TOTAL CELLS COUNTED SPEC: 100
WBC # BLD AUTO: 17.45 THOUSAND/UL (ref 4.31–10.16)

## 2021-03-29 PROCEDURE — 85007 BL SMEAR W/DIFF WBC COUNT: CPT | Performed by: RADIOLOGY

## 2021-03-29 PROCEDURE — 80053 COMPREHEN METABOLIC PANEL: CPT | Performed by: RADIOLOGY

## 2021-03-29 PROCEDURE — 85027 COMPLETE CBC AUTOMATED: CPT | Performed by: RADIOLOGY

## 2021-03-29 PROCEDURE — 96360 HYDRATION IV INFUSION INIT: CPT

## 2021-03-29 PROCEDURE — 77386 HB NTSTY MODUL RAD TX DLVR CPLX: CPT | Performed by: RADIOLOGY

## 2021-03-29 PROCEDURE — 96361 HYDRATE IV INFUSION ADD-ON: CPT

## 2021-03-29 RX ADMIN — SODIUM CHLORIDE 1000 ML: 0.9 INJECTION, SOLUTION INTRAVENOUS at 09:11

## 2021-03-29 NOTE — PROGRESS NOTES
Patient arrived on unit for hydration and labs  Denies any present issues/concerns  Labs drawn as ordered  Tolerated hydration  Aware of next appointment   AVS given to patient

## 2021-03-30 ENCOUNTER — APPOINTMENT (OUTPATIENT)
Dept: RADIATION ONCOLOGY | Facility: CLINIC | Age: 66
End: 2021-03-30
Attending: RADIOLOGY
Payer: COMMERCIAL

## 2021-03-30 PROCEDURE — 77386 HB NTSTY MODUL RAD TX DLVR CPLX: CPT | Performed by: RADIOLOGY

## 2021-03-30 PROCEDURE — 77336 RADIATION PHYSICS CONSULT: CPT | Performed by: RADIOLOGY

## 2021-03-31 ENCOUNTER — APPOINTMENT (OUTPATIENT)
Dept: RADIATION ONCOLOGY | Facility: CLINIC | Age: 66
End: 2021-03-31
Attending: RADIOLOGY
Payer: COMMERCIAL

## 2021-03-31 PROCEDURE — 77386 HB NTSTY MODUL RAD TX DLVR CPLX: CPT | Performed by: RADIOLOGY

## 2021-04-01 ENCOUNTER — APPOINTMENT (OUTPATIENT)
Dept: RADIATION ONCOLOGY | Facility: CLINIC | Age: 66
End: 2021-04-01
Attending: RADIOLOGY
Payer: COMMERCIAL

## 2021-04-01 ENCOUNTER — TELEPHONE (OUTPATIENT)
Dept: HEMATOLOGY ONCOLOGY | Facility: CLINIC | Age: 66
End: 2021-04-01

## 2021-04-01 PROCEDURE — 77386 HB NTSTY MODUL RAD TX DLVR CPLX: CPT | Performed by: RADIOLOGY

## 2021-04-01 NOTE — TELEPHONE ENCOUNTER
Patient called in stated that he was returning CB call - I reached out to the office and was advise that CB will call patient back

## 2021-04-01 NOTE — TELEPHONE ENCOUNTER
Got a message from [facility] regarding hydration orders  Per Dr Powers Fruits it's pts choice if he wants to continue them  Called Ellis Bauer to check in and see how he's feeling  No answer, left VM for him to call back

## 2021-04-02 ENCOUNTER — OFFICE VISIT (OUTPATIENT)
Dept: HEMATOLOGY ONCOLOGY | Facility: CLINIC | Age: 66
End: 2021-04-02
Payer: COMMERCIAL

## 2021-04-02 ENCOUNTER — RADIATION THERAPY TREATMENT (OUTPATIENT)
Dept: RADIATION ONCOLOGY | Facility: CLINIC | Age: 66
End: 2021-04-02
Attending: RADIOLOGY
Payer: COMMERCIAL

## 2021-04-02 ENCOUNTER — HOSPITAL ENCOUNTER (OUTPATIENT)
Dept: INFUSION CENTER | Facility: CLINIC | Age: 66
Discharge: HOME | End: 2021-04-02
Payer: COMMERCIAL

## 2021-04-02 VITALS
HEART RATE: 66 BPM | DIASTOLIC BLOOD PRESSURE: 81 MMHG | RESPIRATION RATE: 18 BRPM | SYSTOLIC BLOOD PRESSURE: 131 MMHG | TEMPERATURE: 97.6 F

## 2021-04-02 VITALS
BODY MASS INDEX: 31.46 KG/M2 | HEIGHT: 75 IN | WEIGHT: 253 LBS | SYSTOLIC BLOOD PRESSURE: 112 MMHG | TEMPERATURE: 98 F | DIASTOLIC BLOOD PRESSURE: 70 MMHG | HEART RATE: 81 BPM

## 2021-04-02 DIAGNOSIS — G47.33 OBSTRUCTIVE SLEEP APNEA SYNDROME: ICD-10-CM

## 2021-04-02 DIAGNOSIS — C71.9 GLIOBLASTOMA (HCC): Primary | ICD-10-CM

## 2021-04-02 DIAGNOSIS — R73.03 PREDIABETES: ICD-10-CM

## 2021-04-02 PROCEDURE — 96360 HYDRATION IV INFUSION INIT: CPT

## 2021-04-02 PROCEDURE — 99215 OFFICE O/P EST HI 40 MIN: CPT | Performed by: INTERNAL MEDICINE

## 2021-04-02 PROCEDURE — 96361 HYDRATE IV INFUSION ADD-ON: CPT

## 2021-04-02 PROCEDURE — 77386 HB NTSTY MODUL RAD TX DLVR CPLX: CPT | Performed by: RADIOLOGY

## 2021-04-02 RX ADMIN — SODIUM CHLORIDE 1000 ML: 0.9 INJECTION, SOLUTION INTRAVENOUS at 08:37

## 2021-04-02 NOTE — PROGRESS NOTES
[facility]ATOLOG[facility] SPECIAL[facility] [place]  [address] [address]  [phone]    Richmond Neil [id number]  04/02/21    Discussion:   In summary, this is a 29-year-old male history of GBM as outlined  He is receiving daily Temodar with radiation therapy  This will and on April 6th, next week  He seems to be tolerating it relatively well  He is currently taking Decadron 2 mg b i d   Since restarting Decadron under radiation therapy advice his neurologic status seems to be somewhat better  His blood sugars elevated, however, 230  We will make arrangements for him to see his PCP in the near future to address/adjust treatment as steroids will be gradually withdrawn over the next 4-6 weeks  He has symptoms related hyperglycemia including nocturia, blurry vision  White count 17 4, attributable to steroid therapy  Hemoglobin and platelets are normal   He has made some changes in his sleep position, sitting up rather than laying down  This has improved his energy level, probably through better control of his sleep apnea  We will make arrangements for repeat MRI in early May  I will see him back thereafter to review the results and make further recommendations  I discussed the above with the patient  The patient  voiced understanding and agreement   ______________________________________________________________________    No chief complaint on file  HPI:  Oncology History   [facility] ) (Resolved)   1/19/2021 Biopsy    BIOPSY BRAIN IMAGE-GUIDED NEEDLE - LEFT TEMPORAL  Surgeon: Dr Alondra Childers    Temporal mass:  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)  Note    This case was seen in consultation with Dr Arash Zhang, an expert in Neuropathology from [facility] [place]            Glioblastoma ([facility] )   1/19/2021 Initial Diagnosis    Glioblastoma ([facility] )     1/19/2021 Biopsy    BIOPSY BRAIN IMAGE-GUIDED NEEDLE - LEFT TEMPORAL  Surgeon: Dr Ines Aguilar Moulding    Temporal mass:  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)  Note    This case was seen in consultation with Dr Sushil DeO liveira, an expert in Neuropathology from [facility]  Interval History:  Clinically stable  ECOG-  1 - Symptomatic but completely ambulatory    Review of Systems   Constitutional: Negative for chills and fever  HENT: Negative for nosebleeds  Eyes: Negative for discharge  Respiratory: Negative for cough and shortness of breath  Cardiovascular: Negative for chest pain  Gastrointestinal: Negative for abdominal pain, constipation and diarrhea  Endocrine: Negative for polydipsia  Genitourinary: Negative for hematuria  Musculoskeletal: Negative for arthralgias  Skin: Negative for color change  Allergic/Immunologic: Negative for immunocompromised state  Neurological: Negative for dizziness and headaches  Hematological: Negative for adenopathy  Psychiatric/Behavioral: Negative for agitation         Past Medical History:   Diagnosis Date    Brain tumor ([facility] ) 1/5/2021    Bruxism (teeth grinding)     GBM (glioblastoma multiforme) (HCC)     Hypercholesterolemia     Hypertension     Obesity     Sleep apnea     CPAP nightly     Patient Active Problem List   Diagnosis    Class 1 obesity due to excess calories with serious comorbidity and body mass index (BMI) of 33 0 to 33 9 in adult    Negative depression screening    Hypercholesterolemia    Essential hypertension    Obstructive sleep apnea syndrome    Sleep disturbance    Daytime sleepiness    Prediabetes    Glioblastoma (HCC)    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    Annual physical exam       Current Outpatient Medications:     amLODIPine (NORVASC) 10 mg tablet, Take 1 tablet (10 mg total) by mouth daily, Disp: 90 tablet, Rfl: 3    atorvastatin (LIPITOR) 10 mg tablet, Take 0 5 tablets (5 mg total) by mouth daily, Disp: 30 tablet, Rfl: 2    dexamethasone (DECADRON) 2 mg tablet, Take 1 tablet (2 mg total) by mouth 2 (two) times a day with meals, Disp: 60 tablet, Rfl: 1    hydrochlorothiazide (MICROZIDE) 12 5 mg capsule, Take 1 capsule (12 5 mg total) by mouth daily, Disp: 90 capsule, Rfl: 3    levETIRAcetam (KEPPRA) 1000 MG tablet, Take 1 tablet (1,000 mg total) by mouth every 12 (twelve) hours, Disp: 180 tablet, Rfl: 1    levETIRAcetam (KEPPRA) 250 mg tablet, Take 1 tablet (250 mg total) by mouth 2 (two) times a day, Disp: 180 tablet, Rfl: 1    minocycline (DYNACIN) 100 MG tablet, Take 100 mg by mouth daily, Disp: , Rfl:     minocycline (MINOCIN) 100 mg capsule, Take 100 mg by mouth daily, Disp: , Rfl:     ondansetron (ZOFRAN) 8 mg tablet, Take 1 tablet (8 mg total) by mouth every 8 (eight) hours as needed for nausea or vomiting, Disp: 30 tablet, Rfl: 3    temozolomide (TEMODAR) 180 mg capsule, TAKE 1 CAPSULE BY MOUTH DAILY FOR 42 DAYS, Disp: 30 capsule, Rfl: 1    temozolomide (TEMODAR) 5 mg capsule, TAKE 1 CAPSULE BY MOUTH DAILY FOR 42 DAYS, Disp: 30 capsule, Rfl: 1    triamcinolone (KENALOG) 0 1 % cream, APPLY CREAM EXTERNALLY TWICE DAILY TO BODY RASH AS PRESCRIBED, Disp: , Rfl:     valsartan (DIOVAN) 80 mg tablet, Take 1 tablet (80 mg total) by mouth daily, Disp: 30 tablet, Rfl: 6    zolpidem (AMBIEN) 5 mg tablet, Take 1 tablet (5 mg total) by mouth daily at bedtime as needed for sleep, Disp: 30 tablet, Rfl: 0  No current facility-administered medications for this visit     No Known Allergies  Past Surgical History:   Procedure Laterality Date    FL LUMBAR PUNCTURE DIAGNOSTIC  1/4/2021    HAND FRACTURE REPAIR      left thumb    CO STEREO BX/ASPIR/EXCIS,INTRACRANIAL LESN Left 1/19/2021    Procedure: BIOPSY BRAIN IMAGE-GUIDED NEEDLE - LEFT TEMPORAL;  Surgeon: Sky Arrington MD;  Location: [facility];  Service: Neurosurgery    TONSILLECTOMY       Social History     Objective:  Vitals:    04/02/21 1340   BP: 112/70   Pulse: 81   Temp: 98 °F (36 7 °C)   Weight: 115 kg (253 lb)   Height: 6' 3" (1 905 m)     Physical Exam  Constitutional:       Appearance: He is well-developed  HENT:      Head: Normocephalic and atraumatic  Eyes:      Pupils: Pupils are equal, round, and reactive to light  Neck:      Musculoskeletal: Neck supple  Cardiovascular:      Rate and Rhythm: Normal rate and regular rhythm  Heart sounds: No murmur  Pulmonary:      Breath sounds: Normal breath sounds  No wheezing or rales  Abdominal:      Palpations: Abdomen is soft  Tenderness: There is no abdominal tenderness  Musculoskeletal: Normal range of motion  General: No tenderness  Lymphadenopathy:      Cervical: No cervical adenopathy  Skin:     Findings: No erythema or rash  Neurological:      Mental Status: He is alert and oriented to person, place, and time  Cranial Nerves: No cranial nerve deficit  Deep Tendon Reflexes: Reflexes are normal and symmetric  Psychiatric:         Behavior: Behavior normal            Labs: I personally reviewed the labs and imaging pertinent to this patient care

## 2021-04-02 NOTE — PROGRESS NOTES
Pt arrived to unit without complaint other than mild fatigue  Pt tolerating IV hydration well  Pt states he does not think he needs further hydration appts after today, he feels well  Pt will discuss with Dr Jacob Andre today at his appt

## 2021-04-02 NOTE — PLAN OF CARE
Problem: Potential for Falls  Goal: Patient will remain free of falls  Description: INTERVENTIONS:  - Assess patient frequently for physical needs  -  Identify cognitive and physical deficits and behaviors that affect risk of falls    -  [place] fall precautions as indicated by assessment   - Educate patient/family on patient safety including physical limitations  - Instruct patient to call for assistance with activity based on assessment  - Modify environment to reduce risk of injury  - Consider OT/PT consult to assist with strengthening/mobility  Outcome: Progressing

## 2021-04-05 ENCOUNTER — LAB (OUTPATIENT)
Dept: LAB | Facility: CLINIC | Age: 66
End: 2021-04-05
Attending: RADIOLOGY
Payer: COMMERCIAL

## 2021-04-05 ENCOUNTER — APPOINTMENT (OUTPATIENT)
Dept: RADIATION ONCOLOGY | Facility: CLINIC | Age: 66
End: 2021-04-05
Attending: RADIOLOGY
Payer: COMMERCIAL

## 2021-04-05 ENCOUNTER — OFFICE VISIT (OUTPATIENT)
Dept: FAMILY MEDICINE CLINIC | Facility: CLINIC | Age: 66
End: 2021-04-05
Payer: COMMERCIAL

## 2021-04-05 VITALS
OXYGEN SATURATION: 98 % | HEART RATE: 74 BPM | TEMPERATURE: 96.5 F | HEIGHT: 75 IN | WEIGHT: 252.8 LBS | BODY MASS INDEX: 31.43 KG/M2 | DIASTOLIC BLOOD PRESSURE: 78 MMHG | SYSTOLIC BLOOD PRESSURE: 120 MMHG

## 2021-04-05 DIAGNOSIS — R73.9 HYPERGLYCEMIA, DRUG-INDUCED: Primary | ICD-10-CM

## 2021-04-05 DIAGNOSIS — R25.2 MUSCLE CRAMPING: ICD-10-CM

## 2021-04-05 DIAGNOSIS — T50.905A HYPERGLYCEMIA, DRUG-INDUCED: Primary | ICD-10-CM

## 2021-04-05 PROCEDURE — 77386 HB NTSTY MODUL RAD TX DLVR CPLX: CPT | Performed by: RADIOLOGY

## 2021-04-05 PROCEDURE — 99214 OFFICE O/P EST MOD 30 MIN: CPT | Performed by: FAMILY MEDICINE

## 2021-04-05 RX ORDER — GLYBURIDE 2.5 MG/1
2.5 TABLET ORAL 2 TIMES DAILY WITH MEALS
Qty: 60 TABLET | Refills: 1 | Status: SHIPPED | OUTPATIENT
Start: 2021-04-05 | End: 2021-04-19 | Stop reason: ALTCHOICE

## 2021-04-05 NOTE — PATIENT INSTRUCTIONS
I will start Kym Grove on low dose glyburide twice daily for the time he continues on decadron  He is to have a BMP weekly and I asked him to check his blood sugar at home daily and call if <70 or >200  I did discuss we will follow his labs and bs diary and decrease the glyburide as the decadron dose decreases, he knows to call me if he experiences any symptoms of hypoglycemia - lightheadedness, dizziness, fatigue, tremulousness, increased hunger    Regarding the muscle crams I asked him to take otc magnesium 500mg daily    F/U 2 weeks - call with any concerns

## 2021-04-05 NOTE — PROGRESS NOTES
Assessment/Plan:    No problem-specific Assessment & Plan notes found for this encounter  Diagnoses and all orders for this visit:    Hyperglycemia, drug-induced  -     HEMOGLOBIN A1C W/ EAG ESTIMATION; Future  -     glyBURIDE (DIABETA) 2 5 mg tablet; Take 1 tablet (2 5 mg total) by mouth 2 (two) times a day with meals  -     Basic metabolic panel; Future    Muscle cramping        Patient Instructions   I will start Rosalia Comer on low dose glyburide twice daily for the time he continues on decadron  He is to have a BMP weekly and I asked him to check his blood sugar at home daily and call if <70 or >200  I did discuss we will follow his labs and bs diary and decrease the glyburide as the decadron dose decreases, he knows to call me if he experiences any symptoms of hypoglycemia - lightheadedness, dizziness, fatigue, tremulousness, increased hunger    Regarding the muscle crams I asked him to take otc magnesium 500mg daily    F/U 2 weeks - call with any concerns        Subjective:      Patient ID: Simeon Ramirez is a 60 y o  male  Patient presents at the request of his oncologist regarding a progressive elevation of his blood sugars  Patient has a hx of pre-diabetes and was started on decadron oral over the past few weeks while receiving XRT for GBM  Patient has noticed Increased frequency of urination, has been having to urinate every 30-45 minutes in the past week and blurred vision  Review of his labs shows progressive elevation in his blood sugars with the past two weeks  and 236 today  Patient will have his last XRT tomorrow and Dr Radha Cuevas will begin weaning him off the decadron  Patient denies any difficulty with gait or balance, no falls  BS prior to starting decadron have been around 115  Patient also notes frequent cramping in his hands and LE         The following portions of the patient's history were reviewed and updated as appropriate: allergies, current medications, past family history, past medical history, past social history, past surgical history and problem list     Review of Systems   Constitutional: Negative  HENT: Negative  Eyes: Positive for visual disturbance  Respiratory: Negative  Cardiovascular: Negative  Gastrointestinal: Negative  Endocrine: Positive for polydipsia and polyuria  Genitourinary: Positive for frequency  Negative for difficulty urinating, dysuria, enuresis, hematuria and urgency  Musculoskeletal: Negative  Neurological: Negative  Objective:      /78 (BP Location: Left arm, Patient Position: Sitting, Cuff Size: Large)   Pulse 74   Temp (!) 96 5 °F (35 8 °C)   Ht 6' 3" (1 905 m)   Wt 115 kg (252 lb 12 8 oz)   SpO2 98%   BMI 31 60 kg/m²          Physical Exam  Constitutional:       General: He is not in acute distress  Appearance: Normal appearance  He is not ill-appearing or toxic-appearing  HENT:      Head: Normocephalic and atraumatic  Mouth/Throat:      Mouth: Mucous membranes are moist    Cardiovascular:      Rate and Rhythm: Normal rate and regular rhythm  Pulses: Normal pulses  Heart sounds: Normal heart sounds  No murmur  Pulmonary:      Effort: Pulmonary effort is normal       Breath sounds: Normal breath sounds  Abdominal:      General: Abdomen is flat  Musculoskeletal:         General: No swelling, tenderness or deformity  Right lower leg: No edema  Left lower leg: No edema  Comments: Bilateral hand erythema   Skin:     General: Skin is warm and dry  Neurological:      General: No focal deficit present  Mental Status: He is alert and oriented to person, place, and time  Sensory: No sensory deficit  Motor: No weakness        Coordination: Coordination normal       Gait: Gait normal

## 2021-04-06 ENCOUNTER — TRANSCRIBE ORDERS (OUTPATIENT)
Dept: LAB | Facility: CLINIC | Age: 66
End: 2021-04-06

## 2021-04-06 ENCOUNTER — APPOINTMENT (OUTPATIENT)
Dept: RADIATION ONCOLOGY | Facility: CLINIC | Age: 66
End: 2021-04-06
Attending: RADIOLOGY
Payer: COMMERCIAL

## 2021-04-06 PROCEDURE — 77336 RADIATION PHYSICS CONSULT: CPT | Performed by: RADIOLOGY

## 2021-04-06 PROCEDURE — 77386 HB NTSTY MODUL RAD TX DLVR CPLX: CPT | Performed by: RADIOLOGY

## 2021-04-12 ENCOUNTER — APPOINTMENT (OUTPATIENT)
Dept: LAB | Facility: CLINIC | Age: 66
End: 2021-04-12
Payer: COMMERCIAL

## 2021-04-12 DIAGNOSIS — C71.9 GLIOBLASTOMA (HCC): Primary | ICD-10-CM

## 2021-04-12 DIAGNOSIS — T50.905A HYPERGLYCEMIA, DRUG-INDUCED: ICD-10-CM

## 2021-04-12 DIAGNOSIS — I10 ESSENTIAL HYPERTENSION: ICD-10-CM

## 2021-04-12 DIAGNOSIS — R73.9 HYPERGLYCEMIA, DRUG-INDUCED: ICD-10-CM

## 2021-04-12 DIAGNOSIS — Z12.5 SCREENING FOR PROSTATE CANCER: ICD-10-CM

## 2021-04-12 DIAGNOSIS — E78.00 HYPERCHOLESTEROLEMIA: ICD-10-CM

## 2021-04-12 DIAGNOSIS — Z11.4 SCREENING FOR HUMAN IMMUNODEFICIENCY VIRUS: ICD-10-CM

## 2021-04-12 DIAGNOSIS — Z11.59 NEED FOR HEPATITIS C SCREENING TEST: ICD-10-CM

## 2021-04-12 LAB
ANION GAP SERPL CALCULATED.3IONS-SCNC: 5 MMOL/L (ref 4–13)
BUN SERPL-MCNC: 21 MG/DL (ref 5–25)
CALCIUM SERPL-MCNC: 8.9 MG/DL (ref 8.3–10.1)
CHLORIDE SERPL-SCNC: 108 MMOL/L (ref 100–108)
CO2 SERPL-SCNC: 26 MMOL/L (ref 21–32)
CREAT SERPL-MCNC: 0.86 MG/DL (ref 0.6–1.3)
EST. AVERAGE GLUCOSE BLD GHB EST-MCNC: 163 MG/DL
GFR SERPL CREATININE-BSD FRML MDRD: 92 ML/MIN/1.73SQ M
GLUCOSE P FAST SERPL-MCNC: 115 MG/DL (ref 65–99)
HBA1C MFR BLD: 7.3 %
POTASSIUM SERPL-SCNC: 4.5 MMOL/L (ref 3.5–5.3)
SODIUM SERPL-SCNC: 139 MMOL/L (ref 136–145)

## 2021-04-12 PROCEDURE — 80048 BASIC METABOLIC PNL TOTAL CA: CPT

## 2021-04-12 PROCEDURE — 83036 HEMOGLOBIN GLYCOSYLATED A1C: CPT

## 2021-04-13 ENCOUNTER — APPOINTMENT (OUTPATIENT)
Dept: RADIATION ONCOLOGY | Facility: CLINIC | Age: 66
End: 2021-04-13
Payer: COMMERCIAL

## 2021-04-19 ENCOUNTER — OFFICE VISIT (OUTPATIENT)
Dept: HEMATOLOGY ONCOLOGY | Facility: CLINIC | Age: 66
End: 2021-04-19
Payer: COMMERCIAL

## 2021-04-19 ENCOUNTER — OFFICE VISIT (OUTPATIENT)
Dept: FAMILY MEDICINE CLINIC | Facility: CLINIC | Age: 66
End: 2021-04-19
Payer: COMMERCIAL

## 2021-04-19 ENCOUNTER — TELEPHONE (OUTPATIENT)
Dept: HEMATOLOGY ONCOLOGY | Facility: CLINIC | Age: 66
End: 2021-04-19

## 2021-04-19 VITALS
HEIGHT: 75 IN | SYSTOLIC BLOOD PRESSURE: 112 MMHG | DIASTOLIC BLOOD PRESSURE: 70 MMHG | OXYGEN SATURATION: 97 % | WEIGHT: 249 LBS | HEART RATE: 96 BPM | BODY MASS INDEX: 30.96 KG/M2 | TEMPERATURE: 96.2 F

## 2021-04-19 VITALS
OXYGEN SATURATION: 95 % | HEART RATE: 94 BPM | DIASTOLIC BLOOD PRESSURE: 74 MMHG | HEIGHT: 75 IN | RESPIRATION RATE: 16 BRPM | BODY MASS INDEX: 31.08 KG/M2 | SYSTOLIC BLOOD PRESSURE: 124 MMHG | WEIGHT: 250 LBS | TEMPERATURE: 98.1 F

## 2021-04-19 DIAGNOSIS — R25.2 MUSCLE CRAMPS: ICD-10-CM

## 2021-04-19 DIAGNOSIS — R73.9 DRUG-INDUCED HYPERGLYCEMIA: Primary | ICD-10-CM

## 2021-04-19 DIAGNOSIS — T50.905A DRUG-INDUCED HYPERGLYCEMIA: Primary | ICD-10-CM

## 2021-04-19 DIAGNOSIS — I10 ESSENTIAL HYPERTENSION: ICD-10-CM

## 2021-04-19 DIAGNOSIS — C71.9 GLIOBLASTOMA (HCC): Primary | ICD-10-CM

## 2021-04-19 PROCEDURE — 99212 OFFICE O/P EST SF 10 MIN: CPT | Performed by: INTERNAL MEDICINE

## 2021-04-19 PROCEDURE — 99214 OFFICE O/P EST MOD 30 MIN: CPT | Performed by: FAMILY MEDICINE

## 2021-04-19 RX ORDER — GLYBURIDE 2.5 MG/1
2.5 TABLET ORAL
Qty: 30 TABLET | Refills: 0
Start: 2021-04-19 | End: 2021-05-06 | Stop reason: ALTCHOICE

## 2021-04-19 NOTE — PROGRESS NOTES
Assessment/Plan:    Problem List Items Addressed This Visit        Cardiovascular and Mediastinum    Essential hypertension       Other    Muscle cramps      Other Visit Diagnoses     Drug-induced hyperglycemia    -  Primary    Relevant Medications    glyBURIDE (DIABETA) 2 5 mg tablet    Other Relevant Orders    Glucose, fasting           Diagnoses and all orders for this visit:    Drug-induced hyperglycemia  -     Glucose, fasting; Future  -     glyBURIDE (DIABETA) 2 5 mg tablet; Take 1 tablet (2 5 mg total) by mouth daily with breakfast    Essential hypertension    Muscle cramps        Patient Instructions   Check FBS 2 weeks  Decrease glyburide to once daily in morning with breakfast  Call with any signs of hyo/hyerglycemia as discussed  Recheck in 2 weeks  Recheck A1C in 3 months  Increase otc magnesium to twice daily  BP stable - no changes    No problem-specific Assessment & Plan notes found for this encounter  Subjective:      Patient ID: Marylee Meissner is a 60 y o  male  Jessy Litten presents for F/U of hyperglycemia  He states he has been weaned off the decadron and has been off of it since Wednesday (5 days)  He notes the polyuria and polydipsia has resolved, he has not been checking his blood sugar at home  His BMP shows a FBS of 115 and his A1C is 7 2  He denies episodes of lightheadedness or dizziness, no polyphagia, no syncope  He was on the decadron for 3 weeks  He did bring a daily BP log which shows stable BP's  Muscle cramps have improved on Magnesium but they continue on occasion      The following portions of the patient's history were reviewed and updated as appropriate:   He has a past medical history of Brain tumor ([facility] ) (1/5/2021), Bruxism (teeth grinding), GBM (glioblastoma multiforme) ([facility] ), Hypercholesterolemia, Hypertension, Obesity, and Sleep apnea  ,  does not have any pertinent problems on file  ,   has a past surgical history that includes Tonsillectomy; FL lumbar puncture diagnostic (1/4/2021); HAND FRACTURE REPAIR; and pr stereo bx/aspir/excis,intracranial lesn (Left, 1/19/2021)  ,  family history includes Heart attack in his father; Heart disease in his mother; Leukemia in his brother  ,   reports that he quit smoking about 45 years ago  His smoking use included cigarettes  He has never used smokeless tobacco  He reports previous alcohol use  He reports previous drug use ,  has No Known Allergies     Current Outpatient Medications   Medication Sig Dispense Refill    amLODIPine (NORVASC) 10 mg tablet Take 1 tablet (10 mg total) by mouth daily 90 tablet 3    atorvastatin (LIPITOR) 10 mg tablet Take 0 5 tablets (5 mg total) by mouth daily 30 tablet 2    dexamethasone (DECADRON) 2 mg tablet Take 1 tablet (2 mg total) by mouth 2 (two) times a day with meals 60 tablet 1    glyBURIDE (DIABETA) 2 5 mg tablet Take 1 tablet (2 5 mg total) by mouth daily with breakfast 30 tablet 0    hydrochlorothiazide (MICROZIDE) 12 5 mg capsule Take 1 capsule (12 5 mg total) by mouth daily 90 capsule 3    levETIRAcetam (KEPPRA) 1000 MG tablet Take 1 tablet (1,000 mg total) by mouth every 12 (twelve) hours 180 tablet 1    levETIRAcetam (KEPPRA) 250 mg tablet Take 1 tablet (250 mg total) by mouth 2 (two) times a day 180 tablet 1    minocycline (DYNACIN) 100 MG tablet Take 100 mg by mouth daily      minocycline (MINOCIN) 100 mg capsule Take 100 mg by mouth daily      ondansetron (ZOFRAN) 8 mg tablet Take 1 tablet (8 mg total) by mouth every 8 (eight) hours as needed for nausea or vomiting 30 tablet 3    temozolomide (TEMODAR) 180 mg capsule TAKE 1 CAPSULE BY MOUTH DAILY FOR 42 DAYS 30 capsule 1    temozolomide (TEMODAR) 5 mg capsule TAKE 1 CAPSULE BY MOUTH DAILY FOR 42 DAYS 30 capsule 1    triamcinolone (KENALOG) 0 1 % cream APPLY CREAM EXTERNALLY TWICE DAILY TO BODY RASH AS PRESCRIBED      valsartan (DIOVAN) 80 mg tablet Take 1 tablet (80 mg total) by mouth daily 30 tablet 6    zolpidem (AMBIEN) 5 mg tablet Take 1 tablet (5 mg total) by mouth daily at bedtime as needed for sleep (Patient not taking: Reported on 4/5/2021) 30 tablet 0     No current facility-administered medications for this visit  Review of Systems   Constitutional: Negative for chills and fever  Eyes: Negative for pain and visual disturbance  Respiratory: Negative for cough and shortness of breath  Cardiovascular: Negative for chest pain and palpitations  Gastrointestinal: Negative for abdominal pain and vomiting  Endocrine: Negative for cold intolerance, heat intolerance, polydipsia, polyphagia and polyuria  Genitourinary: Negative for frequency and urgency  Musculoskeletal: Positive for myalgias  Negative for arthralgias and back pain  Skin: Negative for color change  Neurological: Negative for dizziness, tremors, seizures, syncope, weakness, light-headedness and headaches  Psychiatric/Behavioral: Negative for agitation and confusion  The patient is not nervous/anxious  All other systems reviewed and are negative  Objective:  Vitals:    04/19/21 0759   BP: 112/70   Pulse: 96   Temp: (!) 96 2 °F (35 7 °C)   SpO2: 97%   Weight: 113 kg (249 lb)   Height: 6' 3" (1 905 m)     Body mass index is 31 12 kg/m²  Physical Exam  Constitutional:       General: He is not in acute distress  Appearance: Normal appearance  He is not ill-appearing, toxic-appearing or diaphoretic  HENT:      Head: Normocephalic and atraumatic  Cardiovascular:      Rate and Rhythm: Normal rate and regular rhythm  Pulses: Normal pulses  Heart sounds: Normal heart sounds  No murmur  Pulmonary:      Effort: Pulmonary effort is normal       Breath sounds: Normal breath sounds  No wheezing  Musculoskeletal:         General: No swelling, tenderness or deformity  Right lower leg: No edema  Left lower leg: No edema  Skin:     General: Skin is warm and dry     Neurological:      General: No focal deficit present  Mental Status: He is alert and oriented to person, place, and time  Cranial Nerves: No cranial nerve deficit  Sensory: No sensory deficit     Psychiatric:         Mood and Affect: Mood normal          Behavior: Behavior normal

## 2021-04-19 NOTE — TELEPHONE ENCOUNTER
Patient calling to report that he noticed a lump behind his left ear  He first noticed this today    Lump is about 1 inch wide - non painful  There is no redness or discoloration on the skin    Patient is looking for further recommendation from Dr Geovanna Stone   Will send to RN to update Dr Geovanna Stone     Call back number [phone]

## 2021-04-19 NOTE — TELEPHONE ENCOUNTER
Appt scheduled for 4/19 @ 3:20  AZALEA Yeung left several messages for patient with a ppt day and time

## 2021-04-19 NOTE — PATIENT INSTRUCTIONS
Check FBS 2 weeks  Decrease glyburide to once daily in morning with breakfast  Call with any signs of hyo/hyerglycemia as discussed  Recheck in 2 weeks  Recheck A1C in 3 months  Increase otc magnesium to twice daily  BP stable - no changes

## 2021-04-19 NOTE — PROGRESS NOTES
[facility] HEMATOLOGY ONCOLOGY SPECIALISTS [place]  [facility] [facility] [address] [phone]  [phone]    Ronit BONE,[birth year], [phone]  04/19/21    Discussion:   In summary, this is a 80-year-old male history of GBM as outlined  He was seen today with report of some swelling at the left skull base  On exam, there is mild prominence of the left occiput compared to the right  No erythema  No tenderness  No new neurologic symptoms  I suspect this is normal structural asymmetry and not pathologic  I suggested that he call us if he develops pain or tenderness in this area  Otherwise, he will follow-up with MRI in 2 weeks will see him back thereafter to review the results and make further recommendations  I discussed the above with the patient  The patient  voiced understanding and agreement   ______________________________________________________________________    Chief Complaint   Patient presents with    Follow-up       HPI:  Oncology History   Brain tumor [facility]) (Resolved)   1/19/2021 Biopsy    BIOPSY BRAIN IMAGE-GUIDED NEEDLE - LEFT TEMPORAL  Surgeon: Dr Tavia Krause    Temporal mass:  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)  Note    This case was seen in consultation with Dr Chuck Joshi, an expert in Neuropathology from [facility]  Glioblastoma ([facility] [address] )   1/19/2021 Initial Diagnosis    Glioblastoma ([facility] [address] )     1/19/2021 Biopsy    BIOPSY BRAIN IMAGE-GUIDED NEEDLE - LEFT TEMPORAL  Surgeon: Dr Tavia Krause    Temporal mass:  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)  Note    This case was seen in consultation with Dr Chuck Joshi, an expert in Neuropathology from [facility]  Interval History:  Clinically stable  ECOG-  1 - Symptomatic but completely ambulatory    Review of Systems   Constitutional: Negative for chills and fever  HENT: Negative for nosebleeds  Eyes: Negative for discharge     Respiratory: Negative for cough and shortness of breath  Cardiovascular: Negative for chest pain  Gastrointestinal: Negative for abdominal pain, constipation and diarrhea  Endocrine: Negative for polydipsia  Genitourinary: Negative for hematuria  Musculoskeletal: Negative for arthralgias  Skin: Negative for color change  Allergic/Immunologic: Negative for immunocompromised state  Neurological: Negative for dizziness and headaches  Hematological: Negative for adenopathy  Psychiatric/Behavioral: Negative for agitation         Past Medical History:   Diagnosis Date    Brain tumor ([place] [address] ) 1/5/2021    Bruxism (teeth grinding)     GBM (glioblastoma multiforme) (HCC)     Hypercholesterolemia     Hypertension     Obesity     Sleep apnea     CPAP nightly     Patient Active Problem List   Diagnosis    Class 1 obesity due to excess calories with serious comorbidity and body mass index (BMI) of 33 0 to 33 9 in adult    Negative depression screening    Hypercholesterolemia    Essential hypertension    Obstructive sleep apnea syndrome    Sleep disturbance    Daytime sleepiness    Prediabetes    Glioblastoma ([place] [address] )    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 ([place] [address] )    Annual physical exam    Muscle cramps       Current Outpatient Medications:     amLODIPine (NORVASC) 10 mg tablet, Take 1 tablet (10 mg total) by mouth daily, Disp: 90 tablet, Rfl: 3    atorvastatin (LIPITOR) 10 mg tablet, Take 0 5 tablets (5 mg total) by mouth daily, Disp: 30 tablet, Rfl: 2    dexamethasone (DECADRON) 2 mg tablet, Take 1 tablet (2 mg total) by mouth 2 (two) times a day with meals, Disp: 60 tablet, Rfl: 1    glyBURIDE (DIABETA) 2 5 mg tablet, Take 1 tablet (2 5 mg total) by mouth daily with breakfast, Disp: 30 tablet, Rfl: 0    hydrochlorothiazide (MICROZIDE) 12 5 mg capsule, Take 1 capsule (12 5 mg total) by mouth daily, Disp: 90 capsule, Rfl: 3    levETIRAcetam (KEPPRA) 1000 MG tablet, Take 1 tablet (1,000 mg total) by mouth every 12 (twelve) hours, Disp: 180 tablet, Rfl: 1    levETIRAcetam (KEPPRA) 250 mg tablet, Take 1 tablet (250 mg total) by mouth 2 (two) times a day, Disp: 180 tablet, Rfl: 1    minocycline (DYNACIN) 100 MG tablet, Take 100 mg by mouth daily, Disp: , Rfl:     minocycline (MINOCIN) 100 mg capsule, Take 100 mg by mouth daily, Disp: , Rfl:     ondansetron (ZOFRAN) 8 mg tablet, Take 1 tablet (8 mg total) by mouth every 8 (eight) hours as needed for nausea or vomiting, Disp: 30 tablet, Rfl: 3    temozolomide (TEMODAR) 180 mg capsule, TAKE 1 CAPSULE BY MOUTH DAILY FOR 42 DAYS, Disp: 30 capsule, Rfl: 1    temozolomide (TEMODAR) 5 mg capsule, TAKE 1 CAPSULE BY MOUTH DAILY FOR 42 DAYS, Disp: 30 capsule, Rfl: 1    triamcinolone (KENALOG) 0 1 % cream, APPLY CREAM EXTERNALLY TWICE DAILY TO BODY RASH AS PRESCRIBED, Disp: , Rfl:     valsartan (DIOVAN) 80 mg tablet, Take 1 tablet (80 mg total) by mouth daily, Disp: 30 tablet, Rfl: 6    zolpidem (AMBIEN) 5 mg tablet, Take 1 tablet (5 mg total) by mouth daily at bedtime as needed for sleep (Patient not taking: Reported on 4/5/2021), Disp: 30 tablet, Rfl: 0  No Known Allergies  Past Surgical History:   Procedure Laterality Date    FL LUMBAR PUNCTURE DIAGNOSTIC  1/4/2021    HAND FRACTURE REPAIR      left thumb    WI STEREO BX/ASPIR/EXCIS,INTRACRANIAL LESN Left 1/19/2021    Procedure: BIOPSY BRAIN IMAGE-GUIDED NEEDLE - LEFT TEMPORAL;  Surgeon: Kb Murrell MD;  [facility];  Service: Neurosurgery    TONSILLECTOMY       Social History     Objective:  Vitals:    04/19/21 1525   BP: 124/74   BP Location: Left arm   Patient Position: Sitting   Pulse: 94   Resp: 16   Temp: 98 1 °F (36 7 °C)   TempSrc: Temporal   SpO2: 95%   Weight: 113 kg (250 lb)   Height: 6' 3" (1 905 m)     Physical Exam  Constitutional:       Appearance: He is well-developed  HENT:      Head: Normocephalic and atraumatic  Eyes:      Pupils: Pupils are equal, round, and reactive to light  Neck:      Musculoskeletal: Neck supple  Cardiovascular:      Rate and Rhythm: Normal rate and regular rhythm  Heart sounds: No murmur  Pulmonary:      Breath sounds: Normal breath sounds  No wheezing or rales  Abdominal:      Palpations: Abdomen is soft  Tenderness: There is no abdominal tenderness  Musculoskeletal: Normal range of motion  General: No tenderness  Lymphadenopathy:      Cervical: No cervical adenopathy  Skin:     Findings: No erythema or rash  Neurological:      Mental Status: He is alert and oriented to person, place, and time  Cranial Nerves: No cranial nerve deficit  Deep Tendon Reflexes: Reflexes are normal and symmetric  Psychiatric:         Behavior: Behavior normal            Labs: I personally reviewed the labs and imaging pertinent to this patient care

## 2021-04-19 NOTE — TELEPHONE ENCOUNTER
Called and left several messages  (With patient, mobile # and his Mother) Appointment has been arranged for 4/1921 @ 1204

## 2021-04-23 ENCOUNTER — TELEPHONE (OUTPATIENT)
Dept: HEMATOLOGY ONCOLOGY | Facility: CLINIC | Age: 66
End: 2021-04-23

## 2021-04-23 NOTE — TELEPHONE ENCOUNTER
Patient admits to some fatigue since stopping steroids  Instructed that this is a normal reaction and patient should call if fatigue increases or interferes with ADL's  Patient verbalizes understanding  FYCHARLOTTE to Dr [facility]

## 2021-04-29 ENCOUNTER — APPOINTMENT (OUTPATIENT)
Dept: LAB | Facility: CLINIC | Age: 66
End: 2021-04-29
Payer: COMMERCIAL

## 2021-04-29 DIAGNOSIS — R73.9 DRUG-INDUCED HYPERGLYCEMIA: ICD-10-CM

## 2021-04-29 DIAGNOSIS — T50.905A DRUG-INDUCED HYPERGLYCEMIA: ICD-10-CM

## 2021-04-29 LAB — GLUCOSE P FAST SERPL-MCNC: 109 MG/DL (ref 65–99)

## 2021-04-29 PROCEDURE — 36415 COLL VENOUS BLD VENIPUNCTURE: CPT

## 2021-04-29 PROCEDURE — 82947 ASSAY GLUCOSE BLOOD QUANT: CPT

## 2021-05-06 ENCOUNTER — OFFICE VISIT (OUTPATIENT)
Dept: FAMILY MEDICINE CLINIC | Facility: CLINIC | Age: 66
End: 2021-05-06
Payer: COMMERCIAL

## 2021-05-06 VITALS
BODY MASS INDEX: 30.34 KG/M2 | SYSTOLIC BLOOD PRESSURE: 115 MMHG | DIASTOLIC BLOOD PRESSURE: 79 MMHG | TEMPERATURE: 96.9 F | HEART RATE: 101 BPM | HEIGHT: 75 IN | OXYGEN SATURATION: 9 % | WEIGHT: 244 LBS

## 2021-05-06 DIAGNOSIS — R73.9 DRUG-INDUCED HYPERGLYCEMIA: Primary | ICD-10-CM

## 2021-05-06 DIAGNOSIS — T50.905A DRUG-INDUCED HYPERGLYCEMIA: Primary | ICD-10-CM

## 2021-05-06 PROCEDURE — 99213 OFFICE O/P EST LOW 20 MIN: CPT | Performed by: FAMILY MEDICINE

## 2021-05-06 NOTE — PROGRESS NOTES
Assessment/Plan:    No problem-specific Assessment & Plan notes found for this encounter  Diagnoses and all orders for this visit:    Drug-induced hyperglycemia  -     Glucose, fasting; Future          PHQ-9 Depression Screening    PHQ-9:   Frequency of the following problems over the past two weeks:      Little interest or pleasure in doing things: 0 - not at all  Feeling down, depressed, or hopeless: 0 - not at all  PHQ-2 Score: 0            Subjective:      Patient ID: Adam Oreilly is a 60 y o  male  He has been off the steroid for almost 4 weeks  He feels fatigued, he has an appt with oncology on Friday  He denies any hypoglycemic events  He will have an MRI tomorrow  The following portions of the patient's history were reviewed and updated as appropriate: allergies, current medications, past family history, past medical history, past social history, past surgical history and problem list     Review of Systems   Constitutional: Positive for appetite change and fatigue  Decreased appetite   HENT: Negative  Respiratory: Negative for cough, shortness of breath and wheezing  Cardiovascular: Negative for chest pain, palpitations and leg swelling  Endocrine: Negative for polydipsia, polyphagia and polyuria  Neurological: Negative for dizziness, syncope, weakness and light-headedness  Objective:    /79   Pulse 101   Temp (!) 96 9 °F (36 1 °C)   Ht 6' 3" (1 905 m)   Wt 111 kg (244 lb)   SpO2 (!) 9%   BMI 30 50 kg/m²      Physical Exam  Constitutional:       General: He is not in acute distress  Appearance: Normal appearance  He is not ill-appearing or toxic-appearing  Cardiovascular:      Rate and Rhythm: Normal rate and regular rhythm  Pulses: Normal pulses  Heart sounds: No murmur  Pulmonary:      Effort: Pulmonary effort is normal  No respiratory distress  Breath sounds: Normal breath sounds  No wheezing or rales     Neurological: General: No focal deficit present  Mental Status: He is alert and oriented to person, place, and time     Psychiatric:         Mood and Affect: Mood normal          Behavior: Behavior normal          Judgment: Judgment normal

## 2021-05-07 ENCOUNTER — HOSPITAL ENCOUNTER (OUTPATIENT)
Dept: MRI IMAGING | Facility: HOSPITAL | Age: 66
Discharge: HOME | End: 2021-05-07
Attending: INTERNAL MEDICINE
Payer: COMMERCIAL

## 2021-05-07 DIAGNOSIS — C71.9 GLIOBLASTOMA (HCC): ICD-10-CM

## 2021-05-07 PROCEDURE — A9585 GADOBUTROL INJECTION: HCPCS | Performed by: INTERNAL MEDICINE

## 2021-05-07 PROCEDURE — 70553 MRI BRAIN STEM W/O & W/DYE: CPT

## 2021-05-07 PROCEDURE — G1004 CDSM NDSC: HCPCS

## 2021-05-07 RX ADMIN — GADOBUTROL 11 ML: 604.72 INJECTION INTRAVENOUS at 06:38

## 2021-05-10 ENCOUNTER — DOCUMENTATION (OUTPATIENT)
Dept: HEMATOLOGY ONCOLOGY | Facility: CLINIC | Age: 66
End: 2021-05-10

## 2021-05-10 NOTE — PROGRESS NOTES
Called the ins & spoke to antonette call ref# [id number] he sd that he can only help me w/3 claims  Acct# [de-identified] [facility] 01/01/21 $74,[id number] 66 that is being processed they just recvd the medical records on 04/19/21 & the processing takes 30-45 business days       Acct# [de-identified] dos 01/28/21-01/29/21 $84,617   39 they are waiting on medical records once they recv those they have 30-45 business days to process that claims    Acct# [de-identified] [facility] 03/01/21-03/31/21 #[id number] that claim hasn't been recvd yet  Paolo Gambino Acct# [de-identified] dos 02/06/21-02/28/21 $21,177  That claims was recvd 04/21/21 & is being processed  They have 30-45 business days to process    he sd anymore dos I nee to call back

## 2021-05-10 NOTE — PROGRESS NOTES
Called pt to find out if he ever switched his ins & he sd that he will have medicare A & B effective 07/01/21 & hr has a supplemental  Plan G thru [address] [facility] that will also be effective 07/01/21  He sd that he just stayed w/the [employer] he doesn't know why they aren't paying anything I told him I will check it out & get back to him    called sandi ut they open at 5

## 2021-05-13 ENCOUNTER — OFFICE VISIT (OUTPATIENT)
Dept: NEUROLOGY | Facility: CLINIC | Age: 66
End: 2021-05-13
Payer: COMMERCIAL

## 2021-05-13 VITALS
BODY MASS INDEX: 30.24 KG/M2 | HEIGHT: 75 IN | SYSTOLIC BLOOD PRESSURE: 110 MMHG | HEART RATE: 84 BPM | DIASTOLIC BLOOD PRESSURE: 70 MMHG | WEIGHT: 243.2 LBS

## 2021-05-13 DIAGNOSIS — C71.9 GLIOBLASTOMA (HCC): ICD-10-CM

## 2021-05-13 DIAGNOSIS — D49.6 EPILEPSY DUE TO INTRACRANIAL TUMOR (HCC): Primary | ICD-10-CM

## 2021-05-13 DIAGNOSIS — G40.909 EPILEPSY DUE TO INTRACRANIAL TUMOR (HCC): Primary | ICD-10-CM

## 2021-05-13 PROCEDURE — 99214 OFFICE O/P EST MOD 30 MIN: CPT | Performed by: PSYCHIATRY & NEUROLOGY

## 2021-05-13 RX ORDER — LEVETIRACETAM 250 MG/1
250 TABLET ORAL 2 TIMES DAILY
Qty: 180 TABLET | Refills: 3 | Status: SHIPPED | OUTPATIENT
Start: 2021-05-13 | End: 2021-11-01 | Stop reason: SDUPTHER

## 2021-05-13 RX ORDER — LEVETIRACETAM 1000 MG/1
1000 TABLET ORAL EVERY 12 HOURS SCHEDULED
Qty: 180 TABLET | Refills: 3 | Status: SHIPPED | OUTPATIENT
Start: 2021-05-13 | End: 2021-11-01 | Stop reason: SDUPTHER

## 2021-05-13 NOTE — PROGRESS NOTES
[place] [address] [facility]rology [address] [facility]  Follow Up Visit    Impression/Plan    Mr Kulwinder Campos is a 60 y o  male with 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  While the 2 episodes of aphasia persisted for longer than is typical for seizure, the sudden onset of his expressive aphasia argues for seizure as the cause  There may have been additional focal seizures that caused his waxing/waning language exam during his first admission in early January  The event leading to his repeat admission in late January could have also represented seizure and occurred while he was on levetiracetam 1000 mg bid  There have been no additional events concerning for seizure while on levetiracetam to 1250 mg bid  Patient Instructions   1  Continue current seizure medications unchanged  2  Let us know if there are seizures or problems with your medication  3  Return in 6 months to see PAMELA Alanis  Diagnoses and all orders for this visit:    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 ([facility] [address] )  -     levETIRAcetam (KEPPRA) 1000 MG tablet; Take 1 tablet (1,000 mg total) by mouth every 12 (twelve) hours  -     levETIRAcetam (KEPPRA) 250 mg tablet; Take 1 tablet (250 mg total) by mouth 2 (two) times a day    Glioblastoma ([facility] [address] )        Subjective    Lynette Mandujano is returning to the [place] [address] [facility] for follow up  Interval Events:   Seizures since last visit: None (last seizure 1/28/2021)    Intake visit 2/18/2021  Levetiracetam was increased top 1250 mg bd at that visit due to concern that seizures were not controlled in January when he was on levetiracetam 1000 mg bid  He called on 2/25 to report that memory had been worse since his seizures began  I explained that it was likely that tumor was the direct cause, rather than seizure       Brain MRI 5/7/2021:Left temporal lobe GBM continues to increase in size and is currently contiguous with the lateral margin of the compressed lateral ventricular atrium  Increasing subtle ependymal enhancement and vasogenic edema  Current AEDs:  Levetiracetam 1250 mg bid  Medication side effects: None  Medication adherence: Yes    Event/Seizure semiology:  Sudden onset expressive aphasia     Special Features  Status epilepticus: no  Self Injury Seizures: no  Precipitating Factors: none     Epilepsy Risk Factors:  CNS neoplasm (left temporal GBM)     Prior AEDs:  None     Prior Evaluation:  REEG 1/4/2021: normal  Awake and asleep  Brain MRI 2/112021: Continued enlargement of the left temporal lobe centrally necrotic, irregularly peripherally enhancing neoplasm now measuring 3 7 cm in transverse diameter, compared to 3 1 cm approximately 2 weeks ago  Bronson Perdomo mild surrounding vasogenic edema  (images personally reviewed 2/18/2021)     History Reviewed: The following were reviewed and updated as appropriate: allergies, current medications,  past medical history, past social history and problem list  Joselo, htn, hld      Psychiatric History:  None     Social History:   Driving: No  Lives Alone: No    Objective    /70 (BP Location: Left arm, Patient Position: Sitting, Cuff Size: Standard)   Pulse 84   Ht 6' 3" (1 905 m)   Wt 110 kg (243 lb 3 2 oz)   BMI 30 40 kg/m²      General Exam  No acute distress  Neurologic Exam  Mental Status:  Alert and oriented  Language: normal fluency and comprehension  Occasional word finding problems  Cranial Nerves:  PERRL, EOMI, VFFTC  Face symmetric  No dysarthria  Gait: steady casual gait  ROS:    Review of Systems   Constitutional: Positive for appetite change (no appetite) and fatigue  Negative for fever  HENT: Negative  Negative for hearing loss, tinnitus, trouble swallowing and voice change  Eyes: Positive for visual disturbance (blurriness)  Negative for photophobia and pain          Went to the ophthalmologist and he stated it could take up to 6 months for this to go away, it is not due to his sugar  Respiratory: Negative  Negative for shortness of breath  Cardiovascular: Negative  Negative for palpitations  Gastrointestinal: Negative  Negative for nausea and vomiting  Endocrine: Negative  Negative for cold intolerance  Genitourinary: Negative  Negative for dysuria, frequency and urgency  Musculoskeletal: Negative  Negative for myalgias and neck pain  Skin: Negative  Negative for rash  Neurological: Positive for seizures (last one was Jan 28, 2021) and speech difficulty (trouble finding words )  Negative for dizziness, tremors, syncope, facial asymmetry, weakness, light-headedness, numbness and headaches  Hematological: Negative  Does not bruise/bleed easily  Psychiatric/Behavioral: Positive for sleep disturbance  Negative for confusion and hallucinations  ROS reviewed and updated as appropriate

## 2021-05-13 NOTE — PATIENT INSTRUCTIONS
1  Continue current seizure medications unchanged  2  Let us know if there are seizures or problems with your medication  3  Return in 6 months to see PAMELA Campbell

## 2021-05-14 ENCOUNTER — APPOINTMENT (OUTPATIENT)
Dept: LAB | Facility: CLINIC | Age: 66
End: 2021-05-14
Payer: COMMERCIAL

## 2021-05-14 ENCOUNTER — OFFICE VISIT (OUTPATIENT)
Dept: HEMATOLOGY ONCOLOGY | Facility: CLINIC | Age: 66
End: 2021-05-14
Payer: COMMERCIAL

## 2021-05-14 VITALS
WEIGHT: 241 LBS | HEART RATE: 79 BPM | BODY MASS INDEX: 29.97 KG/M2 | HEIGHT: 75 IN | TEMPERATURE: 97 F | OXYGEN SATURATION: 97 % | SYSTOLIC BLOOD PRESSURE: 108 MMHG | DIASTOLIC BLOOD PRESSURE: 74 MMHG | RESPIRATION RATE: 17 BRPM

## 2021-05-14 DIAGNOSIS — C71.9 GLIOBLASTOMA (HCC): ICD-10-CM

## 2021-05-14 DIAGNOSIS — T50.905A DRUG-INDUCED HYPERGLYCEMIA: ICD-10-CM

## 2021-05-14 DIAGNOSIS — R73.9 DRUG-INDUCED HYPERGLYCEMIA: ICD-10-CM

## 2021-05-14 LAB
ALBUMIN SERPL BCP-MCNC: 3.7 G/DL (ref 3.5–5)
ALP SERPL-CCNC: 91 U/L (ref 46–116)
ALT SERPL W P-5'-P-CCNC: 25 U/L (ref 12–78)
ANION GAP SERPL CALCULATED.3IONS-SCNC: 5 MMOL/L (ref 4–13)
AST SERPL W P-5'-P-CCNC: 15 U/L (ref 5–45)
BASOPHILS # BLD AUTO: 0.04 THOUSANDS/ΜL (ref 0–0.1)
BASOPHILS NFR BLD AUTO: 1 % (ref 0–1)
BILIRUB SERPL-MCNC: 0.8 MG/DL (ref 0.2–1)
BUN SERPL-MCNC: 17 MG/DL (ref 5–25)
CALCIUM SERPL-MCNC: 9.7 MG/DL (ref 8.3–10.1)
CHLORIDE SERPL-SCNC: 106 MMOL/L (ref 100–108)
CO2 SERPL-SCNC: 28 MMOL/L (ref 21–32)
CREAT SERPL-MCNC: 0.81 MG/DL (ref 0.6–1.3)
EOSINOPHIL # BLD AUTO: 0 THOUSAND/ΜL (ref 0–0.61)
EOSINOPHIL NFR BLD AUTO: 0 % (ref 0–6)
ERYTHROCYTE [DISTWIDTH] IN BLOOD BY AUTOMATED COUNT: 14.6 % (ref 11.6–15.1)
GFR SERPL CREATININE-BSD FRML MDRD: 94 ML/MIN/1.73SQ M
GLUCOSE P FAST SERPL-MCNC: 96 MG/DL (ref 65–99)
HCT VFR BLD AUTO: 43 % (ref 36.5–49.3)
HGB BLD-MCNC: 14.4 G/DL (ref 12–17)
IMM GRANULOCYTES # BLD AUTO: 0.02 THOUSAND/UL (ref 0–0.2)
IMM GRANULOCYTES NFR BLD AUTO: 0 % (ref 0–2)
LYMPHOCYTES # BLD AUTO: 1.33 THOUSANDS/ΜL (ref 0.6–4.47)
LYMPHOCYTES NFR BLD AUTO: 20 % (ref 14–44)
MCH RBC QN AUTO: 29.9 PG (ref 26.8–34.3)
MCHC RBC AUTO-ENTMCNC: 33.5 G/DL (ref 31.4–37.4)
MCV RBC AUTO: 89 FL (ref 82–98)
MONOCYTES # BLD AUTO: 0.75 THOUSAND/ΜL (ref 0.17–1.22)
MONOCYTES NFR BLD AUTO: 12 % (ref 4–12)
NEUTROPHILS # BLD AUTO: 4.39 THOUSANDS/ΜL (ref 1.85–7.62)
NEUTS SEG NFR BLD AUTO: 67 % (ref 43–75)
NRBC BLD AUTO-RTO: 0 /100 WBCS
PLATELET # BLD AUTO: 206 THOUSANDS/UL (ref 149–390)
PMV BLD AUTO: 10.3 FL (ref 8.9–12.7)
POTASSIUM SERPL-SCNC: 4.6 MMOL/L (ref 3.5–5.3)
PROT SERPL-MCNC: 7 G/DL (ref 6.4–8.2)
RBC # BLD AUTO: 4.82 MILLION/UL (ref 3.88–5.62)
SODIUM SERPL-SCNC: 139 MMOL/L (ref 136–145)
WBC # BLD AUTO: 6.53 THOUSAND/UL (ref 4.31–10.16)

## 2021-05-14 PROCEDURE — 99215 OFFICE O/P EST HI 40 MIN: CPT | Performed by: INTERNAL MEDICINE

## 2021-05-14 PROCEDURE — 85025 COMPLETE CBC W/AUTO DIFF WBC: CPT | Performed by: INTERNAL MEDICINE

## 2021-05-14 PROCEDURE — 36415 COLL VENOUS BLD VENIPUNCTURE: CPT | Performed by: INTERNAL MEDICINE

## 2021-05-14 PROCEDURE — 80053 COMPREHEN METABOLIC PANEL: CPT | Performed by: INTERNAL MEDICINE

## 2021-05-14 RX ORDER — ONDANSETRON HYDROCHLORIDE 8 MG/1
8 TABLET, FILM COATED ORAL EVERY 8 HOURS PRN
Qty: 30 TABLET | Refills: 3 | Status: SHIPPED | OUTPATIENT
Start: 2021-05-14 | End: 2021-08-17 | Stop reason: ALTCHOICE

## 2021-05-14 NOTE — PROGRESS NOTES
[facility] HEMATOLOGY ONCOLOGY SPECIALISTS [place]  [address] [facility] [address]  [phone]  5 [facility] KRUNAL JDJIX,[birth year], [phone]  05/14/21    Discussion:   In summary, this is a 61-year-old male history of GBM  He is relatively stable clinically  He feels that his gait and speech are somewhat improved compared to time of diagnosis  He has had a decrease in appetite over the past month or so  No nausea or vomiting  No melena or hematochezia  I suggested a trial of H2 blocker for 1-2 weeks  Repeat MRI shows increase in size of left temporal lobe mass with increasing edema  We reviewed that it is possible that some of this represents post treatment change but certainly is concerning for progressive disease as well  Case to be reviewed at [facility]cology [facility] in a few days  We will call the patient with the results of that review and further recommendations  In the interim, I made arrangements for consolidative Temodar  Avastin could be added  I suspect surgery will not be recommended  I discussed the above with the patient  The patient and his mother voiced understanding and agreement   ______________________________________________________________________    Chief Complaint   Patient presents with    Follow-up       HPI:  Oncology History   Brain tumor [facility]) (Resolved)   1/19/2021 Biopsy    BIOPSY BRAIN IMAGE-GUIDED NEEDLE - LEFT TEMPORAL  Surgeon: Dr Yash Marvin    Temporal mass:  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)  Note    This case was seen in consultation with Dr Tucker Paulino, an expert in Neuropathology from [facility]  Glioblastoma ([facility] [address] )   1/19/2021 Initial Diagnosis    Glioblastoma ([facility] [address] )     1/19/2021 Biopsy    BIOPSY BRAIN IMAGE-GUIDED NEEDLE - LEFT TEMPORAL  Surgeon: Dr Yash Marvin    Temporal mass:  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)      Note    This case was seen in consultation with Dr Tucker Paulino, an expert in Neuropathology from [facility]  Interval History:  Clinically stable  ECOG-  1 - Symptomatic but completely ambulatory    Review of Systems   Constitutional: Negative for chills and fever  HENT: Negative for nosebleeds  Eyes: Negative for discharge  Respiratory: Negative for cough and shortness of breath  Cardiovascular: Negative for chest pain  Gastrointestinal: Negative for abdominal pain, constipation and diarrhea  Endocrine: Negative for polydipsia  Genitourinary: Negative for hematuria  Musculoskeletal: Negative for arthralgias  Skin: Negative for color change  Allergic/Immunologic: Negative for immunocompromised state  Neurological: Negative for dizziness and headaches  Hematological: Negative for adenopathy  Psychiatric/Behavioral: Negative for agitation         Past Medical History:   Diagnosis Date    Brain tumor ([facility] ) 1/5/2021    Bruxism (teeth grinding)     GBM (glioblastoma multiforme) (HCC)     Hypercholesterolemia     Hypertension     Obesity     Sleep apnea     CPAP nightly     Patient Active Problem List   Diagnosis    Class 1 obesity due to excess calories with serious comorbidity and body mass index (BMI) of 33 0 to 33 9 in adult    Negative depression screening    Hypercholesterolemia    Essential hypertension    Obstructive sleep apnea syndrome    Sleep disturbance    Daytime sleepiness    Prediabetes    Glioblastoma (HCC)    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 ([facility] )    Annual physical exam    Muscle cramps       Current Outpatient Medications:     amLODIPine (NORVASC) 10 mg tablet, Take 1 tablet (10 mg total) by mouth daily, Disp: 90 tablet, Rfl: 3    atorvastatin (LIPITOR) 10 mg tablet, Take 0 5 tablets (5 mg total) by mouth daily, Disp: 30 tablet, Rfl: 2    hydrochlorothiazide (MICROZIDE) 12 5 mg capsule, Take 1 capsule (12 5 mg total) by mouth daily, Disp: 90 capsule, Rfl: 3    levETIRAcetam (KEPPRA) 1000 MG tablet, Take 1 tablet (1,000 mg total) by mouth every 12 (twelve) hours, Disp: 180 tablet, Rfl: 3    levETIRAcetam (KEPPRA) 250 mg tablet, Take 1 tablet (250 mg total) by mouth 2 (two) times a day, Disp: 180 tablet, Rfl: 3    minocycline (DYNACIN) 100 MG tablet, Take 100 mg by mouth daily, Disp: , Rfl:     minocycline (MINOCIN) 100 mg capsule, Take 100 mg by mouth daily, Disp: , Rfl:     triamcinolone (KENALOG) 0 1 % cream, APPLY CREAM EXTERNALLY TWICE DAILY TO BODY RASH AS PRESCRIBED, Disp: , Rfl:     valsartan (DIOVAN) 80 mg tablet, Take 1 tablet (80 mg total) by mouth daily, Disp: 30 tablet, Rfl: 6    dexamethasone (DECADRON) 2 mg tablet, Take 1 tablet (2 mg total) by mouth 2 (two) times a day with meals (Patient not taking: Reported on 5/13/2021), Disp: 60 tablet, Rfl: 1    ondansetron (ZOFRAN) 8 mg tablet, Take 1 tablet (8 mg total) by mouth every 8 (eight) hours as needed for nausea or vomiting (Patient not taking: Reported on 5/14/2021), Disp: 30 tablet, Rfl: 3    temozolomide (TEMODAR) 180 mg capsule, TAKE 1 CAPSULE BY MOUTH DAILY FOR 42 DAYS (Patient not taking: Reported on 5/13/2021), Disp: 30 capsule, Rfl: 1    temozolomide (TEMODAR) 5 mg capsule, TAKE 1 CAPSULE BY MOUTH DAILY FOR 42 DAYS (Patient not taking: Reported on 5/13/2021), Disp: 30 capsule, Rfl: 1    zolpidem (AMBIEN) 5 mg tablet, Take 1 tablet (5 mg total) by mouth daily at bedtime as needed for sleep (Patient not taking: Reported on 5/14/2021), Disp: 30 tablet, Rfl: 0  No Known Allergies  Past Surgical History:   Procedure Laterality Date    FL LUMBAR PUNCTURE DIAGNOSTIC  1/4/2021    HAND FRACTURE REPAIR      left thumb    [place] STEREO BX/ASPIR/EXCIS,INTRACRANIAL LESN Left 1/19/2021    Procedure: BIOPSY BRAIN IMAGE-GUIDED NEEDLE - LEFT TEMPORAL;  Surgeon: Jason Eaton MD;  Location: [facility];  Service: Neurosurgery    TONSILLECTOMY       Social History     Objective:  Vitals:    05/14/21 0823   BP: 108/74   BP Location: Left arm   Patient Position: Sitting   Pulse: 79   Resp: 17   Temp: (!) 97 °F (36 1 °C)   TempSrc: Tympanic   SpO2: 97%   Weight: 109 kg (241 lb)   Height: 6' 3" (1 905 m)     Physical Exam  Constitutional:       Appearance: He is well-developed  HENT:      Head: Normocephalic and atraumatic  Eyes:      Pupils: Pupils are equal, round, and reactive to light  Neck:      Musculoskeletal: Neck supple  Cardiovascular:      Rate and Rhythm: Normal rate and regular rhythm  Heart sounds: No murmur  Pulmonary:      Breath sounds: Normal breath sounds  No wheezing or rales  Abdominal:      Palpations: Abdomen is soft  Tenderness: There is no abdominal tenderness  Musculoskeletal: Normal range of motion  General: No tenderness  Lymphadenopathy:      Cervical: No cervical adenopathy  Skin:     Findings: No erythema or rash  Neurological:      Mental Status: He is alert and oriented to person, place, and time  Cranial Nerves: No cranial nerve deficit  Deep Tendon Reflexes: Reflexes are normal and symmetric  Psychiatric:         Behavior: Behavior normal            Labs: I personally reviewed the labs and imaging pertinent to this patient care

## 2021-05-17 DIAGNOSIS — C71.9 GLIOBLASTOMA (HCC): Primary | ICD-10-CM

## 2021-05-17 RX ORDER — TEMOZOLOMIDE 5 MG/1
5 CAPSULE ORAL DAILY
Qty: 10 CAPSULE | Refills: 5 | Status: SHIPPED | OUTPATIENT
Start: 2021-05-17 | End: 2021-05-18 | Stop reason: SDUPTHER

## 2021-05-17 RX ORDER — TEMOZOLOMIDE 250 MG/1
250 CAPSULE ORAL DAILY
Qty: 10 CAPSULE | Refills: 5 | Status: SHIPPED | OUTPATIENT
Start: 2021-05-17 | End: 2021-05-18 | Stop reason: SDUPTHER

## 2021-05-17 RX ORDER — TEMOZOLOMIDE 100 MG/1
100 CAPSULE ORAL DAILY
Qty: 10 CAPSULE | Refills: 5 | Status: SHIPPED | OUTPATIENT
Start: 2021-05-17 | End: 2021-05-18 | Stop reason: SDUPTHER

## 2021-05-18 ENCOUNTER — TELEMEDICINE (OUTPATIENT)
Dept: RADIATION ONCOLOGY | Facility: CLINIC | Age: 66
End: 2021-05-18
Attending: RADIOLOGY

## 2021-05-18 ENCOUNTER — DOCUMENTATION (OUTPATIENT)
Dept: HEMATOLOGY ONCOLOGY | Facility: CLINIC | Age: 66
End: 2021-05-18

## 2021-05-18 DIAGNOSIS — C71.9 GLIOBLASTOMA (HCC): ICD-10-CM

## 2021-05-18 DIAGNOSIS — C71.9 GLIOBLASTOMA (HCC): Primary | ICD-10-CM

## 2021-05-18 RX ORDER — TEMOZOLOMIDE 100 MG/1
100 CAPSULE ORAL DAILY
Qty: 10 CAPSULE | Refills: 5 | Status: SHIPPED | OUTPATIENT
Start: 2021-05-18 | End: 2021-06-11 | Stop reason: ALTCHOICE

## 2021-05-18 RX ORDER — TEMOZOLOMIDE 250 MG/1
250 CAPSULE ORAL DAILY
Qty: 10 CAPSULE | Refills: 5 | Status: SHIPPED | OUTPATIENT
Start: 2021-05-18 | End: 2021-06-11 | Stop reason: ALTCHOICE

## 2021-05-18 RX ORDER — TEMOZOLOMIDE 5 MG/1
5 CAPSULE ORAL DAILY
Qty: 10 CAPSULE | Refills: 5 | Status: SHIPPED | OUTPATIENT
Start: 2021-05-18 | End: 2021-06-11 | Stop reason: ALTCHOICE

## 2021-05-18 NOTE — PROGRESS NOTES
Called pt & went over the claims & told hm that it will take a while for them to pay   He thanked me for the call

## 2021-05-18 NOTE — PROGRESS NOTES
2-17-21  Received restart email for oral chemo- Levorn Mingle    2-18-21  Per [facility] temodar copay thru [facility] $ [phone] no funding available at this time  Per [facility], [facility]- better price offered of $ [phone] if patient doesn't use insurance  Call to patient, he stated he is agreeable to [employer]  Notified [employer] that patient would go with them and is waiting on call

## 2021-05-18 NOTE — PROGRESS NOTES
Virtual Brief Visit    Assessment/Plan:    The patient is 1 month status post radiation/Temodar for GBM  Overall he saw feels fairly well  In particular he has been off steroids which he has tolerated  His main complaint is decreased appetite, as slight occasional blurry vision and occasional word-finding difficulty  He is able to converse appropriately during the visit  He denies any headaches  He did undergo MRI of the brain which reveals increased size of his lesion  He met with Dr Buzz Aponte who has started him on Temodar  I will be presenting  His case tomorrow in Neuro-Oncology Clinic to review his most recent imaging  Problem List Items Addressed This Visit        Nervous and Auditory    Glioblastoma ([facility] ) - Primary                Reason for visit is No chief complaint on file  Encounter provider Mabel Sanderson MD    Provider located at [address] [place] [id number]    Recent Visits  No visits were found meeting these conditions  Showing recent visits within past 7 days and meeting all other requirements     Future Appointments  No visits were found meeting these conditions  Showing future appointments within next 150 days and meeting all other requirements        After connecting through telephone, the patient was identified by name and date of birth  Ronald Sos was informed that this is a telemedicine visit and that the visit is being conducted through telephone  My office door was closed  No one else was in the room  He acknowledged consent and understanding of privacy and security of the platform  The patient has agreed to participate and understands he can discontinue the visit at any time  Patient is aware this is a billable service       Subjective        Past Medical History:   Diagnosis Date    [facility] ([facility] ) 1/5/2021    Bruxism (teeth grinding)     GBM (glioblastoma multiforme) (HCC)     Hypercholesterolemia     Hypertension     Obesity     Sleep apnea     CPAP nightly       Past Surgical History:   Procedure Laterality Date    [place] LUMBAR PUNCTURE DIAGNOSTIC  1/4/2021    HAND FRACTURE REPAIR      left thumb    [place] STEREO BX/ASPIR/EXCIS,INTRACRANIAL LESN Left 1/19/2021    Procedure: BIOPSY BRAIN IMAGE-GUIDED NEEDLE - LEFT TEMPORAL;  Surgeon: Chelsi Dugan MD;  [facility];  Service: Neurosurgery    TONSILLECTOMY         Current Outpatient Medications   Medication Sig Dispense Refill    amLODIPine (NORVASC) 10 mg tablet Take 1 tablet (10 mg total) by mouth daily 90 tablet 3    atorvastatin (LIPITOR) 10 mg tablet Take 0 5 tablets (5 mg total) by mouth daily 30 tablet 2    dexamethasone (DECADRON) 2 mg tablet Take 1 tablet (2 mg total) by mouth 2 (two) times a day with meals (Patient not taking: Reported on 5/13/2021) 60 tablet 1    hydrochlorothiazide (MICROZIDE) 12 5 mg capsule Take 1 capsule (12 5 mg total) by mouth daily 90 capsule 3    levETIRAcetam (KEPPRA) 1000 MG tablet Take 1 tablet (1,000 mg total) by mouth every 12 (twelve) hours 180 tablet 3    levETIRAcetam (KEPPRA) 250 mg tablet Take 1 tablet (250 mg total) by mouth 2 (two) times a day 180 tablet 3    minocycline (DYNACIN) 100 MG tablet Take 100 mg by mouth daily      minocycline (MINOCIN) 100 mg capsule Take 100 mg by mouth daily      ondansetron (ZOFRAN) 8 mg tablet Take 1 tablet (8 mg total) by mouth every 8 (eight) hours as needed for nausea or vomiting 30 tablet 3    temozolomide (TEMODAR) 100 mg capsule Take 1 capsule (100 mg total) by mouth daily Take 355mg two times a day on days 1-5, repeat every 28 days   with 2 other temozolomide prescriptions for 355 mg (150 mg/m2/day x 2 37 m2) total 10 capsule 5    temozolomide (TEMODAR) 180 mg capsule TAKE 1 CAPSULE BY MOUTH DAILY FOR 42 DAYS (Patient not taking: Reported on 5/13/2021) 30 capsule 1    temozolomide (TEMODAR) 250 MG capsule Take 1 capsule (250 mg total) by mouth daily Take 355mg two times a day on days 1-5, repeat every 28 days  with 2 other temozolomide prescriptions for 355 mg (150 mg/m2/day x 2 37 m2) total 10 capsule 5    temozolomide (TEMODAR) 5 mg capsule TAKE 1 CAPSULE BY MOUTH DAILY FOR 42 DAYS (Patient not taking: Reported on 5/13/2021) 30 capsule 1    temozolomide (TEMODAR) 5 mg capsule Take 1 capsule (5 mg total) by mouth daily Take 355mg two times a day on days 1-5, repeat every 28 days  with 2 other temozolomide prescriptions for 355 mg (150 mg/m2/day x 2 37 m2) total 10 capsule 5    triamcinolone (KENALOG) 0 1 % cream APPLY CREAM EXTERNALLY TWICE DAILY TO BODY RASH AS PRESCRIBED      valsartan (DIOVAN) 80 mg tablet Take 1 tablet (80 mg total) by mouth daily 30 tablet 6    zolpidem (AMBIEN) 5 mg tablet Take 1 tablet (5 mg total) by mouth daily at bedtime as needed for sleep (Patient not taking: Reported on 5/14/2021) 30 tablet 0     No current facility-administered medications for this visit  No Known Allergies    Review of Systems    There were no vitals filed for this visit  I spent 15 minutes directly with the patient during this visit    Kylie Bhatti Claudine acknowledges that he has consented to an online visit or consultation  He understands that the online visit is based solely on information provided by him, and that, in the absence of a face-to-face physical evaluation by the physician, the diagnosis he receives is both limited and provisional in terms of accuracy and completeness  This is not intended to replace a full medical face-to-face evaluation by the physician  Tiffanie Roland understands and accepts these terms

## 2021-05-19 ENCOUNTER — DOCUMENTATION (OUTPATIENT)
Dept: NEUROSURGERY | Facility: CLINIC | Age: 66
End: 2021-05-19

## 2021-05-19 ENCOUNTER — DOCUMENTATION (OUTPATIENT)
Dept: HEMATOLOGY ONCOLOGY | Facility: CLINIC | Age: 66
End: 2021-05-19

## 2021-05-19 ENCOUNTER — TELEPHONE (OUTPATIENT)
Dept: HEMATOLOGY ONCOLOGY | Facility: CLINIC | Age: 66
End: 2021-05-19

## 2021-05-19 DIAGNOSIS — C71.9 GLIOBLASTOMA (HCC): Primary | ICD-10-CM

## 2021-05-19 NOTE — PROGRESS NOTES
Patient was discussed at the Neuro Oncology Case Review this morning  Dr Teodora Rock requested for the case to be discussed  The group recommended Avastin  Dr Teodora Rock was present for the meeting  Routing message to Dr Washington Boogie team, Cesar Cardoso, and Sam to help arrange

## 2021-05-19 NOTE — TELEPHONE ENCOUNTER
Call from patient asking if office has samples of temodar  No samples available  Patient verbalizes understanding    FYI to DR [facility]  RNs

## 2021-05-19 NOTE — TELEPHONE ENCOUNTER
Patient calling back regarding his Avastin infusions   He is requesting that these be scheduled first thing in the morning if possible   Patient requesting they be scheduled at [facility]     Will forward to RN     Call back number for patient [phone]

## 2021-05-19 NOTE — TELEPHONE ENCOUNTER
Discussed case in Neuro-Oncology Working group today  Tumor is clearly progressive  Discontinuation of Temodar  Institution of Avastin  We reviewed information regarding Avastin by phone today with the patient  We reviewed potential toxicities including but not limited to increased risk for thrombosis, wound dehiscence, hypertension, proteinuria  Many patients tolerate this medication fairly well, however  Orders are entered  Treatment is anticipated to start in the near future  Patient voiced understanding and agreement

## 2021-05-19 NOTE — TELEPHONE ENCOUNTER
Can you please schedule these treatments for this patient  Patient would like to go to [facility], and early in the morning     Thank you

## 2021-05-19 NOTE — PROGRESS NOTES
Pt called & was asking  questions about the oral meds & when he can get them    told him that I don't know but I will reach out to saadia to have her call the pt  Dara Soulier

## 2021-05-20 ENCOUNTER — OFFICE VISIT (OUTPATIENT)
Dept: FAMILY MEDICINE CLINIC | Facility: CLINIC | Age: 66
End: 2021-05-20
Payer: COMMERCIAL

## 2021-05-20 VITALS
WEIGHT: 244 LBS | OXYGEN SATURATION: 96 % | HEIGHT: 75 IN | SYSTOLIC BLOOD PRESSURE: 104 MMHG | TEMPERATURE: 98 F | BODY MASS INDEX: 30.34 KG/M2 | HEART RATE: 74 BPM | DIASTOLIC BLOOD PRESSURE: 66 MMHG

## 2021-05-20 DIAGNOSIS — R73.03 PRE-DIABETES: ICD-10-CM

## 2021-05-20 DIAGNOSIS — R73.9 DRUG-INDUCED HYPERGLYCEMIA: Primary | ICD-10-CM

## 2021-05-20 DIAGNOSIS — T50.905A DRUG-INDUCED HYPERGLYCEMIA: Primary | ICD-10-CM

## 2021-05-20 PROCEDURE — 99213 OFFICE O/P EST LOW 20 MIN: CPT | Performed by: FAMILY MEDICINE

## 2021-05-20 NOTE — PROGRESS NOTES
Assessment/Plan:    No problem-specific Assessment & Plan notes found for this encounter  Diagnoses and all orders for this visit:    Drug-induced hyperglycemia    Pre-diabetes  -     HEMOGLOBIN A1C W/ EAG ESTIMATION; Future          PHQ-9 Depression Screening    PHQ-9:   Frequency of the following problems over the past two weeks:      Little interest or pleasure in doing things: 0 - not at all  Feeling down, depressed, or hopeless: 0 - not at all  PHQ-2 Score: 0            Subjective:      Patient ID: Jethro Mckeon is a 60 y o  male  Patient presents for follow up of steroid induced hyperglycemia  He has been off the steroids and the glyburide was discontinued at the last visit  FBS yesterday was 96  No hypoglycemic symptoms  The following portions of the patient's history were reviewed and updated as appropriate: allergies, current medications, past family history, past medical history, past social history, past surgical history and problem list     Review of Systems   Eyes: Negative for visual disturbance  Cardiovascular: Negative for chest pain and palpitations  Gastrointestinal: Negative for abdominal pain, nausea and vomiting  Genitourinary: Negative for frequency  Skin: Negative for wound  Neurological: Negative for numbness  Objective:    /66   Pulse 74   Temp 98 °F (36 7 °C)   Ht 6' 3" (1 905 m)   Wt 111 kg (244 lb)   SpO2 96%   BMI 30 50 kg/m²      Physical Exam  Vitals signs and nursing note reviewed  Constitutional:       General: He is not in acute distress  Appearance: Normal appearance  He is not ill-appearing or diaphoretic  HENT:      Head: Normocephalic and atraumatic  Mouth/Throat:      Mouth: Mucous membranes are moist    Eyes:      Conjunctiva/sclera: Conjunctivae normal    Neck:      Musculoskeletal: Normal range of motion  No neck rigidity  Cardiovascular:      Rate and Rhythm: Normal rate and regular rhythm        Heart sounds: Normal heart sounds  No murmur  Pulmonary:      Effort: Pulmonary effort is normal  No respiratory distress  Breath sounds: Normal breath sounds  No wheezing, rhonchi or rales  Abdominal:      General: There is no distension  Palpations: Abdomen is soft  There is no mass  Tenderness: There is no abdominal tenderness  There is no guarding or rebound  Musculoskeletal:      Right lower leg: No edema  Left lower leg: No edema  Lymphadenopathy:      Cervical: No cervical adenopathy  Neurological:      Mental Status: He is alert and oriented to person, place, and time  Psychiatric:         Mood and Affect: Mood normal          Behavior: Behavior normal          Thought Content:  Thought content normal          Judgment: Judgment normal

## 2021-05-21 ENCOUNTER — TELEPHONE (OUTPATIENT)
Dept: HEMATOLOGY ONCOLOGY | Facility: CLINIC | Age: 66
End: 2021-05-21

## 2021-05-21 RX ORDER — SODIUM CHLORIDE 9 MG/ML
20 INJECTION, SOLUTION INTRAVENOUS ONCE
Status: CANCELLED | OUTPATIENT
Start: 2021-05-27

## 2021-05-21 NOTE — TELEPHONE ENCOUNTER
Call from patient  Patient will need lab work and urine testing prior to each avastin per [facility] orders      Patient expresses frustration that he was not aware that he needed lab work prior to each infusion  Patient verbalizes understanding      FYI to Dr Lucius Lovett

## 2021-05-24 ENCOUNTER — TELEPHONE (OUTPATIENT)
Dept: HEMATOLOGY ONCOLOGY | Facility: CLINIC | Age: 66
End: 2021-05-24

## 2021-05-24 NOTE — TELEPHONE ENCOUNTER
LM Letting pt know that per Dr Tavares Ang he prefers pt to delay colonoscopy if possible  If not Avastin will need to be delay for 2 weeks after colonoscopy  Pt has not started Avastin yet  I asked opt to return my call to discuss this

## 2021-05-24 NOTE — TELEPHONE ENCOUNTER
Call from patient  Patient is receiving avastin  Patient is wondering if he can have a colonoscopy  Will pass on to Dr Dennis Erickson MA for clarification      Patient aware of plan

## 2021-05-25 NOTE — TELEPHONE ENCOUNTER
Spoke with pt and he stated that colonoscopy is not urgent and he will be starting Avastin as scheduled  Pt stated that colonoscopy can wait several months

## 2021-05-26 ENCOUNTER — APPOINTMENT (OUTPATIENT)
Dept: LAB | Facility: CLINIC | Age: 66
End: 2021-05-26
Payer: COMMERCIAL

## 2021-05-26 DIAGNOSIS — C71.9 GLIOBLASTOMA (HCC): ICD-10-CM

## 2021-05-26 LAB
ALBUMIN SERPL BCP-MCNC: 3.6 G/DL (ref 3.5–5)
ALP SERPL-CCNC: 73 U/L (ref 46–116)
ALT SERPL W P-5'-P-CCNC: 24 U/L (ref 12–78)
ANION GAP SERPL CALCULATED.3IONS-SCNC: 4 MMOL/L (ref 4–13)
AST SERPL W P-5'-P-CCNC: 17 U/L (ref 5–45)
BASOPHILS # BLD AUTO: 0.04 THOUSANDS/ΜL (ref 0–0.1)
BASOPHILS NFR BLD AUTO: 1 % (ref 0–1)
BILIRUB SERPL-MCNC: 0.9 MG/DL (ref 0.2–1)
BUN SERPL-MCNC: 10 MG/DL (ref 5–25)
CALCIUM SERPL-MCNC: 9.6 MG/DL (ref 8.3–10.1)
CHLORIDE SERPL-SCNC: 111 MMOL/L (ref 100–108)
CO2 SERPL-SCNC: 28 MMOL/L (ref 21–32)
CREAT SERPL-MCNC: 0.75 MG/DL (ref 0.6–1.3)
EOSINOPHIL # BLD AUTO: 0 THOUSAND/ΜL (ref 0–0.61)
EOSINOPHIL NFR BLD AUTO: 0 % (ref 0–6)
ERYTHROCYTE [DISTWIDTH] IN BLOOD BY AUTOMATED COUNT: 14.9 % (ref 11.6–15.1)
GFR SERPL CREATININE-BSD FRML MDRD: 97 ML/MIN/1.73SQ M
GLUCOSE P FAST SERPL-MCNC: 92 MG/DL (ref 65–99)
HCT VFR BLD AUTO: 42.7 % (ref 36.5–49.3)
HGB BLD-MCNC: 14 G/DL (ref 12–17)
IMM GRANULOCYTES # BLD AUTO: 0.01 THOUSAND/UL (ref 0–0.2)
IMM GRANULOCYTES NFR BLD AUTO: 0 % (ref 0–2)
LYMPHOCYTES # BLD AUTO: 1.28 THOUSANDS/ΜL (ref 0.6–4.47)
LYMPHOCYTES NFR BLD AUTO: 22 % (ref 14–44)
MCH RBC QN AUTO: 29.7 PG (ref 26.8–34.3)
MCHC RBC AUTO-ENTMCNC: 32.8 G/DL (ref 31.4–37.4)
MCV RBC AUTO: 91 FL (ref 82–98)
MONOCYTES # BLD AUTO: 0.59 THOUSAND/ΜL (ref 0.17–1.22)
MONOCYTES NFR BLD AUTO: 10 % (ref 4–12)
NEUTROPHILS # BLD AUTO: 3.81 THOUSANDS/ΜL (ref 1.85–7.62)
NEUTS SEG NFR BLD AUTO: 67 % (ref 43–75)
NRBC BLD AUTO-RTO: 0 /100 WBCS
PLATELET # BLD AUTO: 223 THOUSANDS/UL (ref 149–390)
PMV BLD AUTO: 10.2 FL (ref 8.9–12.7)
POTASSIUM SERPL-SCNC: 4.2 MMOL/L (ref 3.5–5.3)
PROT SERPL-MCNC: 6.6 G/DL (ref 6.4–8.2)
RBC # BLD AUTO: 4.72 MILLION/UL (ref 3.88–5.62)
SODIUM SERPL-SCNC: 143 MMOL/L (ref 136–145)
WBC # BLD AUTO: 5.73 THOUSAND/UL (ref 4.31–10.16)

## 2021-05-26 PROCEDURE — 85025 COMPLETE CBC W/AUTO DIFF WBC: CPT

## 2021-05-26 PROCEDURE — 36415 COLL VENOUS BLD VENIPUNCTURE: CPT

## 2021-05-26 PROCEDURE — 80053 COMPREHEN METABOLIC PANEL: CPT

## 2021-05-27 ENCOUNTER — HOSPITAL ENCOUNTER (OUTPATIENT)
Dept: INFUSION CENTER | Facility: HOSPITAL | Age: 66
Discharge: HOME | End: 2021-05-27
Attending: INTERNAL MEDICINE
Payer: COMMERCIAL

## 2021-05-27 ENCOUNTER — APPOINTMENT (OUTPATIENT)
Dept: LAB | Facility: HOSPITAL | Age: 66
End: 2021-05-27
Attending: INTERNAL MEDICINE
Payer: COMMERCIAL

## 2021-05-27 VITALS
RESPIRATION RATE: 16 BRPM | HEART RATE: 71 BPM | OXYGEN SATURATION: 97 % | WEIGHT: 241.4 LBS | BODY MASS INDEX: 30.02 KG/M2 | SYSTOLIC BLOOD PRESSURE: 121 MMHG | HEIGHT: 75 IN | TEMPERATURE: 97.8 F | DIASTOLIC BLOOD PRESSURE: 76 MMHG

## 2021-05-27 DIAGNOSIS — C71.9 GLIOBLASTOMA (HCC): ICD-10-CM

## 2021-05-27 DIAGNOSIS — C71.9 GLIOBLASTOMA (HCC): Primary | ICD-10-CM

## 2021-05-27 LAB
BACTERIA UR QL AUTO: ABNORMAL /HPF
BILIRUB UR QL STRIP: NEGATIVE
CLARITY UR: CLEAR
COLOR UR: YELLOW
GLUCOSE UR STRIP-MCNC: NEGATIVE MG/DL
HGB UR QL STRIP.AUTO: NEGATIVE
KETONES UR STRIP-MCNC: ABNORMAL MG/DL
LEUKOCYTE ESTERASE UR QL STRIP: NEGATIVE
MUCOUS THREADS UR QL AUTO: ABNORMAL
NITRITE UR QL STRIP: NEGATIVE
NON-SQ EPI CELLS URNS QL MICRO: ABNORMAL /HPF
PH UR STRIP.AUTO: 6 [PH]
PROT UR STRIP-MCNC: ABNORMAL MG/DL
RBC #/AREA URNS AUTO: ABNORMAL /HPF
SP GR UR STRIP.AUTO: 1.02 (ref 1–1.03)
UROBILINOGEN UR QL STRIP.AUTO: 0.2 E.U./DL
WBC #/AREA URNS AUTO: ABNORMAL /HPF

## 2021-05-27 PROCEDURE — 81001 URINALYSIS AUTO W/SCOPE: CPT

## 2021-05-27 PROCEDURE — 96413 CHEMO IV INFUSION 1 HR: CPT

## 2021-05-27 PROCEDURE — 96415 CHEMO IV INFUSION ADDL HR: CPT

## 2021-05-27 RX ORDER — SODIUM CHLORIDE 9 MG/ML
20 INJECTION, SOLUTION INTRAVENOUS ONCE
Status: COMPLETED | OUTPATIENT
Start: 2021-05-27 | End: 2021-05-27

## 2021-05-27 RX ADMIN — BEVACIZUMAB 1100 MG: 400 INJECTION, SOLUTION INTRAVENOUS at 09:23

## 2021-05-27 RX ADMIN — SODIUM CHLORIDE 20 ML/HR: 0.9 INJECTION, SOLUTION INTRAVENOUS at 08:58

## 2021-05-27 NOTE — PROGRESS NOTES
Trace protein noted in U/A from this a m  Miky LEVI  w/ Dr Juany Laughlin made aware  OK to treat per Miky Nuñez

## 2021-05-27 NOTE — PROGRESS NOTES
Avastin given without incident  Pt offers no complaints on D/C  Reviewed upcoming appts,  AVS given

## 2021-06-07 RX ORDER — SODIUM CHLORIDE 9 MG/ML
20 INJECTION, SOLUTION INTRAVENOUS ONCE
Status: CANCELLED | OUTPATIENT
Start: 2021-06-10

## 2021-06-08 ENCOUNTER — TRANSCRIBE ORDERS (OUTPATIENT)
Dept: LAB | Facility: HOSPITAL | Age: 66
End: 2021-06-08

## 2021-06-08 ENCOUNTER — TELEPHONE (OUTPATIENT)
Dept: HEMATOLOGY ONCOLOGY | Facility: CLINIC | Age: 66
End: 2021-06-08

## 2021-06-08 ENCOUNTER — PATIENT OUTREACH (OUTPATIENT)
Dept: CASE MANAGEMENT | Facility: HOSPITAL | Age: 66
End: 2021-06-08

## 2021-06-08 DIAGNOSIS — C71.9 GLIOBLASTOMA (HCC): Primary | ICD-10-CM

## 2021-06-08 DIAGNOSIS — Z78.9 NEED FOR FOLLOW-UP BY SOCIAL WORKER: Primary | ICD-10-CM

## 2021-06-08 NOTE — TELEPHONE ENCOUNTER
Called ARTEM and discussed that he needs to have a UA monthly with Avastin treatments  (Per Dr Juany Laughlin)

## 2021-06-08 NOTE — TELEPHONE ENCOUNTER
Patient called back questioning if he needed to have a urinalysis prior to his Avastin? Will forward to clinical to review  Juve did not note labs  Will forward Dr Pj Ramirez clinical team to review what labs need to be done

## 2021-06-08 NOTE — TELEPHONE ENCOUNTER
Call from patient  Patient questioning whether he needs labs prior to each avastin  [place] orders do not have lab parameters for avastin as this drug does not affect CBC  Patient aware  Patient has f/u with Dr Priya Lin on 6/11/2021  Does patient need labs for the visit? Will send to Dr Zari Kelly team to confirm : no need for labs prior to avastin but does patient need labs prior to OV? Patient verbalizes understanding of plan

## 2021-06-08 NOTE — PROGRESS NOTES
Oncology LSW outreached PT to introduce herself, explain her role within the oncology department, provide support, and assess for areas of need  PT reported that he was doing "pretty well" at this time and did not have any areas of psychosocial concern including, but not limited to, transportation and financial  LSW explained how she would be able to assist in both of these areas in particular, to which PT was receptive and understanding  LSW provided PT with her contact information and encouraged him to reach out at any point with any questions or concerns  PT was agreeable with this plan and thanked LSW for her time  Emotional support provided

## 2021-06-10 ENCOUNTER — TELEPHONE (OUTPATIENT)
Dept: HEMATOLOGY ONCOLOGY | Facility: CLINIC | Age: 66
End: 2021-06-10

## 2021-06-10 ENCOUNTER — HOSPITAL ENCOUNTER (OUTPATIENT)
Dept: INFUSION CENTER | Facility: HOSPITAL | Age: 66
Discharge: HOME | End: 2021-06-10
Attending: INTERNAL MEDICINE
Payer: COMMERCIAL

## 2021-06-10 VITALS
TEMPERATURE: 97.9 F | HEIGHT: 75 IN | SYSTOLIC BLOOD PRESSURE: 115 MMHG | HEART RATE: 84 BPM | BODY MASS INDEX: 30.02 KG/M2 | RESPIRATION RATE: 16 BRPM | DIASTOLIC BLOOD PRESSURE: 75 MMHG | WEIGHT: 241.4 LBS

## 2021-06-10 DIAGNOSIS — C71.9 GLIOBLASTOMA (HCC): Primary | ICD-10-CM

## 2021-06-10 PROCEDURE — 96413 CHEMO IV INFUSION 1 HR: CPT

## 2021-06-10 RX ORDER — SODIUM CHLORIDE 9 MG/ML
20 INJECTION, SOLUTION INTRAVENOUS ONCE
Status: COMPLETED | OUTPATIENT
Start: 2021-06-10 | End: 2021-06-10

## 2021-06-10 RX ADMIN — BEVACIZUMAB 1100 MG: 400 INJECTION, SOLUTION INTRAVENOUS at 12:49

## 2021-06-10 RX ADMIN — SODIUM CHLORIDE 20 ML/HR: 0.9 INJECTION, SOLUTION INTRAVENOUS at 12:18

## 2021-06-10 NOTE — PLAN OF CARE
Problem: Potential for Falls  Goal: Patient will remain free of falls  Description: INTERVENTIONS:  - Assess patient frequently for physical needs  -  Identify cognitive and physical deficits and behaviors that affect risk of falls    -  Gary fall precautions as indicated by assessment   - Educate patient/family on patient safety including physical limitations  - Instruct patient to call for assistance with activity based on assessment  - Modify environment to reduce risk of injury  - Consider OT/PT consult to assist with strengthening/mobility  Outcome: Progressing

## 2021-06-10 NOTE — TELEPHONE ENCOUNTER
Called patient to make him aware we need back of his insurance card  He stated what we have is all he ever received  He will bring what he has to doctors visit

## 2021-06-11 ENCOUNTER — OFFICE VISIT (OUTPATIENT)
Dept: HEMATOLOGY ONCOLOGY | Facility: CLINIC | Age: 66
End: 2021-06-11
Payer: COMMERCIAL

## 2021-06-11 ENCOUNTER — DOCUMENTATION (OUTPATIENT)
Dept: HEMATOLOGY ONCOLOGY | Facility: CLINIC | Age: 66
End: 2021-06-11

## 2021-06-11 VITALS
HEART RATE: 67 BPM | OXYGEN SATURATION: 98 % | BODY MASS INDEX: 29.22 KG/M2 | HEIGHT: 75 IN | TEMPERATURE: 97 F | DIASTOLIC BLOOD PRESSURE: 74 MMHG | SYSTOLIC BLOOD PRESSURE: 112 MMHG | WEIGHT: 235 LBS | RESPIRATION RATE: 17 BRPM

## 2021-06-11 DIAGNOSIS — C71.9 GLIOBLASTOMA (HCC): Primary | ICD-10-CM

## 2021-06-11 PROCEDURE — 99214 OFFICE O/P EST MOD 30 MIN: CPT | Performed by: INTERNAL MEDICINE

## 2021-06-11 NOTE — PROGRESS NOTES
[facility] [place]  [address] [id number]  [phone]  [address] [place] [address] KRUNAL OVIEDO,[birth year], [id number]  06/11/21    Discussion:   In summary, this is a 50-year-old male history of GBM as outlined  He is currently on Avastin  2nd treatment was yesterday  He tolerates this relatively well  Blood pressure is normal   He has some diminished appetite  Weight is down about 10% from baseline  Urinalysis shows trace protein  We will continue treatment for now  Reimaging just prior to his next visit in 6 weeks  He asked about medical marijuana  We reviewed that this can be helpful for some symptoms such as anorexia, headache, anxiety  There are no data to support that it is effective as anticancer treatment, however  He will call if he wishes to engage  I discussed the above with the patient  The patient  voiced understanding and agreement   ______________________________________________________________________    Chief Complaint   Patient presents with    Follow-up       HPI:  Oncology History Overview Note   Shanthi Phillips is a 60year old male is status post biopsy of a left temporal mass with pathology consistent with GBM  He has a  past medical history of hypertension, obesity, hyperlipidemia, and sleep apnea  He presented to the ED on 1/1/21 with sudden onset of expressive aphasia  He completed a course of radiation therapy to the left temporal lobe of the brain concurrent with temodar on 4/6/21 5/7/21 MRI brain w wo contrast  IMPRESSION:  Left temporal lobe GBM continues to increase in size and is currently contiguous with the lateral margin of the compressed lateral ventricular atrium  Increasing subtle ependymal enhancement and vasogenic edema        5/13/21 Dr Prescilla Severe, neurology f/y  Continue current seizure medications unchanged  Return in 6 months    5/14/21 Dr Asenath Claude f/u  Relatively stable clinically, feels his gait and speech are somewhat improved compared to time of diagnosis  "Repeat MRI shows increase in size of left temporal lobe mass with increasing edema  We reviewed that it is possible that some of this represents post treatment change but certainly is concerning for progressive disease as well  Case to be reviewed at Neuro-Oncology Working group in a few days  We will call the patient with the results of that review and further recommendations "  Arrange for consolidative Temodar  Avastin could be added  6/11/21 Dr Jacob Andre  11/1/21 Neurology f/u         Brain tumor [facility]) (Resolved)   1/19/2021 Biopsy    BIOPSY BRAIN IMAGE-GUIDED NEEDLE - LEFT TEMPORAL  Surgeon: Dr Stephanie Dow    Temporal mass:  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)  Note    This case was seen in consultation with Dr Burgess Pardo, an expert in Neuropathology from [facility]  Glioblastoma ([facility] )   1/19/2021 Initial Diagnosis    Glioblastoma ([facility] )     1/19/2021 Biopsy    BIOPSY BRAIN IMAGE-GUIDED NEEDLE - LEFT TEMPORAL  Surgeon: Dr Stephanie Dow    Temporal mass:  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)  Note    This case was seen in consultation with Dr Burgess Pardo, an expert in Neuropathology from [facility]  2/24/2021 - 4/6/2021 Radiation    Plan ID Energy Fractions Dose per Fraction (cGy) Dose Correction (cGy) Total Dose Delivered (cGy) Elapsed Days   L Temp CD 6X 7 / 7 200 0 1,400 8   L Temp Lobe 6X 23 / 23 200 0 4,600 30   Treatment dates:  C1: 2/24/2021 - 4/6/2021 2/24/2021 - 4/6/2021 Chemotherapy    Temodar 185 mg daily w/radiation therapy     5/27/2021 -  Chemotherapy    bevacizumab (AVASTIN) 1,100 mg in sodium chloride 0 9 % 100 mL IVPB, 1,090 mg, Intravenous, Once, 2 of 6 cycles  Administration: 1,100 mg (5/27/2021), 1,100 mg (6/10/2021)         Interval History:  Clinically stable    ECOG-  1 - Symptomatic but completely ambulatory    Review of Systems   Constitutional: Positive for fatigue  Negative for chills and fever  HENT: Negative for nosebleeds  Eyes: Negative for discharge  Respiratory: Negative for cough and shortness of breath  Cardiovascular: Negative for chest pain  Gastrointestinal: Negative for abdominal pain, constipation and diarrhea  Endocrine: Negative for polydipsia  Genitourinary: Negative for hematuria  Musculoskeletal: Negative for arthralgias  Skin: Negative for color change  Allergic/Immunologic: Negative for immunocompromised state  Neurological: Negative for dizziness and headaches  Hematological: Negative for adenopathy  Psychiatric/Behavioral: Negative for agitation         Past Medical History:   Diagnosis Date    Brain tumor ([facility] ) 1/5/2021    Bruxism (teeth grinding)     GBM (glioblastoma multiforme) (HCC)     Hypercholesterolemia     Hypertension     Obesity     Sleep apnea     CPAP nightly     Patient Active Problem List   Diagnosis    Class 1 obesity due to excess calories with serious comorbidity and body mass index (BMI) of 33 0 to 33 9 in adult    Negative depression screening    Hypercholesterolemia    Essential hypertension    Obstructive sleep apnea syndrome    Sleep disturbance    Daytime sleepiness    Prediabetes    Glioblastoma (HCC)    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 ([facility] )    Annual physical exam    Muscle cramps       Current Outpatient Medications:     amLODIPine (NORVASC) 10 mg tablet, Take 1 tablet (10 mg total) by mouth daily, Disp: 90 tablet, Rfl: 3    atorvastatin (LIPITOR) 10 mg tablet, Take 0 5 tablets (5 mg total) by mouth daily, Disp: 30 tablet, Rfl: 2    hydrochlorothiazide (MICROZIDE) 12 5 mg capsule, Take 1 capsule (12 5 mg total) by mouth daily, Disp: 90 capsule, Rfl: 3    levETIRAcetam (KEPPRA) 1000 MG tablet, Take 1 tablet (1,000 mg total) by mouth every 12 (twelve) hours, Disp: 180 tablet, Rfl: 3    levETIRAcetam (KEPPRA) 250 mg tablet, Take 1 tablet (250 mg total) by mouth 2 (two) times a day, Disp: 180 tablet, Rfl: 3    minocycline (DYNACIN) 100 MG tablet, Take 100 mg by mouth daily, Disp: , Rfl:     minocycline (MINOCIN) 100 mg capsule, Take 100 mg by mouth daily, Disp: , Rfl:     ondansetron (ZOFRAN) 8 mg tablet, Take 1 tablet (8 mg total) by mouth every 8 (eight) hours as needed for nausea or vomiting, Disp: 30 tablet, Rfl: 3    temozolomide (TEMODAR) 100 mg capsule, Take 1 capsule (100 mg total) by mouth daily Take 355mg two times a day on days 1-5, repeat every 28 days  with 2 other temozolomide prescriptions for 355 mg (150 mg/m2/day x 2 37 m2) total, Disp: 10 capsule, Rfl: 5    temozolomide (TEMODAR) 250 MG capsule, Take 1 capsule (250 mg total) by mouth daily Take 355mg two times a day on days 1-5, repeat every 28 days  with 2 other temozolomide prescriptions for 355 mg (150 mg/m2/day x 2 37 m2) total, Disp: 10 capsule, Rfl: 5    temozolomide (TEMODAR) 5 mg capsule, Take 1 capsule (5 mg total) by mouth daily Take 355mg two times a day on days 1-5, repeat every 28 days  with 2 other temozolomide prescriptions for 355 mg (150 mg/m2/day x 2 37 m2) total, Disp: 10 capsule, Rfl: 5    triamcinolone (KENALOG) 0 1 % cream, APPLY CREAM EXTERNALLY TWICE DAILY TO BODY RASH AS PRESCRIBED, Disp: , Rfl:     valsartan (DIOVAN) 80 mg tablet, Take 1 tablet (80 mg total) by mouth daily, Disp: 30 tablet, Rfl: 6    temozolomide (TEMODAR) 180 mg capsule, TAKE 1 CAPSULE BY MOUTH DAILY FOR 42 DAYS (Patient not taking: Reported on 5/13/2021), Disp: 30 capsule, Rfl: 1    temozolomide (TEMODAR) 5 mg capsule, TAKE 1 CAPSULE BY MOUTH DAILY FOR 42 DAYS (Patient not taking: Reported on 5/13/2021), Disp: 30 capsule, Rfl: 1    zolpidem (AMBIEN) 5 mg tablet, Take 1 tablet (5 mg total) by mouth daily at bedtime as needed for sleep (Patient not taking: Reported on 5/14/2021), Disp: 30 tablet, Rfl: 0  No current facility-administered medications for this visit     No Known Allergies  Past Surgical History: Procedure Laterality Date    [place] LUMBAR PUNCTURE DIAGNOSTIC  1/4/2021    HAND FRACTURE REPAIR      left thumb    [place] STEREO BX/ASPIR/EXCIS,INTRACRANIAL LESN Left 1/19/2021    Procedure: BIOPSY BRAIN IMAGE-GUIDED NEEDLE - LEFT TEMPORAL;  Surgeon: Tawanna Harper MD;  [facility];  Service: Neurosurgery    TONSILLECTOMY       Social History     Objective:  Vitals:    06/11/21 1048   BP: 112/74   BP Location: Left arm   Patient Position: Sitting   Pulse: 67   Resp: 17   Temp: (!) 97 °F (36 1 °C)   TempSrc: Tympanic   SpO2: 98%   Weight: 107 kg (235 lb)   Height: 6' 3" (1 905 m)     Physical Exam  Constitutional:       Appearance: He is well-developed  HENT:      Head: Normocephalic and atraumatic  Eyes:      Pupils: Pupils are equal, round, and reactive to light  Neck:      Musculoskeletal: Neck supple  Cardiovascular:      Rate and Rhythm: Normal rate and regular rhythm  Heart sounds: No murmur  Pulmonary:      Breath sounds: Normal breath sounds  No wheezing or rales  Abdominal:      Palpations: Abdomen is soft  Tenderness: There is no abdominal tenderness  Musculoskeletal: Normal range of motion  General: No tenderness  Lymphadenopathy:      Cervical: No cervical adenopathy  Skin:     Findings: No erythema or rash  Neurological:      Mental Status: He is alert and oriented to person, place, and time  Cranial Nerves: No cranial nerve deficit  Deep Tendon Reflexes: Reflexes are normal and symmetric  Psychiatric:         Behavior: Behavior normal            Labs: I personally reviewed the labs and imaging pertinent to this patient care

## 2021-06-11 NOTE — PROGRESS NOTES
Called ins & spoke to mateusz betancourt sd that the acct# [de-identified] is being processed & there should be a payment in the next few fays he can't see the amt that will be pd sd to try back in about a week to 10 days

## 2021-06-14 ENCOUNTER — DOCUMENTATION (OUTPATIENT)
Dept: HEMATOLOGY ONCOLOGY | Facility: CLINIC | Age: 66
End: 2021-06-14

## 2021-06-14 NOTE — PROGRESS NOTES
Called ins & spoke to [facility] call ref# [id number]  Asked him about some claims & gave him the following info  Acct# [de-identified] [facility] 01/01/21-01/06/21 is still in process he sd that they are waiting for info from the pt but he won't tell me what info  Acct# [de-identified] [facility] 01/19/21-01/20/21 that has been finalized & they didn't pay anything so they have the pt being resp for [phone]  Called pt & gave him that info but he sd that he can't pay that  We talked about f/a w/the hosp & I gave him the name of shanae rodrigues & told him to let me know what happens  Pt called me back & he sd that jong answerd  Called ganesh & she sd that pt needs to talk to [facility]    called pt back & gave him the # for her & her #

## 2021-06-17 RX ORDER — SODIUM CHLORIDE 9 MG/ML
20 INJECTION, SOLUTION INTRAVENOUS ONCE
Status: CANCELLED | OUTPATIENT
Start: 2021-06-24

## 2021-06-24 ENCOUNTER — PATIENT OUTREACH (OUTPATIENT)
Dept: CASE MANAGEMENT | Facility: HOSPITAL | Age: 66
End: 2021-06-24

## 2021-06-24 ENCOUNTER — HOSPITAL ENCOUNTER (OUTPATIENT)
Dept: INFUSION CENTER | Facility: HOSPITAL | Age: 66
Discharge: HOME | End: 2021-06-24
Attending: INTERNAL MEDICINE
Payer: COMMERCIAL

## 2021-06-24 VITALS
TEMPERATURE: 97.6 F | HEIGHT: 75 IN | RESPIRATION RATE: 17 BRPM | SYSTOLIC BLOOD PRESSURE: 111 MMHG | OXYGEN SATURATION: 96 % | WEIGHT: 233.25 LBS | BODY MASS INDEX: 29 KG/M2 | DIASTOLIC BLOOD PRESSURE: 72 MMHG | HEART RATE: 82 BPM

## 2021-06-24 DIAGNOSIS — C71.9 GLIOBLASTOMA (HCC): Primary | ICD-10-CM

## 2021-06-24 LAB
ALBUMIN SERPL BCP-MCNC: 4.3 G/DL (ref 3.5–5.7)
ALP SERPL-CCNC: 57 U/L (ref 55–165)
ALT SERPL W P-5'-P-CCNC: 12 U/L (ref 7–52)
ANION GAP SERPL CALCULATED.3IONS-SCNC: 10 MMOL/L (ref 4–13)
AST SERPL W P-5'-P-CCNC: 13 U/L (ref 13–39)
BASOPHILS # BLD AUTO: 0 THOUSANDS/ΜL (ref 0–0.1)
BASOPHILS NFR BLD AUTO: 1 % (ref 0–2)
BILIRUB SERPL-MCNC: 0.6 MG/DL (ref 0.2–1)
BILIRUB UR QL STRIP: NEGATIVE
BUN SERPL-MCNC: 14 MG/DL (ref 7–25)
CALCIUM SERPL-MCNC: 9.7 MG/DL (ref 8.6–10.5)
CHLORIDE SERPL-SCNC: 105 MMOL/L (ref 98–107)
CLARITY UR: CLEAR
CO2 SERPL-SCNC: 26 MMOL/L (ref 21–31)
COLOR UR: YELLOW
CREAT SERPL-MCNC: 0.8 MG/DL (ref 0.7–1.3)
EOSINOPHIL # BLD AUTO: 0.1 THOUSAND/ΜL (ref 0–0.61)
EOSINOPHIL NFR BLD AUTO: 1 % (ref 0–5)
ERYTHROCYTE [DISTWIDTH] IN BLOOD BY AUTOMATED COUNT: 15.2 % (ref 11.5–14.5)
GFR SERPL CREATININE-BSD FRML MDRD: 94 ML/MIN/1.73SQ M
GLUCOSE SERPL-MCNC: 134 MG/DL (ref 65–99)
GLUCOSE UR STRIP-MCNC: NEGATIVE MG/DL
HCT VFR BLD AUTO: 43.3 % (ref 42–47)
HGB BLD-MCNC: 15.1 G/DL (ref 14–18)
HGB UR QL STRIP.AUTO: NEGATIVE
KETONES UR STRIP-MCNC: ABNORMAL MG/DL
LEUKOCYTE ESTERASE UR QL STRIP: NEGATIVE
LYMPHOCYTES # BLD AUTO: 1.5 THOUSANDS/ΜL (ref 0.6–4.47)
LYMPHOCYTES NFR BLD AUTO: 21 % (ref 21–51)
MCH RBC QN AUTO: 30.6 PG (ref 26–34)
MCHC RBC AUTO-ENTMCNC: 34.8 G/DL (ref 31–37)
MCV RBC AUTO: 88 FL (ref 81–99)
MONOCYTES # BLD AUTO: 0.7 THOUSAND/ΜL (ref 0.17–1.22)
MONOCYTES NFR BLD AUTO: 9 % (ref 2–12)
NEUTROPHILS # BLD AUTO: 5 THOUSANDS/ΜL (ref 1.4–6.5)
NEUTS SEG NFR BLD AUTO: 69 % (ref 42–75)
NITRITE UR QL STRIP: NEGATIVE
PH UR STRIP.AUTO: 5.5 [PH]
PLATELET # BLD AUTO: 200 THOUSANDS/UL (ref 149–390)
PMV BLD AUTO: 7.8 FL (ref 8.6–11.7)
POTASSIUM SERPL-SCNC: 4 MMOL/L (ref 3.5–5.5)
PROT SERPL-MCNC: 7 G/DL (ref 6.4–8.9)
PROT UR STRIP-MCNC: NEGATIVE MG/DL
RBC # BLD AUTO: 4.93 MILLION/UL (ref 4.3–5.9)
SODIUM SERPL-SCNC: 141 MMOL/L (ref 134–143)
SP GR UR STRIP.AUTO: 1.02 (ref 1–1.03)
UROBILINOGEN UR QL STRIP.AUTO: 0.2 E.U./DL
WBC # BLD AUTO: 7.3 THOUSAND/UL (ref 4.8–10.8)

## 2021-06-24 PROCEDURE — 80053 COMPREHEN METABOLIC PANEL: CPT | Performed by: INTERNAL MEDICINE

## 2021-06-24 PROCEDURE — 81003 URINALYSIS AUTO W/O SCOPE: CPT | Performed by: INTERNAL MEDICINE

## 2021-06-24 PROCEDURE — 96413 CHEMO IV INFUSION 1 HR: CPT

## 2021-06-24 PROCEDURE — 85025 COMPLETE CBC W/AUTO DIFF WBC: CPT | Performed by: INTERNAL MEDICINE

## 2021-06-24 RX ORDER — SODIUM CHLORIDE 9 MG/ML
20 INJECTION, SOLUTION INTRAVENOUS ONCE
Status: COMPLETED | OUTPATIENT
Start: 2021-06-24 | End: 2021-06-24

## 2021-06-24 RX ADMIN — SODIUM CHLORIDE 20 ML/HR: 0.9 INJECTION, SOLUTION INTRAVENOUS at 09:56

## 2021-06-24 RX ADMIN — BEVACIZUMAB 1100 MG: 400 INJECTION, SOLUTION INTRAVENOUS at 10:07

## 2021-06-24 NOTE — PROGRESS NOTES
Oncology LSW met with PT chairside in the infusion center to formally introduce herself, provide support, and assess for areas of need  PT reported that he he was "frustrated" with his doctor, as he was not told that he needed further tests done before treatment today  PT reported that he had specifically asked his provider if anything needed to be done prior to treatment today, to which he was told nothing needed to be completed  However, when he arrived at infusion he was told otherwise  When asked if the situation was retified now, PT reported that it had been taken care of and the clarity provided him specific direction for his next infusion treatment  Throughout the remainder of their conversation, PT did not report any psychosocial concerns  LSW provided PT with her business card and encouraged him to reach out to her in the future should any questions or areas of concern arise  PT was agreeable with this plan and thanked LSW for her time  Emotional support provided

## 2021-06-24 NOTE — PROGRESS NOTES
Patient here for Avastin but did not have any labs done since 5-27-21  Eulalio Eisenberg RN made aware, clarified that labs are to be done monthly  Patient is aware

## 2021-06-24 NOTE — PLAN OF CARE
Problem: Potential for Falls  Goal: Patient will remain free of falls  Description: INTERVENTIONS:  - Educate patient/family on patient safety including physical limitations  - Instruct patient to call for assistance with activity   - Consult OT/PT to assist with strengthening/mobility   - Keep Call bell within reach  - Keep bed low and locked with side rails adjusted as appropriate  - Keep care items and personal belongings within reach  - Initiate and maintain comfort rounds      - Obtain necessary fall risk management equipment:  - Apply yellow socks and bracelet for high fall risk patients  - Consider moving patient to room near nurses station  Outcome: Progressing

## 2021-06-24 NOTE — PROGRESS NOTES
Avastin given without incident  AVS given  Also provided patient with a lab requisition slip to reinforce that monthly labs are required  AVS given

## 2021-06-30 ENCOUNTER — DOCUMENTATION (OUTPATIENT)
Dept: HEMATOLOGY ONCOLOGY | Facility: CLINIC | Age: 66
End: 2021-06-30

## 2021-06-30 NOTE — PROGRESS NOTES
Per notes on the acct pt is to fill out a hardship application that was mailed out to the pt   Also pts medicare is to be active 07/01/21 will call to verify that its active

## 2021-07-01 RX ORDER — SODIUM CHLORIDE 9 MG/ML
20 INJECTION, SOLUTION INTRAVENOUS ONCE
Status: CANCELLED | OUTPATIENT
Start: 2021-07-08

## 2021-07-02 ENCOUNTER — DOCUMENTATION (OUTPATIENT)
Dept: HEMATOLOGY ONCOLOGY | Facility: CLINIC | Age: 66
End: 2021-07-02

## 2021-07-02 NOTE — PROGRESS NOTES
Pt called me & gave me his medicare inf & the supplemental info thru [facility]  He also sd that he's going to be getting a check from [facility] to pay some of his medical bills  When he gets that he will maria de jesus me & let me know  how much he has & get all the mailing add info  Aletha Johansen

## 2021-07-08 ENCOUNTER — HOSPITAL ENCOUNTER (OUTPATIENT)
Dept: INFUSION CENTER | Facility: HOSPITAL | Age: 66
Discharge: HOME | End: 2021-07-08
Attending: INTERNAL MEDICINE
Payer: MEDICARE

## 2021-07-08 VITALS
SYSTOLIC BLOOD PRESSURE: 123 MMHG | HEIGHT: 75 IN | DIASTOLIC BLOOD PRESSURE: 75 MMHG | TEMPERATURE: 96.6 F | WEIGHT: 231 LBS | HEART RATE: 80 BPM | BODY MASS INDEX: 28.72 KG/M2 | RESPIRATION RATE: 18 BRPM

## 2021-07-08 DIAGNOSIS — C71.9 GLIOBLASTOMA (HCC): Primary | ICD-10-CM

## 2021-07-08 PROCEDURE — 96413 CHEMO IV INFUSION 1 HR: CPT

## 2021-07-08 RX ORDER — SODIUM CHLORIDE 9 MG/ML
20 INJECTION, SOLUTION INTRAVENOUS ONCE
Status: COMPLETED | OUTPATIENT
Start: 2021-07-08 | End: 2021-07-08

## 2021-07-08 RX ADMIN — SODIUM CHLORIDE 20 ML/HR: 0.9 INJECTION, SOLUTION INTRAVENOUS at 08:37

## 2021-07-08 RX ADMIN — BEVACIZUMAB 1100 MG: 400 INJECTION, SOLUTION INTRAVENOUS at 09:18

## 2021-07-08 NOTE — PLAN OF CARE
Problem: Potential for Falls  Goal: Patient will remain free of falls  Description: INTERVENTIONS:  - Educate patient/family on patient safety including physical limitations  - Instruct patient to call for assistance with activity   - Consult OT/PT to assist with strengthening/mobility   - Keep Call bell within reach  - Keep bed low and locked with side rails adjusted as appropriate  - Keep care items and personal belongings within reach  - Initiate and maintain comfort rounds  - Make Fall Risk Sign visible to staff  - Offer Toileting every 1 Hours, in advance of need  - Initiate/Maintain- Obtain necessary fall risk management equipment:   - Apply yellow socks and bracelet for high fall risk patients  - Consider moving patient to room near nurses station  Outcome: Progressing

## 2021-07-15 RX ORDER — SODIUM CHLORIDE 9 MG/ML
20 INJECTION, SOLUTION INTRAVENOUS ONCE
Status: CANCELLED | OUTPATIENT
Start: 2021-07-22

## 2021-07-16 ENCOUNTER — HOSPITAL ENCOUNTER (OUTPATIENT)
Dept: MRI IMAGING | Facility: HOSPITAL | Age: 66
Discharge: HOME | End: 2021-07-16
Attending: INTERNAL MEDICINE
Payer: MEDICARE

## 2021-07-16 DIAGNOSIS — C71.9 GLIOBLASTOMA (HCC): ICD-10-CM

## 2021-07-16 PROCEDURE — A9585 GADOBUTROL INJECTION: HCPCS | Performed by: INTERNAL MEDICINE

## 2021-07-16 PROCEDURE — 70553 MRI BRAIN STEM W/O & W/DYE: CPT

## 2021-07-16 PROCEDURE — G1004 CDSM NDSC: HCPCS

## 2021-07-16 RX ADMIN — GADOBUTROL 10 ML: 604.72 INJECTION INTRAVENOUS at 06:34

## 2021-07-22 ENCOUNTER — HOSPITAL ENCOUNTER (OUTPATIENT)
Dept: INFUSION CENTER | Facility: HOSPITAL | Age: 66
Discharge: HOME | End: 2021-07-22
Attending: INTERNAL MEDICINE
Payer: MEDICARE

## 2021-07-22 ENCOUNTER — APPOINTMENT (OUTPATIENT)
Dept: LAB | Facility: HOSPITAL | Age: 66
End: 2021-07-22
Attending: INTERNAL MEDICINE
Payer: MEDICARE

## 2021-07-22 VITALS
TEMPERATURE: 97.6 F | HEART RATE: 60 BPM | DIASTOLIC BLOOD PRESSURE: 78 MMHG | WEIGHT: 231.48 LBS | SYSTOLIC BLOOD PRESSURE: 131 MMHG | BODY MASS INDEX: 28.78 KG/M2 | RESPIRATION RATE: 18 BRPM | HEIGHT: 75 IN

## 2021-07-22 DIAGNOSIS — C71.9 GLIOBLASTOMA (HCC): Primary | ICD-10-CM

## 2021-07-22 DIAGNOSIS — C71.9 GLIOBLASTOMA (HCC): ICD-10-CM

## 2021-07-22 LAB
ALBUMIN SERPL BCP-MCNC: 4.1 G/DL (ref 3.5–5.7)
ALP SERPL-CCNC: 65 U/L (ref 55–165)
ALT SERPL W P-5'-P-CCNC: 15 U/L (ref 7–52)
ANION GAP SERPL CALCULATED.3IONS-SCNC: 7 MMOL/L (ref 4–13)
AST SERPL W P-5'-P-CCNC: 14 U/L (ref 13–39)
BASOPHILS # BLD AUTO: 0 THOUSANDS/ΜL (ref 0–0.1)
BASOPHILS NFR BLD AUTO: 1 % (ref 0–2)
BILIRUB SERPL-MCNC: 0.6 MG/DL (ref 0.2–1)
BILIRUB UR QL STRIP: NEGATIVE
BUN SERPL-MCNC: 13 MG/DL (ref 7–25)
CALCIUM SERPL-MCNC: 9.6 MG/DL (ref 8.6–10.5)
CHLORIDE SERPL-SCNC: 103 MMOL/L (ref 98–107)
CLARITY UR: CLEAR
CO2 SERPL-SCNC: 30 MMOL/L (ref 21–31)
COLOR UR: YELLOW
CREAT SERPL-MCNC: 0.86 MG/DL (ref 0.7–1.3)
EOSINOPHIL # BLD AUTO: 0.1 THOUSAND/ΜL (ref 0–0.61)
EOSINOPHIL NFR BLD AUTO: 2 % (ref 0–5)
ERYTHROCYTE [DISTWIDTH] IN BLOOD BY AUTOMATED COUNT: 14.2 % (ref 11.5–14.5)
GFR SERPL CREATININE-BSD FRML MDRD: 91 ML/MIN/1.73SQ M
GLUCOSE P FAST SERPL-MCNC: 103 MG/DL (ref 65–99)
GLUCOSE UR STRIP-MCNC: NEGATIVE MG/DL
HCT VFR BLD AUTO: 44.2 % (ref 42–47)
HGB BLD-MCNC: 15.3 G/DL (ref 14–18)
HGB UR QL STRIP.AUTO: NEGATIVE
KETONES UR STRIP-MCNC: NEGATIVE MG/DL
LEUKOCYTE ESTERASE UR QL STRIP: NEGATIVE
LYMPHOCYTES # BLD AUTO: 1.6 THOUSANDS/ΜL (ref 0.6–4.47)
LYMPHOCYTES NFR BLD AUTO: 24 % (ref 21–51)
MCH RBC QN AUTO: 30.6 PG (ref 26–34)
MCHC RBC AUTO-ENTMCNC: 34.6 G/DL (ref 31–37)
MCV RBC AUTO: 88 FL (ref 81–99)
MONOCYTES # BLD AUTO: 0.7 THOUSAND/ΜL (ref 0.17–1.22)
MONOCYTES NFR BLD AUTO: 10 % (ref 2–12)
NEUTROPHILS # BLD AUTO: 4.4 THOUSANDS/ΜL (ref 1.4–6.5)
NEUTS SEG NFR BLD AUTO: 64 % (ref 42–75)
NITRITE UR QL STRIP: NEGATIVE
PH UR STRIP.AUTO: 6 [PH]
PLATELET # BLD AUTO: 191 THOUSANDS/UL (ref 149–390)
PMV BLD AUTO: 8.2 FL (ref 8.6–11.7)
POTASSIUM SERPL-SCNC: 4.3 MMOL/L (ref 3.5–5.5)
PROT SERPL-MCNC: 6.8 G/DL (ref 6.4–8.9)
PROT UR STRIP-MCNC: NEGATIVE MG/DL
RBC # BLD AUTO: 5.01 MILLION/UL (ref 4.3–5.9)
SODIUM SERPL-SCNC: 140 MMOL/L (ref 134–143)
SP GR UR STRIP.AUTO: 1.02 (ref 1–1.03)
UROBILINOGEN UR QL STRIP.AUTO: 0.2 E.U./DL
WBC # BLD AUTO: 6.8 THOUSAND/UL (ref 4.8–10.8)

## 2021-07-22 PROCEDURE — 81003 URINALYSIS AUTO W/O SCOPE: CPT | Performed by: INTERNAL MEDICINE

## 2021-07-22 PROCEDURE — 80053 COMPREHEN METABOLIC PANEL: CPT

## 2021-07-22 PROCEDURE — 96413 CHEMO IV INFUSION 1 HR: CPT

## 2021-07-22 PROCEDURE — 85025 COMPLETE CBC W/AUTO DIFF WBC: CPT | Performed by: INTERNAL MEDICINE

## 2021-07-22 PROCEDURE — 36415 COLL VENOUS BLD VENIPUNCTURE: CPT

## 2021-07-22 RX ORDER — SODIUM CHLORIDE 9 MG/ML
20 INJECTION, SOLUTION INTRAVENOUS ONCE
Status: COMPLETED | OUTPATIENT
Start: 2021-07-22 | End: 2021-07-22

## 2021-07-22 RX ADMIN — BEVACIZUMAB 1100 MG: 400 INJECTION, SOLUTION INTRAVENOUS at 09:10

## 2021-07-22 RX ADMIN — SODIUM CHLORIDE 20 ML/HR: 0.9 INJECTION, SOLUTION INTRAVENOUS at 09:10

## 2021-07-22 NOTE — PROGRESS NOTES
Avastin given without incident  Pt offers no complaints on D/C  AVS declined  He is aware of his appt tomorrow with Dr Patrica Zimmerman and his next infusion appt  Pt arrives ambulatory c/o vaginal bleeding with large sized clots (bigger than a quarter.) since last Tuesday. Per pt Monday the 8th she got her menstrual cycle and Tuesday is when the clots began. Pt is A&Ox4. Denies dizziness, lightheadedness, and chest pain.

## 2021-07-22 NOTE — PLAN OF CARE
Problem: Potential for Falls  Goal: Patient will remain free of falls  Description: INTERVENTIONS:  - Educate patient/family on patient safety including physical limitations  - Instruct patient to call for assistance with activity   - Consult OT/PT to assist with strengthening/mobility   - Keep Call bell within reach  - Keep bed low and locked with side rails adjusted as appropriate  - Keep care items and personal belongings within reach  - Initiate and maintain comfort rounds  - Make Fall Risk Sign visible to staff  - Offer Toileting every Hours, in advance of need  - Initiate/Maintain larm  - Obtain necessary fall risk management equipment: - Apply yellow socks and bracelet for high fall risk patients  - Consider moving patient to room near nurses station  Outcome: Progressing

## 2021-07-23 ENCOUNTER — OFFICE VISIT (OUTPATIENT)
Dept: HEMATOLOGY ONCOLOGY | Facility: CLINIC | Age: 66
End: 2021-07-23
Payer: MEDICARE

## 2021-07-23 VITALS
DIASTOLIC BLOOD PRESSURE: 70 MMHG | WEIGHT: 232 LBS | TEMPERATURE: 98.7 F | BODY MASS INDEX: 28.85 KG/M2 | SYSTOLIC BLOOD PRESSURE: 113 MMHG | HEART RATE: 65 BPM | HEIGHT: 75 IN

## 2021-07-23 DIAGNOSIS — C71.9 GLIOBLASTOMA (HCC): Primary | ICD-10-CM

## 2021-07-23 PROCEDURE — 99215 OFFICE O/P EST HI 40 MIN: CPT | Performed by: INTERNAL MEDICINE

## 2021-07-23 NOTE — PROGRESS NOTES
[facility] [place]  [address] [place] [id number]  [phone]  [address] [facility] KRUNAL UYWLJ,[birth year], [id number]  07/23/21    Discussion:   In summary, this is a 80-year-old male history of GBM as outlined  He has been on Avastin monotherapy for the past 2 months  Clinically he is doing fairly well  He has occasional pharyngeal mucous  He has no dysphagia  He denies aspiration type symptoms  He believes that his speech fluency may be somewhat better than it had been  Recent MRI shows decrease in enhancement as well as edema  We reviewed that he is responding well to treatment  Blood pressure is normal   No proteinuria  He does not seem to be experiencing any significant toxicities  He is agreeable to continue treatment  Reimaging in 2 months  He had some questions about whether Radon is a contributor to GBM  I do not believe this is the case  I discussed the above with the patient  The patient  voiced understanding and agreement   ______________________________________________________________________    No chief complaint on file  HPI:  Oncology History Overview Note   Parul Lazcano is a 60year old male is status post biopsy of a left temporal mass with pathology consistent with GBM  He has a  past medical history of hypertension, obesity, hyperlipidemia, and sleep apnea  He presented to the ED on 1/1/21 with sudden onset of expressive aphasia  He completed a course of radiation therapy to the left temporal lobe of the brain concurrent with temodar on 4/6/21 5/7/21 MRI brain w wo contrast  IMPRESSION:  Left temporal lobe GBM continues to increase in size and is currently contiguous with the lateral margin of the compressed lateral ventricular atrium  Increasing subtle ependymal enhancement and vasogenic edema        5/13/21 Dr Pravin Petersen, neurology f/y  Continue current seizure medications unchanged  Return in 6 months    5/14/21 Dr Regan Diss f/u  Relatively stable clinically, feels his gait and speech are somewhat improved compared to time of diagnosis  "Repeat MRI shows increase in size of left temporal lobe mass with increasing edema  We reviewed that it is possible that some of this represents post treatment change but certainly is concerning for progressive disease as well  Case to be reviewed at [facility] in a few days  We will call the patient with the results of that review and further recommendations "  Arrange for consolidative Temodar  Avastin could be added  6/11/21 Dr Marii Farmer  11/1/21 Neurology f/u         Brain tumor [facility]) (Resolved)   1/19/2021 Biopsy    BIOPSY BRAIN IMAGE-GUIDED NEEDLE - LEFT TEMPORAL  Surgeon: Dr Sally George    Temporal mass:  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)  Note    This case was seen in consultation with Dr Afshin Villarreal, an expert in Neuropathology from [facility]  Glioblastoma ([facility] [address] )   1/19/2021 Initial Diagnosis    Glioblastoma ([facility] [address] )     1/19/2021 Biopsy    BIOPSY BRAIN IMAGE-GUIDED NEEDLE - LEFT TEMPORAL  Surgeon: Dr Sally George    Temporal mass:  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)  Note    This case was seen in consultation with Dr Afshin Villarreal, an expert in Neuropathology from [facility]  2/24/2021 - 4/6/2021 Radiation    Plan ID Energy Fractions Dose per Fraction (cGy) Dose Correction (cGy) Total Dose Delivered (cGy) Elapsed Days   L Temp CD 6X 7 / 7 200 0 1,400 8   L Temp Lobe 6X 23 / 23 200 0 4,600 30   Treatment dates:  C1: 2/24/2021 - 4/6/2021 2/24/2021 - 4/6/2021 Chemotherapy    Temodar 185 mg daily w/radiation therapy     5/27/2021 -  Chemotherapy    bevacizumab (AVASTIN) IVPB, 1,090 mg, Intravenous, Once, 5 of 10 cycles  Administration: 1,100 mg (5/27/2021), 1,100 mg (6/10/2021), 1,100 mg (6/24/2021), 1,100 mg (7/8/2021), 1,100 mg (7/22/2021)         Interval History:  Clinically stable    ECOG-  1 - Symptomatic but completely ambulatory    Review of Systems   Constitutional: Negative for chills and fever  HENT: Negative for nosebleeds  Eyes: Negative for discharge  Respiratory: Negative for cough and shortness of breath  Cardiovascular: Negative for chest pain  Gastrointestinal: Negative for abdominal pain, constipation and diarrhea  Endocrine: Negative for polydipsia  Genitourinary: Negative for hematuria  Musculoskeletal: Negative for arthralgias  Skin: Negative for color change  Allergic/Immunologic: Negative for immunocompromised state  Neurological: Negative for dizziness and headaches  Hematological: Negative for adenopathy  Psychiatric/Behavioral: Negative for agitation         Past Medical History:   Diagnosis Date    Brain tumor ([facility] ) 1/5/2021    Bruxism (teeth grinding)     GBM (glioblastoma multiforme) (HCC)     Hypercholesterolemia     Hypertension     Obesity     Sleep apnea     CPAP nightly     Patient Active Problem List   Diagnosis    Class 1 obesity due to excess calories with serious comorbidity and body mass index (BMI) of 33 0 to 33 9 in adult    Negative depression screening    Hypercholesterolemia    Essential hypertension    Obstructive sleep apnea syndrome    Sleep disturbance    Daytime sleepiness    Prediabetes    Glioblastoma (HCC)    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 ([place] [id number] )    Annual physical exam    Muscle cramps       Current Outpatient Medications:     amLODIPine (NORVASC) 10 mg tablet, Take 1 tablet (10 mg total) by mouth daily, Disp: 90 tablet, Rfl: 3    atorvastatin (LIPITOR) 10 mg tablet, Take 0 5 tablets (5 mg total) by mouth daily, Disp: 30 tablet, Rfl: 2    hydrochlorothiazide (MICROZIDE) 12 5 mg capsule, Take 1 capsule (12 5 mg total) by mouth daily, Disp: 90 capsule, Rfl: 3    levETIRAcetam (KEPPRA) 1000 MG tablet, Take 1 tablet (1,000 mg total) by mouth every 12 (twelve) hours, Disp: 180 tablet, Rfl: 3    levETIRAcetam (KEPPRA) 250 mg tablet, Take 1 tablet (250 mg total) by mouth 2 (two) times a day, Disp: 180 tablet, Rfl: 3    minocycline (DYNACIN) 100 MG tablet, Take 100 mg by mouth daily, Disp: , Rfl:     minocycline (MINOCIN) 100 mg capsule, Take 100 mg by mouth daily, Disp: , Rfl:     ondansetron (ZOFRAN) 8 mg tablet, Take 1 tablet (8 mg total) by mouth every 8 (eight) hours as needed for nausea or vomiting, Disp: 30 tablet, Rfl: 3    triamcinolone (KENALOG) 0 1 % cream, APPLY CREAM EXTERNALLY TWICE DAILY TO BODY RASH AS PRESCRIBED, Disp: , Rfl:     valsartan (DIOVAN) 80 mg tablet, Take 1 tablet (80 mg total) by mouth daily, Disp: 30 tablet, Rfl: 6    zolpidem (AMBIEN) 5 mg tablet, Take 1 tablet (5 mg total) by mouth daily at bedtime as needed for sleep, Disp: 30 tablet, Rfl: 0  No Known Allergies  Past Surgical History:   Procedure Laterality Date    FL LUMBAR PUNCTURE DIAGNOSTIC  1/4/2021    HAND FRACTURE REPAIR      left thumb    AK STEREO BX/ASPIR/EXCIS,INTRACRANIAL LESN Left 1/19/2021    Procedure: BIOPSY BRAIN IMAGE-GUIDED NEEDLE - LEFT TEMPORAL;  Surgeon: Chad Diggs MD;  Location: [facility];  Service: Neurosurgery    TONSILLECTOMY       Social History     Objective:  Vitals:    07/23/21 1021   BP: 113/70   Pulse: 65   Temp: 98 7 °F (37 1 °C)   Weight: 105 kg (232 lb)   Height: 6' 3" (1 905 m)     Physical Exam  Constitutional:       Appearance: He is well-developed  HENT:      Head: Normocephalic and atraumatic  Eyes:      Pupils: Pupils are equal, round, and reactive to light  Cardiovascular:      Rate and Rhythm: Normal rate and regular rhythm  Heart sounds: No murmur heard  Pulmonary:      Breath sounds: Normal breath sounds  No wheezing or rales  Abdominal:      Palpations: Abdomen is soft  Tenderness: There is no abdominal tenderness  Musculoskeletal:         General: No tenderness  Normal range of motion  Cervical back: Neck supple     Lymphadenopathy:      Cervical: No cervical adenopathy  Skin:     Findings: No erythema or rash  Neurological:      Mental Status: He is alert and oriented to person, place, and time  Cranial Nerves: No cranial nerve deficit  Deep Tendon Reflexes: Reflexes are normal and symmetric  Psychiatric:         Behavior: Behavior normal            Labs: I personally reviewed the labs and imaging pertinent to this patient care

## 2021-07-28 ENCOUNTER — TELEPHONE (OUTPATIENT)
Dept: NEUROLOGY | Facility: CLINIC | Age: 66
End: 2021-07-28

## 2021-07-29 RX ORDER — SODIUM CHLORIDE 9 MG/ML
20 INJECTION, SOLUTION INTRAVENOUS ONCE
Status: CANCELLED | OUTPATIENT
Start: 2021-08-05

## 2021-08-05 ENCOUNTER — HOSPITAL ENCOUNTER (OUTPATIENT)
Dept: INFUSION CENTER | Facility: HOSPITAL | Age: 66
Discharge: HOME | End: 2021-08-05
Attending: INTERNAL MEDICINE
Payer: MEDICARE

## 2021-08-05 VITALS
SYSTOLIC BLOOD PRESSURE: 138 MMHG | RESPIRATION RATE: 18 BRPM | BODY MASS INDEX: 28.65 KG/M2 | HEIGHT: 75 IN | DIASTOLIC BLOOD PRESSURE: 69 MMHG | WEIGHT: 230.38 LBS | HEART RATE: 70 BPM | TEMPERATURE: 97.7 F

## 2021-08-05 DIAGNOSIS — C71.9 GLIOBLASTOMA (HCC): Primary | ICD-10-CM

## 2021-08-05 PROCEDURE — 96413 CHEMO IV INFUSION 1 HR: CPT

## 2021-08-05 RX ORDER — SODIUM CHLORIDE 9 MG/ML
20 INJECTION, SOLUTION INTRAVENOUS ONCE
Status: COMPLETED | OUTPATIENT
Start: 2021-08-05 | End: 2021-08-05

## 2021-08-05 RX ADMIN — BEVACIZUMAB 1100 MG: 400 INJECTION, SOLUTION INTRAVENOUS at 09:08

## 2021-08-05 RX ADMIN — SODIUM CHLORIDE 20 ML/HR: 0.9 INJECTION, SOLUTION INTRAVENOUS at 09:00

## 2021-08-06 ENCOUNTER — APPOINTMENT (EMERGENCY)
Dept: MRI IMAGING | Facility: HOSPITAL | Age: 66
End: 2021-08-06
Payer: MEDICARE

## 2021-08-06 ENCOUNTER — TELEPHONE (OUTPATIENT)
Dept: HEMATOLOGY ONCOLOGY | Facility: CLINIC | Age: 66
End: 2021-08-06

## 2021-08-06 ENCOUNTER — APPOINTMENT (EMERGENCY)
Dept: CT IMAGING | Facility: HOSPITAL | Age: 66
End: 2021-08-06
Payer: MEDICARE

## 2021-08-06 ENCOUNTER — HOSPITAL ENCOUNTER (EMERGENCY)
Facility: HOSPITAL | Age: 66
Discharge: HOME | End: 2021-08-06
Attending: EMERGENCY MEDICINE | Admitting: EMERGENCY MEDICINE
Payer: MEDICARE

## 2021-08-06 ENCOUNTER — APPOINTMENT (EMERGENCY)
Dept: RADIOLOGY | Facility: HOSPITAL | Age: 66
End: 2021-08-06
Payer: MEDICARE

## 2021-08-06 VITALS
TEMPERATURE: 98.4 F | WEIGHT: 230 LBS | HEIGHT: 75 IN | HEART RATE: 69 BPM | SYSTOLIC BLOOD PRESSURE: 137 MMHG | OXYGEN SATURATION: 97 % | BODY MASS INDEX: 28.6 KG/M2 | RESPIRATION RATE: 18 BRPM | DIASTOLIC BLOOD PRESSURE: 84 MMHG

## 2021-08-06 DIAGNOSIS — R42 DIZZINESS: Primary | ICD-10-CM

## 2021-08-06 LAB
ANION GAP SERPL CALCULATED.3IONS-SCNC: 4 MMOL/L (ref 4–13)
APTT PPP: 29 SECONDS (ref 23–37)
ATRIAL RATE: 68 BPM
BACTERIA UR QL AUTO: ABNORMAL /HPF
BASOPHILS # BLD AUTO: 0 THOUSANDS/ΜL (ref 0–0.1)
BASOPHILS NFR BLD AUTO: 1 % (ref 0–2)
BILIRUB UR QL STRIP: NEGATIVE
BUN SERPL-MCNC: 13 MG/DL (ref 7–25)
CALCIUM SERPL-MCNC: 9.6 MG/DL (ref 8.6–10.5)
CHLORIDE SERPL-SCNC: 106 MMOL/L (ref 98–107)
CLARITY UR: CLEAR
CO2 SERPL-SCNC: 31 MMOL/L (ref 21–31)
COLOR UR: ABNORMAL
CREAT SERPL-MCNC: 0.85 MG/DL (ref 0.7–1.3)
EOSINOPHIL # BLD AUTO: 0.1 THOUSAND/ΜL (ref 0–0.61)
EOSINOPHIL NFR BLD AUTO: 1 % (ref 0–5)
ERYTHROCYTE [DISTWIDTH] IN BLOOD BY AUTOMATED COUNT: 13.8 % (ref 11.5–14.5)
GFR SERPL CREATININE-BSD FRML MDRD: 91 ML/MIN/1.73SQ M
GLUCOSE SERPL-MCNC: 94 MG/DL (ref 65–99)
GLUCOSE UR STRIP-MCNC: NEGATIVE MG/DL
HCT VFR BLD AUTO: 45 % (ref 42–47)
HGB BLD-MCNC: 15.2 G/DL (ref 14–18)
HGB UR QL STRIP.AUTO: ABNORMAL
INR PPP: 0.95 (ref 0.84–1.19)
KETONES UR STRIP-MCNC: NEGATIVE MG/DL
LEUKOCYTE ESTERASE UR QL STRIP: NEGATIVE
LYMPHOCYTES # BLD AUTO: 1.1 THOUSANDS/ΜL (ref 0.6–4.47)
LYMPHOCYTES NFR BLD AUTO: 19 % (ref 21–51)
MAGNESIUM SERPL-MCNC: 2 MG/DL (ref 1.9–2.7)
MCH RBC QN AUTO: 30 PG (ref 26–34)
MCHC RBC AUTO-ENTMCNC: 33.8 G/DL (ref 31–37)
MCV RBC AUTO: 89 FL (ref 81–99)
MONOCYTES # BLD AUTO: 0.5 THOUSAND/ΜL (ref 0.17–1.22)
MONOCYTES NFR BLD AUTO: 8 % (ref 2–12)
MUCOUS THREADS UR QL AUTO: ABNORMAL
NEUTROPHILS # BLD AUTO: 4.4 THOUSANDS/ΜL (ref 1.4–6.5)
NEUTS SEG NFR BLD AUTO: 72 % (ref 42–75)
NITRITE UR QL STRIP: NEGATIVE
NON-SQ EPI CELLS URNS QL MICRO: ABNORMAL /HPF
P AXIS: 65 DEGREES
PH UR STRIP.AUTO: 6 [PH]
PLATELET # BLD AUTO: 184 THOUSANDS/UL (ref 149–390)
PMV BLD AUTO: 8 FL (ref 8.6–11.7)
POTASSIUM SERPL-SCNC: 4.1 MMOL/L (ref 3.5–5.5)
PR INTERVAL: 194 MS
PROT UR STRIP-MCNC: NEGATIVE MG/DL
PROTHROMBIN TIME: 12.6 SECONDS (ref 11.6–14.5)
QRS AXIS: -48 DEGREES
QRSD INTERVAL: 100 MS
QT INTERVAL: 412 MS
QTC INTERVAL: 438 MS
RBC # BLD AUTO: 5.07 MILLION/UL (ref 4.3–5.9)
RBC #/AREA URNS AUTO: ABNORMAL /HPF
SODIUM SERPL-SCNC: 141 MMOL/L (ref 134–143)
SP GR UR STRIP.AUTO: <=1.005 (ref 1–1.03)
T WAVE AXIS: 50 DEGREES
TROPONIN I SERPL-MCNC: <0.03 NG/ML
UROBILINOGEN UR QL STRIP.AUTO: 0.2 E.U./DL
VENTRICULAR RATE: 68 BPM
WBC # BLD AUTO: 6.1 THOUSAND/UL (ref 4.8–10.8)
WBC #/AREA URNS AUTO: ABNORMAL /HPF

## 2021-08-06 PROCEDURE — 71045 X-RAY EXAM CHEST 1 VIEW: CPT

## 2021-08-06 PROCEDURE — 70498 CT ANGIOGRAPHY NECK: CPT

## 2021-08-06 PROCEDURE — 99285 EMERGENCY DEPT VISIT HI MDM: CPT

## 2021-08-06 PROCEDURE — 93005 ELECTROCARDIOGRAM TRACING: CPT

## 2021-08-06 PROCEDURE — 83735 ASSAY OF MAGNESIUM: CPT | Performed by: EMERGENCY MEDICINE

## 2021-08-06 PROCEDURE — 80048 BASIC METABOLIC PNL TOTAL CA: CPT | Performed by: EMERGENCY MEDICINE

## 2021-08-06 PROCEDURE — 85610 PROTHROMBIN TIME: CPT | Performed by: EMERGENCY MEDICINE

## 2021-08-06 PROCEDURE — 99285 EMERGENCY DEPT VISIT HI MDM: CPT | Performed by: EMERGENCY MEDICINE

## 2021-08-06 PROCEDURE — 81001 URINALYSIS AUTO W/SCOPE: CPT | Performed by: EMERGENCY MEDICINE

## 2021-08-06 PROCEDURE — 84484 ASSAY OF TROPONIN QUANT: CPT | Performed by: EMERGENCY MEDICINE

## 2021-08-06 PROCEDURE — 70496 CT ANGIOGRAPHY HEAD: CPT

## 2021-08-06 PROCEDURE — 70553 MRI BRAIN STEM W/O & W/DYE: CPT

## 2021-08-06 PROCEDURE — G1004 CDSM NDSC: HCPCS

## 2021-08-06 PROCEDURE — 96360 HYDRATION IV INFUSION INIT: CPT

## 2021-08-06 PROCEDURE — A9585 GADOBUTROL INJECTION: HCPCS | Performed by: EMERGENCY MEDICINE

## 2021-08-06 PROCEDURE — 85730 THROMBOPLASTIN TIME PARTIAL: CPT | Performed by: EMERGENCY MEDICINE

## 2021-08-06 PROCEDURE — 1124F ACP DISCUSS-NO DSCNMKR DOCD: CPT | Performed by: EMERGENCY MEDICINE

## 2021-08-06 PROCEDURE — 36415 COLL VENOUS BLD VENIPUNCTURE: CPT | Performed by: EMERGENCY MEDICINE

## 2021-08-06 PROCEDURE — 93010 ELECTROCARDIOGRAM REPORT: CPT | Performed by: INTERNAL MEDICINE

## 2021-08-06 PROCEDURE — 85025 COMPLETE CBC W/AUTO DIFF WBC: CPT | Performed by: EMERGENCY MEDICINE

## 2021-08-06 RX ADMIN — SODIUM CHLORIDE 1000 ML: 0.9 INJECTION, SOLUTION INTRAVENOUS at 08:31

## 2021-08-06 RX ADMIN — IOHEXOL 85 ML: 350 INJECTION, SOLUTION INTRAVENOUS at 09:48

## 2021-08-06 RX ADMIN — GADOBUTROL 10 ML: 604.72 INJECTION INTRAVENOUS at 12:46

## 2021-08-06 NOTE — TELEPHONE ENCOUNTER
Received a call from patient  He wants to confirm that labs are not needed prior to his Avastin infusions  He also wants to confirm that he needs a U/A done monthly  Patient thought that infusion was going to be notified that no labs were needed  He states that when he goes there he is told that blood work needs to be drawn    There are not labs or parameters on the order    Will send to RN to confirm   Patient needs a standing order for Monthly U/A placed if that is needed  He is requesting a call back with confirmation  [phone]

## 2021-08-06 NOTE — ED PROVIDER NOTES
History  Chief Complaint   Patient presents with    Dizziness     According to the patient, he has been lightheaded for the last few days and feeling off balance     Patient is 72-year-old male presenting with to complain of dizziness, lightheadedness, funny feeling in his head for the last several days  Patient has a history of glioblastoma status post radiation, with chemotherapy infusions  He denies any headache, weakness, numbness  He feels that when he is walking he is unstable, usually worse in the morning  His symptoms are not always present  Denies any nausea, vomiting, chest pain, shortness of breath  Dizziness  Quality:  Lightheadedness and imbalance  Severity:  Mild  Onset quality:  Gradual  Chronicity:  New  Relieved by:  Nothing  Worsened by:  Nothing  Ineffective treatments:  None tried  Associated symptoms: no chest pain, no diarrhea, no nausea, no vomiting and no weakness        Prior to Admission Medications   Prescriptions Last Dose Informant Patient Reported? Taking?    amLODIPine (NORVASC) 10 mg tablet  Self No Yes   Sig: Take 1 tablet (10 mg total) by mouth daily   atorvastatin (LIPITOR) 10 mg tablet  Self No Yes   Sig: Take 0 5 tablets (5 mg total) by mouth daily   hydrochlorothiazide (MICROZIDE) 12 5 mg capsule  Self No Yes   Sig: Take 1 capsule (12 5 mg total) by mouth daily   levETIRAcetam (KEPPRA) 1000 MG tablet  Self No Yes   Sig: Take 1 tablet (1,000 mg total) by mouth every 12 (twelve) hours   levETIRAcetam (KEPPRA) 250 mg tablet  Self No Yes   Sig: Take 1 tablet (250 mg total) by mouth 2 (two) times a day   minocycline (DYNACIN) 100 MG tablet  Self Yes No   Sig: Take 100 mg by mouth daily   minocycline (MINOCIN) 100 mg capsule  Self Yes No   Sig: Take 100 mg by mouth daily   ondansetron (ZOFRAN) 8 mg tablet  Self No No   Sig: Take 1 tablet (8 mg total) by mouth every 8 (eight) hours as needed for nausea or vomiting   triamcinolone (KENALOG) 0 1 % cream  Self Yes No   Sig: APPLY CREAM EXTERNALLY TWICE DAILY TO BODY RASH AS PRESCRIBED   valsartan (DIOVAN) 80 mg tablet  Self No Yes   Sig: Take 1 tablet (80 mg total) by mouth daily   zolpidem (AMBIEN) 5 mg tablet  Self No No   Sig: Take 1 tablet (5 mg total) by mouth daily at bedtime as needed for sleep      Facility-Administered Medications: None       Past Medical History:   Diagnosis Date    Brain tumor ([address] ) 2021    Bruxism (teeth grinding)     GBM (glioblastoma multiforme) (HCC)     Hypercholesterolemia     Hypertension     Obesity     Sleep apnea     CPAP nightly       Past Surgical History:   Procedure Laterality Date    [place] LUMBAR PUNCTURE DIAGNOSTIC  2021    HAND FRACTURE REPAIR      left thumb    OK STEREO BX/ASPIR/EXCIS,INTRACRANIAL LESN Left 2021    Procedure: BIOPSY BRAIN IMAGE-GUIDED NEEDLE - LEFT TEMPORAL;  Surgeon: Radames Cushing, MD;  Location: [facility];  Service: Neurosurgery    TONSILLECTOMY         Family History   Problem Relation Age of Onset    Heart disease Mother     Heart attack Father     Leukemia Brother      I have reviewed and agree with the history as documented  E-Cigarette/Vaping    E-Cigarette Use Never User      E-Cigarette/Vaping Substances    Nicotine No     THC No     CBD No     Flavoring No     Other No     Unknown No      Social History     Tobacco Use    Smoking status: Former Smoker     Types: Cigarettes     Quit date:      Years since quittin 6    Smokeless tobacco: Never Used   Vaping Use    Vaping Use: Never used   Substance Use Topics    Alcohol use: Not Currently    Drug use: Not Currently       Review of Systems   Constitutional: Negative for chills and fever  HENT: Negative for congestion, nosebleeds, rhinorrhea and sore throat  Eyes: Negative for pain and visual disturbance  Respiratory: Negative for cough and wheezing  Cardiovascular: Negative for chest pain and leg swelling     Gastrointestinal: Negative for abdominal distention, abdominal pain, diarrhea, nausea and vomiting  Genitourinary: Negative for dysuria and frequency  Musculoskeletal: Negative for back pain and joint swelling  Skin: Negative for rash and wound  Neurological: Positive for dizziness and light-headedness  Negative for weakness and numbness  Psychiatric/Behavioral: Negative for decreased concentration and suicidal ideas  Physical Exam  Physical Exam  Constitutional:       Appearance: He is well-developed  HENT:      Head: Normocephalic and atraumatic  Eyes:      Conjunctiva/sclera: Conjunctivae normal       Pupils: Pupils are equal, round, and reactive to light  Neck:      Trachea: No tracheal deviation  Cardiovascular:      Rate and Rhythm: Normal rate and regular rhythm  Heart sounds: Normal heart sounds  No murmur heard  Pulmonary:      Effort: Pulmonary effort is normal  No respiratory distress  Breath sounds: Normal breath sounds  No wheezing or rales  Abdominal:      General: Bowel sounds are normal  There is no distension  Palpations: Abdomen is soft  Tenderness: There is no abdominal tenderness  Musculoskeletal:         General: No deformity  Cervical back: Normal range of motion and neck supple  Skin:     General: Skin is warm and dry  Capillary Refill: Capillary refill takes less than 2 seconds  Neurological:      General: No focal deficit present  Mental Status: He is alert and oriented to person, place, and time  Sensory: No sensory deficit        Coordination: Coordination normal       Deep Tendon Reflexes: Reflexes normal    Psychiatric:         Judgment: Judgment normal          Vital Signs  ED Triage Vitals [08/06/21 0809]   Temperature Pulse Respirations Blood Pressure SpO2   98 4 °F (36 9 °C) 74 18 137/85 98 %      Temp Source Heart Rate Source Patient Position - Orthostatic VS BP Location FiO2 (%)   Temporal Monitor Lying Right arm --      Pain Score       --           Vitals: 08/06/21 1100 08/06/21 1130 08/06/21 1200 08/06/21 1300   BP: 132/90 151/87 144/89 137/84   Pulse: 68 64 63 69   Patient Position - Orthostatic VS:  Lying Lying Lying         Visual Acuity      ED Medications  Medications   sodium chloride 0 9 % bolus 1,000 mL (0 mL Intravenous Stopped 8/6/21 0931)   iohexol (OMNIPAQUE) 350 MG/ML injection (SINGLE-DOSE) 85 mL (85 mL Intravenous Given 8/6/21 0948)   Gadobutrol injection (SINGLE-DOSE) SOLN 10 mL (10 mL Intravenous Given 8/6/21 1246)       Diagnostic Studies  Results Reviewed     Procedure Component Value Units Date/Time    Urine Microscopic [[id number]]  (Abnormal) Collected: 08/06/21 1008    Lab Status: Final result Specimen: Urine, Clean Catch Updated: 08/06/21 1032     RBC, UA 1-2 /hpf      WBC, UA None Seen /hpf      Epithelial Cells Occasional /hpf      Bacteria, UA None Seen /hpf      MUCUS THREADS Occasional    UA w Reflex to Microscopic w Reflex to Culture [[id number]]  (Abnormal) Collected: 08/06/21 1008    Lab Status: Final result Specimen: Urine, Clean Catch Updated: 08/06/21 1024     Color, UA Straw     Clarity, UA Clear     Specific Gravity, UA <=1 005     pH, UA 6 0     Leukocytes, UA Negative     Nitrite, UA Negative     Protein, UA Negative mg/dl      Glucose, UA Negative mg/dl      Ketones, UA Negative mg/dl      Urobilinogen, UA 0 2 E U /dl      Bilirubin, UA Negative     Blood, UA Trace-lysed    Basic metabolic panel [[id number]] Collected: 08/06/21 0823    Lab Status: Final result Specimen: Blood from Arm, Right Updated: 08/06/21 8892     Sodium 141 mmol/L      Potassium 4 1 mmol/L      Chloride 106 mmol/L      CO2 31 mmol/L      ANION GAP 4 mmol/L      BUN 13 mg/dL      Creatinine 0 85 mg/dL      Glucose 94 mg/dL      Calcium 9 6 mg/dL      eGFR 91 ml/min/1 73sq m     Narrative:      [place] guidelines for Chronic Kidney Disease (CKD):     Stage 1 with normal or high GFR (GFR > 90 mL/min/1 73 square meters)    Stage 2 Mild CKD (GFR = 60-89 mL/min/1 73 square meters)    Stage 3A Moderate CKD (GFR = 45-59 mL/min/1 73 square meters)    Stage 3B Moderate CKD (GFR = 30-44 mL/min/1 73 square meters)    Stage 4 Severe CKD (GFR = 15-29 mL/min/1 73 square meters)    Stage 5 End Stage CKD (GFR <15 mL/min/1 73 square meters)  Note: GFR calculation is accurate only with a steady state creatinine    Magnesium [[id number]]  (Normal) Collected: 08/06/21 0823    Lab Status: Final result Specimen: Blood from Arm, Right Updated: 08/06/21 0927     Magnesium 2 0 mg/dL     Troponin I [[id number]]  (Normal) Collected: 08/06/21 0823    Lab Status: Final result Specimen: Blood from Arm, Right Updated: 08/06/21 0851     Troponin I <0 03 ng/mL     Protime-INR [[id number]]  (Normal) Collected: 08/06/21 0823    Lab Status: Final result Specimen: Blood from Arm, Right Updated: 08/06/21 0844     Protime 12 6 seconds      INR 0 95    APTT [[id number]]  (Normal) Collected: 08/06/21 0823    Lab Status: Final result Specimen: Blood from Arm, Right Updated: 08/06/21 0844     PTT 29 seconds     CBC and differential [[id number]]  (Abnormal) Collected: 08/06/21 0823    Lab Status: Final result Specimen: Blood from Arm, Right Updated: 08/06/21 0834     WBC 6 10 Thousand/uL      RBC 5 07 Million/uL      Hemoglobin 15 2 g/dL      Hematocrit 45 0 %      MCV 89 fL      MCH 30 0 pg      MCHC 33 8 g/dL      RDW 13 8 %      MPV 8 0 fL      Platelets 930 Thousands/uL      Neutrophils Relative 72 %      Lymphocytes Relative 19 %      Monocytes Relative 8 %      Eosinophils Relative 1 %      Basophils Relative 1 %      Neutrophils Absolute 4 40 Thousands/µL      Lymphocytes Absolute 1 10 Thousands/µL      Monocytes Absolute 0 50 Thousand/µL      Eosinophils Absolute 0 10 Thousand/µL      Basophils Absolute 0 00 Thousands/µL                  MRI brain w wo contrast   Final Result by Betty Rayo MD (08/06 1329)      Treated left posterior temporal lobe GBM is stable in size  Stable mild edema without mass effect  No residual enhancement  No acute intracranial pathology  Workstation performed: [id number]         CTA head and neck with and without contrast   Final Result by Patsy Agosto MD (08/06 1109)   No significant stenosis or vessel occlusion  Posterior left temporal lobe lesion compatible with known GBM is grossly unchanged from recent MRI  No significant mass effect  No acute infarct or hemorrhage  Workstation performed: [id number]         XR chest 1 view portable   Final Result by Leonor Sandoval MD (01/64 1437)      No acute cardiopulmonary disease  Workstation performed: [id number]                    Procedures  ECG 12 Lead Documentation Only    Date/Time: 8/6/2021 8:16 AM  Performed by: Benjamin Brunner DO  Authorized by: Benjamin Brunner DO     Indications / Diagnosis:  Lightheaded by    Patient location:  ED  Interpretation:     Interpretation: normal    Rate:     ECG rate:  ICU68    ECG rate assessment: normal    Rhythm:     Rhythm: sinus rhythm    QRS:     QRS axis:  Left  Conduction:     Conduction: abnormal      Abnormal conduction: incomplete RBBB    ST segments:     ST segments:  Normal             ED Course                                           MDM  Number of Diagnoses or Management Options  Dizziness: new and requires workup  Diagnosis management comments: 23-year-old male with chief complaint of dizziness,  Patient has mild ataxia on heel-to-shin bilaterally which he states is his normal  Is a very complicated history of will require CBC, chemistry, CTA of his head and neck  If all else reassuring I recommended admission for serial monitoring, MRI but he said he would not want that  CT was reassuring, but he still required MRI given his glioblastoma history  I was able to her arrange this in the ER without having to admit the patient  MRI demonstrated old findings      Patient felt better after IV fluid, suspect and possible mild dehydration in the setting of chemotherapy as a cause of his symptoms  Patient again offered observation admission however he is comfortable being discharged strict return precautions and primary oncologist follow up       Amount and/or Complexity of Data Reviewed  Review and summarize past medical records: yes  Independent visualization of images, tracings, or specimens: yes    Risk of Complications, Morbidity, and/or Mortality  Presenting problems: high  Diagnostic procedures: minimal  Management options: high        Disposition  Final diagnoses:   Dizziness     Time reflects when diagnosis was documented in both MDM as applicable and the Disposition within this note     Time User Action Codes Description Comment    8/6/2021  1:39 PM Pilar Horicon Add [R42] Dizziness       ED Disposition     ED Disposition Condition Date/Time Comment    Discharge Stable Fri Aug 6, 2021  1:39 PM Taisha Celestin discharge to home/self care  Follow-up Information     Follow up With Specialties Details Why [address] [facility] Hematology and Oncology, Hematology, Oncology Schedule an appointment as soon as possible for a visit   [address] [place] [address] Family Medicine Schedule an appointment as soon as possible for a visit   [address] [place] [address]  [phone]            Discharge Medication List as of 8/6/2021  1:40 PM      CONTINUE these medications which have NOT CHANGED    Details   amLODIPine (NORVASC) 10 mg tablet Take 1 tablet (10 mg total) by mouth daily, Starting Thu 11/5/2020, Normal      atorvastatin (LIPITOR) 10 mg tablet Take 0 5 tablets (5 mg total) by mouth daily, Starting Fri 2/12/2021, Normal      hydrochlorothiazide (MICROZIDE) 12 5 mg capsule Take 1 capsule (12 5 mg total) by mouth daily, Starting Wed 2/3/2021, Normal      !! levETIRAcetam (KEPPRA) 1000 MG tablet Take 1 tablet (1,000 mg total) by mouth every 12 (twelve) hours, Starting Thu 5/13/2021, Normal      !! levETIRAcetam (KEPPRA) 250 mg tablet Take 1 tablet (250 mg total) by mouth 2 (two) times a day, Starting Thu 5/13/2021, Normal      minocycline (DYNACIN) 100 MG tablet Take 100 mg by mouth daily, Historical Med      minocycline (MINOCIN) 100 mg capsule Take 100 mg by mouth daily, Starting Mon 3/1/2021, Historical Med      ondansetron (ZOFRAN) 8 mg tablet Take 1 tablet (8 mg total) by mouth every 8 (eight) hours as needed for nausea or vomiting, Starting Fri 5/14/2021, Normal      triamcinolone (KENALOG) 0 1 % cream APPLY CREAM EXTERNALLY TWICE DAILY TO BODY RASH AS PRESCRIBED, Historical Med      valsartan (DIOVAN) 80 mg tablet Take 1 tablet (80 mg total) by mouth daily, Starting Fri 3/19/2021, Normal      zolpidem (AMBIEN) 5 mg tablet Take 1 tablet (5 mg total) by mouth daily at bedtime as needed for sleep, Starting Wed 3/3/2021, Normal       !! - Potential duplicate medications found  Please discuss with provider  No discharge procedures on file      PDMP Review     None          ED Provider  Electronically Signed by           Lindajo Gowers, DO  08/06/21 1640

## 2021-08-14 ENCOUNTER — APPOINTMENT (OUTPATIENT)
Dept: LAB | Facility: HOSPITAL | Age: 66
End: 2021-08-14
Attending: FAMILY MEDICINE
Payer: MEDICARE

## 2021-08-14 DIAGNOSIS — C71.9 GLIOBLASTOMA (HCC): ICD-10-CM

## 2021-08-14 DIAGNOSIS — R73.03 PRE-DIABETES: ICD-10-CM

## 2021-08-14 LAB
ALBUMIN SERPL BCP-MCNC: 4.3 G/DL (ref 3.5–5.7)
ALP SERPL-CCNC: 70 U/L (ref 55–165)
ALT SERPL W P-5'-P-CCNC: 19 U/L (ref 7–52)
ANION GAP SERPL CALCULATED.3IONS-SCNC: 8 MMOL/L (ref 4–13)
AST SERPL W P-5'-P-CCNC: 19 U/L (ref 13–39)
BASOPHILS # BLD AUTO: 0 THOUSANDS/ΜL (ref 0–0.1)
BASOPHILS NFR BLD AUTO: 1 % (ref 0–2)
BILIRUB SERPL-MCNC: 0.5 MG/DL (ref 0.2–1)
BILIRUB UR QL STRIP: NEGATIVE
BUN SERPL-MCNC: 12 MG/DL (ref 7–25)
CALCIUM SERPL-MCNC: 9.7 MG/DL (ref 8.6–10.5)
CHLORIDE SERPL-SCNC: 103 MMOL/L (ref 98–107)
CHOLEST SERPL-MCNC: 177 MG/DL (ref 0–200)
CLARITY UR: CLEAR
CO2 SERPL-SCNC: 29 MMOL/L (ref 21–31)
COLOR UR: YELLOW
CREAT SERPL-MCNC: 0.82 MG/DL (ref 0.7–1.3)
EOSINOPHIL # BLD AUTO: 0.1 THOUSAND/ΜL (ref 0–0.61)
EOSINOPHIL NFR BLD AUTO: 1 % (ref 0–5)
ERYTHROCYTE [DISTWIDTH] IN BLOOD BY AUTOMATED COUNT: 13.8 % (ref 11.5–14.5)
EST. AVERAGE GLUCOSE BLD GHB EST-MCNC: 105 MG/DL
GFR SERPL CREATININE-BSD FRML MDRD: 93 ML/MIN/1.73SQ M
GLUCOSE P FAST SERPL-MCNC: 105 MG/DL (ref 65–99)
GLUCOSE UR STRIP-MCNC: NEGATIVE MG/DL
HBA1C MFR BLD: 5.3 %
HCT VFR BLD AUTO: 44.7 % (ref 42–47)
HCV AB SER QL: NORMAL
HDLC SERPL-MCNC: 35 MG/DL
HGB BLD-MCNC: 15.5 G/DL (ref 14–18)
HGB UR QL STRIP.AUTO: NEGATIVE
KETONES UR STRIP-MCNC: NEGATIVE MG/DL
LDLC SERPL CALC-MCNC: 77 MG/DL (ref 0–100)
LEUKOCYTE ESTERASE UR QL STRIP: NEGATIVE
LYMPHOCYTES # BLD AUTO: 1.5 THOUSANDS/ΜL (ref 0.6–4.47)
LYMPHOCYTES NFR BLD AUTO: 20 % (ref 21–51)
MCH RBC QN AUTO: 30.5 PG (ref 26–34)
MCHC RBC AUTO-ENTMCNC: 34.7 G/DL (ref 31–37)
MCV RBC AUTO: 88 FL (ref 81–99)
MONOCYTES # BLD AUTO: 0.7 THOUSAND/ΜL (ref 0.17–1.22)
MONOCYTES NFR BLD AUTO: 9 % (ref 2–12)
NEUTROPHILS # BLD AUTO: 5.2 THOUSANDS/ΜL (ref 1.4–6.5)
NEUTS SEG NFR BLD AUTO: 70 % (ref 42–75)
NITRITE UR QL STRIP: NEGATIVE
NONHDLC SERPL-MCNC: 142 MG/DL
PH UR STRIP.AUTO: 6 [PH]
PLATELET # BLD AUTO: 196 THOUSANDS/UL (ref 149–390)
PMV BLD AUTO: 8.2 FL (ref 8.6–11.7)
POTASSIUM SERPL-SCNC: 3.7 MMOL/L (ref 3.5–5.5)
PROT SERPL-MCNC: 6.6 G/DL (ref 6.4–8.9)
PROT UR STRIP-MCNC: NEGATIVE MG/DL
PSA SERPL-MCNC: 2.5 NG/ML (ref 0–4)
RBC # BLD AUTO: 5.09 MILLION/UL (ref 4.3–5.9)
SODIUM SERPL-SCNC: 140 MMOL/L (ref 134–143)
SP GR UR STRIP.AUTO: 1.02 (ref 1–1.03)
T4 FREE SERPL-MCNC: 0.77 NG/DL (ref 0.76–1.46)
TRIGL SERPL-MCNC: 324 MG/DL (ref 44–166)
TSH SERPL DL<=0.05 MIU/L-ACNC: 8.13 UIU/ML (ref 0.45–5.33)
UROBILINOGEN UR QL STRIP.AUTO: 0.2 E.U./DL
WBC # BLD AUTO: 7.5 THOUSAND/UL (ref 4.8–10.8)

## 2021-08-14 PROCEDURE — 80061 LIPID PANEL: CPT

## 2021-08-14 PROCEDURE — 86803 HEPATITIS C AB TEST: CPT

## 2021-08-14 PROCEDURE — 85025 COMPLETE CBC W/AUTO DIFF WBC: CPT

## 2021-08-14 PROCEDURE — 87389 HIV-1 AG W/HIV-1&-2 AB AG IA: CPT

## 2021-08-14 PROCEDURE — 84443 ASSAY THYROID STIM HORMONE: CPT

## 2021-08-14 PROCEDURE — 80053 COMPREHEN METABOLIC PANEL: CPT

## 2021-08-14 PROCEDURE — 81003 URINALYSIS AUTO W/O SCOPE: CPT

## 2021-08-14 PROCEDURE — 83036 HEMOGLOBIN GLYCOSYLATED A1C: CPT

## 2021-08-14 PROCEDURE — G0103 PSA SCREENING: HCPCS

## 2021-08-14 PROCEDURE — 36415 COLL VENOUS BLD VENIPUNCTURE: CPT

## 2021-08-14 PROCEDURE — 84439 ASSAY OF FREE THYROXINE: CPT

## 2021-08-16 LAB — HIV 1+2 AB+HIV1 P24 AG SERPL QL IA: NORMAL

## 2021-08-17 ENCOUNTER — OFFICE VISIT (OUTPATIENT)
Dept: FAMILY MEDICINE CLINIC | Facility: CLINIC | Age: 66
End: 2021-08-17
Payer: MEDICARE

## 2021-08-17 VITALS
BODY MASS INDEX: 29.22 KG/M2 | OXYGEN SATURATION: 97 % | TEMPERATURE: 97.3 F | HEART RATE: 72 BPM | WEIGHT: 235 LBS | DIASTOLIC BLOOD PRESSURE: 74 MMHG | HEIGHT: 75 IN | SYSTOLIC BLOOD PRESSURE: 126 MMHG

## 2021-08-17 DIAGNOSIS — Z00.00 ENCOUNTER FOR SUBSEQUENT ANNUAL WELLNESS VISIT (AWV) IN MEDICARE PATIENT: Primary | ICD-10-CM

## 2021-08-17 DIAGNOSIS — E78.1 ABNORMALLY LOW HIGH DENSITY LIPOPROTEIN (HDL) CHOLESTEROL WITH HYPERTRIGLYCERIDEMIA: ICD-10-CM

## 2021-08-17 DIAGNOSIS — E78.00 HYPERCHOLESTEREMIA: ICD-10-CM

## 2021-08-17 DIAGNOSIS — C71.9 GLIOBLASTOMA (HCC): Primary | ICD-10-CM

## 2021-08-17 DIAGNOSIS — E03.8 SUBCLINICAL HYPOTHYROIDISM: ICD-10-CM

## 2021-08-17 DIAGNOSIS — R73.03 PREDIABETES: ICD-10-CM

## 2021-08-17 DIAGNOSIS — I10 ESSENTIAL HYPERTENSION: ICD-10-CM

## 2021-08-17 DIAGNOSIS — E78.6 ABNORMALLY LOW HIGH DENSITY LIPOPROTEIN (HDL) CHOLESTEROL WITH HYPERTRIGLYCERIDEMIA: ICD-10-CM

## 2021-08-17 DIAGNOSIS — C71.9 GLIOBLASTOMA (HCC): ICD-10-CM

## 2021-08-17 DIAGNOSIS — E78.00 HYPERCHOLESTEROLEMIA: ICD-10-CM

## 2021-08-17 PROCEDURE — G0402 INITIAL PREVENTIVE EXAM: HCPCS | Performed by: FAMILY MEDICINE

## 2021-08-17 PROCEDURE — 99214 OFFICE O/P EST MOD 30 MIN: CPT | Performed by: FAMILY MEDICINE

## 2021-08-17 RX ORDER — LEVOTHYROXINE SODIUM 0.03 MG/1
25 TABLET ORAL DAILY
Qty: 30 TABLET | Refills: 5 | Status: SHIPPED | OUTPATIENT
Start: 2021-08-17 | End: 2021-09-10 | Stop reason: SDUPTHER

## 2021-08-17 RX ORDER — ATORVASTATIN CALCIUM 10 MG/1
TABLET, FILM COATED ORAL
Qty: 90 TABLET | Refills: 0 | Status: SHIPPED | OUTPATIENT
Start: 2021-08-17 | End: 2021-09-13 | Stop reason: SDUPTHER

## 2021-08-17 RX ORDER — SODIUM CHLORIDE 9 MG/ML
20 INJECTION, SOLUTION INTRAVENOUS ONCE
Status: CANCELLED | OUTPATIENT
Start: 2021-08-19

## 2021-08-17 RX ORDER — FENOFIBRATE 54 MG/1
54 TABLET ORAL DAILY
Qty: 30 TABLET | Refills: 5 | Status: SHIPPED | OUTPATIENT
Start: 2021-08-17 | End: 2021-09-15 | Stop reason: SDUPTHER

## 2021-08-19 ENCOUNTER — HOSPITAL ENCOUNTER (OUTPATIENT)
Dept: INFUSION CENTER | Facility: HOSPITAL | Age: 66
Discharge: HOME | End: 2021-08-19
Attending: INTERNAL MEDICINE
Payer: MEDICARE

## 2021-08-19 VITALS
WEIGHT: 233.69 LBS | BODY MASS INDEX: 29.06 KG/M2 | DIASTOLIC BLOOD PRESSURE: 87 MMHG | HEART RATE: 73 BPM | SYSTOLIC BLOOD PRESSURE: 137 MMHG | RESPIRATION RATE: 18 BRPM | HEIGHT: 75 IN | OXYGEN SATURATION: 95 % | TEMPERATURE: 98.6 F

## 2021-08-19 DIAGNOSIS — C71.9 GLIOBLASTOMA (HCC): Primary | ICD-10-CM

## 2021-08-19 PROCEDURE — 96413 CHEMO IV INFUSION 1 HR: CPT

## 2021-08-19 RX ORDER — SODIUM CHLORIDE 9 MG/ML
20 INJECTION, SOLUTION INTRAVENOUS ONCE
Status: COMPLETED | OUTPATIENT
Start: 2021-08-19 | End: 2021-08-19

## 2021-08-19 RX ADMIN — SODIUM CHLORIDE 20 ML/HR: 0.9 INJECTION, SOLUTION INTRAVENOUS at 13:32

## 2021-08-19 RX ADMIN — BEVACIZUMAB 1100 MG: 400 INJECTION, SOLUTION INTRAVENOUS at 13:57

## 2021-08-19 NOTE — PLAN OF CARE
Problem: Potential for Falls  Goal: Patient will remain free of falls  Description: INTERVENTIONS:  - Educate patient/family on patient safety including physical limitations  - Instruct patient to call for assistance with activity   - Consult OT/PT to assist with strengthening/mobility   - Keep Call bell within reach  - Keep bed low and locked with side rails adjusted as appropriate  - Keep care items and personal belongings within reach  - Initiate and maintain comfort rounds    - Apply yellow socks and bracelet for high fall risk patients  - Consider moving patient to room near nurses station  Outcome: Progressing

## 2021-08-19 NOTE — PROGRESS NOTES
Pt tolerated todays avastin well  No adverse reactions noted  Peripheral iv discontinued jelco intact   Discharged ambulatory with avs

## 2021-08-27 ENCOUNTER — OFFICE VISIT (OUTPATIENT)
Dept: HEMATOLOGY ONCOLOGY | Facility: CLINIC | Age: 66
End: 2021-08-27

## 2021-08-27 VITALS
WEIGHT: 233.2 LBS | DIASTOLIC BLOOD PRESSURE: 65 MMHG | HEART RATE: 64 BPM | HEIGHT: 75 IN | TEMPERATURE: 97 F | SYSTOLIC BLOOD PRESSURE: 133 MMHG | BODY MASS INDEX: 29 KG/M2

## 2021-08-27 DIAGNOSIS — G40.909 EPILEPSY DUE TO INTRACRANIAL TUMOR (HCC): ICD-10-CM

## 2021-08-27 DIAGNOSIS — C71.9 GLIOBLASTOMA (HCC): Primary | ICD-10-CM

## 2021-08-27 DIAGNOSIS — D49.6 EPILEPSY DUE TO INTRACRANIAL TUMOR (HCC): ICD-10-CM

## 2021-08-27 DIAGNOSIS — E03.9 HYPOTHYROIDISM (ACQUIRED): ICD-10-CM

## 2021-08-27 RX ORDER — TRIAMCINOLONE ACETONIDE 1 MG/G
CREAM TOPICAL
COMMUNITY
Start: 2021-08-21 | End: 2021-12-16 | Stop reason: ALTCHOICE

## 2021-08-30 RX ORDER — SODIUM CHLORIDE 9 MG/ML
20 INJECTION, SOLUTION INTRAVENOUS ONCE
Status: CANCELLED | OUTPATIENT
Start: 2021-09-02

## 2021-09-02 ENCOUNTER — HOSPITAL ENCOUNTER (OUTPATIENT)
Dept: INFUSION CENTER | Facility: HOSPITAL | Age: 66
Discharge: HOME | End: 2021-09-02
Attending: INTERNAL MEDICINE
Payer: MEDICARE

## 2021-09-02 VITALS
SYSTOLIC BLOOD PRESSURE: 138 MMHG | DIASTOLIC BLOOD PRESSURE: 86 MMHG | HEIGHT: 75 IN | WEIGHT: 233.25 LBS | TEMPERATURE: 97.6 F | BODY MASS INDEX: 29 KG/M2 | RESPIRATION RATE: 18 BRPM | OXYGEN SATURATION: 97 % | HEART RATE: 69 BPM

## 2021-09-02 DIAGNOSIS — C71.9 GLIOBLASTOMA (HCC): Primary | ICD-10-CM

## 2021-09-02 PROCEDURE — 96413 CHEMO IV INFUSION 1 HR: CPT

## 2021-09-02 RX ORDER — SODIUM CHLORIDE 9 MG/ML
20 INJECTION, SOLUTION INTRAVENOUS ONCE
Status: COMPLETED | OUTPATIENT
Start: 2021-09-02 | End: 2021-09-02

## 2021-09-02 RX ADMIN — BEVACIZUMAB 1100 MG: 400 INJECTION, SOLUTION INTRAVENOUS at 09:51

## 2021-09-02 RX ADMIN — SODIUM CHLORIDE 20 ML/HR: 0.9 INJECTION, SOLUTION INTRAVENOUS at 09:47

## 2021-09-02 NOTE — PROGRESS NOTES
Pt tolerated avastin infusion well without incident  Blood work ordered Q4 weeks  Pt aware to have blood work performed prior to next infusion appointment  Discharged in stable condition and AVS provided

## 2021-09-08 ENCOUNTER — TELEPHONE (OUTPATIENT)
Dept: HEMATOLOGY ONCOLOGY | Facility: CLINIC | Age: 66
End: 2021-09-08

## 2021-09-09 RX ORDER — SODIUM CHLORIDE 9 MG/ML
20 INJECTION, SOLUTION INTRAVENOUS ONCE
Status: CANCELLED | OUTPATIENT
Start: 2021-09-16

## 2021-09-10 ENCOUNTER — TELEPHONE (OUTPATIENT)
Dept: FAMILY MEDICINE CLINIC | Facility: CLINIC | Age: 66
End: 2021-09-10

## 2021-09-10 DIAGNOSIS — E03.8 SUBCLINICAL HYPOTHYROIDISM: ICD-10-CM

## 2021-09-10 RX ORDER — LEVOTHYROXINE SODIUM 0.03 MG/1
25 TABLET ORAL DAILY
Qty: 90 TABLET | Refills: 3 | Status: SHIPPED | OUTPATIENT
Start: 2021-09-10 | End: 2022-07-31

## 2021-09-10 NOTE — TELEPHONE ENCOUNTER
Patient would like know if he can have something prescribed to help him sleep      Please advise  Km Villalta

## 2021-09-13 DIAGNOSIS — F51.01 PRIMARY INSOMNIA: Primary | ICD-10-CM

## 2021-09-13 DIAGNOSIS — E78.00 HYPERCHOLESTEROLEMIA: ICD-10-CM

## 2021-09-13 RX ORDER — TRAZODONE HYDROCHLORIDE 50 MG/1
50 TABLET ORAL
Qty: 30 TABLET | Refills: 2 | Status: SHIPPED | OUTPATIENT
Start: 2021-09-13 | End: 2021-12-16 | Stop reason: ALTCHOICE

## 2021-09-13 RX ORDER — ATORVASTATIN CALCIUM 10 MG/1
10 TABLET, FILM COATED ORAL DAILY
Qty: 90 TABLET | Refills: 3 | Status: SHIPPED | OUTPATIENT
Start: 2021-09-13

## 2021-09-15 DIAGNOSIS — E78.1 ABNORMALLY LOW HIGH DENSITY LIPOPROTEIN (HDL) CHOLESTEROL WITH HYPERTRIGLYCERIDEMIA: ICD-10-CM

## 2021-09-15 DIAGNOSIS — E78.6 ABNORMALLY LOW HIGH DENSITY LIPOPROTEIN (HDL) CHOLESTEROL WITH HYPERTRIGLYCERIDEMIA: ICD-10-CM

## 2021-09-15 RX ORDER — FENOFIBRATE 54 MG/1
54 TABLET ORAL DAILY
Qty: 90 TABLET | Refills: 2 | Status: SHIPPED | OUTPATIENT
Start: 2021-09-15 | End: 2022-06-19

## 2021-09-16 ENCOUNTER — HOSPITAL ENCOUNTER (OUTPATIENT)
Dept: INFUSION CENTER | Facility: HOSPITAL | Age: 66
Discharge: HOME | End: 2021-09-16
Attending: INTERNAL MEDICINE
Payer: MEDICARE

## 2021-09-16 VITALS
OXYGEN SATURATION: 96 % | BODY MASS INDEX: 29 KG/M2 | RESPIRATION RATE: 18 BRPM | HEART RATE: 69 BPM | HEIGHT: 75 IN | TEMPERATURE: 97.7 F | WEIGHT: 233.25 LBS

## 2021-09-16 DIAGNOSIS — C71.9 GLIOBLASTOMA (HCC): Primary | ICD-10-CM

## 2021-09-16 LAB
BASOPHILS # BLD AUTO: 0 THOUSANDS/ΜL (ref 0–0.1)
BASOPHILS NFR BLD AUTO: 1 % (ref 0–2)
BILIRUB UR QL STRIP: NEGATIVE
CLARITY UR: CLEAR
COLOR UR: YELLOW
EOSINOPHIL # BLD AUTO: 0.1 THOUSAND/ΜL (ref 0–0.61)
EOSINOPHIL NFR BLD AUTO: 2 % (ref 0–5)
ERYTHROCYTE [DISTWIDTH] IN BLOOD BY AUTOMATED COUNT: 14 % (ref 11.5–14.5)
GLUCOSE UR STRIP-MCNC: NEGATIVE MG/DL
HCT VFR BLD AUTO: 44.5 % (ref 42–47)
HGB BLD-MCNC: 15.1 G/DL (ref 14–18)
HGB UR QL STRIP.AUTO: NEGATIVE
KETONES UR STRIP-MCNC: NEGATIVE MG/DL
LEUKOCYTE ESTERASE UR QL STRIP: NEGATIVE
LYMPHOCYTES # BLD AUTO: 1.5 THOUSANDS/ΜL (ref 0.6–4.47)
LYMPHOCYTES NFR BLD AUTO: 26 % (ref 21–51)
MCH RBC QN AUTO: 29.9 PG (ref 26–34)
MCHC RBC AUTO-ENTMCNC: 33.9 G/DL (ref 31–37)
MCV RBC AUTO: 88 FL (ref 81–99)
MONOCYTES # BLD AUTO: 0.6 THOUSAND/ΜL (ref 0.17–1.22)
MONOCYTES NFR BLD AUTO: 11 % (ref 2–12)
NEUTROPHILS # BLD AUTO: 3.5 THOUSANDS/ΜL (ref 1.4–6.5)
NEUTS SEG NFR BLD AUTO: 61 % (ref 42–75)
NITRITE UR QL STRIP: NEGATIVE
PH UR STRIP.AUTO: 6 [PH]
PLATELET # BLD AUTO: 181 THOUSANDS/UL (ref 149–390)
PMV BLD AUTO: 8.2 FL (ref 8.6–11.7)
PROT UR STRIP-MCNC: NEGATIVE MG/DL
RBC # BLD AUTO: 5.05 MILLION/UL (ref 4.3–5.9)
SP GR UR STRIP.AUTO: 1.01 (ref 1–1.03)
UROBILINOGEN UR QL STRIP.AUTO: 0.2 E.U./DL
WBC # BLD AUTO: 5.7 THOUSAND/UL (ref 4.8–10.8)

## 2021-09-16 PROCEDURE — 85025 COMPLETE CBC W/AUTO DIFF WBC: CPT | Performed by: INTERNAL MEDICINE

## 2021-09-16 PROCEDURE — 96413 CHEMO IV INFUSION 1 HR: CPT

## 2021-09-16 PROCEDURE — 81003 URINALYSIS AUTO W/O SCOPE: CPT | Performed by: INTERNAL MEDICINE

## 2021-09-16 RX ORDER — SODIUM CHLORIDE 9 MG/ML
20 INJECTION, SOLUTION INTRAVENOUS ONCE
Status: COMPLETED | OUTPATIENT
Start: 2021-09-16 | End: 2021-09-16

## 2021-09-16 RX ADMIN — SODIUM CHLORIDE 20 ML/HR: 0.9 INJECTION, SOLUTION INTRAVENOUS at 09:00

## 2021-09-16 RX ADMIN — BEVACIZUMAB 1100 MG: 400 INJECTION, SOLUTION INTRAVENOUS at 09:13

## 2021-09-28 RX ORDER — SODIUM CHLORIDE 9 MG/ML
20 INJECTION, SOLUTION INTRAVENOUS ONCE
Status: CANCELLED | OUTPATIENT
Start: 2021-09-30

## 2021-09-30 ENCOUNTER — HOSPITAL ENCOUNTER (OUTPATIENT)
Dept: INFUSION CENTER | Facility: HOSPITAL | Age: 66
Discharge: HOME | End: 2021-09-30
Attending: INTERNAL MEDICINE
Payer: MEDICARE

## 2021-09-30 VITALS
BODY MASS INDEX: 28.97 KG/M2 | DIASTOLIC BLOOD PRESSURE: 72 MMHG | WEIGHT: 233.03 LBS | HEART RATE: 71 BPM | HEIGHT: 75 IN | OXYGEN SATURATION: 96 % | RESPIRATION RATE: 18 BRPM | TEMPERATURE: 97.2 F | SYSTOLIC BLOOD PRESSURE: 122 MMHG

## 2021-09-30 DIAGNOSIS — C71.9 GLIOBLASTOMA (HCC): Primary | ICD-10-CM

## 2021-09-30 PROCEDURE — 96413 CHEMO IV INFUSION 1 HR: CPT

## 2021-09-30 RX ORDER — SODIUM CHLORIDE 9 MG/ML
20 INJECTION, SOLUTION INTRAVENOUS ONCE
Status: COMPLETED | OUTPATIENT
Start: 2021-09-30 | End: 2021-09-30

## 2021-09-30 RX ADMIN — SODIUM CHLORIDE 20 ML/HR: 0.9 INJECTION, SOLUTION INTRAVENOUS at 08:24

## 2021-09-30 RX ADMIN — BEVACIZUMAB 1100 MG: 400 INJECTION, SOLUTION INTRAVENOUS at 09:08

## 2021-10-07 RX ORDER — SODIUM CHLORIDE 9 MG/ML
20 INJECTION, SOLUTION INTRAVENOUS ONCE
Status: CANCELLED | OUTPATIENT
Start: 2021-10-14

## 2021-10-14 ENCOUNTER — HOSPITAL ENCOUNTER (OUTPATIENT)
Dept: INFUSION CENTER | Facility: HOSPITAL | Age: 66
Discharge: HOME | End: 2021-10-14
Attending: INTERNAL MEDICINE
Payer: MEDICARE

## 2021-10-14 ENCOUNTER — APPOINTMENT (OUTPATIENT)
Dept: LAB | Facility: HOSPITAL | Age: 66
End: 2021-10-14
Attending: INTERNAL MEDICINE
Payer: MEDICARE

## 2021-10-14 VITALS
WEIGHT: 227.51 LBS | RESPIRATION RATE: 18 BRPM | HEIGHT: 75 IN | SYSTOLIC BLOOD PRESSURE: 136 MMHG | BODY MASS INDEX: 28.29 KG/M2 | HEART RATE: 88 BPM | TEMPERATURE: 96.9 F | DIASTOLIC BLOOD PRESSURE: 84 MMHG

## 2021-10-14 DIAGNOSIS — C71.9 GLIOBLASTOMA (HCC): ICD-10-CM

## 2021-10-14 DIAGNOSIS — C71.9 GLIOBLASTOMA (HCC): Primary | ICD-10-CM

## 2021-10-14 LAB
BACTERIA UR QL AUTO: ABNORMAL /HPF
BASOPHILS # BLD AUTO: 0 THOUSANDS/ΜL (ref 0–0.1)
BASOPHILS NFR BLD AUTO: 1 % (ref 0–2)
BILIRUB UR QL STRIP: NEGATIVE
CLARITY UR: CLEAR
COLOR UR: YELLOW
EOSINOPHIL # BLD AUTO: 0.1 THOUSAND/ΜL (ref 0–0.61)
EOSINOPHIL NFR BLD AUTO: 2 % (ref 0–5)
ERYTHROCYTE [DISTWIDTH] IN BLOOD BY AUTOMATED COUNT: 14.3 % (ref 11.5–14.5)
GLUCOSE UR STRIP-MCNC: NEGATIVE MG/DL
HCT VFR BLD AUTO: 47.3 % (ref 42–47)
HGB BLD-MCNC: 15.8 G/DL (ref 14–18)
HGB UR QL STRIP.AUTO: ABNORMAL
KETONES UR STRIP-MCNC: NEGATIVE MG/DL
LEUKOCYTE ESTERASE UR QL STRIP: NEGATIVE
LYMPHOCYTES # BLD AUTO: 1.7 THOUSANDS/ΜL (ref 0.6–4.47)
LYMPHOCYTES NFR BLD AUTO: 30 % (ref 21–51)
MCH RBC QN AUTO: 29.9 PG (ref 26–34)
MCHC RBC AUTO-ENTMCNC: 33.5 G/DL (ref 31–37)
MCV RBC AUTO: 89 FL (ref 81–99)
MONOCYTES # BLD AUTO: 0.6 THOUSAND/ΜL (ref 0.17–1.22)
MONOCYTES NFR BLD AUTO: 10 % (ref 2–12)
NEUTROPHILS # BLD AUTO: 3.2 THOUSANDS/ΜL (ref 1.4–6.5)
NEUTS SEG NFR BLD AUTO: 57 % (ref 42–75)
NITRITE UR QL STRIP: NEGATIVE
NON-SQ EPI CELLS URNS QL MICRO: ABNORMAL /HPF
PH UR STRIP.AUTO: 6 [PH]
PLATELET # BLD AUTO: 193 THOUSANDS/UL (ref 149–390)
PMV BLD AUTO: 8.2 FL (ref 8.6–11.7)
PROT UR STRIP-MCNC: NEGATIVE MG/DL
RBC # BLD AUTO: 5.29 MILLION/UL (ref 4.3–5.9)
RBC #/AREA URNS AUTO: ABNORMAL /HPF
SP GR UR STRIP.AUTO: 1.01 (ref 1–1.03)
UROBILINOGEN UR QL STRIP.AUTO: 0.2 E.U./DL
WBC # BLD AUTO: 5.5 THOUSAND/UL (ref 4.8–10.8)
WBC #/AREA URNS AUTO: ABNORMAL /HPF

## 2021-10-14 PROCEDURE — 96413 CHEMO IV INFUSION 1 HR: CPT

## 2021-10-14 PROCEDURE — 85025 COMPLETE CBC W/AUTO DIFF WBC: CPT

## 2021-10-14 PROCEDURE — 81001 URINALYSIS AUTO W/SCOPE: CPT

## 2021-10-14 RX ORDER — SODIUM CHLORIDE 9 MG/ML
20 INJECTION, SOLUTION INTRAVENOUS ONCE
Status: COMPLETED | OUTPATIENT
Start: 2021-10-14 | End: 2021-10-14

## 2021-10-14 RX ADMIN — SODIUM CHLORIDE 20 ML/HR: 0.9 INJECTION, SOLUTION INTRAVENOUS at 12:06

## 2021-10-14 RX ADMIN — BEVACIZUMAB 1100 MG: 400 INJECTION, SOLUTION INTRAVENOUS at 12:48

## 2021-10-15 ENCOUNTER — TELEPHONE (OUTPATIENT)
Dept: HEMATOLOGY ONCOLOGY | Facility: CLINIC | Age: 66
End: 2021-10-15

## 2021-10-18 DIAGNOSIS — I10 ESSENTIAL HYPERTENSION: ICD-10-CM

## 2021-10-18 RX ORDER — VALSARTAN 80 MG/1
80 TABLET ORAL DAILY
Qty: 90 TABLET | Refills: 2 | Status: SHIPPED | OUTPATIENT
Start: 2021-10-18 | End: 2022-07-12

## 2021-10-21 ENCOUNTER — HOSPITAL ENCOUNTER (OUTPATIENT)
Dept: MRI IMAGING | Facility: HOSPITAL | Age: 66
Discharge: HOME | End: 2021-10-21
Payer: MEDICARE

## 2021-10-21 DIAGNOSIS — D49.6 EPILEPSY DUE TO INTRACRANIAL TUMOR (HCC): ICD-10-CM

## 2021-10-21 DIAGNOSIS — G40.909 EPILEPSY DUE TO INTRACRANIAL TUMOR (HCC): ICD-10-CM

## 2021-10-21 DIAGNOSIS — C71.9 GLIOBLASTOMA (HCC): ICD-10-CM

## 2021-10-21 PROCEDURE — A9585 GADOBUTROL INJECTION: HCPCS | Performed by: NURSE PRACTITIONER

## 2021-10-21 PROCEDURE — 70553 MRI BRAIN STEM W/O & W/DYE: CPT

## 2021-10-21 PROCEDURE — G1004 CDSM NDSC: HCPCS

## 2021-10-21 RX ORDER — SODIUM CHLORIDE 9 MG/ML
20 INJECTION, SOLUTION INTRAVENOUS ONCE
Status: CANCELLED | OUTPATIENT
Start: 2021-10-28

## 2021-10-21 RX ADMIN — GADOBUTROL 10 ML: 604.72 INJECTION INTRAVENOUS at 09:01

## 2021-10-28 ENCOUNTER — HOSPITAL ENCOUNTER (OUTPATIENT)
Dept: INFUSION CENTER | Facility: HOSPITAL | Age: 66
Discharge: HOME | End: 2021-10-28
Attending: INTERNAL MEDICINE
Payer: MEDICARE

## 2021-10-28 VITALS
TEMPERATURE: 96.9 F | HEIGHT: 75 IN | RESPIRATION RATE: 18 BRPM | DIASTOLIC BLOOD PRESSURE: 78 MMHG | SYSTOLIC BLOOD PRESSURE: 137 MMHG | WEIGHT: 228.84 LBS | BODY MASS INDEX: 28.45 KG/M2 | HEART RATE: 80 BPM

## 2021-10-28 DIAGNOSIS — C71.9 GLIOBLASTOMA (HCC): Primary | ICD-10-CM

## 2021-10-28 PROCEDURE — 96413 CHEMO IV INFUSION 1 HR: CPT

## 2021-10-28 RX ORDER — SODIUM CHLORIDE 9 MG/ML
20 INJECTION, SOLUTION INTRAVENOUS ONCE
Status: COMPLETED | OUTPATIENT
Start: 2021-10-28 | End: 2021-10-28

## 2021-10-28 RX ADMIN — SODIUM CHLORIDE 20 ML/HR: 0.9 INJECTION, SOLUTION INTRAVENOUS at 09:00

## 2021-10-28 RX ADMIN — BEVACIZUMAB 1100 MG: 400 INJECTION, SOLUTION INTRAVENOUS at 09:09

## 2021-11-01 ENCOUNTER — OFFICE VISIT (OUTPATIENT)
Dept: NEUROLOGY | Facility: CLINIC | Age: 66
End: 2021-11-01
Payer: MEDICARE

## 2021-11-01 VITALS
HEIGHT: 75 IN | DIASTOLIC BLOOD PRESSURE: 60 MMHG | HEART RATE: 70 BPM | WEIGHT: 225 LBS | SYSTOLIC BLOOD PRESSURE: 138 MMHG | BODY MASS INDEX: 27.98 KG/M2

## 2021-11-01 DIAGNOSIS — G40.909 EPILEPSY DUE TO INTRACRANIAL TUMOR (HCC): ICD-10-CM

## 2021-11-01 DIAGNOSIS — D49.6 EPILEPSY DUE TO INTRACRANIAL TUMOR (HCC): ICD-10-CM

## 2021-11-01 DIAGNOSIS — C71.9 GLIOBLASTOMA (HCC): Primary | ICD-10-CM

## 2021-11-01 PROCEDURE — 99214 OFFICE O/P EST MOD 30 MIN: CPT | Performed by: NURSE PRACTITIONER

## 2021-11-01 RX ORDER — LEVETIRACETAM 1000 MG/1
1000 TABLET ORAL EVERY 12 HOURS SCHEDULED
Qty: 180 TABLET | Refills: 0 | Status: SHIPPED | OUTPATIENT
Start: 2021-11-01 | End: 2021-12-16 | Stop reason: ALTCHOICE

## 2021-11-01 RX ORDER — LEVETIRACETAM 250 MG/1
250 TABLET ORAL 2 TIMES DAILY
Qty: 180 TABLET | Refills: 0 | Status: SHIPPED | OUTPATIENT
Start: 2021-11-01 | End: 2021-12-16 | Stop reason: ALTCHOICE

## 2021-11-02 ENCOUNTER — OFFICE VISIT (OUTPATIENT)
Dept: HEMATOLOGY ONCOLOGY | Facility: CLINIC | Age: 66
End: 2021-11-02
Payer: MEDICARE

## 2021-11-02 VITALS
TEMPERATURE: 98.9 F | SYSTOLIC BLOOD PRESSURE: 135 MMHG | HEIGHT: 75 IN | BODY MASS INDEX: 27.98 KG/M2 | DIASTOLIC BLOOD PRESSURE: 83 MMHG | WEIGHT: 225 LBS | HEART RATE: 64 BPM

## 2021-11-02 DIAGNOSIS — C71.9 GLIOBLASTOMA (HCC): Primary | ICD-10-CM

## 2021-11-02 PROCEDURE — 99214 OFFICE O/P EST MOD 30 MIN: CPT | Performed by: INTERNAL MEDICINE

## 2021-11-04 RX ORDER — SODIUM CHLORIDE 9 MG/ML
20 INJECTION, SOLUTION INTRAVENOUS ONCE
Status: CANCELLED | OUTPATIENT
Start: 2021-11-11

## 2021-11-08 ENCOUNTER — APPOINTMENT (OUTPATIENT)
Dept: LAB | Facility: HOSPITAL | Age: 66
End: 2021-11-08
Attending: INTERNAL MEDICINE
Payer: MEDICARE

## 2021-11-08 DIAGNOSIS — C71.9 GLIOBLASTOMA (HCC): ICD-10-CM

## 2021-11-08 LAB
BASOPHILS # BLD AUTO: 0 THOUSANDS/ΜL (ref 0–0.1)
BASOPHILS NFR BLD AUTO: 1 % (ref 0–2)
BILIRUB UR QL STRIP: NEGATIVE
CLARITY UR: CLEAR
COLOR UR: YELLOW
EOSINOPHIL # BLD AUTO: 0.1 THOUSAND/ΜL (ref 0–0.61)
EOSINOPHIL NFR BLD AUTO: 3 % (ref 0–5)
ERYTHROCYTE [DISTWIDTH] IN BLOOD BY AUTOMATED COUNT: 14.5 % (ref 11.5–14.5)
GLUCOSE UR STRIP-MCNC: NEGATIVE MG/DL
HCT VFR BLD AUTO: 50.2 % (ref 42–47)
HGB BLD-MCNC: 16.6 G/DL (ref 14–18)
HGB UR QL STRIP.AUTO: NEGATIVE
KETONES UR STRIP-MCNC: NEGATIVE MG/DL
LEUKOCYTE ESTERASE UR QL STRIP: NEGATIVE
LYMPHOCYTES # BLD AUTO: 1.5 THOUSANDS/ΜL (ref 0.6–4.47)
LYMPHOCYTES NFR BLD AUTO: 29 % (ref 21–51)
MCH RBC QN AUTO: 29.4 PG (ref 26–34)
MCHC RBC AUTO-ENTMCNC: 33.1 G/DL (ref 31–37)
MCV RBC AUTO: 89 FL (ref 81–99)
MONOCYTES # BLD AUTO: 0.5 THOUSAND/ΜL (ref 0.17–1.22)
MONOCYTES NFR BLD AUTO: 10 % (ref 2–12)
NEUTROPHILS # BLD AUTO: 3.1 THOUSANDS/ΜL (ref 1.4–6.5)
NEUTS SEG NFR BLD AUTO: 58 % (ref 42–75)
NITRITE UR QL STRIP: NEGATIVE
PH UR STRIP.AUTO: 6 [PH]
PLATELET # BLD AUTO: 190 THOUSANDS/UL (ref 149–390)
PMV BLD AUTO: 8.4 FL (ref 8.6–11.7)
PROT UR STRIP-MCNC: NEGATIVE MG/DL
RBC # BLD AUTO: 5.65 MILLION/UL (ref 4.3–5.9)
SP GR UR STRIP.AUTO: 1.02 (ref 1–1.03)
UROBILINOGEN UR QL STRIP.AUTO: 0.2 E.U./DL
WBC # BLD AUTO: 5.3 THOUSAND/UL (ref 4.8–10.8)

## 2021-11-08 PROCEDURE — 36415 COLL VENOUS BLD VENIPUNCTURE: CPT

## 2021-11-08 PROCEDURE — 81003 URINALYSIS AUTO W/O SCOPE: CPT

## 2021-11-08 PROCEDURE — 85025 COMPLETE CBC W/AUTO DIFF WBC: CPT

## 2021-11-11 ENCOUNTER — HOSPITAL ENCOUNTER (OUTPATIENT)
Dept: INFUSION CENTER | Facility: HOSPITAL | Age: 66
Discharge: HOME | End: 2021-11-11
Attending: INTERNAL MEDICINE
Payer: MEDICARE

## 2021-11-11 VITALS
OXYGEN SATURATION: 97 % | TEMPERATURE: 97.7 F | HEART RATE: 64 BPM | WEIGHT: 224.65 LBS | BODY MASS INDEX: 27.93 KG/M2 | HEIGHT: 75 IN | SYSTOLIC BLOOD PRESSURE: 128 MMHG | RESPIRATION RATE: 18 BRPM | DIASTOLIC BLOOD PRESSURE: 89 MMHG

## 2021-11-11 DIAGNOSIS — C71.9 GLIOBLASTOMA (HCC): Primary | ICD-10-CM

## 2021-11-11 PROCEDURE — 96413 CHEMO IV INFUSION 1 HR: CPT

## 2021-11-11 RX ORDER — SODIUM CHLORIDE 9 MG/ML
20 INJECTION, SOLUTION INTRAVENOUS ONCE
Status: COMPLETED | OUTPATIENT
Start: 2021-11-11 | End: 2021-11-11

## 2021-11-11 RX ADMIN — BEVACIZUMAB 1100 MG: 400 INJECTION, SOLUTION INTRAVENOUS at 09:05

## 2021-11-11 RX ADMIN — SODIUM CHLORIDE 20 ML/HR: 0.9 INJECTION, SOLUTION INTRAVENOUS at 08:30

## 2021-11-13 DIAGNOSIS — I10 ESSENTIAL HYPERTENSION: ICD-10-CM

## 2021-11-15 RX ORDER — AMLODIPINE BESYLATE 10 MG/1
TABLET ORAL
Qty: 90 TABLET | Refills: 0 | Status: SHIPPED | OUTPATIENT
Start: 2021-11-15 | End: 2022-02-08

## 2021-11-19 RX ORDER — SODIUM CHLORIDE 9 MG/ML
20 INJECTION, SOLUTION INTRAVENOUS ONCE
Status: CANCELLED | OUTPATIENT
Start: 2021-11-26

## 2021-11-26 ENCOUNTER — TELEPHONE (OUTPATIENT)
Dept: HEMATOLOGY ONCOLOGY | Facility: MEDICAL CENTER | Age: 66
End: 2021-11-26

## 2021-11-26 ENCOUNTER — APPOINTMENT (OUTPATIENT)
Dept: LAB | Facility: HOSPITAL | Age: 66
End: 2021-11-26
Payer: MEDICARE

## 2021-11-26 ENCOUNTER — APPOINTMENT (OUTPATIENT)
Dept: LAB | Facility: HOSPITAL | Age: 66
End: 2021-11-26
Attending: INTERNAL MEDICINE
Payer: MEDICARE

## 2021-11-26 ENCOUNTER — HOSPITAL ENCOUNTER (OUTPATIENT)
Dept: INFUSION CENTER | Facility: HOSPITAL | Age: 66
Discharge: HOME | End: 2021-11-26
Attending: INTERNAL MEDICINE
Payer: MEDICARE

## 2021-11-26 VITALS
HEIGHT: 75 IN | TEMPERATURE: 97.6 F | OXYGEN SATURATION: 95 % | HEART RATE: 73 BPM | WEIGHT: 222.66 LBS | BODY MASS INDEX: 27.69 KG/M2 | SYSTOLIC BLOOD PRESSURE: 120 MMHG | DIASTOLIC BLOOD PRESSURE: 80 MMHG | RESPIRATION RATE: 18 BRPM

## 2021-11-26 DIAGNOSIS — C71.9 GLIOBLASTOMA (HCC): Primary | ICD-10-CM

## 2021-11-26 DIAGNOSIS — C71.9 GLIOBLASTOMA (HCC): ICD-10-CM

## 2021-11-26 DIAGNOSIS — L29.8 OTHER SPECIFIED PRURITIC CONDITIONS: ICD-10-CM

## 2021-11-26 DIAGNOSIS — E03.9 HYPOTHYROIDISM (ACQUIRED): ICD-10-CM

## 2021-11-26 PROCEDURE — 96413 CHEMO IV INFUSION 1 HR: CPT

## 2021-11-26 RX ORDER — SODIUM CHLORIDE 9 MG/ML
20 INJECTION, SOLUTION INTRAVENOUS ONCE
Status: COMPLETED | OUTPATIENT
Start: 2021-11-26 | End: 2021-11-26

## 2021-11-26 RX ADMIN — SODIUM CHLORIDE 20 ML/HR: 0.9 INJECTION, SOLUTION INTRAVENOUS at 08:15

## 2021-11-26 RX ADMIN — BEVACIZUMAB 1100 MG: 400 INJECTION, SOLUTION INTRAVENOUS at 09:04

## 2021-12-02 RX ORDER — SODIUM CHLORIDE 9 MG/ML
20 INJECTION, SOLUTION INTRAVENOUS ONCE
Status: CANCELLED | OUTPATIENT
Start: 2021-12-09

## 2021-12-09 ENCOUNTER — HOSPITAL ENCOUNTER (OUTPATIENT)
Dept: INFUSION CENTER | Facility: HOSPITAL | Age: 66
Discharge: HOME | End: 2021-12-09
Attending: INTERNAL MEDICINE
Payer: MEDICARE

## 2021-12-09 ENCOUNTER — APPOINTMENT (OUTPATIENT)
Dept: LAB | Facility: HOSPITAL | Age: 66
End: 2021-12-09
Attending: INTERNAL MEDICINE
Payer: MEDICARE

## 2021-12-09 VITALS
WEIGHT: 221.56 LBS | BODY MASS INDEX: 27.55 KG/M2 | DIASTOLIC BLOOD PRESSURE: 85 MMHG | TEMPERATURE: 98.5 F | OXYGEN SATURATION: 97 % | HEIGHT: 75 IN | HEART RATE: 65 BPM | SYSTOLIC BLOOD PRESSURE: 135 MMHG | RESPIRATION RATE: 18 BRPM

## 2021-12-09 DIAGNOSIS — C71.9 GLIOBLASTOMA (HCC): Primary | ICD-10-CM

## 2021-12-09 DIAGNOSIS — C71.9 GLIOBLASTOMA (HCC): ICD-10-CM

## 2021-12-09 LAB
ALBUMIN SERPL BCP-MCNC: 4.3 G/DL (ref 3.5–5)
ALP SERPL-CCNC: 55 U/L (ref 34–104)
ALT SERPL W P-5'-P-CCNC: 16 U/L (ref 7–52)
ANION GAP SERPL CALCULATED.3IONS-SCNC: 7 MMOL/L (ref 4–13)
AST SERPL W P-5'-P-CCNC: 18 U/L (ref 13–39)
BASOPHILS # BLD AUTO: 0.03 THOUSANDS/ΜL (ref 0–0.1)
BASOPHILS NFR BLD AUTO: 0 % (ref 0–1)
BILIRUB SERPL-MCNC: 0.98 MG/DL (ref 0.2–1)
BILIRUB UR QL STRIP: ABNORMAL
BUN SERPL-MCNC: 18 MG/DL (ref 5–25)
CALCIUM SERPL-MCNC: 9.8 MG/DL (ref 8.4–10.2)
CHLORIDE SERPL-SCNC: 102 MMOL/L (ref 96–108)
CLARITY UR: ABNORMAL
CO2 SERPL-SCNC: 29 MMOL/L (ref 21–32)
COLOR UR: ABNORMAL
CREAT SERPL-MCNC: 0.92 MG/DL (ref 0.6–1.3)
EOSINOPHIL # BLD AUTO: 0.05 THOUSAND/ΜL (ref 0–0.61)
EOSINOPHIL NFR BLD AUTO: 1 % (ref 0–6)
ERYTHROCYTE [DISTWIDTH] IN BLOOD BY AUTOMATED COUNT: 14 % (ref 11.6–15.1)
GFR SERPL CREATININE-BSD FRML MDRD: 87 ML/MIN/1.73SQ M
GLUCOSE P FAST SERPL-MCNC: 94 MG/DL (ref 65–99)
GLUCOSE UR STRIP-MCNC: NEGATIVE MG/DL
HCT VFR BLD AUTO: 49.8 % (ref 36.5–49.3)
HGB BLD-MCNC: 16.4 G/DL (ref 12–17)
HGB UR QL STRIP.AUTO: NEGATIVE
IMM GRANULOCYTES # BLD AUTO: 0.02 THOUSAND/UL (ref 0–0.2)
IMM GRANULOCYTES NFR BLD AUTO: 0 % (ref 0–2)
KETONES UR STRIP-MCNC: NEGATIVE MG/DL
LEUKOCYTE ESTERASE UR QL STRIP: NEGATIVE
LYMPHOCYTES # BLD AUTO: 1.66 THOUSANDS/ΜL (ref 0.6–4.47)
LYMPHOCYTES NFR BLD AUTO: 23 % (ref 14–44)
MCH RBC QN AUTO: 29.2 PG (ref 26.8–34.3)
MCHC RBC AUTO-ENTMCNC: 32.9 G/DL (ref 31.4–37.4)
MCV RBC AUTO: 89 FL (ref 82–98)
MONOCYTES # BLD AUTO: 0.61 THOUSAND/ΜL (ref 0.17–1.22)
MONOCYTES NFR BLD AUTO: 9 % (ref 4–12)
NEUTROPHILS # BLD AUTO: 4.81 THOUSANDS/ΜL (ref 1.85–7.62)
NEUTS SEG NFR BLD AUTO: 67 % (ref 43–75)
NITRITE UR QL STRIP: NEGATIVE
NRBC BLD AUTO-RTO: 0 /100 WBCS
PH UR STRIP.AUTO: 6 [PH]
PLATELET # BLD AUTO: 197 THOUSANDS/UL (ref 149–390)
PMV BLD AUTO: 9.8 FL (ref 8.9–12.7)
POTASSIUM SERPL-SCNC: 4 MMOL/L (ref 3.5–5.3)
PROT SERPL-MCNC: 6.9 G/DL (ref 6.4–8.4)
PROT UR STRIP-MCNC: NEGATIVE MG/DL
RBC # BLD AUTO: 5.62 MILLION/UL (ref 3.88–5.62)
SODIUM SERPL-SCNC: 138 MMOL/L (ref 135–147)
SP GR UR STRIP.AUTO: >=1.03 (ref 1–1.03)
TSH SERPL DL<=0.05 MIU/L-ACNC: 3.17 UIU/ML (ref 0.45–5.33)
UROBILINOGEN UR QL STRIP.AUTO: 0.2 E.U./DL
WBC # BLD AUTO: 7.18 THOUSAND/UL (ref 4.31–10.16)

## 2021-12-09 PROCEDURE — 96413 CHEMO IV INFUSION 1 HR: CPT

## 2021-12-09 PROCEDURE — 36415 COLL VENOUS BLD VENIPUNCTURE: CPT

## 2021-12-09 PROCEDURE — 85025 COMPLETE CBC W/AUTO DIFF WBC: CPT

## 2021-12-09 PROCEDURE — 81003 URINALYSIS AUTO W/O SCOPE: CPT

## 2021-12-09 PROCEDURE — 80053 COMPREHEN METABOLIC PANEL: CPT

## 2021-12-09 PROCEDURE — 84443 ASSAY THYROID STIM HORMONE: CPT

## 2021-12-09 RX ORDER — SODIUM CHLORIDE 9 MG/ML
20 INJECTION, SOLUTION INTRAVENOUS ONCE
Status: COMPLETED | OUTPATIENT
Start: 2021-12-09 | End: 2021-12-09

## 2021-12-09 RX ADMIN — SODIUM CHLORIDE 20 ML/HR: 0.9 INJECTION, SOLUTION INTRAVENOUS at 08:50

## 2021-12-09 RX ADMIN — BEVACIZUMAB 1100 MG: 400 INJECTION, SOLUTION INTRAVENOUS at 09:04

## 2021-12-16 ENCOUNTER — OFFICE VISIT (OUTPATIENT)
Dept: FAMILY MEDICINE CLINIC | Facility: CLINIC | Age: 66
End: 2021-12-16
Payer: MEDICARE

## 2021-12-16 VITALS
SYSTOLIC BLOOD PRESSURE: 128 MMHG | DIASTOLIC BLOOD PRESSURE: 74 MMHG | HEIGHT: 75 IN | WEIGHT: 220.6 LBS | HEART RATE: 72 BPM | OXYGEN SATURATION: 97 % | TEMPERATURE: 97.2 F | BODY MASS INDEX: 27.43 KG/M2

## 2021-12-16 DIAGNOSIS — E03.8 SUBCLINICAL HYPOTHYROIDISM: ICD-10-CM

## 2021-12-16 DIAGNOSIS — Z12.5 SCREENING FOR MALIGNANT NEOPLASM OF PROSTATE: ICD-10-CM

## 2021-12-16 DIAGNOSIS — I10 ESSENTIAL HYPERTENSION: Primary | ICD-10-CM

## 2021-12-16 DIAGNOSIS — E78.6 ABNORMALLY LOW HIGH DENSITY LIPOPROTEIN (HDL) CHOLESTEROL WITH HYPERTRIGLYCERIDEMIA: ICD-10-CM

## 2021-12-16 DIAGNOSIS — E78.1 ABNORMALLY LOW HIGH DENSITY LIPOPROTEIN (HDL) CHOLESTEROL WITH HYPERTRIGLYCERIDEMIA: ICD-10-CM

## 2021-12-16 DIAGNOSIS — C71.9 GLIOBLASTOMA (HCC): ICD-10-CM

## 2021-12-16 DIAGNOSIS — Z12.12 SCREENING FOR COLORECTAL CANCER: ICD-10-CM

## 2021-12-16 DIAGNOSIS — Z12.11 SCREENING FOR COLORECTAL CANCER: ICD-10-CM

## 2021-12-16 DIAGNOSIS — E78.2 MIXED HYPERLIPIDEMIA: ICD-10-CM

## 2021-12-16 PROBLEM — R73.03 PREDIABETES: Status: RESOLVED | Noted: 2021-01-02 | Resolved: 2021-12-16

## 2021-12-16 PROCEDURE — 99214 OFFICE O/P EST MOD 30 MIN: CPT | Performed by: FAMILY MEDICINE

## 2021-12-16 RX ORDER — LEVETIRACETAM 250 MG/1
250 TABLET ORAL 2 TIMES DAILY
COMMUNITY
End: 2022-08-01

## 2021-12-16 RX ORDER — LEVETIRACETAM 1000 MG/1
1000 TABLET ORAL 2 TIMES DAILY
COMMUNITY
End: 2022-08-01

## 2021-12-16 RX ORDER — SODIUM CHLORIDE 9 MG/ML
20 INJECTION, SOLUTION INTRAVENOUS ONCE
Status: CANCELLED | OUTPATIENT
Start: 2021-12-23

## 2021-12-23 ENCOUNTER — HOSPITAL ENCOUNTER (OUTPATIENT)
Dept: INFUSION CENTER | Facility: HOSPITAL | Age: 66
Discharge: HOME | End: 2021-12-23
Attending: INTERNAL MEDICINE
Payer: MEDICARE

## 2021-12-23 VITALS
HEIGHT: 75 IN | SYSTOLIC BLOOD PRESSURE: 138 MMHG | HEART RATE: 64 BPM | WEIGHT: 223.11 LBS | BODY MASS INDEX: 27.74 KG/M2 | DIASTOLIC BLOOD PRESSURE: 86 MMHG | RESPIRATION RATE: 18 BRPM | TEMPERATURE: 96.9 F | OXYGEN SATURATION: 97 %

## 2021-12-23 DIAGNOSIS — C71.9 GLIOBLASTOMA (HCC): Primary | ICD-10-CM

## 2021-12-23 PROCEDURE — 96413 CHEMO IV INFUSION 1 HR: CPT

## 2021-12-23 RX ORDER — SODIUM CHLORIDE 9 MG/ML
20 INJECTION, SOLUTION INTRAVENOUS ONCE
Status: COMPLETED | OUTPATIENT
Start: 2021-12-23 | End: 2021-12-23

## 2021-12-23 RX ADMIN — BEVACIZUMAB 1100 MG: 400 INJECTION, SOLUTION INTRAVENOUS at 08:57

## 2021-12-23 RX ADMIN — SODIUM CHLORIDE 20 ML/HR: 0.9 INJECTION, SOLUTION INTRAVENOUS at 08:18

## 2021-12-27 ENCOUNTER — HOSPITAL ENCOUNTER (OUTPATIENT)
Dept: MRI IMAGING | Facility: HOSPITAL | Age: 66
Discharge: HOME | End: 2021-12-27
Payer: MEDICARE

## 2021-12-27 DIAGNOSIS — C71.9 GLIOBLASTOMA (HCC): ICD-10-CM

## 2021-12-27 PROCEDURE — A9585 GADOBUTROL INJECTION: HCPCS | Performed by: INTERNAL MEDICINE

## 2021-12-27 PROCEDURE — 70553 MRI BRAIN STEM W/O & W/DYE: CPT

## 2021-12-27 PROCEDURE — G1004 CDSM NDSC: HCPCS

## 2021-12-27 RX ADMIN — GADOBUTROL 10 ML: 604.72 INJECTION INTRAVENOUS at 06:55

## 2021-12-30 RX ORDER — SODIUM CHLORIDE 9 MG/ML
20 INJECTION, SOLUTION INTRAVENOUS ONCE
Status: CANCELLED | OUTPATIENT
Start: 2022-01-06

## 2022-01-04 ENCOUNTER — OFFICE VISIT (OUTPATIENT)
Dept: HEMATOLOGY ONCOLOGY | Facility: CLINIC | Age: 67
End: 2022-01-04
Payer: MEDICARE

## 2022-01-04 VITALS
HEART RATE: 80 BPM | SYSTOLIC BLOOD PRESSURE: 128 MMHG | BODY MASS INDEX: 27.48 KG/M2 | WEIGHT: 221 LBS | TEMPERATURE: 98.8 F | DIASTOLIC BLOOD PRESSURE: 82 MMHG | HEIGHT: 75 IN

## 2022-01-04 DIAGNOSIS — C71.9 GLIOBLASTOMA (HCC): Primary | ICD-10-CM

## 2022-01-04 DIAGNOSIS — Z12.11 COLON CANCER SCREENING: ICD-10-CM

## 2022-01-04 PROCEDURE — 99215 OFFICE O/P EST HI 40 MIN: CPT | Performed by: INTERNAL MEDICINE

## 2022-01-04 NOTE — PROGRESS NOTES
[facility] HEMATOLOGY ONCOLOGY SPECIALISTS [place]  [address] [facility] [address]  [phone]  [address] [facility] KRUNAL CINTRON,[birth year], [id number]  01/04/22    Discussion:   In summary, this is a 71-year-old male history of GBM  He is on Avastin  He seems to tolerate this without difficulty  After further discussion today he elects to change to a Q three-week infusion schedule and arrangements were made accordingly  We will proceed with CBC, CMP, urinalysis Q 6 weeks  MRI of the brain showed post treatment changes without evidence of new or progressive disease  He asked about potential uses of medical marijuana  We will make arrangements if he decides to move in that direction  We reviewed potential options for further treatment upon disease progression, at his request   Lastly, he asked about colon cancer screening  Given his GBM diagnosis I think Cologuard would be reasonable  If negative, colonoscopy could be avoided  I discussed the above with the patient  The patient  voiced understanding and agreement   ______________________________________________________________________    No chief complaint on file  HPI:  Oncology History Overview Note   Flo Voss is a 60year old male is status post biopsy of a left temporal mass with pathology consistent with GBM  He has a  past medical history of hypertension, obesity, hyperlipidemia, and sleep apnea  He presented to the ED on 1/1/21 with sudden onset of expressive aphasia  He completed a course of radiation therapy to the left temporal lobe of the brain concurrent with temodar on 4/6/21 5/7/21 MRI brain w wo contrast  IMPRESSION:  Left temporal lobe GBM continues to increase in size and is currently contiguous with the lateral margin of the compressed lateral ventricular atrium  Increasing subtle ependymal enhancement and vasogenic edema        5/13/21 Dr Tesha Hill, neurology f/y  Continue current seizure medications unchanged  Return in 6 months    5/14/21 Dr Justin Smith f/u  Relatively stable clinically, feels his gait and speech are somewhat improved compared to time of diagnosis  "Repeat MRI shows increase in size of left temporal lobe mass with increasing edema  We reviewed that it is possible that some of this represents post treatment change but certainly is concerning for progressive disease as well  Case to be reviewed at Neuro-Oncology Working group in a few days  We will call the patient with the results of that review and further recommendations "  Arrange for consolidative Temodar  Avastin could be added  6/11/21 Dr Justin Smith  11/1/21 Neurology f/u         Brain tumor [facility]) (Resolved)   1/19/2021 Biopsy    BIOPSY BRAIN IMAGE-GUIDED NEEDLE - LEFT TEMPORAL  Surgeon: Dr Corina Underwood    Temporal mass:  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)  Note    This case was seen in consultation with Dr Arlene Tiwari, an expert in Neuropathology from [facility]  Glioblastoma ([facility] )   1/19/2021 Initial Diagnosis    Glioblastoma ([facility] )     1/19/2021 Biopsy    BIOPSY BRAIN IMAGE-GUIDED NEEDLE - LEFT TEMPORAL  Surgeon: Dr Corina Underwood    Temporal mass:  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)  Note    This case was seen in consultation with Dr Arlene Tiwari, an expert in Neuropathology from [facility]            2/24/2021 - 4/6/2021 Radiation    Plan ID Energy Fractions Dose per Fraction (cGy) Dose Correction (cGy) Total Dose Delivered (cGy) Elapsed Days   L Temp CD 6X 7 / 7 200 0 1,400 8   L Temp Lobe 6X 23 / 23 200 0 4,600 30   Treatment dates:  C1: 2/24/2021 - 4/6/2021 2/24/2021 - 4/6/2021 Chemotherapy    Temodar 185 mg daily w/radiation therapy     5/27/2021 -  Chemotherapy    bevacizumab (AVASTIN) IVPB, 1,090 mg, Intravenous, Once, 16 of 17 cycles  Administration: 1,100 mg (5/27/2021), 1,100 mg (6/10/2021), 1,100 mg (6/24/2021), 1,100 mg (7/8/2021), 1,100 mg (7/22/2021), 1,100 mg (8/5/2021), 1,100 mg (9/30/2021), 1,100 mg (9/16/2021), 1,100 mg (9/2/2021), 1,100 mg (8/19/2021), 1,100 mg (10/14/2021), 1,100 mg (10/28/2021), 1,100 mg (11/11/2021), 1,100 mg (11/26/2021), 1,100 mg (12/23/2021), 1,100 mg (12/9/2021)         Interval History:  Clinically stable  ECOG-  1 - Symptomatic but completely ambulatory    Review of Systems   Constitutional: Negative for chills and fever  HENT: Negative for nosebleeds  Eyes: Negative for discharge  Respiratory: Negative for cough and shortness of breath  Cardiovascular: Negative for chest pain  Gastrointestinal: Negative for abdominal pain, constipation and diarrhea  Endocrine: Negative for polydipsia  Genitourinary: Negative for hematuria  Musculoskeletal: Negative for arthralgias  Skin: Negative for color change  Allergic/Immunologic: Negative for immunocompromised state  Neurological: Positive for speech difficulty (Improved)  Negative for dizziness and headaches  Hematological: Negative for adenopathy  Psychiatric/Behavioral: Negative for agitation         Past Medical History:   Diagnosis Date    Brain tumor ([facility] ) 1/5/2021    Bruxism (teeth grinding)     GBM (glioblastoma multiforme) (HCC)     Hypercholesterolemia     Hypertension     Obesity     Sleep apnea     CPAP nightly     Patient Active Problem List   Diagnosis    Class 1 obesity due to excess calories with serious comorbidity and body mass index (BMI) of 33 0 to 33 9 in adult    Negative depression screening    Essential hypertension    Obstructive sleep apnea syndrome    Sleep disturbance    Daytime sleepiness    [facility]    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 ([facility] )    Annual physical exam    Muscle cramps    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    Mixed hyperlipidemia    Subclinical hypothyroidism       Current Outpatient Medications:     amLODIPine (NORVASC) 10 mg tablet, Take 1 tablet by mouth once daily, Disp: 90 tablet, Rfl: 0    atorvastatin (LIPITOR) 10 mg tablet, Take 1 tablet (10 mg total) by mouth daily, Disp: 90 tablet, Rfl: 3    Bevacizumab (AVASTIN IV), Infuse into a venous catheter Get infusion every two weeks ( for Glioblastoma cancer), Disp: , Rfl:     fenofibrate (TRICOR) 54 MG tablet, Take 1 tablet (54 mg total) by mouth daily, Disp: 90 tablet, Rfl: 2    hydrochlorothiazide (MICROZIDE) 12 5 mg capsule, Take 1 capsule (12 5 mg total) by mouth daily, Disp: 90 capsule, Rfl: 3    levETIRAcetam (KEPPRA) 1000 MG tablet, Take 1,000 mg by mouth 2 (two) times a day, Disp: , Rfl:     levETIRAcetam (KEPPRA) 250 mg tablet, Take 250 mg by mouth 2 (two) times a day, Disp: , Rfl:     levothyroxine 25 mcg tablet, Take 1 tablet (25 mcg total) by mouth daily, Disp: 90 tablet, Rfl: 3    valsartan (DIOVAN) 80 mg tablet, Take 1 tablet (80 mg total) by mouth daily, Disp: 90 tablet, Rfl: 2  No Known Allergies  Past Surgical History:   Procedure Laterality Date    FL LUMBAR PUNCTURE DIAGNOSTIC  1/4/2021    HAND FRACTURE REPAIR      left thumb    MN STEREO BX/ASPIR/EXCIS,INTRACRANIAL LESN Left 1/19/2021    Procedure: BIOPSY BRAIN IMAGE-GUIDED NEEDLE - LEFT TEMPORAL;  Surgeon: Sky Arrington MD;  [facility];  Service: Neurosurgery    TONSILLECTOMY       Social History     Objective:  Vitals:    01/04/22 0851   BP: 128/82   Pulse: 80   Temp: 98 8 °F (37 1 °C)   Weight: 100 kg (221 lb)   Height: 6' 3" (1 905 m)     Physical Exam  Constitutional:       Appearance: He is well-developed  HENT:      Head: Normocephalic and atraumatic  Eyes:      Pupils: Pupils are equal, round, and reactive to light  Cardiovascular:      Rate and Rhythm: Normal rate and regular rhythm  Heart sounds: No murmur heard  Pulmonary:      Breath sounds: Normal breath sounds  No wheezing or rales  Abdominal:      Palpations: Abdomen is soft  Tenderness: There is no abdominal tenderness     Musculoskeletal: General: No tenderness  Normal range of motion  Cervical back: Neck supple  Lymphadenopathy:      Cervical: No cervical adenopathy  Skin:     Findings: No erythema or rash  Neurological:      Mental Status: He is alert and oriented to person, place, and time  Cranial Nerves: No cranial nerve deficit  Deep Tendon Reflexes: Reflexes are normal and symmetric  Psychiatric:         Behavior: Behavior normal            Labs: I personally reviewed the labs and imaging pertinent to this patient care

## 2022-01-05 ENCOUNTER — APPOINTMENT (OUTPATIENT)
Dept: LAB | Facility: HOSPITAL | Age: 67
End: 2022-01-05
Attending: INTERNAL MEDICINE
Payer: MEDICARE

## 2022-01-05 ENCOUNTER — TELEPHONE (OUTPATIENT)
Dept: HEMATOLOGY ONCOLOGY | Facility: CLINIC | Age: 67
End: 2022-01-05

## 2022-01-05 LAB
ALBUMIN SERPL BCP-MCNC: 4.3 G/DL (ref 3.5–5)
ALP SERPL-CCNC: 43 U/L (ref 34–104)
ALT SERPL W P-5'-P-CCNC: 16 U/L (ref 7–52)
ANION GAP SERPL CALCULATED.3IONS-SCNC: 7 MMOL/L (ref 4–13)
AST SERPL W P-5'-P-CCNC: 21 U/L (ref 13–39)
BASOPHILS # BLD AUTO: 0.03 THOUSANDS/ΜL (ref 0–0.1)
BASOPHILS NFR BLD AUTO: 0 % (ref 0–1)
BILIRUB SERPL-MCNC: 0.87 MG/DL (ref 0.2–1)
BILIRUB UR QL STRIP: NEGATIVE
BUN SERPL-MCNC: 14 MG/DL (ref 5–25)
CALCIUM SERPL-MCNC: 9.3 MG/DL (ref 8.4–10.2)
CHLORIDE SERPL-SCNC: 102 MMOL/L (ref 96–108)
CLARITY UR: CLEAR
CO2 SERPL-SCNC: 30 MMOL/L (ref 21–32)
COLOR UR: YELLOW
CREAT SERPL-MCNC: 0.89 MG/DL (ref 0.6–1.3)
EOSINOPHIL # BLD AUTO: 0.03 THOUSAND/ΜL (ref 0–0.61)
EOSINOPHIL NFR BLD AUTO: 0 % (ref 0–6)
ERYTHROCYTE [DISTWIDTH] IN BLOOD BY AUTOMATED COUNT: 14.2 % (ref 11.6–15.1)
GFR SERPL CREATININE-BSD FRML MDRD: 89 ML/MIN/1.73SQ M
GLUCOSE P FAST SERPL-MCNC: 81 MG/DL (ref 65–99)
GLUCOSE UR STRIP-MCNC: NEGATIVE MG/DL
HCT VFR BLD AUTO: 47.2 % (ref 36.5–49.3)
HGB BLD-MCNC: 15.8 G/DL (ref 12–17)
HGB UR QL STRIP.AUTO: NEGATIVE
IMM GRANULOCYTES # BLD AUTO: 0.02 THOUSAND/UL (ref 0–0.2)
IMM GRANULOCYTES NFR BLD AUTO: 0 % (ref 0–2)
KETONES UR STRIP-MCNC: NEGATIVE MG/DL
LEUKOCYTE ESTERASE UR QL STRIP: NEGATIVE
LYMPHOCYTES # BLD AUTO: 1.25 THOUSANDS/ΜL (ref 0.6–4.47)
LYMPHOCYTES NFR BLD AUTO: 16 % (ref 14–44)
MCH RBC QN AUTO: 30 PG (ref 26.8–34.3)
MCHC RBC AUTO-ENTMCNC: 33.5 G/DL (ref 31.4–37.4)
MCV RBC AUTO: 90 FL (ref 82–98)
MONOCYTES # BLD AUTO: 0.5 THOUSAND/ΜL (ref 0.17–1.22)
MONOCYTES NFR BLD AUTO: 7 % (ref 4–12)
NEUTROPHILS # BLD AUTO: 5.8 THOUSANDS/ΜL (ref 1.85–7.62)
NEUTS SEG NFR BLD AUTO: 77 % (ref 43–75)
NITRITE UR QL STRIP: NEGATIVE
NRBC BLD AUTO-RTO: 0 /100 WBCS
PH UR STRIP.AUTO: 6 [PH]
PLATELET # BLD AUTO: 184 THOUSANDS/UL (ref 149–390)
PMV BLD AUTO: 10.3 FL (ref 8.9–12.7)
POTASSIUM SERPL-SCNC: 3.7 MMOL/L (ref 3.5–5.3)
PROT SERPL-MCNC: 6.6 G/DL (ref 6.4–8.4)
PROT UR STRIP-MCNC: NEGATIVE MG/DL
RBC # BLD AUTO: 5.27 MILLION/UL (ref 3.88–5.62)
SODIUM SERPL-SCNC: 139 MMOL/L (ref 135–147)
SP GR UR STRIP.AUTO: 1.02 (ref 1–1.03)
UROBILINOGEN UR QL STRIP.AUTO: 0.2 E.U./DL
WBC # BLD AUTO: 7.63 THOUSAND/UL (ref 4.31–10.16)

## 2022-01-05 PROCEDURE — 81003 URINALYSIS AUTO W/O SCOPE: CPT | Performed by: INTERNAL MEDICINE

## 2022-01-05 PROCEDURE — 85025 COMPLETE CBC W/AUTO DIFF WBC: CPT | Performed by: INTERNAL MEDICINE

## 2022-01-05 PROCEDURE — 36415 COLL VENOUS BLD VENIPUNCTURE: CPT | Performed by: INTERNAL MEDICINE

## 2022-01-05 PROCEDURE — 80053 COMPREHEN METABOLIC PANEL: CPT | Performed by: INTERNAL MEDICINE

## 2022-01-05 NOTE — TELEPHONE ENCOUNTER
Returned call to pt  Pt questioning if changing to Q21 versus his original Q14 days is the right choice  We reviewed his concerns and ultimately pt would like to try out the Q21D schedule  Advised pt to call back with any other questions or concerns

## 2022-01-05 NOTE — TELEPHONE ENCOUNTER
Patient is requesting a call back from [facility] regarding treatment frequency   This is per the discussion they had in office yesterday   Please call patient back at [phone]

## 2022-01-06 ENCOUNTER — HOSPITAL ENCOUNTER (OUTPATIENT)
Dept: INFUSION CENTER | Facility: HOSPITAL | Age: 67
Discharge: HOME | End: 2022-01-06
Attending: INTERNAL MEDICINE
Payer: MEDICARE

## 2022-01-06 VITALS
RESPIRATION RATE: 18 BRPM | OXYGEN SATURATION: 98 % | HEIGHT: 75 IN | HEART RATE: 63 BPM | BODY MASS INDEX: 27.47 KG/M2 | SYSTOLIC BLOOD PRESSURE: 123 MMHG | TEMPERATURE: 96.9 F | DIASTOLIC BLOOD PRESSURE: 84 MMHG | WEIGHT: 220.9 LBS

## 2022-01-06 DIAGNOSIS — C71.9 GLIOBLASTOMA (HCC): Primary | ICD-10-CM

## 2022-01-06 PROCEDURE — 96413 CHEMO IV INFUSION 1 HR: CPT

## 2022-01-06 RX ORDER — SODIUM CHLORIDE 9 MG/ML
20 INJECTION, SOLUTION INTRAVENOUS ONCE
Status: COMPLETED | OUTPATIENT
Start: 2022-01-06 | End: 2022-01-06

## 2022-01-06 RX ADMIN — SODIUM CHLORIDE 20 ML/HR: 0.9 INJECTION, SOLUTION INTRAVENOUS at 08:19

## 2022-01-06 RX ADMIN — BEVACIZUMAB 1600 MG: 400 INJECTION, SOLUTION INTRAVENOUS at 09:23

## 2022-01-06 NOTE — PROGRESS NOTES
Pt tolerated todays avastin well via peripheral iv site  No adverse reactions noted  Reviewed upcoming appts with pt   Discharged ambulatory with avs

## 2022-01-20 RX ORDER — SODIUM CHLORIDE 9 MG/ML
20 INJECTION, SOLUTION INTRAVENOUS ONCE
Status: CANCELLED | OUTPATIENT
Start: 2022-01-27

## 2022-01-27 ENCOUNTER — HOSPITAL ENCOUNTER (OUTPATIENT)
Dept: INFUSION CENTER | Facility: HOSPITAL | Age: 67
Discharge: HOME | End: 2022-01-27
Attending: INTERNAL MEDICINE
Payer: MEDICARE

## 2022-01-27 VITALS
WEIGHT: 220.9 LBS | SYSTOLIC BLOOD PRESSURE: 133 MMHG | RESPIRATION RATE: 18 BRPM | HEART RATE: 63 BPM | BODY MASS INDEX: 27.61 KG/M2 | TEMPERATURE: 96.9 F | DIASTOLIC BLOOD PRESSURE: 83 MMHG

## 2022-01-27 DIAGNOSIS — C71.9 GLIOBLASTOMA (HCC): Primary | ICD-10-CM

## 2022-01-27 PROCEDURE — 96413 CHEMO IV INFUSION 1 HR: CPT

## 2022-01-27 RX ORDER — SODIUM CHLORIDE 9 MG/ML
20 INJECTION, SOLUTION INTRAVENOUS ONCE
Status: COMPLETED | OUTPATIENT
Start: 2022-01-27 | End: 2022-01-27

## 2022-01-27 RX ADMIN — BEVACIZUMAB 1600 MG: 400 INJECTION, SOLUTION INTRAVENOUS at 08:52

## 2022-01-27 RX ADMIN — SODIUM CHLORIDE 20 ML/HR: 0.9 INJECTION, SOLUTION INTRAVENOUS at 08:46

## 2022-01-27 NOTE — PROGRESS NOTES
Pt offers no complaints  Blood work not needed for today's treatment (only ordered Q6 weeks)  Pt tolerated chemotherapy infusion well without incident and was discharged in stable condition  AVS provided and pt aware of next infusion appointment

## 2022-02-10 RX ORDER — SODIUM CHLORIDE 9 MG/ML
20 INJECTION, SOLUTION INTRAVENOUS ONCE
Status: CANCELLED | OUTPATIENT
Start: 2022-02-17

## 2022-02-17 ENCOUNTER — HOSPITAL ENCOUNTER (OUTPATIENT)
Dept: INFUSION CENTER | Facility: HOSPITAL | Age: 67
Discharge: HOME | End: 2022-02-17
Attending: INTERNAL MEDICINE
Payer: MEDICARE

## 2022-02-17 ENCOUNTER — APPOINTMENT (OUTPATIENT)
Dept: LAB | Facility: HOSPITAL | Age: 67
End: 2022-02-17
Attending: INTERNAL MEDICINE
Payer: MEDICARE

## 2022-02-17 VITALS
TEMPERATURE: 96.9 F | HEIGHT: 75 IN | BODY MASS INDEX: 28.01 KG/M2 | RESPIRATION RATE: 18 BRPM | SYSTOLIC BLOOD PRESSURE: 131 MMHG | HEART RATE: 74 BPM | OXYGEN SATURATION: 97 % | WEIGHT: 225.31 LBS | DIASTOLIC BLOOD PRESSURE: 79 MMHG

## 2022-02-17 DIAGNOSIS — C71.9 GLIOBLASTOMA (HCC): Primary | ICD-10-CM

## 2022-02-17 PROCEDURE — 96413 CHEMO IV INFUSION 1 HR: CPT

## 2022-02-17 RX ORDER — SODIUM CHLORIDE 9 MG/ML
20 INJECTION, SOLUTION INTRAVENOUS ONCE
Status: COMPLETED | OUTPATIENT
Start: 2022-02-17 | End: 2022-02-17

## 2022-02-17 RX ADMIN — BEVACIZUMAB 1600 MG: 400 INJECTION, SOLUTION INTRAVENOUS at 09:34

## 2022-02-17 RX ADMIN — SODIUM CHLORIDE 20 ML/HR: 0.9 INJECTION, SOLUTION INTRAVENOUS at 09:01

## 2022-02-28 ENCOUNTER — HOSPITAL ENCOUNTER (OUTPATIENT)
Dept: MRI IMAGING | Facility: HOSPITAL | Age: 67
Discharge: HOME | End: 2022-02-28
Attending: INTERNAL MEDICINE
Payer: MEDICARE

## 2022-02-28 DIAGNOSIS — C71.9 GLIOBLASTOMA (HCC): ICD-10-CM

## 2022-02-28 PROCEDURE — A9585 GADOBUTROL INJECTION: HCPCS | Performed by: INTERNAL MEDICINE

## 2022-02-28 PROCEDURE — G1004 CDSM NDSC: HCPCS

## 2022-02-28 PROCEDURE — 70553 MRI BRAIN STEM W/O & W/DYE: CPT

## 2022-02-28 RX ADMIN — GADOBUTROL 10 ML: 604.72 INJECTION INTRAVENOUS at 08:27

## 2022-03-04 ENCOUNTER — OFFICE VISIT (OUTPATIENT)
Dept: HEMATOLOGY ONCOLOGY | Facility: CLINIC | Age: 67
End: 2022-03-04
Payer: MEDICARE

## 2022-03-04 VITALS
HEART RATE: 67 BPM | TEMPERATURE: 98.4 F | WEIGHT: 224 LBS | HEIGHT: 75 IN | BODY MASS INDEX: 27.85 KG/M2 | SYSTOLIC BLOOD PRESSURE: 130 MMHG | DIASTOLIC BLOOD PRESSURE: 85 MMHG

## 2022-03-04 DIAGNOSIS — C71.9 GLIOBLASTOMA (HCC): Primary | ICD-10-CM

## 2022-03-04 PROCEDURE — 99214 OFFICE O/P EST MOD 30 MIN: CPT | Performed by: INTERNAL MEDICINE

## 2022-03-04 NOTE — PROGRESS NOTES
[facility] HEMATOLOGY ONCOLOGY SPECIALISTS [place]  [address] [facility] [id number] [phone]  [address] [place] [address] KRUNAL MONSIVAIS,[birth year], [id number]  03/04/22    Discussion:   In summary, this is a 27-year-old man with history of GBM as outlined  He is currently on Avastin  He seems to be tolerating this without difficulty  Blood pressure is normal   No proteinuria  CBC and chemistry are normal   Functionally he is stable  He has some speech deficits but is generally able to make himself understood  Receptive speech appears normal   Recent MRI shows BT rads 3A consistent with worsening imaging findings felt to represent post treatment change without convincing evidence of disease progression  Continuation of treatment is recommended  We will follow-up in 3 months with repeat imaging just prior to that    I discussed the above with the patient  The patient  voiced understanding and agreement   ______________________________________________________________________    No chief complaint on file  HPI:  Oncology History Overview Note   Tatianna Daniels is a 60year old male is status post biopsy of a left temporal mass with pathology consistent with GBM  He has a  past medical history of hypertension, obesity, hyperlipidemia, and sleep apnea  He presented to the ED on 1/1/21 with sudden onset of expressive aphasia  He completed a course of radiation therapy to the left temporal lobe of the brain concurrent with temodar on 4/6/21 5/7/21 MRI brain w wo contrast  IMPRESSION:  Left temporal lobe GBM continues to increase in size and is currently contiguous with the lateral margin of the compressed lateral ventricular atrium  Increasing subtle ependymal enhancement and vasogenic edema        5/13/21 Dr Claribel Hernandez, neurology f/y  Continue current seizure medications unchanged  Return in 6 months    5/14/21 Dr Dsouza Orn f/u  Relatively stable clinically, feels his gait and speech are somewhat improved compared to time of diagnosis  "Repeat MRI shows increase in size of left temporal lobe mass with increasing edema  We reviewed that it is possible that some of this represents post treatment change but certainly is concerning for progressive disease as well  Case to be reviewed at Neuro-Oncology Working group in a few days  We will call the patient with the results of that review and further recommendations "  Arrange for consolidative Temodar  Avastin could be added  6/11/21 Dr Disha Mcmanus  11/1/21 Neurology f/u         Brain tumor [facility]) (Resolved)   1/19/2021 Biopsy    BIOPSY BRAIN IMAGE-GUIDED NEEDLE - LEFT TEMPORAL  Surgeon: Dr Kb Murrell    Temporal mass:  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)  Note    This case was seen in consultation with Dr Euna Goodpasture, an expert in Neuropathology from [facility]  Glioblastoma ([facility] )   1/19/2021 Initial Diagnosis    Glioblastoma ([facility] )     1/19/2021 Biopsy    BIOPSY BRAIN IMAGE-GUIDED NEEDLE - LEFT TEMPORAL  Surgeon: Dr Kb Murrell    Temporal mass:  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)  Note    This case was seen in consultation with Dr Euna Goodpasture, an expert in Neuropathology from [facility]            2/24/2021 - 4/6/2021 Radiation    Plan ID Energy Fractions Dose per Fraction (cGy) Dose Correction (cGy) Total Dose Delivered (cGy) Elapsed Days   L Temp CD 6X 7 / 7 200 0 1,400 8   L Temp Lobe 6X 23 / 23 200 0 4,600 30   Treatment dates:  C1: 2/24/2021 - 4/6/2021 2/24/2021 - 4/6/2021 Chemotherapy    Temodar 185 mg daily w/radiation therapy     5/27/2021 -  Chemotherapy    bevacizumab (AVASTIN) IVPB, 1,090 mg, Intravenous, Once, 20 of 20 cycles  Dose modification: 15 mg/kg (original dose 10 mg/kg, Cycle 17, Reason: Dose modified as per discussion with consulting physician)  Administration: 1,100 mg (5/27/2021), 1,100 mg (6/10/2021), 1,100 mg (6/24/2021), 1,100 mg (7/8/2021), 1,100 mg (7/22/2021), 1,100 mg (8/5/2021), 1,100 mg (9/30/2021), 1,100 mg (9/16/2021), 1,100 mg (9/2/2021), 1,100 mg (8/19/2021), 1,100 mg (10/14/2021), 1,100 mg (10/28/2021), 1,100 mg (11/11/2021), 1,100 mg (11/26/2021), 1,600 mg (1/6/2022), 1,100 mg (12/23/2021), 1,100 mg (12/9/2021), 1,600 mg (1/27/2022), 1,600 mg (2/17/2022)         Interval History:  Clinically stable  ECOG-  1 - Symptomatic but completely ambulatory    Review of Systems   Constitutional: Negative for chills and fever  HENT: Negative for nosebleeds  Eyes: Negative for discharge  Respiratory: Negative for cough and shortness of breath  Cardiovascular: Negative for chest pain  Gastrointestinal: Negative for abdominal pain, constipation and diarrhea  Endocrine: Negative for polydipsia  Genitourinary: Negative for hematuria  Musculoskeletal: Negative for arthralgias  Skin: Negative for color change  Allergic/Immunologic: Negative for immunocompromised state  Neurological: Negative for dizziness and headaches  Hematological: Negative for adenopathy  Psychiatric/Behavioral: Negative for agitation         Past Medical History:   Diagnosis Date    [facility]    Bruxism (teeth grinding)     GBM (glioblastoma multiforme) (HCC)     Hypercholesterolemia     Hypertension     Obesity     Sleep apnea     CPAP nightly     Patient Active Problem List   Diagnosis    Class 1 obesity due to excess calories with serious comorbidity and body mass index (BMI) of 33 0 to 33 9 in adult    Negative depression screening    Essential hypertension    Obstructive sleep apnea syndrome    Sleep disturbance    Daytime sleepiness    [facility] )    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 ([facility] )    Annual physical exam    Muscle cramps    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    Mixed hyperlipidemia    Subclinical hypothyroidism       Current Outpatient Medications:     amLODIPine (NORVASC) 10 mg tablet, Take 1 tablet by mouth once daily, Disp: 90 tablet, Rfl: 1    atorvastatin (LIPITOR) 10 mg tablet, Take 1 tablet (10 mg total) by mouth daily, Disp: 90 tablet, Rfl: 3    Bevacizumab (AVASTIN IV), Infuse into a venous catheter Get infusion every two weeks ( for Glioblastoma cancer), Disp: , Rfl:     fenofibrate (TRICOR) 54 MG tablet, Take 1 tablet (54 mg total) by mouth daily, Disp: 90 tablet, Rfl: 2    hydrochlorothiazide (MICROZIDE) 12 5 mg capsule, Take 1 capsule by mouth once daily, Disp: 90 capsule, Rfl: 1    levETIRAcetam (KEPPRA) 1000 MG tablet, Take 1,000 mg by mouth 2 (two) times a day, Disp: , Rfl:     levETIRAcetam (KEPPRA) 250 mg tablet, Take 250 mg by mouth 2 (two) times a day, Disp: , Rfl:     levothyroxine 25 mcg tablet, Take 1 tablet (25 mcg total) by mouth daily, Disp: 90 tablet, Rfl: 3    mupirocin (BACTROBAN) 2 % ointment, Apply topically 3 (three) times a day, Disp: 22 g, Rfl: 5    valsartan (DIOVAN) 80 mg tablet, Take 1 tablet (80 mg total) by mouth daily, Disp: 90 tablet, Rfl: 2  No Known Allergies  Past Surgical History:   Procedure Laterality Date    FL LUMBAR PUNCTURE DIAGNOSTIC  1/4/2021    HAND FRACTURE REPAIR      left thumb    CT STEREO BX/ASPIR/EXCIS,INTRACRANIAL LESN Left 1/19/2021    Procedure: BIOPSY BRAIN IMAGE-GUIDED NEEDLE - LEFT TEMPORAL;  Surgeon: Jesus Tinajero MD;  [facility]: [facility];  Service: Neurosurgery    TONSILLECTOMY       Social History     Objective:  Vitals:    03/04/22 0844   BP: 130/85   Pulse: 67   Temp: 98 4 °F (36 9 °C)   Weight: 102 kg (224 lb)   Height: 6' 3" (1 905 m)     Physical Exam  Constitutional:       Appearance: He is well-developed  HENT:      Head: Normocephalic and atraumatic  Eyes:      Pupils: Pupils are equal, round, and reactive to light  Cardiovascular:      Rate and Rhythm: Normal rate and regular rhythm  Heart sounds: No murmur heard  Pulmonary:      Breath sounds: Normal breath sounds   No wheezing or rales    Abdominal:      Palpations: Abdomen is soft  Tenderness: There is no abdominal tenderness  Musculoskeletal:         General: No tenderness  Normal range of motion  Cervical back: Neck supple  Lymphadenopathy:      Cervical: No cervical adenopathy  Skin:     Findings: No erythema or rash  Neurological:      Mental Status: He is alert and oriented to person, place, and time  Cranial Nerves: No cranial nerve deficit  Deep Tendon Reflexes: Reflexes are normal and symmetric  Psychiatric:         Behavior: Behavior normal            Labs: I personally reviewed the labs and imaging pertinent to this patient care

## 2022-03-07 RX ORDER — SODIUM CHLORIDE 9 MG/ML
20 INJECTION, SOLUTION INTRAVENOUS ONCE
Status: CANCELLED | OUTPATIENT
Start: 2022-03-10

## 2022-03-08 ENCOUNTER — TELEPHONE (OUTPATIENT)
Dept: NEUROLOGY | Facility: CLINIC | Age: 67
End: 2022-03-08

## 2022-03-08 NOTE — TELEPHONE ENCOUNTER
Kelly Dailey called back and advised he only wants to see Dr Konstantin Cormier  Reschedule for 7/1/22 at 8 am with Dr Konstantin Cormier at the [facility] location

## 2022-03-08 NOTE — TELEPHONE ENCOUNTER
[id number] and left a message  Advised 5/13/22 needs to be reschedule, as provider will be inpatient  Advised patient per Farhana we can schedule a follow up w/her around the same  Time advised patient to callback ASAP, all availability is subject to change   Gene Chandler had 5/10 In [facility] open at 3pm

## 2022-03-10 ENCOUNTER — HOSPITAL ENCOUNTER (OUTPATIENT)
Dept: INFUSION CENTER | Facility: HOSPITAL | Age: 67
Discharge: HOME | End: 2022-03-10
Attending: INTERNAL MEDICINE
Payer: MEDICARE

## 2022-03-10 VITALS
HEART RATE: 68 BPM | TEMPERATURE: 96.4 F | WEIGHT: 225.09 LBS | BODY MASS INDEX: 27.99 KG/M2 | HEIGHT: 75 IN | SYSTOLIC BLOOD PRESSURE: 132 MMHG | DIASTOLIC BLOOD PRESSURE: 80 MMHG | OXYGEN SATURATION: 99 %

## 2022-03-10 DIAGNOSIS — C71.9 GLIOBLASTOMA (HCC): Primary | ICD-10-CM

## 2022-03-10 PROCEDURE — 96413 CHEMO IV INFUSION 1 HR: CPT

## 2022-03-10 RX ORDER — SODIUM CHLORIDE 9 MG/ML
20 INJECTION, SOLUTION INTRAVENOUS ONCE
Status: COMPLETED | OUTPATIENT
Start: 2022-03-10 | End: 2022-03-10

## 2022-03-10 RX ADMIN — SODIUM CHLORIDE 20 ML/HR: 0.9 INJECTION, SOLUTION INTRAVENOUS at 08:16

## 2022-03-10 RX ADMIN — BEVACIZUMAB 1600 MG: 400 INJECTION, SOLUTION INTRAVENOUS at 08:55

## 2022-03-10 NOTE — PROGRESS NOTES
Patient tolerated IV Avastin without reaction or issues  AVS declined  Confirmed next appointment with patient  Patient ambulated off unit without incident  All personal belongings taken with patient

## 2022-03-14 NOTE — ED ATTENDING ATTESTATION
1/28/2021  ISandi DO, saw and evaluated the patient  I have discussed the patient with the resident/non-physician practitioner and agree with the resident's/non-physician practitioner's findings, Plan of Care, and MDM as documented in the resident's/non-physician practitioner's note, except where noted  All available labs and Radiology studies were reviewed  I was present for key portions of any procedure(s) performed by the resident/non-physician practitioner and I was immediately available to provide assistance  At this point I agree with the current assessment done in the Emergency Department  I have conducted an independent evaluation of this patient a history and physical is as follows:    80-year-old male presenting to the emergency department due to onset of confusion around 12 noon today  Was originally seen by our a PP who noted that the patient was altered  Upon my evaluation later prior to neurological evaluation  He seems more oriented  He is alert and oriented x3 and was able to get the date correctly with the exception of calling today the 27th and not the 28th  Heart is regular rate rhythm and lungs are clear  Patient is conversant but mildly slow with responses  Patient is following commands appropriately  Neurology to evaluate the patient at the bedside          ED Course         Critical Care Time  Procedures
no

## 2022-03-15 ENCOUNTER — TELEPHONE (OUTPATIENT)
Dept: FAMILY MEDICINE CLINIC | Facility: CLINIC | Age: 67
End: 2022-03-15

## 2022-03-15 NOTE — TELEPHONE ENCOUNTER
Spoke with doctor to confirm patient should be taking Atorvastatin 10 mg once daily    Left message on patients vm

## 2022-03-24 RX ORDER — SODIUM CHLORIDE 9 MG/ML
20 INJECTION, SOLUTION INTRAVENOUS ONCE
Status: CANCELLED | OUTPATIENT
Start: 2022-03-31

## 2022-03-31 ENCOUNTER — HOSPITAL ENCOUNTER (OUTPATIENT)
Dept: INFUSION CENTER | Facility: HOSPITAL | Age: 67
Discharge: HOME | End: 2022-03-31
Attending: INTERNAL MEDICINE
Payer: MEDICARE

## 2022-03-31 ENCOUNTER — APPOINTMENT (OUTPATIENT)
Dept: LAB | Facility: HOSPITAL | Age: 67
End: 2022-03-31
Attending: INTERNAL MEDICINE
Payer: MEDICARE

## 2022-03-31 VITALS
HEIGHT: 75 IN | BODY MASS INDEX: 28.34 KG/M2 | SYSTOLIC BLOOD PRESSURE: 138 MMHG | DIASTOLIC BLOOD PRESSURE: 78 MMHG | WEIGHT: 227.96 LBS

## 2022-03-31 DIAGNOSIS — C71.9 GLIOBLASTOMA (HCC): ICD-10-CM

## 2022-03-31 DIAGNOSIS — C71.9 GLIOBLASTOMA (HCC): Primary | ICD-10-CM

## 2022-03-31 PROCEDURE — 96413 CHEMO IV INFUSION 1 HR: CPT

## 2022-03-31 RX ORDER — SODIUM CHLORIDE 9 MG/ML
20 INJECTION, SOLUTION INTRAVENOUS ONCE
Status: COMPLETED | OUTPATIENT
Start: 2022-03-31 | End: 2022-03-31

## 2022-03-31 RX ADMIN — BEVACIZUMAB 1600 MG: 400 INJECTION, SOLUTION INTRAVENOUS at 09:37

## 2022-03-31 RX ADMIN — SODIUM CHLORIDE 20 ML/HR: 0.9 INJECTION, SOLUTION INTRAVENOUS at 09:20

## 2022-04-14 ENCOUNTER — APPOINTMENT (OUTPATIENT)
Dept: LAB | Facility: HOSPITAL | Age: 67
End: 2022-04-14
Payer: MEDICARE

## 2022-04-14 DIAGNOSIS — E78.1 ABNORMALLY LOW HIGH DENSITY LIPOPROTEIN (HDL) CHOLESTEROL WITH HYPERTRIGLYCERIDEMIA: ICD-10-CM

## 2022-04-14 DIAGNOSIS — E78.2 MIXED HYPERLIPIDEMIA: ICD-10-CM

## 2022-04-14 DIAGNOSIS — I10 ESSENTIAL HYPERTENSION: ICD-10-CM

## 2022-04-14 DIAGNOSIS — E78.6 ABNORMALLY LOW HIGH DENSITY LIPOPROTEIN (HDL) CHOLESTEROL WITH HYPERTRIGLYCERIDEMIA: ICD-10-CM

## 2022-04-14 DIAGNOSIS — Z12.5 SCREENING FOR MALIGNANT NEOPLASM OF PROSTATE: ICD-10-CM

## 2022-04-14 DIAGNOSIS — E03.8 SUBCLINICAL HYPOTHYROIDISM: ICD-10-CM

## 2022-04-14 LAB
ALBUMIN SERPL BCP-MCNC: 4.2 G/DL (ref 3.5–5)
ALP SERPL-CCNC: 47 U/L (ref 34–104)
ALT SERPL W P-5'-P-CCNC: 19 U/L (ref 7–52)
ANION GAP SERPL CALCULATED.3IONS-SCNC: 6 MMOL/L (ref 4–13)
AST SERPL W P-5'-P-CCNC: 23 U/L (ref 13–39)
BASOPHILS # BLD AUTO: 0.04 THOUSANDS/ΜL (ref 0–0.1)
BASOPHILS NFR BLD AUTO: 1 % (ref 0–1)
BILIRUB SERPL-MCNC: 0.83 MG/DL (ref 0.2–1)
BUN SERPL-MCNC: 15 MG/DL (ref 5–25)
CALCIUM SERPL-MCNC: 9.5 MG/DL (ref 8.4–10.2)
CHLORIDE SERPL-SCNC: 106 MMOL/L (ref 96–108)
CHOLEST SERPL-MCNC: 151 MG/DL
CO2 SERPL-SCNC: 29 MMOL/L (ref 21–32)
CREAT SERPL-MCNC: 0.93 MG/DL (ref 0.6–1.3)
EOSINOPHIL # BLD AUTO: 0.08 THOUSAND/ΜL (ref 0–0.61)
EOSINOPHIL NFR BLD AUTO: 2 % (ref 0–6)
ERYTHROCYTE [DISTWIDTH] IN BLOOD BY AUTOMATED COUNT: 13.6 % (ref 11.6–15.1)
GFR SERPL CREATININE-BSD FRML MDRD: 85 ML/MIN/1.73SQ M
GLUCOSE P FAST SERPL-MCNC: 87 MG/DL (ref 65–99)
HCT VFR BLD AUTO: 50.3 % (ref 36.5–49.3)
HDLC SERPL-MCNC: 36 MG/DL
HGB BLD-MCNC: 16.5 G/DL (ref 12–17)
IMM GRANULOCYTES # BLD AUTO: 0.01 THOUSAND/UL (ref 0–0.2)
IMM GRANULOCYTES NFR BLD AUTO: 0 % (ref 0–2)
LDLC SERPL CALC-MCNC: 94 MG/DL (ref 0–100)
LYMPHOCYTES # BLD AUTO: 1.57 THOUSANDS/ΜL (ref 0.6–4.47)
LYMPHOCYTES NFR BLD AUTO: 29 % (ref 14–44)
MCH RBC QN AUTO: 30.2 PG (ref 26.8–34.3)
MCHC RBC AUTO-ENTMCNC: 32.8 G/DL (ref 31.4–37.4)
MCV RBC AUTO: 92 FL (ref 82–98)
MONOCYTES # BLD AUTO: 0.52 THOUSAND/ΜL (ref 0.17–1.22)
MONOCYTES NFR BLD AUTO: 10 % (ref 4–12)
NEUTROPHILS # BLD AUTO: 3.13 THOUSANDS/ΜL (ref 1.85–7.62)
NEUTS SEG NFR BLD AUTO: 58 % (ref 43–75)
NONHDLC SERPL-MCNC: 115 MG/DL
NRBC BLD AUTO-RTO: 0 /100 WBCS
PLATELET # BLD AUTO: 180 THOUSANDS/UL (ref 149–390)
PMV BLD AUTO: 10.5 FL (ref 8.9–12.7)
POTASSIUM SERPL-SCNC: 4.2 MMOL/L (ref 3.5–5.3)
PROT SERPL-MCNC: 6.5 G/DL (ref 6.4–8.4)
PSA SERPL-MCNC: 1.8 NG/ML (ref 0–4)
RBC # BLD AUTO: 5.46 MILLION/UL (ref 3.88–5.62)
SODIUM SERPL-SCNC: 141 MMOL/L (ref 135–147)
TRIGL SERPL-MCNC: 106 MG/DL
TSH SERPL DL<=0.05 MIU/L-ACNC: 3.4 UIU/ML (ref 0.45–4.5)
WBC # BLD AUTO: 5.35 THOUSAND/UL (ref 4.31–10.16)

## 2022-04-14 PROCEDURE — 80061 LIPID PANEL: CPT

## 2022-04-14 PROCEDURE — 84443 ASSAY THYROID STIM HORMONE: CPT

## 2022-04-14 PROCEDURE — 85025 COMPLETE CBC W/AUTO DIFF WBC: CPT

## 2022-04-14 PROCEDURE — 36415 COLL VENOUS BLD VENIPUNCTURE: CPT

## 2022-04-14 PROCEDURE — G0103 PSA SCREENING: HCPCS

## 2022-04-14 PROCEDURE — 80053 COMPREHEN METABOLIC PANEL: CPT

## 2022-04-14 RX ORDER — SODIUM CHLORIDE 9 MG/ML
20 INJECTION, SOLUTION INTRAVENOUS ONCE
Status: CANCELLED | OUTPATIENT
Start: 2022-04-21

## 2022-04-18 ENCOUNTER — OFFICE VISIT (OUTPATIENT)
Dept: FAMILY MEDICINE CLINIC | Facility: CLINIC | Age: 67
End: 2022-04-18
Payer: MEDICARE

## 2022-04-18 VITALS
HEIGHT: 75 IN | DIASTOLIC BLOOD PRESSURE: 80 MMHG | BODY MASS INDEX: 28.1 KG/M2 | HEART RATE: 63 BPM | TEMPERATURE: 96.8 F | WEIGHT: 226 LBS | OXYGEN SATURATION: 98 % | SYSTOLIC BLOOD PRESSURE: 128 MMHG

## 2022-04-18 DIAGNOSIS — C71.9 GLIOBLASTOMA (HCC): ICD-10-CM

## 2022-04-18 DIAGNOSIS — E78.2 MIXED HYPERLIPIDEMIA: ICD-10-CM

## 2022-04-18 DIAGNOSIS — E78.6 ABNORMALLY LOW HIGH DENSITY LIPOPROTEIN (HDL) CHOLESTEROL WITH HYPERTRIGLYCERIDEMIA: ICD-10-CM

## 2022-04-18 DIAGNOSIS — E03.8 SUBCLINICAL HYPOTHYROIDISM: Primary | ICD-10-CM

## 2022-04-18 DIAGNOSIS — D49.6 EPILEPSY DUE TO INTRACRANIAL TUMOR (HCC): ICD-10-CM

## 2022-04-18 DIAGNOSIS — Z12.11 SCREENING FOR COLON CANCER: ICD-10-CM

## 2022-04-18 DIAGNOSIS — I10 ESSENTIAL HYPERTENSION: ICD-10-CM

## 2022-04-18 DIAGNOSIS — G40.909 EPILEPSY DUE TO INTRACRANIAL TUMOR (HCC): ICD-10-CM

## 2022-04-18 DIAGNOSIS — E78.1 ABNORMALLY LOW HIGH DENSITY LIPOPROTEIN (HDL) CHOLESTEROL WITH HYPERTRIGLYCERIDEMIA: ICD-10-CM

## 2022-04-18 DIAGNOSIS — G47.33 OBSTRUCTIVE SLEEP APNEA SYNDROME: ICD-10-CM

## 2022-04-18 PROCEDURE — 99214 OFFICE O/P EST MOD 30 MIN: CPT | Performed by: FAMILY MEDICINE

## 2022-04-18 NOTE — PROGRESS NOTES
Assessment/Plan:    No problem-specific Assessment & Plan notes found for this encounter  Diagnoses and all orders for this visit:    Subclinical hypothyroidism  Comments:  Thyroid functions remain normal on 25 mcg of Synthroid  Orders:  -     TSH, 3rd generation with Free T4 reflex; Future    Essential hypertension  Comments: Well controlled    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  Comments:  Triglycerides continue to remain stable at 106 on fenofibrate  Orders:  -     Lipid panel; Future    Glioblastoma ([facility] [address] )  Comments:  Continue following with hematology oncology    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 [facility])  Comments: Well controlled on Keppra    Mixed hyperlipidemia  Comments:  Lipid numbers well controlled on statin  Orders:  -     Comprehensive metabolic panel; Future  -     Lipid panel; Future    Obstructive sleep apnea syndrome  Comments:  Doing well on CPAP    Screening for colon cancer  -     Ambulatory referral for colonoscopy; Future          PHQ-2/9 Depression Screening    Little interest or pleasure in doing things: 0 - not at all  Feeling down, depressed, or hopeless: 0 - not at all  PHQ-2 Score: 0  PHQ-2 Interpretation: Negative depression screen            Subjective:      Patient ID: Romain Serna is a 73 y o  male  Patient presents for follow-up of chronic conditions, hypertension, hyperlipidemia, hypertriglyceridemia, hypothyroidism, glioblastoma and history of epilepsy due to brain tumor  I reviewed his labs with him, his CBC CMP TSH are all within normal limits, PSA is normal at 1 8, total cholesterol 151, triglycerides 106, HDL 36, LDL 94      The following portions of the patient's history were reviewed and updated as appropriate: allergies, current medications, past family history, past medical history, past social history, past surgical history and problem list     Review of Systems   Constitutional: Negative    Negative for chills, fatigue and fever  HENT: Negative  Eyes: Negative  Negative for visual disturbance  Respiratory: Negative for shortness of breath and wheezing  Cardiovascular: Negative for chest pain and palpitations  Gastrointestinal: Negative for abdominal pain, blood in stool, constipation, diarrhea, nausea and vomiting  Endocrine: Negative  Genitourinary: Negative for difficulty urinating, dysuria, frequency, hematuria and urgency  Musculoskeletal: Negative for arthralgias and myalgias  Skin: Negative  Allergic/Immunologic: Negative  Neurological: Negative for dizziness, tremors, seizures, syncope, weakness, light-headedness and headaches  Hematological: Negative for adenopathy  Psychiatric/Behavioral: Negative  Negative for dysphoric mood  The patient is not nervous/anxious  Objective:    /80 (BP Location: Left arm, Patient Position: Sitting)   Pulse 63   Temp (!) 96 8 °F (36 °C)   Ht 6' 3" (1 905 m)   Wt 103 kg (226 lb)   SpO2 98%   BMI 28 25 kg/m²      Physical Exam  Vitals and nursing note reviewed  Constitutional:       General: He is not in acute distress  Appearance: Normal appearance  He is well-developed  He is not ill-appearing, toxic-appearing or diaphoretic  HENT:      Head: Normocephalic and atraumatic  Right Ear: Tympanic membrane, ear canal and external ear normal  There is no impacted cerumen  Left Ear: Tympanic membrane, ear canal and external ear normal  There is no impacted cerumen  Nose: Nose normal  No congestion or rhinorrhea  Mouth/Throat:      Mouth: Mucous membranes are moist       Pharynx: Oropharynx is clear  No oropharyngeal exudate or posterior oropharyngeal erythema  Eyes:      General: No scleral icterus  Right eye: No discharge  Left eye: No discharge  Extraocular Movements: Extraocular movements intact        Conjunctiva/sclera: Conjunctivae normal       Pupils: Pupils are equal, round, and reactive to light  Cardiovascular:      Rate and Rhythm: Normal rate and regular rhythm  Pulses: Normal pulses  Heart sounds: Normal heart sounds  No murmur heard  No friction rub  No gallop  Pulmonary:      Effort: Pulmonary effort is normal  No respiratory distress  Breath sounds: Normal breath sounds  No wheezing, rhonchi or rales  Abdominal:      General: Bowel sounds are normal  There is no distension  Palpations: Abdomen is soft  There is no mass  Tenderness: There is no abdominal tenderness  There is no guarding or rebound  Musculoskeletal:         General: No swelling or deformity  Normal range of motion  Cervical back: Normal range of motion and neck supple  No rigidity  Right lower leg: No edema  Left lower leg: No edema  Lymphadenopathy:      Cervical: No cervical adenopathy  Skin:     General: Skin is warm and dry  Findings: No rash  Neurological:      General: No focal deficit present  Mental Status: He is alert and oriented to person, place, and time  Sensory: No sensory deficit  Motor: No weakness  Coordination: Coordination normal       Gait: Gait normal       Deep Tendon Reflexes: Reflexes are normal and symmetric  Reflexes normal    Psychiatric:         Mood and Affect: Mood normal          Behavior: Behavior normal          Thought Content:  Thought content normal          Judgment: Judgment normal

## 2022-04-21 ENCOUNTER — HOSPITAL ENCOUNTER (OUTPATIENT)
Dept: INFUSION CENTER | Facility: HOSPITAL | Age: 67
Discharge: HOME | End: 2022-04-21
Attending: INTERNAL MEDICINE
Payer: MEDICARE

## 2022-04-21 VITALS
DIASTOLIC BLOOD PRESSURE: 74 MMHG | TEMPERATURE: 96.6 F | SYSTOLIC BLOOD PRESSURE: 134 MMHG | WEIGHT: 229.94 LBS | RESPIRATION RATE: 18 BRPM | OXYGEN SATURATION: 97 % | HEIGHT: 75 IN | HEART RATE: 68 BPM | BODY MASS INDEX: 28.59 KG/M2

## 2022-04-21 DIAGNOSIS — C71.9 GLIOBLASTOMA (HCC): Primary | ICD-10-CM

## 2022-04-21 PROCEDURE — 96413 CHEMO IV INFUSION 1 HR: CPT

## 2022-04-21 RX ORDER — SODIUM CHLORIDE 9 MG/ML
20 INJECTION, SOLUTION INTRAVENOUS ONCE
Status: COMPLETED | OUTPATIENT
Start: 2022-04-21 | End: 2022-04-21

## 2022-04-21 RX ADMIN — SODIUM CHLORIDE 20 ML/HR: 0.9 INJECTION, SOLUTION INTRAVENOUS at 08:10

## 2022-04-21 RX ADMIN — BEVACIZUMAB 1600 MG: 400 INJECTION, SOLUTION INTRAVENOUS at 08:35

## 2022-04-21 NOTE — PLAN OF CARE
Femoral Nerve Single Injection Block    Patient location during procedure: pre-op   Block not for primary anesthetic.  Reason for block: at surgeon's request and post-op pain management   Post-op Pain Location: left hip  Start time: 3/25/2019 9:17 AM  Timeout: 3/25/2019 9:14 AM   End time: 3/25/2019 9:40 AM  Staffing  Anesthesiologist: Asim Yan MD  Resident/CRNA: Rolan Diallo MD  Performed: resident/CRNA   Preanesthetic Checklist  Completed: patient identified, site marked, surgical consent, pre-op evaluation, timeout performed, IV checked, risks and benefits discussed and monitors and equipment checked  Peripheral Block  Patient position: supine  Prep: ChloraPrep  Patient monitoring: heart rate, cardiac monitor, continuous pulse ox, continuous capnometry and frequent blood pressure checks  Block type: femoral  Laterality: left  Injection technique: single shot  Needle  Needle type: Stimuplex   Needle gauge: 22 G  Needle length: 2 in  Needle localization: anatomical landmarks and ultrasound guidance   -ultrasound image captured on disc.  Assessment  Injection assessment: negative aspiration, negative parasthesia and local visualized surrounding nerve  Paresthesia pain: none  Heart rate change: no  Slow fractionated injection: yes  Additional Notes  VSS.  [facility] RN monitoring vitals throughout procedure.  Patient tolerated procedure well.     5 cc of 0.75% bupivacaine with epi added to 5 cc of NS.              Problem: Knowledge Deficit  Goal: Patient/family/caregiver demonstrates understanding of disease process, treatment plan, medications, and discharge instructions  Description: Complete learning assessment and assess knowledge base    Interventions:  - Provide teaching at level of understanding  - Provide teaching via preferred learning methods  4/21/2022 0801 by Larry Tang RN  Outcome: Progressing  4/21/2022 0801 by Larry Tang RN  Outcome: Progressing

## 2022-05-05 ENCOUNTER — TELEPHONE (OUTPATIENT)
Dept: FAMILY MEDICINE CLINIC | Facility: CLINIC | Age: 67
End: 2022-05-05

## 2022-05-05 RX ORDER — SODIUM CHLORIDE 9 MG/ML
20 INJECTION, SOLUTION INTRAVENOUS ONCE
Status: CANCELLED | OUTPATIENT
Start: 2022-05-12

## 2022-05-05 NOTE — TELEPHONE ENCOUNTER
Pt called in today and is complaining of heartburn he believes that it is caused by the levothyroxine  He would like to know if he should stop this or what the course of action should be  I did offer him an apt to which he declined saying "that's ridiculous I just want to know if I should stop this medication" I explained that I would send you a note but I could not advise him to stop the medication  He thought that is "just unbelievable" that I would not tell him what to do  Please advise what you would like pt to do

## 2022-05-11 ENCOUNTER — APPOINTMENT (OUTPATIENT)
Dept: LAB | Facility: HOSPITAL | Age: 67
End: 2022-05-11
Attending: INTERNAL MEDICINE
Payer: MEDICARE

## 2022-05-11 DIAGNOSIS — C71.9 GLIOBLASTOMA (HCC): ICD-10-CM

## 2022-05-11 LAB
ALBUMIN SERPL BCP-MCNC: 4.4 G/DL (ref 3.5–5)
ALP SERPL-CCNC: 48 U/L (ref 34–104)
ALT SERPL W P-5'-P-CCNC: 17 U/L (ref 7–52)
ANION GAP SERPL CALCULATED.3IONS-SCNC: 6 MMOL/L (ref 4–13)
AST SERPL W P-5'-P-CCNC: 24 U/L (ref 13–39)
BACTERIA UR QL AUTO: ABNORMAL /HPF
BASOPHILS # BLD AUTO: 0.02 THOUSANDS/ΜL (ref 0–0.1)
BASOPHILS NFR BLD AUTO: 0 % (ref 0–1)
BILIRUB SERPL-MCNC: 1.09 MG/DL (ref 0.2–1)
BILIRUB UR QL STRIP: NEGATIVE
BUN SERPL-MCNC: 14 MG/DL (ref 5–25)
CALCIUM SERPL-MCNC: 9.4 MG/DL (ref 8.4–10.2)
CHLORIDE SERPL-SCNC: 103 MMOL/L (ref 96–108)
CLARITY UR: CLEAR
CO2 SERPL-SCNC: 30 MMOL/L (ref 21–32)
COLOR UR: YELLOW
CREAT SERPL-MCNC: 0.99 MG/DL (ref 0.6–1.3)
EOSINOPHIL # BLD AUTO: 0.09 THOUSAND/ΜL (ref 0–0.61)
EOSINOPHIL NFR BLD AUTO: 2 % (ref 0–6)
ERYTHROCYTE [DISTWIDTH] IN BLOOD BY AUTOMATED COUNT: 13.7 % (ref 11.6–15.1)
GFR SERPL CREATININE-BSD FRML MDRD: 79 ML/MIN/1.73SQ M
GLUCOSE P FAST SERPL-MCNC: 83 MG/DL (ref 65–99)
GLUCOSE UR STRIP-MCNC: NEGATIVE MG/DL
HCT VFR BLD AUTO: 49.7 % (ref 36.5–49.3)
HGB BLD-MCNC: 16.5 G/DL (ref 12–17)
HGB UR QL STRIP.AUTO: NEGATIVE
IMM GRANULOCYTES # BLD AUTO: 0.01 THOUSAND/UL (ref 0–0.2)
IMM GRANULOCYTES NFR BLD AUTO: 0 % (ref 0–2)
KETONES UR STRIP-MCNC: NEGATIVE MG/DL
LEUKOCYTE ESTERASE UR QL STRIP: NEGATIVE
LYMPHOCYTES # BLD AUTO: 1.66 THOUSANDS/ΜL (ref 0.6–4.47)
LYMPHOCYTES NFR BLD AUTO: 27 % (ref 14–44)
MCH RBC QN AUTO: 30.4 PG (ref 26.8–34.3)
MCHC RBC AUTO-ENTMCNC: 33.2 G/DL (ref 31.4–37.4)
MCV RBC AUTO: 92 FL (ref 82–98)
MONOCYTES # BLD AUTO: 0.54 THOUSAND/ΜL (ref 0.17–1.22)
MONOCYTES NFR BLD AUTO: 9 % (ref 4–12)
NEUTROPHILS # BLD AUTO: 3.75 THOUSANDS/ΜL (ref 1.85–7.62)
NEUTS SEG NFR BLD AUTO: 62 % (ref 43–75)
NITRITE UR QL STRIP: NEGATIVE
NON-SQ EPI CELLS URNS QL MICRO: ABNORMAL /HPF
NRBC BLD AUTO-RTO: 0 /100 WBCS
PH UR STRIP.AUTO: 7 [PH]
PLATELET # BLD AUTO: 176 THOUSANDS/UL (ref 149–390)
PMV BLD AUTO: 9.9 FL (ref 8.9–12.7)
POTASSIUM SERPL-SCNC: 4.5 MMOL/L (ref 3.5–5.3)
PROT SERPL-MCNC: 6.6 G/DL (ref 6.4–8.4)
PROT UR STRIP-MCNC: NEGATIVE MG/DL
RBC # BLD AUTO: 5.43 MILLION/UL (ref 3.88–5.62)
RBC #/AREA URNS AUTO: ABNORMAL /HPF
SODIUM SERPL-SCNC: 139 MMOL/L (ref 135–147)
SP GR UR STRIP.AUTO: 1.01 (ref 1–1.03)
UROBILINOGEN UR QL STRIP.AUTO: 0.2 E.U./DL
WBC # BLD AUTO: 6.07 THOUSAND/UL (ref 4.31–10.16)
WBC #/AREA URNS AUTO: ABNORMAL /HPF

## 2022-05-11 PROCEDURE — 81001 URINALYSIS AUTO W/SCOPE: CPT

## 2022-05-11 PROCEDURE — 80053 COMPREHEN METABOLIC PANEL: CPT

## 2022-05-11 PROCEDURE — 36415 COLL VENOUS BLD VENIPUNCTURE: CPT

## 2022-05-11 PROCEDURE — 85025 COMPLETE CBC W/AUTO DIFF WBC: CPT

## 2022-05-12 ENCOUNTER — HOSPITAL ENCOUNTER (OUTPATIENT)
Dept: INFUSION CENTER | Facility: HOSPITAL | Age: 67
Discharge: HOME | End: 2022-05-12
Attending: INTERNAL MEDICINE
Payer: MEDICARE

## 2022-05-12 VITALS
DIASTOLIC BLOOD PRESSURE: 72 MMHG | RESPIRATION RATE: 18 BRPM | TEMPERATURE: 96.9 F | HEIGHT: 75 IN | WEIGHT: 228.84 LBS | BODY MASS INDEX: 28.45 KG/M2 | SYSTOLIC BLOOD PRESSURE: 136 MMHG

## 2022-05-12 DIAGNOSIS — C71.9 GLIOBLASTOMA (HCC): Primary | ICD-10-CM

## 2022-05-12 PROCEDURE — 96413 CHEMO IV INFUSION 1 HR: CPT

## 2022-05-12 RX ORDER — SODIUM CHLORIDE 9 MG/ML
20 INJECTION, SOLUTION INTRAVENOUS ONCE
Status: COMPLETED | OUTPATIENT
Start: 2022-05-12 | End: 2022-05-12

## 2022-05-12 RX ADMIN — BEVACIZUMAB 1600 MG: 400 INJECTION, SOLUTION INTRAVENOUS at 08:48

## 2022-05-12 RX ADMIN — SODIUM CHLORIDE 20 ML/HR: 0.9 INJECTION, SOLUTION INTRAVENOUS at 08:45

## 2022-05-12 NOTE — PROGRESS NOTES
Most recent labs reviewed  Parameters met for treatment  Tolerated Avastin very well  No adverse reactions noted  Peripheral iv discontinued jelco intact   Discharged ambulatory with avs

## 2022-05-26 RX ORDER — SODIUM CHLORIDE 9 MG/ML
20 INJECTION, SOLUTION INTRAVENOUS ONCE
Status: CANCELLED | OUTPATIENT
Start: 2022-06-02

## 2022-06-02 ENCOUNTER — HOSPITAL ENCOUNTER (OUTPATIENT)
Dept: INFUSION CENTER | Facility: HOSPITAL | Age: 67
Discharge: HOME | End: 2022-06-02
Attending: INTERNAL MEDICINE
Payer: MEDICARE

## 2022-06-02 VITALS
HEART RATE: 68 BPM | OXYGEN SATURATION: 97 % | RESPIRATION RATE: 18 BRPM | HEIGHT: 75 IN | SYSTOLIC BLOOD PRESSURE: 137 MMHG | WEIGHT: 229.72 LBS | DIASTOLIC BLOOD PRESSURE: 80 MMHG | BODY MASS INDEX: 28.56 KG/M2 | TEMPERATURE: 96.9 F

## 2022-06-02 DIAGNOSIS — C71.9 GLIOBLASTOMA (HCC): Primary | ICD-10-CM

## 2022-06-02 PROCEDURE — 96413 CHEMO IV INFUSION 1 HR: CPT

## 2022-06-02 RX ORDER — SODIUM CHLORIDE 9 MG/ML
20 INJECTION, SOLUTION INTRAVENOUS ONCE
Status: COMPLETED | OUTPATIENT
Start: 2022-06-02 | End: 2022-06-02

## 2022-06-02 RX ADMIN — SODIUM CHLORIDE 20 ML/HR: 0.9 INJECTION, SOLUTION INTRAVENOUS at 08:31

## 2022-06-02 RX ADMIN — BEVACIZUMAB 1600 MG: 400 INJECTION, SOLUTION INTRAVENOUS at 08:57

## 2022-06-02 NOTE — PROGRESS NOTES
Recent labs reviewed  Patient tolerated Avastin  treatment without reaction or issues  AVS given to patient  Patient ambulated off unit without incident  All personal belongings taken with patient

## 2022-06-02 NOTE — TELEPHONE ENCOUNTER
Spoke to Dr Daren Santos he stated he does not feel the levothyroxine is causing the heart burn due to patient has been on this for some time however we can call over omeprazole or over the counter Pepcid to help with his symptoms      Spoke to patient he will start with Pepcid or Tums and see if that gives him relief if not he will let the office know

## 2022-06-08 ENCOUNTER — TELEPHONE (OUTPATIENT)
Dept: HEMATOLOGY ONCOLOGY | Facility: CLINIC | Age: 67
End: 2022-06-08

## 2022-06-08 NOTE — TELEPHONE ENCOUNTER
Spoke with patient regarding some questions he has for Dr Harry Johnson  Pt states that his family doctor would like him to get a colonoscopy and wants to know Dr Annamaria Peter opinion on this (if he needs one or not)  Patient is also asking if he can go back to taking Aspirin 81mg daily  I asked if Dr Harry Johnson was the one who told him to stop taking the Aspirin and the patient could not remember  I told the patient that I will look into this and call him back this afternoon  Pt verbalized understanding

## 2022-06-08 NOTE — TELEPHONE ENCOUNTER
Left  for patient returning his call  I gave him my direct teams number to call back and discuss further

## 2022-06-08 NOTE — TELEPHONE ENCOUNTER
CALL RETURN FORM   Reason for patient call? Patient calling to speak with Dr Celina Wang  He would like to know if he can schedule the colonoscopy, because his family physician recommends it  He also wants to know if he can start taking the 81mg aspirin again   Patient's primary oncologist? Dr Celina Wang   Name of person the patient was calling for? Dr Celina Wang   Any additional information to add, if applicable? [phone]   Informed patient that the message will be forwarded to the team and someone will get back to them as soon as possible    Did you relay this information to the patient?  yes

## 2022-06-09 ENCOUNTER — TELEPHONE (OUTPATIENT)
Dept: HEMATOLOGY ONCOLOGY | Facility: CLINIC | Age: 67
End: 2022-06-09

## 2022-06-09 ENCOUNTER — HOSPITAL ENCOUNTER (OUTPATIENT)
Dept: MRI IMAGING | Facility: HOSPITAL | Age: 67
Discharge: HOME | End: 2022-06-09
Attending: INTERNAL MEDICINE
Payer: MEDICARE

## 2022-06-09 DIAGNOSIS — C71.9 GLIOBLASTOMA (HCC): ICD-10-CM

## 2022-06-09 PROCEDURE — 70553 MRI BRAIN STEM W/O & W/DYE: CPT

## 2022-06-09 PROCEDURE — G1004 CDSM NDSC: HCPCS

## 2022-06-09 PROCEDURE — A9585 GADOBUTROL INJECTION: HCPCS | Performed by: INTERNAL MEDICINE

## 2022-06-09 RX ADMIN — GADOBUTROL 10 ML: 604.72 INJECTION INTRAVENOUS at 06:57

## 2022-06-09 NOTE — TELEPHONE ENCOUNTER
Spoke with patient to let him know that I spoke with Satnam Aponte since Dr Lorna Hatfield is on vacation this week about his concerns  I informed him that these are questions Dr Lorna Hatfield has to address  Pt verbalized understanding  I also told the patient that he has an appt coming up next Friday with Dr Lorna Hatfield and he can bring up the questions then  Pt verbalized understanding

## 2022-06-16 RX ORDER — SODIUM CHLORIDE 9 MG/ML
20 INJECTION, SOLUTION INTRAVENOUS ONCE
Status: CANCELLED | OUTPATIENT
Start: 2022-06-23

## 2022-06-17 ENCOUNTER — OFFICE VISIT (OUTPATIENT)
Dept: HEMATOLOGY ONCOLOGY | Facility: CLINIC | Age: 67
End: 2022-06-17
Payer: MEDICARE

## 2022-06-17 VITALS
BODY MASS INDEX: 28.35 KG/M2 | HEIGHT: 75 IN | SYSTOLIC BLOOD PRESSURE: 133 MMHG | TEMPERATURE: 98 F | DIASTOLIC BLOOD PRESSURE: 81 MMHG | WEIGHT: 228 LBS | HEART RATE: 67 BPM

## 2022-06-17 DIAGNOSIS — Z12.11 SCREENING FOR COLON CANCER: ICD-10-CM

## 2022-06-17 DIAGNOSIS — G47.33 OBSTRUCTIVE SLEEP APNEA SYNDROME: ICD-10-CM

## 2022-06-17 DIAGNOSIS — C71.9 GLIOBLASTOMA (HCC): Primary | ICD-10-CM

## 2022-06-17 DIAGNOSIS — G40.909 EPILEPSY DUE TO INTRACRANIAL TUMOR (HCC): ICD-10-CM

## 2022-06-17 DIAGNOSIS — G47.9 SLEEP DISTURBANCE: ICD-10-CM

## 2022-06-17 DIAGNOSIS — D49.6 EPILEPSY DUE TO INTRACRANIAL TUMOR (HCC): ICD-10-CM

## 2022-06-17 PROCEDURE — 99215 OFFICE O/P EST HI 40 MIN: CPT | Performed by: INTERNAL MEDICINE

## 2022-06-17 NOTE — LETTER
June 17, 2022     Mallory Ramsey DO  [address] [place] [address]    Patient: Brett Brambila   YOB: 1955   Date of Visit: 6/17/2022       Dear Dr Darrius De Guzman:    Thank you for referring Lou Hansen to me for evaluation  Below are my notes for this consultation  If you have questions, please do not hesitate to call me  I look forward to following your patient along with you  Sincerely,        Teodora Durham DO        CC: MD Karine Clements MD Chales Hamel, MD Evetta Amble, DO  6/17/2022  8:59 AM  Sign when Signing Visit  [address]  [address]  [place] [address] [phone]  [phone]    Oral Martino,1955, [id number]  06/17/22    Discussion:   , this is a 80-year-old male history of GBM  He is currently on Avastin  He tolerates this without difficulty  He notes that he has diminished energy at times  This is been true since his diagnosis 2 5 years ago  He has sleep apnea but states that he uses his CPAP regularly  Recent TSH is normal   My highest suspicion is that his daytime fatigue relates to either Keppra or Avastin toxicities  Keppra dosing could be reduced though risk of seizure increases somewhat  We reviewed that previously his cancer was uncontrolled  Since then it has come under control allowing the possibility of dose reduction  He also understands that if he had a seizure his license would have to be revoked for 6 months  He will confer with Neurology about this  Additionally, he is concerned about colon cancer screening  We reviewed the pros and cons of Cologuard testing versus colonoscopy  Either would be acceptable from my viewpoint  He will consider this and let us know how he wishes to proceed  Lastly, his MRI shows no change compared to the prior study of 3 months ago illustrating good disease control  I discussed the above with the patient    The patient voiced understanding and agreement   ______________________________________________________________________    No chief complaint on file  HPI:  Oncology History Overview Note   Nahomy Friday is a 60year old male is status post biopsy of a left temporal mass with pathology consistent with GBM  He has a  past medical history of hypertension, obesity, hyperlipidemia, and sleep apnea  He presented to the ED on 1/1/21 with sudden onset of expressive aphasia  He completed a course of radiation therapy to the left temporal lobe of the brain concurrent with temodar on 4/6/21 5/7/21 MRI brain w wo contrast  IMPRESSION:  Left temporal lobe GBM continues to increase in size and is currently contiguous with the lateral margin of the compressed lateral ventricular atrium  Increasing subtle ependymal enhancement and vasogenic edema  5/13/21 Dr Parul Chan, neurology f/y  Continue current seizure medications unchanged  Return in 6 months    5/14/21 Dr Karma Braden f/u  Relatively stable clinically, feels his gait and speech are somewhat improved compared to time of diagnosis  "Repeat MRI shows increase in size of left temporal lobe mass with increasing edema  We reviewed that it is possible that some of this represents post treatment change but certainly is concerning for progressive disease as well  Case to be reviewed at Neuro-Oncology Working group in a few days  We will call the patient with the results of that review and further recommendations "  Arrange for consolidative Temodar  Avastin could be added  6/11/21 Dr Karma Braden  11/1/21 Neurology f/u         Brain tumor [facility]) (Resolved)   1/19/2021 Biopsy    BIOPSY BRAIN IMAGE-GUIDED NEEDLE - LEFT TEMPORAL  Surgeon: Dr Sangita Tadeo    Temporal mass:  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)  Note    This case was seen in consultation with Dr Zenon Boyce, an expert in Neuropathology from [facility], operated by [facility] [place]            Glioblastoma ([facility] [address] )   1/19/2021 Initial Diagnosis    Glioblastoma ([facility] [address] )     1/19/2021 Biopsy    BIOPSY BRAIN IMAGE-GUIDED NEEDLE - LEFT TEMPORAL  Surgeon: Dr Joaquin Craig    Temporal mass:  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)  Note    This case was seen in consultation with Dr Sheri Turcios, an expert in Neuropathology from [facility] [place]  2/24/2021 - 4/6/2021 Radiation    Plan ID Energy Fractions Dose per Fraction (cGy) Dose Correction (cGy) Total Dose Delivered (cGy) Elapsed Days   L Temp CD 6X 7 / 7 200 0 1,400 8   L Temp Lobe 6X 23 / 23 200 0 4,600 30   Treatment dates:  C1: 2/24/2021 - 4/6/2021 2/24/2021 - 4/6/2021 Chemotherapy    Temodar 185 mg daily w/radiation therapy     5/27/2021 -  Chemotherapy    bevacizumab (AVASTIN) IVPB, 1,090 mg, Intravenous, Once, 24 of 26 cycles  Dose modification: 15 mg/kg (original dose 10 mg/kg, Cycle 17, Reason: Dose modified as per discussion with consulting physician)  Administration: 1,100 mg (5/27/2021), 1,100 mg (6/10/2021), 1,100 mg (6/24/2021), 1,100 mg (7/8/2021), 1,100 mg (7/22/2021), 1,100 mg (8/5/2021), 1,100 mg (9/30/2021), 1,100 mg (9/16/2021), 1,100 mg (9/2/2021), 1,100 mg (8/19/2021), 1,100 mg (10/14/2021), 1,100 mg (10/28/2021), 1,100 mg (11/11/2021), 1,100 mg (11/26/2021), 1,600 mg (1/6/2022), 1,100 mg (12/23/2021), 1,100 mg (12/9/2021), 1,600 mg (1/27/2022), 1,600 mg (2/17/2022), 1,600 mg (4/21/2022), 1,600 mg (3/10/2022), 1,600 mg (3/31/2022), 1,600 mg (5/12/2022), 1,600 mg (6/2/2022)         Interval History:  Clinically stable  ECOG-  0 - Asymptomatic    Review of Systems   Constitutional: Negative for chills and fever  HENT: Negative for nosebleeds  Eyes: Negative for discharge  Respiratory: Negative for cough and shortness of breath  Cardiovascular: Negative for chest pain  Gastrointestinal: Negative for abdominal pain, constipation and diarrhea  Endocrine: Negative for polydipsia  Genitourinary: Negative for hematuria  Musculoskeletal: Negative for arthralgias  Skin: Negative for color change  Allergic/Immunologic: Negative for immunocompromised state  Neurological: Negative for dizziness and headaches  Hematological: Negative for adenopathy  Psychiatric/Behavioral: Negative for agitation         Past Medical History:   Diagnosis Date    Brain tumor ([facility] ) 1/5/2021    Bruxism (teeth grinding)     GBM (glioblastoma multiforme) (HCC)     Hypercholesterolemia     Hypertension     Obesity     Sleep apnea     CPAP nightly     Patient Active Problem List   Diagnosis    Class 1 obesity due to excess calories with serious comorbidity and body mass index (BMI) of 33 0 to 33 9 in adult    Negative depression screening    Essential hypertension    Obstructive sleep apnea syndrome    Sleep disturbance    Daytime sleepiness    Glioblastoma ([facility] )    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 ([place] [address] )    Annual physical exam    Muscle cramps    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    Mixed hyperlipidemia    Subclinical hypothyroidism       Current Outpatient Medications:     amLODIPine (NORVASC) 10 mg tablet, Take 1 tablet by mouth once daily, Disp: 90 tablet, Rfl: 1    atorvastatin (LIPITOR) 10 mg tablet, Take 1 tablet (10 mg total) by mouth daily, Disp: 90 tablet, Rfl: 3    Bevacizumab (AVASTIN IV), Infuse into a venous catheter Get infusion every two weeks ( for Glioblastoma cancer), Disp: , Rfl:     fenofibrate (TRICOR) 54 MG tablet, Take 1 tablet (54 mg total) by mouth daily, Disp: 90 tablet, Rfl: 2    hydrochlorothiazide (MICROZIDE) 12 5 mg capsule, Take 1 capsule by mouth once daily, Disp: 90 capsule, Rfl: 1    levETIRAcetam (KEPPRA) 1000 MG tablet, Take 1,000 mg by mouth 2 (two) times a day, Disp: , Rfl:     levETIRAcetam (KEPPRA) 250 mg tablet, Take 250 mg by mouth 2 (two) times a day, Disp: , Rfl:     levothyroxine 25 mcg tablet, Take 1 tablet (25 mcg total) by mouth daily, Disp: 90 tablet, Rfl: 3    valsartan (DIOVAN) 80 mg tablet, Take 1 tablet (80 mg total) by mouth daily, Disp: 90 tablet, Rfl: 2  No Known Allergies  Past Surgical History:   Procedure Laterality Date    FL LUMBAR PUNCTURE DIAGNOSTIC  1/4/2021    HAND FRACTURE REPAIR      left thumb    OK STEREO BX/ASPIR/EXCIS,INTRACRANIAL LESN Left 1/19/2021    Procedure: BIOPSY BRAIN IMAGE-GUIDED NEEDLE - LEFT TEMPORAL;  Surgeon: Naye Lux MD;  Location: [facility];  Service: Neurosurgery    TONSILLECTOMY       Social History     Objective:  Vitals:    06/17/22 0822   BP: 133/81   Pulse: 67   Temp: 98 °F (36 7 °C)   Weight: 103 kg (228 lb)   Height: 6' 3" (1 905 m)     Physical Exam  Constitutional:       Appearance: He is well-developed  HENT:      Head: Normocephalic and atraumatic  Eyes:      Pupils: Pupils are equal, round, and reactive to light  Cardiovascular:      Rate and Rhythm: Normal rate and regular rhythm  Heart sounds: No murmur heard  Pulmonary:      Breath sounds: Normal breath sounds  No wheezing or rales  Abdominal:      Palpations: Abdomen is soft  Tenderness: There is no abdominal tenderness  Musculoskeletal:         General: No tenderness  Normal range of motion  Cervical back: Neck supple  Lymphadenopathy:      Cervical: No cervical adenopathy  Skin:     Findings: No erythema or rash  Neurological:      Mental Status: He is alert and oriented to person, place, and time  Cranial Nerves: No cranial nerve deficit  Deep Tendon Reflexes: Reflexes are normal and symmetric  Psychiatric:         Behavior: Behavior normal            Labs: I personally reviewed the labs and imaging pertinent to this patient care

## 2022-06-17 NOTE — PROGRESS NOTES
[facility] [place]  [address] [facility] [address]  [address] [facility] KRUNAL JACKSON,[birth year], [id number]  06/17/22    Discussion:   , this is a 59-year-old male history of GBM  He is currently on Avastin  He tolerates this without difficulty  He notes that he has diminished energy at times  This is been true since his diagnosis 2 5 years ago  He has sleep apnea but states that he uses his CPAP regularly  Recent TSH is normal   My highest suspicion is that his daytime fatigue relates to either Keppra or Avastin toxicities  Keppra dosing could be reduced though risk of seizure increases somewhat  We reviewed that previously his cancer was uncontrolled  Since then it has come under control allowing the possibility of dose reduction  He also understands that if he had a seizure his license would have to be revoked for 6 months  He will confer with Neurology about this  Additionally, he is concerned about colon cancer screening  We reviewed the pros and cons of Cologuard testing versus colonoscopy  Either would be acceptable from my viewpoint  He will consider this and let us know how he wishes to proceed  Lastly, his MRI shows no change compared to the prior study of 3 months ago illustrating good disease control  I discussed the above with the patient  The patient  voiced understanding and agreement   ______________________________________________________________________    No chief complaint on file  HPI:  Oncology History Overview Note   Wandaleaurelio Friday is a 60year old male is status post biopsy of a left temporal mass with pathology consistent with GBM  He has a  past medical history of hypertension, obesity, hyperlipidemia, and sleep apnea  He presented to the ED on 1/1/21 with sudden onset of expressive aphasia      He completed a course of radiation therapy to the left temporal lobe of the brain concurrent with temodar on 4/6/21 5/7/21 MRI brain w wo contrast  IMPRESSION:  Left temporal lobe GBM continues to increase in size and is currently contiguous with the lateral margin of the compressed lateral ventricular atrium  Increasing subtle ependymal enhancement and vasogenic edema  5/13/21 Dr Deonna Chamberlain, neurology f/y  Continue current seizure medications unchanged  Return in 6 months    5/14/21 Dr Teri Mckinney f/u  Relatively stable clinically, feels his gait and speech are somewhat improved compared to time of diagnosis  "Repeat MRI shows increase in size of left temporal lobe mass with increasing edema  We reviewed that it is possible that some of this represents post treatment change but certainly is concerning for progressive disease as well  Case to be reviewed at [facility]-Oncology Working group in a few days  We will call the patient with the results of that review and further recommendations "  Arrange for consolidative Temodar  Avastin could be added  6/11/21 Dr Teri Mckinney  11/1/21 Neurology f/u         Brain tumor [facility]) (Resolved)   1/19/2021 Biopsy    BIOPSY BRAIN IMAGE-GUIDED NEEDLE - LEFT TEMPORAL  Surgeon: Dr Betsy Taveras    Temporal mass:  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)  Note    This case was seen in consultation with Dr Philip El, an expert in Neuropathology from [facility]  Glioblastoma ([facility] [address] )   1/19/2021 Initial Diagnosis    Glioblastoma ([facility] [address] )     1/19/2021 Biopsy    BIOPSY BRAIN IMAGE-GUIDED NEEDLE - LEFT TEMPORAL  Surgeon: Dr Betsy Taveras    Temporal mass:  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)  Note    This case was seen in consultation with Dr Philip El, an expert in Neuropathology from [facility]            2/24/2021 - 4/6/2021 Radiation    Plan ID Energy Fractions Dose per Fraction (cGy) Dose Correction (cGy) Total Dose Delivered (cGy) Elapsed Days   L Temp CD 6X 7 / 7 200 0 1,400 8   L Temp Lobe 6X 23 / 23 200 0 4,600 30   Treatment dates:  C1: 2/24/2021 - 4/6/2021 2/24/2021 - 4/6/2021 Chemotherapy    Temodar 185 mg daily w/radiation therapy     5/27/2021 -  Chemotherapy    bevacizumab (AVASTIN) IVPB, 1,090 mg, Intravenous, Once, 24 of 26 cycles  Dose modification: 15 mg/kg (original dose 10 mg/kg, Cycle 17, Reason: Dose modified as per discussion with consulting physician)  Administration: 1,100 mg (5/27/2021), 1,100 mg (6/10/2021), 1,100 mg (6/24/2021), 1,100 mg (7/8/2021), 1,100 mg (7/22/2021), 1,100 mg (8/5/2021), 1,100 mg (9/30/2021), 1,100 mg (9/16/2021), 1,100 mg (9/2/2021), 1,100 mg (8/19/2021), 1,100 mg (10/14/2021), 1,100 mg (10/28/2021), 1,100 mg (11/11/2021), 1,100 mg (11/26/2021), 1,600 mg (1/6/2022), 1,100 mg (12/23/2021), 1,100 mg (12/9/2021), 1,600 mg (1/27/2022), 1,600 mg (2/17/2022), 1,600 mg (4/21/2022), 1,600 mg (3/10/2022), 1,600 mg (3/31/2022), 1,600 mg (5/12/2022), 1,600 mg (6/2/2022)         Interval History:  Clinically stable  ECOG-  0 - Asymptomatic    Review of Systems   Constitutional: Negative for chills and fever  HENT: Negative for nosebleeds  Eyes: Negative for discharge  Respiratory: Negative for cough and shortness of breath  Cardiovascular: Negative for chest pain  Gastrointestinal: Negative for abdominal pain, constipation and diarrhea  Endocrine: Negative for polydipsia  Genitourinary: Negative for hematuria  Musculoskeletal: Negative for arthralgias  Skin: Negative for color change  Allergic/Immunologic: Negative for immunocompromised state  Neurological: Negative for dizziness and headaches  Hematological: Negative for adenopathy  Psychiatric/Behavioral: Negative for agitation         Past Medical History:   Diagnosis Date    [facility]    Bruxism (teeth grinding)     GBM (glioblastoma multiforme) (HCC)     Hypercholesterolemia     Hypertension     Obesity     Sleep apnea     CPAP nightly     Patient Active Problem List   Diagnosis    Class 1 obesity due to excess calories with serious comorbidity and body mass index (BMI) of 33 0 to 33 9 in adult    Negative depression screening    Essential hypertension    Obstructive sleep apnea syndrome    Sleep disturbance    Daytime sleepiness    Glioblastoma (HCC)    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 ([facility] [address] )    Annual physical exam    Muscle cramps    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    Mixed hyperlipidemia    Subclinical hypothyroidism       Current Outpatient Medications:     amLODIPine (NORVASC) 10 mg tablet, Take 1 tablet by mouth once daily, Disp: 90 tablet, Rfl: 1    atorvastatin (LIPITOR) 10 mg tablet, Take 1 tablet (10 mg total) by mouth daily, Disp: 90 tablet, Rfl: 3    Bevacizumab (AVASTIN IV), Infuse into a venous catheter Get infusion every two weeks ( for Glioblastoma cancer), Disp: , Rfl:     fenofibrate (TRICOR) 54 MG tablet, Take 1 tablet (54 mg total) by mouth daily, Disp: 90 tablet, Rfl: 2    hydrochlorothiazide (MICROZIDE) 12 5 mg capsule, Take 1 capsule by mouth once daily, Disp: 90 capsule, Rfl: 1    levETIRAcetam (KEPPRA) 1000 MG tablet, Take 1,000 mg by mouth 2 (two) times a day, Disp: , Rfl:     levETIRAcetam (KEPPRA) 250 mg tablet, Take 250 mg by mouth 2 (two) times a day, Disp: , Rfl:     levothyroxine 25 mcg tablet, Take 1 tablet (25 mcg total) by mouth daily, Disp: 90 tablet, Rfl: 3    valsartan (DIOVAN) 80 mg tablet, Take 1 tablet (80 mg total) by mouth daily, Disp: 90 tablet, Rfl: 2  No Known Allergies  Past Surgical History:   Procedure Laterality Date    FL LUMBAR PUNCTURE DIAGNOSTIC  1/4/2021    HAND FRACTURE REPAIR      left thumb    NE STEREO BX/ASPIR/EXCIS,INTRACRANIAL LESN Left 1/19/2021    Procedure: BIOPSY BRAIN IMAGE-GUIDED NEEDLE - LEFT TEMPORAL;  Surgeon: Delphine Lai MD;  [facility];  Service: Neurosurgery    TONSILLECTOMY       Social History     Objective:  Vitals:    06/17/22 6645 BP: 133/81   Pulse: 67   Temp: 98 °F (36 7 °C)   Weight: 103 kg (228 lb)   Height: 6' 3" (1 905 m)     Physical Exam  Constitutional:       Appearance: He is well-developed  HENT:      Head: Normocephalic and atraumatic  Eyes:      Pupils: Pupils are equal, round, and reactive to light  Cardiovascular:      Rate and Rhythm: Normal rate and regular rhythm  Heart sounds: No murmur heard  Pulmonary:      Breath sounds: Normal breath sounds  No wheezing or rales  Abdominal:      Palpations: Abdomen is soft  Tenderness: There is no abdominal tenderness  Musculoskeletal:         General: No tenderness  Normal range of motion  Cervical back: Neck supple  Lymphadenopathy:      Cervical: No cervical adenopathy  Skin:     Findings: No erythema or rash  Neurological:      Mental Status: He is alert and oriented to person, place, and time  Cranial Nerves: No cranial nerve deficit  Deep Tendon Reflexes: Reflexes are normal and symmetric  Psychiatric:         Behavior: Behavior normal            Labs: I personally reviewed the labs and imaging pertinent to this patient care

## 2022-06-19 DIAGNOSIS — E78.1 ABNORMALLY LOW HIGH DENSITY LIPOPROTEIN (HDL) CHOLESTEROL WITH HYPERTRIGLYCERIDEMIA: ICD-10-CM

## 2022-06-19 DIAGNOSIS — E78.6 ABNORMALLY LOW HIGH DENSITY LIPOPROTEIN (HDL) CHOLESTEROL WITH HYPERTRIGLYCERIDEMIA: ICD-10-CM

## 2022-06-19 RX ORDER — FENOFIBRATE 54 MG/1
TABLET ORAL
Qty: 90 TABLET | Refills: 0 | Status: SHIPPED | OUTPATIENT
Start: 2022-06-19

## 2022-06-22 ENCOUNTER — APPOINTMENT (OUTPATIENT)
Dept: LAB | Facility: HOSPITAL | Age: 67
End: 2022-06-22
Attending: INTERNAL MEDICINE
Payer: MEDICARE

## 2022-06-22 DIAGNOSIS — C71.9 GLIOBLASTOMA (HCC): ICD-10-CM

## 2022-06-22 LAB
ALBUMIN SERPL BCP-MCNC: 4.2 G/DL (ref 3.5–5)
ALP SERPL-CCNC: 41 U/L (ref 34–104)
ALT SERPL W P-5'-P-CCNC: 15 U/L (ref 7–52)
ANION GAP SERPL CALCULATED.3IONS-SCNC: 7 MMOL/L (ref 4–13)
AST SERPL W P-5'-P-CCNC: 22 U/L (ref 13–39)
BACTERIA UR QL AUTO: ABNORMAL /HPF
BASOPHILS # BLD AUTO: 0.04 THOUSANDS/ΜL (ref 0–0.1)
BASOPHILS NFR BLD AUTO: 1 % (ref 0–1)
BILIRUB SERPL-MCNC: 0.67 MG/DL (ref 0.2–1)
BILIRUB UR QL STRIP: NEGATIVE
BUN SERPL-MCNC: 15 MG/DL (ref 5–25)
CALCIUM SERPL-MCNC: 9.1 MG/DL (ref 8.4–10.2)
CHLORIDE SERPL-SCNC: 105 MMOL/L (ref 96–108)
CLARITY UR: CLEAR
CO2 SERPL-SCNC: 28 MMOL/L (ref 21–32)
COLOR UR: YELLOW
CREAT SERPL-MCNC: 0.9 MG/DL (ref 0.6–1.3)
EOSINOPHIL # BLD AUTO: 0.11 THOUSAND/ΜL (ref 0–0.61)
EOSINOPHIL NFR BLD AUTO: 2 % (ref 0–6)
ERYTHROCYTE [DISTWIDTH] IN BLOOD BY AUTOMATED COUNT: 13.8 % (ref 11.6–15.1)
GFR SERPL CREATININE-BSD FRML MDRD: 89 ML/MIN/1.73SQ M
GLUCOSE P FAST SERPL-MCNC: 89 MG/DL (ref 65–99)
GLUCOSE UR STRIP-MCNC: NEGATIVE MG/DL
HCT VFR BLD AUTO: 49.1 % (ref 36.5–49.3)
HGB BLD-MCNC: 16.2 G/DL (ref 12–17)
HGB UR QL STRIP.AUTO: NEGATIVE
IMM GRANULOCYTES # BLD AUTO: 0.01 THOUSAND/UL (ref 0–0.2)
IMM GRANULOCYTES NFR BLD AUTO: 0 % (ref 0–2)
KETONES UR STRIP-MCNC: NEGATIVE MG/DL
LEUKOCYTE ESTERASE UR QL STRIP: NEGATIVE
LYMPHOCYTES # BLD AUTO: 1.96 THOUSANDS/ΜL (ref 0.6–4.47)
LYMPHOCYTES NFR BLD AUTO: 31 % (ref 14–44)
MCH RBC QN AUTO: 30.1 PG (ref 26.8–34.3)
MCHC RBC AUTO-ENTMCNC: 33 G/DL (ref 31.4–37.4)
MCV RBC AUTO: 91 FL (ref 82–98)
MONOCYTES # BLD AUTO: 0.6 THOUSAND/ΜL (ref 0.17–1.22)
MONOCYTES NFR BLD AUTO: 10 % (ref 4–12)
MUCOUS THREADS UR QL AUTO: ABNORMAL
NEUTROPHILS # BLD AUTO: 3.58 THOUSANDS/ΜL (ref 1.85–7.62)
NEUTS SEG NFR BLD AUTO: 56 % (ref 43–75)
NITRITE UR QL STRIP: NEGATIVE
NON-SQ EPI CELLS URNS QL MICRO: ABNORMAL /HPF
NRBC BLD AUTO-RTO: 0 /100 WBCS
PH UR STRIP.AUTO: 5.5 [PH]
PLATELET # BLD AUTO: 174 THOUSANDS/UL (ref 149–390)
PMV BLD AUTO: 10.4 FL (ref 8.9–12.7)
POTASSIUM SERPL-SCNC: 3.7 MMOL/L (ref 3.5–5.3)
PROT SERPL-MCNC: 6.3 G/DL (ref 6.4–8.4)
PROT UR STRIP-MCNC: NEGATIVE MG/DL
RBC # BLD AUTO: 5.39 MILLION/UL (ref 3.88–5.62)
RBC #/AREA URNS AUTO: ABNORMAL /HPF
SODIUM SERPL-SCNC: 140 MMOL/L (ref 135–147)
SP GR UR STRIP.AUTO: >=1.03 (ref 1–1.03)
UROBILINOGEN UR QL STRIP.AUTO: 0.2 E.U./DL
WBC # BLD AUTO: 6.3 THOUSAND/UL (ref 4.31–10.16)
WBC #/AREA URNS AUTO: ABNORMAL /HPF

## 2022-06-22 PROCEDURE — 85025 COMPLETE CBC W/AUTO DIFF WBC: CPT

## 2022-06-22 PROCEDURE — 81001 URINALYSIS AUTO W/SCOPE: CPT

## 2022-06-22 PROCEDURE — 80053 COMPREHEN METABOLIC PANEL: CPT

## 2022-06-22 PROCEDURE — 36415 COLL VENOUS BLD VENIPUNCTURE: CPT

## 2022-06-23 ENCOUNTER — HOSPITAL ENCOUNTER (OUTPATIENT)
Dept: INFUSION CENTER | Facility: HOSPITAL | Age: 67
Discharge: HOME | End: 2022-06-23
Attending: INTERNAL MEDICINE
Payer: MEDICARE

## 2022-06-23 VITALS
DIASTOLIC BLOOD PRESSURE: 84 MMHG | TEMPERATURE: 97.2 F | HEART RATE: 67 BPM | HEIGHT: 75 IN | WEIGHT: 229.06 LBS | BODY MASS INDEX: 28.48 KG/M2 | OXYGEN SATURATION: 98 % | SYSTOLIC BLOOD PRESSURE: 132 MMHG | RESPIRATION RATE: 18 BRPM

## 2022-06-23 DIAGNOSIS — C71.9 GLIOBLASTOMA (HCC): Primary | ICD-10-CM

## 2022-06-23 PROCEDURE — 96413 CHEMO IV INFUSION 1 HR: CPT

## 2022-06-23 RX ORDER — SODIUM CHLORIDE 9 MG/ML
20 INJECTION, SOLUTION INTRAVENOUS ONCE
Status: COMPLETED | OUTPATIENT
Start: 2022-06-23 | End: 2022-06-23

## 2022-06-23 RX ADMIN — SODIUM CHLORIDE 20 ML/HR: 0.9 INJECTION, SOLUTION INTRAVENOUS at 08:31

## 2022-06-23 RX ADMIN — BEVACIZUMAB 1600 MG: 400 INJECTION, SOLUTION INTRAVENOUS at 09:02

## 2022-06-29 ENCOUNTER — OFFICE VISIT (OUTPATIENT)
Dept: GASTROENTEROLOGY | Facility: CLINIC | Age: 67
End: 2022-06-29
Payer: MEDICARE

## 2022-06-29 ENCOUNTER — TELEPHONE (OUTPATIENT)
Dept: GASTROENTEROLOGY | Facility: CLINIC | Age: 67
End: 2022-06-29

## 2022-06-29 VITALS
HEIGHT: 75 IN | RESPIRATION RATE: 20 BRPM | OXYGEN SATURATION: 97 % | BODY MASS INDEX: 28.35 KG/M2 | SYSTOLIC BLOOD PRESSURE: 136 MMHG | HEART RATE: 74 BPM | WEIGHT: 228 LBS | TEMPERATURE: 97.7 F | DIASTOLIC BLOOD PRESSURE: 84 MMHG

## 2022-06-29 DIAGNOSIS — G47.33 OBSTRUCTIVE SLEEP APNEA SYNDROME: ICD-10-CM

## 2022-06-29 DIAGNOSIS — Z12.11 SCREENING FOR COLON CANCER: ICD-10-CM

## 2022-06-29 DIAGNOSIS — C71.9 GLIOBLASTOMA (HCC): ICD-10-CM

## 2022-06-29 DIAGNOSIS — K59.00 CONSTIPATION, UNSPECIFIED CONSTIPATION TYPE: Primary | ICD-10-CM

## 2022-06-29 PROCEDURE — 99204 OFFICE O/P NEW MOD 45 MIN: CPT | Performed by: INTERNAL MEDICINE

## 2022-06-29 RX ORDER — POLYETHYLENE GLYCOL 3350 17 G/17G
17 POWDER, FOR SOLUTION ORAL DAILY
Qty: 510 G | Refills: 0 | Status: SHIPPED | OUTPATIENT
Start: 2022-06-29 | End: 2022-07-29

## 2022-06-29 NOTE — PROGRESS NOTES
[facility] - Outpatient Consultation  Adam Hanesn 73 y o  male MRN: [id number]  Encounter: [id number]          ASSESSMENT AND PLAN:      1  Screening for colon cancer  - Ambulatory referral for colonoscopy  - Colonoscopy; Future  2  Constipation, unspecified constipation type  - polyethylene glycol (GLYCOLAX) 17 GM/SCOOP powder; Take 17 g by mouth daily  Dispense: 510 g; Refill: 0  3  Obstructive sleep apnea syndrome  4  Glioblastoma [facility]    43-year-old male with glioblastoma, seizures, sleep apnea presenting for discussion of colorectal cancer screening  Discussed that while colonoscopies are safe procedures, they are not risk free; his underlying brain tumor with risk of seizures along with sleep apnea puts him at higher risk for anesthesia complication  We discussed in detail the alternatives to colonoscopy including stool based testing such as FIT or cologuard  We discussed the benefits/downsides of each of these tests in contrast to colonoscopy  After a lengthy risks/benefits discussion, Mr Dom Welch wishes to proceed with colonoscopy understanding increased risk for him  Will proceed with colonoscopy with miralax/magnesium citrate bowel prep  To treat underlying likely slow transit/functional constipation I have recommended daily miralax starting now and titrating as needed for complete evacuation  Could also consider addition of fiber supplementation depending on his response      ______________________________________________________________________    HPI: 72 y/o male with glioblastoma on avastin, seizure disorder on Keppra, sleep apnea, presenting for discussion of colorectal cancer screening  He was referred by his oncologist for discussion of options  He has never had a colonoscopy or other form of colorectal cancer screening prior  Since he was diagnosed with glioblastoma he has noted change in bowel frequency and sensation of incomplete evacuation    Stool caliber remains the same, and he denies rectal bleeding  He does not have intermittent diarrhea, abdominal pain  His weight has been stable  There is no family history of colorectal cancer  He has sleep apnea and uses CPAP regularly at night  REVIEW OF SYSTEMS:    CONSTITUTIONAL: Denies any fever, chills, rigors, and weight loss  HEENT: Denies odynophagia, tinnitus  CARDIOVASCULAR: No chest pain or palpitations  RESPIRATORY: Denies any cough, hemoptysis, shortness of breath or dyspnea on exertion  GASTROINTESTINAL: As noted in the History of Present Illness  GENITOURINARY: No problems with urination  Denies any hematuria or dysuria  NEUROLOGIC: No dizziness or vertigo, denies headaches  MUSCULOSKELETAL: Denies any muscle or joint pain  SKIN: Denies skin rashes or itching  ENDOCRINE:  Denies intolerance to heat or cold  PSYCHOSOCIAL: Denies depression or anxiety  Denies any recent memory loss         Historical Information   Past Medical History:   Diagnosis Date    [facility] [address] ) 2021    Bruxism (teeth grinding)     GBM (glioblastoma multiforme) (HCC)     Hypercholesterolemia     Hypertension     Obesity     Sleep apnea     CPAP nightly     Past Surgical History:   Procedure Laterality Date    FL LUMBAR PUNCTURE DIAGNOSTIC  2021    HAND FRACTURE REPAIR      left thumb    ID STEREO BX/ASPIR/EXCIS,INTRACRANIAL LESN Left 2021    Procedure: BIOPSY BRAIN IMAGE-GUIDED NEEDLE - LEFT TEMPORAL;  Surgeon: Saul Gilmore MD;  [facility];  Service: Neurosurgery    TONSILLECTOMY       Social History   Social History     Substance and Sexual Activity   Alcohol Use Not Currently     Social History     Substance and Sexual Activity   Drug Use Not Currently     Social History     Tobacco Use   Smoking Status Former Smoker    Types: Cigarettes    Quit date: 12    Years since quittin 5   Smokeless Tobacco Never Used     Family History   Problem Relation Age of Onset    Heart disease Mother     Heart attack Father     Leukemia Brother        Meds/Allergies       Current Outpatient Medications:     amLODIPine (NORVASC) 10 mg tablet    atorvastatin (LIPITOR) 10 mg tablet    Bevacizumab (AVASTIN IV)    fenofibrate (TRICOR) 54 MG tablet    hydrochlorothiazide (MICROZIDE) 12 5 mg capsule    levETIRAcetam (KEPPRA) 1000 MG tablet    levETIRAcetam (KEPPRA) 250 mg tablet    levothyroxine 25 mcg tablet    polyethylene glycol (GLYCOLAX) 17 GM/SCOOP powder    valsartan (DIOVAN) 80 mg tablet    No Known Allergies        Objective     Blood pressure 136/84, pulse 74, temperature 97 7 °F (36 5 °C), resp  rate 20, height 6' 3" (1 905 m), weight 103 kg (228 lb), SpO2 97 %  Body mass index is 28 5 kg/m²  PHYSICAL EXAM:      General Appearance:   Alert, cooperative, no distress; word finding difficulty   HEENT:   Normocephalic, atraumatic, anicteric  Neck:  Supple, symmetrical, trachea midline   Lungs:   Clear to auscultation bilaterally; no rales, rhonchi or wheezing; respirations unlabored    Heart[de-identified]   Regular rate and rhythm; no murmur, rub, or gallop  Abdomen:   Soft, non-tender, non-distended; normal bowel sounds; no masses, no organomegaly    Genitalia:   Deferred    Rectal:   Deferred    Extremities:  No cyanosis, clubbing or edema    Pulses:  2+ and symmetric    Skin:  No jaundice, rashes, or lesions          Lab Results:   No visits with results within 1 Day(s) from this visit     Latest known visit with results is:   Appointment on 06/22/2022   Component Date Value    Sodium 06/22/2022 140     Potassium 06/22/2022 3 7     Chloride 06/22/2022 105     CO2 06/22/2022 28     ANION GAP 06/22/2022 7     BUN 06/22/2022 15     Creatinine 06/22/2022 0 90     Glucose, Fasting 06/22/2022 89     Calcium 06/22/2022 9 1     AST 06/22/2022 22     ALT 06/22/2022 15     Alkaline Phosphatase 06/22/2022 41     Total Protein 06/22/2022 6 3 (A)    Albumin 06/22/2022 4 2  Total Bilirubin 06/22/2022 0 67     eGFR 06/22/2022 89     WBC 06/22/2022 6 30     RBC 06/22/2022 5 39     Hemoglobin 06/22/2022 16 2     Hematocrit 06/22/2022 49 1     MCV 06/22/2022 91     MCH 06/22/2022 30 1     MCHC 06/22/2022 33 0     RDW 06/22/2022 13 8     MPV 06/22/2022 10 4     Platelets 99/13/8719 174     nRBC 06/22/2022 0     Neutrophils Relative 06/22/2022 56     Immat GRANS % 06/22/2022 0     Lymphocytes Relative 06/22/2022 31     Monocytes Relative 06/22/2022 10     Eosinophils Relative 06/22/2022 2     Basophils Relative 06/22/2022 1     Neutrophils Absolute 06/22/2022 3 58     Immature Grans Absolute 06/22/2022 0 01     Lymphocytes Absolute 06/22/2022 1 96     Monocytes Absolute 06/22/2022 0 60     Eosinophils Absolute 06/22/2022 0 11     Basophils Absolute 06/22/2022 0 04     Clarity, UA 06/22/2022 Clear     Color, UA 06/22/2022 Yellow     Specific Gravity, UA 06/22/2022 >=1 030 (A)    pH, UA 06/22/2022 5 5     Glucose, UA 06/22/2022 Negative     Ketones, UA 06/22/2022 Negative     Occult Blood, UA 06/22/2022 Negative     Protein, UA 06/22/2022 Negative     Nitrite, UA 06/22/2022 Negative     Bilirubin, UA 06/22/2022 Negative     Urobilinogen, UA 06/22/2022 0 2     Leukocytes, UA 06/22/2022 Negative     WBC, UA 06/22/2022 0-1 (A)    RBC, UA 06/22/2022 None Seen     Bacteria, UA 06/22/2022 None Seen     Epithelial Cells 06/22/2022 None Seen     MUCUS THREADS 06/22/2022 Moderate (A)         Radiology Results:   MRI brain w wo contrast    Result Date: 6/9/2022  Narrative: MRI BRAIN WITH AND WITHOUT CONTRAST INDICATION: C71 9: Malignant neoplasm of brain, unspecified  Tumor Type: 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) Surgical History: Left temporal lobe biopsy 1/19/2021  Radiation History: 2/24/2021 through 4/6/2021 Relevant Medications: Temodar concurrent with radiation  Currently on bevacizumab (Avastin) since 5/27/2021    COMPARISON:  Multiple priors most recently performed on 2/20/2022  TECHNIQUE: Sagittal T1, axial T2, axial FLAIR, axial T1, axial [place], axial diffusion  Sagittal, axial T1 postcontrast   Axial bravo postcontrast with coronal reconstructions  IV Contrast:  10 mL of Gadobutrol injection ( IMAGE QUALITY:   Diagnostic  FINDINGS: BRAIN TUMOR:  Stable left temporal cystic encephalomalacia communicating with the left lateral ventricle at site of treated GBM  Surrounding FLAIR signal in the left temporal lobe that extends to the periatrial white matter  Stable restricted diffusion at the margins of the cavity  FLAIR: Stable FLAIR signal in the left temporal lobe surrounding the porencephalic cavity that extends into the left parietal periatrial white matter  Enhancement: Small curvilinear focus of enhancement in the left temporal lobe has improved  Tiny linear focus of T1 shortening at site of enhancement  No definite residual enhancement  Perfusion: Not performed Diffusion: Stable restricted diffusion surrounding the treatment cavity Posttreatment changes: Expected post treatment change is present  No new ischemia  No new fluid collections or hemorrhage  OTHER: No acute infarction  No hemorrhage  No hydrocephalus  No herniation  No fluid collections  Mild chronic microangiopathy  SELLA AND PITUITARY GLAND:  Normal  ORBITS:  Normal  PARANASAL SINUSES:  Normal  VASCULATURE:  Stable subcentimeter left MCA bifurcation aneurysm  CALVARIUM AND SKULL BASE:  Normal  EXTRACRANIAL SOFT TISSUES:  Normal      Impression: Status post treatment of Glioblastoma  2 - No change  LEGEND: 0 - New baseline, incomplete study, otherwise unable to categorize  1A - Improvement in imaging findings suspected to reflect decreasing tumor burden and/or treatment effect 1B - Improvement in imaging findings potentially due to effect from medications such as steroids or avastin therapy  2 - No change   3A - Worsening imaging findings favored to represent treatment effects including radiation therapy and medications  3B - Worsening imaging findings favored to represent an indeterminant mix of treatment effects and tumor worsening  3C - Worsening imaging findings favored to represent increasing burden of tumor  4 - Worsening of imaging findings highly suspicious for tumor progression   Workstation performed: [id number]

## 2022-06-29 NOTE — H&P (VIEW-ONLY)
[facility] - Outpatient Consultation  Adenike Villela 73 y o  male MRN: [id number]  Encounter: [id number]          ASSESSMENT AND PLAN:      1  Screening for colon cancer  - Ambulatory referral for colonoscopy  - Colonoscopy; Future  2  Constipation, unspecified constipation type  - polyethylene glycol (GLYCOLAX) 17 GM/SCOOP powder; Take 17 g by mouth daily  Dispense: 510 g; Refill: 0  3  Obstructive sleep apnea syndrome  4  Glioblastoma [facility]    29-year-old male with glioblastoma, seizures, sleep apnea presenting for discussion of colorectal cancer screening  Discussed that while colonoscopies are safe procedures, they are not risk free; his underlying brain tumor with risk of seizures along with sleep apnea puts him at higher risk for anesthesia complication  We discussed in detail the alternatives to colonoscopy including stool based testing such as FIT or cologuard  We discussed the benefits/downsides of each of these tests in contrast to colonoscopy  After a lengthy risks/benefits discussion, Mr Deja Oliveros wishes to proceed with colonoscopy understanding increased risk for him  Will proceed with colonoscopy with miralax/magnesium citrate bowel prep  To treat underlying likely slow transit/functional constipation I have recommended daily miralax starting now and titrating as needed for complete evacuation  Could also consider addition of fiber supplementation depending on his response      ______________________________________________________________________    HPI: 72 y/o male with glioblastoma on avastin, seizure disorder on Keppra, sleep apnea, presenting for discussion of colorectal cancer screening  He was referred by his oncologist for discussion of options  He has never had a colonoscopy or other form of colorectal cancer screening prior  Since he was diagnosed with glioblastoma he has noted change in bowel frequency and sensation of incomplete evacuation    Stool caliber remains the same, and he denies rectal bleeding  He does not have intermittent diarrhea, abdominal pain  His weight has been stable  There is no family history of colorectal cancer  He has sleep apnea and uses CPAP regularly at night  REVIEW OF SYSTEMS:    CONSTITUTIONAL: Denies any fever, chills, rigors, and weight loss  HEENT: Denies odynophagia, tinnitus  CARDIOVASCULAR: No chest pain or palpitations  RESPIRATORY: Denies any cough, hemoptysis, shortness of breath or dyspnea on exertion  GASTROINTESTINAL: As noted in the History of Present Illness  GENITOURINARY: No problems with urination  Denies any hematuria or dysuria  NEUROLOGIC: No dizziness or vertigo, denies headaches  MUSCULOSKELETAL: Denies any muscle or joint pain  SKIN: Denies skin rashes or itching  ENDOCRINE:  Denies intolerance to heat or cold  PSYCHOSOCIAL: Denies depression or anxiety  Denies any recent memory loss         Historical Information   Past Medical History:   Diagnosis Date    Brain tumor ([facility] [address] ) 2021    Bruxism (teeth grinding)     GBM (glioblastoma multiforme) (HCC)     Hypercholesterolemia     Hypertension     Obesity     Sleep apnea     CPAP nightly     Past Surgical History:   Procedure Laterality Date    FL LUMBAR PUNCTURE DIAGNOSTIC  2021    HAND FRACTURE REPAIR      left thumb    SD STEREO BX/ASPIR/EXCIS,INTRACRANIAL LESN Left 2021    Procedure: BIOPSY BRAIN IMAGE-GUIDED NEEDLE - LEFT TEMPORAL;  Surgeon: Ivan Kolb MD;  [facility];  Service: Neurosurgery    TONSILLECTOMY       Social History   Social History     Substance and Sexual Activity   Alcohol Use Not Currently     Social History     Substance and Sexual Activity   Drug Use Not Currently     Social History     Tobacco Use   Smoking Status Former Smoker    Types: Cigarettes    Quit date: 12    Years since quittin 5   Smokeless Tobacco Never Used     Family History   Problem Relation Age of Onset    Heart disease Mother     Heart attack Father     Leukemia Brother        Meds/Allergies       Current Outpatient Medications:     amLODIPine (NORVASC) 10 mg tablet    atorvastatin (LIPITOR) 10 mg tablet    Bevacizumab (AVASTIN IV)    fenofibrate (TRICOR) 54 MG tablet    hydrochlorothiazide (MICROZIDE) 12 5 mg capsule    levETIRAcetam (KEPPRA) 1000 MG tablet    levETIRAcetam (KEPPRA) 250 mg tablet    levothyroxine 25 mcg tablet    polyethylene glycol (GLYCOLAX) 17 GM/SCOOP powder    valsartan (DIOVAN) 80 mg tablet    No Known Allergies        Objective     Blood pressure 136/84, pulse 74, temperature 97 7 °F (36 5 °C), resp  rate 20, height 6' 3" (1 905 m), weight 103 kg (228 lb), SpO2 97 %  Body mass index is 28 5 kg/m²  PHYSICAL EXAM:      General Appearance:   Alert, cooperative, no distress; word finding difficulty   HEENT:   Normocephalic, atraumatic, anicteric  Neck:  Supple, symmetrical, trachea midline   Lungs:   Clear to auscultation bilaterally; no rales, rhonchi or wheezing; respirations unlabored    Heart[de-identified]   Regular rate and rhythm; no murmur, rub, or gallop  Abdomen:   Soft, non-tender, non-distended; normal bowel sounds; no masses, no organomegaly    Genitalia:   Deferred    Rectal:   Deferred    Extremities:  No cyanosis, clubbing or edema    Pulses:  2+ and symmetric    Skin:  No jaundice, rashes, or lesions          Lab Results:   No visits with results within 1 Day(s) from this visit     Latest known visit with results is:   Appointment on 06/22/2022   Component Date Value    Sodium 06/22/2022 140     Potassium 06/22/2022 3 7     Chloride 06/22/2022 105     CO2 06/22/2022 28     ANION GAP 06/22/2022 7     BUN 06/22/2022 15     Creatinine 06/22/2022 0 90     Glucose, Fasting 06/22/2022 89     Calcium 06/22/2022 9 1     AST 06/22/2022 22     ALT 06/22/2022 15     Alkaline Phosphatase 06/22/2022 41     Total Protein 06/22/2022 6 3 (A)    Albumin 06/22/2022 4 2  Total Bilirubin 06/22/2022 0 67     eGFR 06/22/2022 89     WBC 06/22/2022 6 30     RBC 06/22/2022 5 39     Hemoglobin 06/22/2022 16 2     Hematocrit 06/22/2022 49 1     MCV 06/22/2022 91     MCH 06/22/2022 30 1     MCHC 06/22/2022 33 0     RDW 06/22/2022 13 8     MPV 06/22/2022 10 4     Platelets 72/77/8400 174     nRBC 06/22/2022 0     Neutrophils Relative 06/22/2022 56     Immat GRANS % 06/22/2022 0     Lymphocytes Relative 06/22/2022 31     Monocytes Relative 06/22/2022 10     Eosinophils Relative 06/22/2022 2     Basophils Relative 06/22/2022 1     Neutrophils Absolute 06/22/2022 3 58     Immature Grans Absolute 06/22/2022 0 01     Lymphocytes Absolute 06/22/2022 1 96     Monocytes Absolute 06/22/2022 0 60     Eosinophils Absolute 06/22/2022 0 11     Basophils Absolute 06/22/2022 0 04     Clarity, UA 06/22/2022 Clear     Color, UA 06/22/2022 Yellow     Specific Gravity, UA 06/22/2022 >=1 030 (A)    pH, UA 06/22/2022 5 5     Glucose, UA 06/22/2022 Negative     Ketones, UA 06/22/2022 Negative     Occult Blood, UA 06/22/2022 Negative     Protein, UA 06/22/2022 Negative     Nitrite, UA 06/22/2022 Negative     Bilirubin, UA 06/22/2022 Negative     Urobilinogen, UA 06/22/2022 0 2     Leukocytes, UA 06/22/2022 Negative     WBC, UA 06/22/2022 0-1 (A)    RBC, UA 06/22/2022 None Seen     Bacteria, UA 06/22/2022 None Seen     Epithelial Cells 06/22/2022 None Seen     MUCUS THREADS 06/22/2022 Moderate (A)         Radiology Results:   MRI brain w wo contrast    Result Date: 6/9/2022  Narrative: MRI BRAIN WITH AND WITHOUT CONTRAST INDICATION: C71 9: Malignant neoplasm of brain, unspecified  Tumor Type: 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) Surgical History: Left temporal lobe biopsy 1/19/2021  Radiation History: 2/24/2021 through 4/6/2021 Relevant Medications: Temodar concurrent with radiation  Currently on bevacizumab (Avastin) since 5/27/2021    COMPARISON:  Multiple priors most recently performed on 2/20/2022  TECHNIQUE: Sagittal T1, axial T2, axial FLAIR, axial T1, axial [place], axial diffusion  Sagittal, axial T1 postcontrast   Axial bravo postcontrast with coronal reconstructions  IV Contrast:  10 mL of Gadobutrol injection ( IMAGE QUALITY:   Diagnostic  FINDINGS: BRAIN TUMOR:  Stable left temporal cystic encephalomalacia communicating with the left lateral ventricle at site of treated GBM  Surrounding FLAIR signal in the left temporal lobe that extends to the periatrial white matter  Stable restricted diffusion at the margins of the cavity  FLAIR: Stable FLAIR signal in the left temporal lobe surrounding the porencephalic cavity that extends into the left parietal periatrial white matter  Enhancement: Small curvilinear focus of enhancement in the left temporal lobe has improved  Tiny linear focus of T1 shortening at site of enhancement  No definite residual enhancement  Perfusion: Not performed Diffusion: Stable restricted diffusion surrounding the treatment cavity Posttreatment changes: Expected post treatment change is present  No new ischemia  No new fluid collections or hemorrhage  OTHER: No acute infarction  No hemorrhage  No hydrocephalus  No herniation  No fluid collections  Mild chronic microangiopathy  SELLA AND PITUITARY GLAND:  Normal  ORBITS:  Normal  PARANASAL SINUSES:  Normal  VASCULATURE:  Stable subcentimeter left MCA bifurcation aneurysm  CALVARIUM AND SKULL BASE:  Normal  EXTRACRANIAL SOFT TISSUES:  Normal      Impression: Status post treatment of Glioblastoma  2 - No change  LEGEND: 0 - New baseline, incomplete study, otherwise unable to categorize  1A - Improvement in imaging findings suspected to reflect decreasing tumor burden and/or treatment effect 1B - Improvement in imaging findings potentially due to effect from medications such as steroids or avastin therapy  2 - No change   3A - Worsening imaging findings favored to represent treatment effects including radiation therapy and medications  3B - Worsening imaging findings favored to represent an indeterminant mix of treatment effects and tumor worsening  3C - Worsening imaging findings favored to represent increasing burden of tumor  4 - Worsening of imaging findings highly suspicious for tumor progression   Workstation performed: [id number]

## 2022-06-29 NOTE — TELEPHONE ENCOUNTER
Scheduled date of colonoscopy (as of today): 07/05/2022  Physician performing colonoscopy: Pawel  Location of colonoscopy: [place]  Bowel prep reviewed with patient: Miralax/ Magnesium Citrate  Instructions reviewed with patient by: Karime  Clearances: no

## 2022-07-04 RX ORDER — LIDOCAINE HYDROCHLORIDE 10 MG/ML
0.5 INJECTION, SOLUTION EPIDURAL; INFILTRATION; INTRACAUDAL; PERINEURAL ONCE AS NEEDED
Status: CANCELLED | OUTPATIENT
Start: 2022-07-04

## 2022-07-04 RX ORDER — SODIUM CHLORIDE, SODIUM LACTATE, POTASSIUM CHLORIDE, CALCIUM CHLORIDE 600; 310; 30; 20 MG/100ML; MG/100ML; MG/100ML; MG/100ML
125 INJECTION, SOLUTION INTRAVENOUS CONTINUOUS
Status: CANCELLED | OUTPATIENT
Start: 2022-07-04

## 2022-07-05 ENCOUNTER — HOSPITAL ENCOUNTER (OUTPATIENT)
Dept: GASTROENTEROLOGY | Facility: HOSPITAL | Age: 67
Setting detail: OUTPATIENT SURGERY
Discharge: HOME | End: 2022-07-05
Admitting: INTERNAL MEDICINE
Payer: MEDICARE

## 2022-07-05 ENCOUNTER — ANESTHESIA EVENT (OUTPATIENT)
Dept: GASTROENTEROLOGY | Facility: HOSPITAL | Age: 67
End: 2022-07-05

## 2022-07-05 ENCOUNTER — ANESTHESIA (OUTPATIENT)
Dept: GASTROENTEROLOGY | Facility: HOSPITAL | Age: 67
End: 2022-07-05

## 2022-07-05 ENCOUNTER — TELEPHONE (OUTPATIENT)
Dept: HEMATOLOGY ONCOLOGY | Facility: CLINIC | Age: 67
End: 2022-07-05

## 2022-07-05 VITALS
HEART RATE: 67 BPM | TEMPERATURE: 97.2 F | RESPIRATION RATE: 18 BRPM | SYSTOLIC BLOOD PRESSURE: 107 MMHG | OXYGEN SATURATION: 97 % | DIASTOLIC BLOOD PRESSURE: 71 MMHG

## 2022-07-05 DIAGNOSIS — Z12.11 SCREENING FOR COLON CANCER: ICD-10-CM

## 2022-07-05 PROCEDURE — G0121 COLON CA SCRN NOT HI RSK IND: HCPCS | Performed by: INTERNAL MEDICINE

## 2022-07-05 PROCEDURE — 88305 TISSUE EXAM BY PATHOLOGIST: CPT | Performed by: PATHOLOGY

## 2022-07-05 RX ORDER — PROPOFOL 10 MG/ML
INJECTION, EMULSION INTRAVENOUS AS NEEDED
Status: DISCONTINUED | OUTPATIENT
Start: 2022-07-05 | End: 2022-07-05

## 2022-07-05 RX ORDER — LIDOCAINE HYDROCHLORIDE 10 MG/ML
0.5 INJECTION, SOLUTION EPIDURAL; INFILTRATION; INTRACAUDAL; PERINEURAL ONCE AS NEEDED
Status: DISCONTINUED | OUTPATIENT
Start: 2022-07-05 | End: 2022-07-09 | Stop reason: HOSPADM

## 2022-07-05 RX ORDER — SODIUM CHLORIDE, SODIUM LACTATE, POTASSIUM CHLORIDE, CALCIUM CHLORIDE 600; 310; 30; 20 MG/100ML; MG/100ML; MG/100ML; MG/100ML
125 INJECTION, SOLUTION INTRAVENOUS CONTINUOUS
Status: DISCONTINUED | OUTPATIENT
Start: 2022-07-05 | End: 2022-07-09 | Stop reason: HOSPADM

## 2022-07-05 RX ADMIN — PROPOFOL 100 MG: 10 INJECTION, EMULSION INTRAVENOUS at 11:48

## 2022-07-05 RX ADMIN — PROPOFOL 50 MG: 10 INJECTION, EMULSION INTRAVENOUS at 11:51

## 2022-07-05 RX ADMIN — SODIUM CHLORIDE, SODIUM LACTATE, POTASSIUM CHLORIDE, AND CALCIUM CHLORIDE 125 ML/HR: .6; .31; .03; .02 INJECTION, SOLUTION INTRAVENOUS at 11:09

## 2022-07-05 RX ADMIN — PROPOFOL 50 MG: 10 INJECTION, EMULSION INTRAVENOUS at 11:49

## 2022-07-05 RX ADMIN — PROPOFOL 30 MG: 10 INJECTION, EMULSION INTRAVENOUS at 11:54

## 2022-07-05 NOTE — ANESTHESIA PREPROCEDURE EVALUATION
Procedure:  COLONOSCOPY    No seizures in over a year    JESSICA on CPAP - 10    Relevant Problems   CARDIO   (+) Essential hypertension   (+) Mixed hyperlipidemia      ENDO   (+) Subclinical hypothyroidism      NEURO/PSYCH   (+) 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 (HCC)      PULMONARY   (+) Obstructive sleep apnea syndrome        Physical Exam    Airway    Mallampati score: II  TM Distance: >3 FB  Neck ROM: full     Dental   No notable dental hx     Cardiovascular  Rhythm: regular, Rate: normal, Cardiovascular exam normal    Pulmonary  Pulmonary exam normal Breath sounds clear to auscultation,     Other Findings        Anesthesia Plan  ASA Score- 2     Anesthesia Type- IV sedation with anesthesia with ASA Monitors  Additional Monitors:   Airway Plan:     Comment: Discussed risks/benefits, including medication reactions, awareness, aspiration, and serious/life threatening complications  Plan to maintain native airway with IVGA, monitored with EtCO2  Plan Factors-Exercise tolerance (METS): >4 METS  Patient summary reviewed  Patient instructed to abstain from smoking on day of procedure  Patient did not smoke on day of surgery  Induction- intravenous  Postoperative Plan-     Informed Consent- Anesthetic plan and risks discussed with patient  I personally reviewed this patient with the CRNA  Discussed and agreed on the Anesthesia Plan with the CRNA  Melisa Jean

## 2022-07-05 NOTE — INTERVAL H&P NOTE
H&P reviewed  After examining the patient I find no changes in the patients condition since the H&P had been written      Vitals:    07/05/22 1103   BP: 123/77   Pulse: 70   Resp: 18   Temp: 97 6 °F (36 4 °C)   SpO2: 95% no exercise/no heavy lifting/no sports/gym

## 2022-07-05 NOTE — DISCHARGE INSTRUCTIONS
Colonoscopy   WHAT YOU NEED TO KNOW:   A colonoscopy is a procedure to examine the inside of your colon (intestine) with a scope  Polyps or tissue growths may have been removed during your colonoscopy  It is normal to feel bloated and to have some abdominal discomfort  You should be passing gas  If you have hemorrhoids or you had polyps removed, you may have a small amount of bleeding  DISCHARGE INSTRUCTIONS:   Seek care immediately if:   You have sudden, severe abdominal pain  You have problems swallowing  You have a large amount of black, sticky bowel movements or blood in your bowel movements  You have sudden trouble breathing  You feel weak, lightheaded, or faint or your heart beats faster than normal for you  Contact your healthcare provider if:   You have a fever and chills  You have nausea or are vomiting  Your abdomen is bloated or feels full and hard  You have abdominal pain  You have black, sticky bowel movements or blood in your bowel movements  You have not had a bowel movement for 3 days after your procedure  You have rash or hives  You have questions or concerns about your procedure  Activity:   Do not lift, strain, or run for 24 hours after your procedure  Rest after your procedure  You have been given medicine to relax you  Do not drive or make important decisions until the day after your procedure  Return to your normal activity as directed  Relieve gas and discomfort from bloating by lying on your right side with a heating pad on your abdomen  You may need to take short walks to help the gas move out  Eat small meals until bloating is relieved  Follow up with your healthcare provider as directed: Write down your questions so you remember to ask them during your visits  If you take a blood thinner, please review the specific instructions from your endoscopist about when you should resume it   These can be found in the Recommendation and Your Medication list sections of this After Visit Summary

## 2022-07-05 NOTE — ANESTHESIA POSTPROCEDURE EVALUATION
Post-Op Assessment Note    CV Status:  Stable  Pain Score: 0    Pain management: adequate     Mental Status:  Alert   Hydration Status:  Stable   PONV Controlled:  Controlled   Airway Patency:  Patent      Post Op Vitals Reviewed: Yes      Staff: CRNA         No complications documented      BP   121/75   Temp      Pulse  67   Resp   20   SpO2   99

## 2022-07-06 ENCOUNTER — PREP FOR PROCEDURE (OUTPATIENT)
Dept: GASTROENTEROLOGY | Facility: CLINIC | Age: 67
End: 2022-07-06

## 2022-07-06 DIAGNOSIS — K59.00 CONSTIPATION, UNSPECIFIED CONSTIPATION TYPE: Primary | ICD-10-CM

## 2022-07-07 DIAGNOSIS — K59.00 CONSTIPATION, UNSPECIFIED CONSTIPATION TYPE: Primary | ICD-10-CM

## 2022-07-07 RX ORDER — SODIUM CHLORIDE 9 MG/ML
20 INJECTION, SOLUTION INTRAVENOUS ONCE
Status: CANCELLED | OUTPATIENT
Start: 2022-07-14

## 2022-07-08 ENCOUNTER — TELEPHONE (OUTPATIENT)
Dept: GASTROENTEROLOGY | Facility: CLINIC | Age: 67
End: 2022-07-08

## 2022-07-08 NOTE — TELEPHONE ENCOUNTER
Scheduled date of colonoscopy (as of today): 08/22/2022  Physician performing colonoscopy: Pawel  Location of colonoscopy: [facility]  Bowel prep reviewed with patient: Golytely/Dulcolax  Instructions reviewed with patient by: Teresa   Clearances: no

## 2022-07-12 DIAGNOSIS — I10 ESSENTIAL HYPERTENSION: ICD-10-CM

## 2022-07-12 RX ORDER — VALSARTAN 80 MG/1
TABLET ORAL
Qty: 90 TABLET | Refills: 0 | Status: SHIPPED | OUTPATIENT
Start: 2022-07-12 | End: 2022-07-31

## 2022-07-14 ENCOUNTER — HOSPITAL ENCOUNTER (OUTPATIENT)
Dept: INFUSION CENTER | Facility: HOSPITAL | Age: 67
Discharge: HOME | End: 2022-07-14
Attending: INTERNAL MEDICINE
Payer: MEDICARE

## 2022-07-14 VITALS
TEMPERATURE: 97.1 F | HEIGHT: 75 IN | HEART RATE: 71 BPM | BODY MASS INDEX: 28.12 KG/M2 | RESPIRATION RATE: 16 BRPM | OXYGEN SATURATION: 97 % | SYSTOLIC BLOOD PRESSURE: 124 MMHG | DIASTOLIC BLOOD PRESSURE: 78 MMHG | WEIGHT: 226.19 LBS

## 2022-07-14 DIAGNOSIS — C71.9 GLIOBLASTOMA (HCC): Primary | ICD-10-CM

## 2022-07-14 PROCEDURE — 96413 CHEMO IV INFUSION 1 HR: CPT

## 2022-07-14 RX ORDER — SODIUM CHLORIDE 9 MG/ML
20 INJECTION, SOLUTION INTRAVENOUS ONCE
Status: COMPLETED | OUTPATIENT
Start: 2022-07-14 | End: 2022-07-14

## 2022-07-14 RX ADMIN — SODIUM CHLORIDE 20 ML/HR: 0.9 INJECTION, SOLUTION INTRAVENOUS at 08:14

## 2022-07-14 RX ADMIN — BEVACIZUMAB 1600 MG: 400 INJECTION, SOLUTION INTRAVENOUS at 08:55

## 2022-07-14 NOTE — PROGRESS NOTES
Pt tolerated todays avastin without incident  Peripheral iv discontinued jelco intact   Discharged ambulatory with avs

## 2022-07-28 RX ORDER — SODIUM CHLORIDE 9 MG/ML
20 INJECTION, SOLUTION INTRAVENOUS ONCE
Status: CANCELLED | OUTPATIENT
Start: 2022-08-04

## 2022-07-31 DIAGNOSIS — E03.8 SUBCLINICAL HYPOTHYROIDISM: ICD-10-CM

## 2022-07-31 DIAGNOSIS — I10 ESSENTIAL HYPERTENSION: ICD-10-CM

## 2022-07-31 RX ORDER — VALSARTAN 80 MG/1
TABLET ORAL
Qty: 90 TABLET | Refills: 0 | Status: SHIPPED | OUTPATIENT
Start: 2022-07-31

## 2022-07-31 RX ORDER — LEVOTHYROXINE SODIUM 0.03 MG/1
TABLET ORAL
Qty: 90 TABLET | Refills: 0 | Status: SHIPPED | OUTPATIENT
Start: 2022-07-31

## 2022-07-31 RX ORDER — HYDROCHLOROTHIAZIDE 12.5 MG/1
CAPSULE, GELATIN COATED ORAL
Qty: 90 CAPSULE | Refills: 0 | Status: SHIPPED | OUTPATIENT
Start: 2022-07-31

## 2022-07-31 RX ORDER — AMLODIPINE BESYLATE 10 MG/1
TABLET ORAL
Qty: 90 TABLET | Refills: 0 | Status: SHIPPED | OUTPATIENT
Start: 2022-07-31

## 2022-08-01 ENCOUNTER — APPOINTMENT (OUTPATIENT)
Dept: LAB | Facility: HOSPITAL | Age: 67
End: 2022-08-01
Attending: INTERNAL MEDICINE
Payer: MEDICARE

## 2022-08-01 DIAGNOSIS — C71.9 GLIOBLASTOMA (HCC): ICD-10-CM

## 2022-08-01 LAB
ALBUMIN SERPL BCP-MCNC: 4.2 G/DL (ref 3.5–5)
ALP SERPL-CCNC: 44 U/L (ref 34–104)
ALT SERPL W P-5'-P-CCNC: 15 U/L (ref 7–52)
ANION GAP SERPL CALCULATED.3IONS-SCNC: 6 MMOL/L (ref 4–13)
AST SERPL W P-5'-P-CCNC: 18 U/L (ref 13–39)
BASOPHILS # BLD AUTO: 0.04 THOUSANDS/ΜL (ref 0–0.1)
BASOPHILS NFR BLD AUTO: 1 % (ref 0–1)
BILIRUB SERPL-MCNC: 0.78 MG/DL (ref 0.2–1)
BUN SERPL-MCNC: 14 MG/DL (ref 5–25)
CALCIUM SERPL-MCNC: 9.6 MG/DL (ref 8.4–10.2)
CHLORIDE SERPL-SCNC: 103 MMOL/L (ref 96–108)
CO2 SERPL-SCNC: 31 MMOL/L (ref 21–32)
CREAT SERPL-MCNC: 0.96 MG/DL (ref 0.6–1.3)
EOSINOPHIL # BLD AUTO: 0.11 THOUSAND/ΜL (ref 0–0.61)
EOSINOPHIL NFR BLD AUTO: 2 % (ref 0–6)
ERYTHROCYTE [DISTWIDTH] IN BLOOD BY AUTOMATED COUNT: 13.7 % (ref 11.6–15.1)
GFR SERPL CREATININE-BSD FRML MDRD: 82 ML/MIN/1.73SQ M
GLUCOSE P FAST SERPL-MCNC: 89 MG/DL (ref 65–99)
HCT VFR BLD AUTO: 50.9 % (ref 36.5–49.3)
HGB BLD-MCNC: 16.4 G/DL (ref 12–17)
IMM GRANULOCYTES # BLD AUTO: 0.01 THOUSAND/UL (ref 0–0.2)
IMM GRANULOCYTES NFR BLD AUTO: 0 % (ref 0–2)
LYMPHOCYTES # BLD AUTO: 1.73 THOUSANDS/ΜL (ref 0.6–4.47)
LYMPHOCYTES NFR BLD AUTO: 30 % (ref 14–44)
MCH RBC QN AUTO: 29.3 PG (ref 26.8–34.3)
MCHC RBC AUTO-ENTMCNC: 32.2 G/DL (ref 31.4–37.4)
MCV RBC AUTO: 91 FL (ref 82–98)
MONOCYTES # BLD AUTO: 0.55 THOUSAND/ΜL (ref 0.17–1.22)
MONOCYTES NFR BLD AUTO: 9 % (ref 4–12)
NEUTROPHILS # BLD AUTO: 3.4 THOUSANDS/ΜL (ref 1.85–7.62)
NEUTS SEG NFR BLD AUTO: 58 % (ref 43–75)
NRBC BLD AUTO-RTO: 0 /100 WBCS
PLATELET # BLD AUTO: 169 THOUSANDS/UL (ref 149–390)
PMV BLD AUTO: 10.1 FL (ref 8.9–12.7)
POTASSIUM SERPL-SCNC: 4 MMOL/L (ref 3.5–5.3)
PROT SERPL-MCNC: 6.4 G/DL (ref 6.4–8.4)
RBC # BLD AUTO: 5.59 MILLION/UL (ref 3.88–5.62)
SODIUM SERPL-SCNC: 140 MMOL/L (ref 135–147)
WBC # BLD AUTO: 5.84 THOUSAND/UL (ref 4.31–10.16)

## 2022-08-01 PROCEDURE — 36415 COLL VENOUS BLD VENIPUNCTURE: CPT

## 2022-08-01 PROCEDURE — 80053 COMPREHEN METABOLIC PANEL: CPT

## 2022-08-01 PROCEDURE — 85025 COMPLETE CBC W/AUTO DIFF WBC: CPT

## 2022-08-03 ENCOUNTER — APPOINTMENT (OUTPATIENT)
Dept: LAB | Facility: HOSPITAL | Age: 67
End: 2022-08-03
Attending: INTERNAL MEDICINE
Payer: MEDICARE

## 2022-08-03 DIAGNOSIS — C71.9 GLIOBLASTOMA (HCC): Primary | ICD-10-CM

## 2022-08-03 DIAGNOSIS — C71.9 GLIOBLASTOMA (HCC): ICD-10-CM

## 2022-08-03 LAB
BACTERIA UR QL AUTO: ABNORMAL /HPF
BILIRUB UR QL STRIP: NEGATIVE
CLARITY UR: CLEAR
COLOR UR: YELLOW
GLUCOSE UR STRIP-MCNC: NEGATIVE MG/DL
HGB UR QL STRIP.AUTO: NEGATIVE
KETONES UR STRIP-MCNC: NEGATIVE MG/DL
LEUKOCYTE ESTERASE UR QL STRIP: NEGATIVE
NITRITE UR QL STRIP: NEGATIVE
NON-SQ EPI CELLS URNS QL MICRO: ABNORMAL /HPF
PH UR STRIP.AUTO: 7.5 [PH]
PROT UR STRIP-MCNC: NEGATIVE MG/DL
RBC #/AREA URNS AUTO: ABNORMAL /HPF
SP GR UR STRIP.AUTO: 1.01 (ref 1–1.03)
UROBILINOGEN UR QL STRIP.AUTO: 0.2 E.U./DL
WBC #/AREA URNS AUTO: ABNORMAL /HPF

## 2022-08-03 PROCEDURE — 81001 URINALYSIS AUTO W/SCOPE: CPT

## 2022-08-04 ENCOUNTER — HOSPITAL ENCOUNTER (OUTPATIENT)
Dept: INFUSION CENTER | Facility: HOSPITAL | Age: 67
Discharge: HOME | End: 2022-08-04
Attending: INTERNAL MEDICINE
Payer: MEDICARE

## 2022-08-04 VITALS
HEIGHT: 75 IN | TEMPERATURE: 98.2 F | BODY MASS INDEX: 27.99 KG/M2 | DIASTOLIC BLOOD PRESSURE: 79 MMHG | RESPIRATION RATE: 16 BRPM | SYSTOLIC BLOOD PRESSURE: 132 MMHG | WEIGHT: 225.09 LBS | HEART RATE: 79 BPM

## 2022-08-04 DIAGNOSIS — C71.9 GLIOBLASTOMA (HCC): Primary | ICD-10-CM

## 2022-08-04 PROCEDURE — 96413 CHEMO IV INFUSION 1 HR: CPT

## 2022-08-04 RX ORDER — ASPIRIN 81 MG/1
81 TABLET ORAL DAILY
COMMUNITY

## 2022-08-04 RX ORDER — SODIUM CHLORIDE 9 MG/ML
20 INJECTION, SOLUTION INTRAVENOUS ONCE
Status: COMPLETED | OUTPATIENT
Start: 2022-08-04 | End: 2022-08-04

## 2022-08-04 RX ADMIN — SODIUM CHLORIDE 20 ML/HR: 0.9 INJECTION, SOLUTION INTRAVENOUS at 08:17

## 2022-08-04 RX ADMIN — BEVACIZUMAB 1600 MG: 400 INJECTION, SOLUTION INTRAVENOUS at 08:50

## 2022-08-04 NOTE — PROGRESS NOTES
Most recent labs and md note Pt tolerated todays avastin without incident  Peripheral iv discontinued jelco intact   Discharged ambulatory with avs

## 2022-08-18 ENCOUNTER — OFFICE VISIT (OUTPATIENT)
Dept: NEUROLOGY | Facility: CLINIC | Age: 67
End: 2022-08-18
Payer: MEDICARE

## 2022-08-18 VITALS
HEIGHT: 75 IN | BODY MASS INDEX: 28.03 KG/M2 | TEMPERATURE: 97.8 F | HEART RATE: 80 BPM | WEIGHT: 225.4 LBS | SYSTOLIC BLOOD PRESSURE: 118 MMHG | DIASTOLIC BLOOD PRESSURE: 60 MMHG | RESPIRATION RATE: 14 BRPM

## 2022-08-18 DIAGNOSIS — D49.6 EPILEPSY DUE TO INTRACRANIAL TUMOR (HCC): ICD-10-CM

## 2022-08-18 DIAGNOSIS — G47.33 OBSTRUCTIVE SLEEP APNEA SYNDROME: Primary | ICD-10-CM

## 2022-08-18 DIAGNOSIS — G40.909 EPILEPSY DUE TO INTRACRANIAL TUMOR (HCC): ICD-10-CM

## 2022-08-18 PROCEDURE — 99213 OFFICE O/P EST LOW 20 MIN: CPT | Performed by: PSYCHIATRY & NEUROLOGY

## 2022-08-18 RX ORDER — SODIUM CHLORIDE 9 MG/ML
20 INJECTION, SOLUTION INTRAVENOUS ONCE
Status: CANCELLED | OUTPATIENT
Start: 2022-08-25

## 2022-08-18 NOTE — PATIENT INSTRUCTIONS
Continue levetiracetam 1250 mg twice daily  Have blood work done shortly before taking your levetiracetam dose (trough level)  Schedule visit sleep medicine

## 2022-08-18 NOTE — PROGRESS NOTES
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 manifest as sudden onset aphasia  While the 2 episodes of aphasia persisted for longer than is typical for seizure, the sudden onset of his expressive aphasia argues for seizure as the cause  There may have been additional focal seizures that caused his waxing/waning language exam during his first admission in early January 2021  The event leading to his repeat admission in late January 2021 could have also represented seizure and occurred while he was on levetiracetam 1000 mg bid  There have been no additional events concerning for seizure while on levetiracetam to 1250 mg bid      Reports fatigue and sleepiness over the last year  Has history of sleep apnea and uses CPAP, but it doesn't seem to help  Doubt levetiracetam is significantly contributing  Will check levetiracetam trough level to make sure it is not unexpectedly high  Would be cautious about lowering dose given his seizure control  Referring to sleep medicine  Patient Instructions   1  Continue levetiracetam 1250 mg twice daily  2  Have blood work done shortly before taking your levetiracetam dose (trough level)  3  Schedule visit sleep medicine  Diagnoses and all orders for this visit:    Obstructive sleep apnea syndrome  -     Ambulatory Referral to Sleep Medicine; Future    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 )  -     Levetiracetam level; Future  -     Levetiracetam level; Future        Subjective    Lynette Mandujano is returning to the [place] [address] [facility] for follow up  Interval Events:   Seizures since last visit: None  Hospitalizations: no    Last seen 11/2021  Notices fatigue and sleepiness over the past year  Not sure if related to chemotherapy or maybe levetiracetam  Goes too sleep between 7p and 11p, usually near 8p  Gets up 7 am  Naps during the day   Uses CPAP, but doesn't seem to help  MRI 6/9/2022 stable  Current AEDs:  Levetiracetam 1250 mg bid  Medication side effects: None  Medication adherence: Yes     Event/Seizure semiology:  Sudden onset expressive aphasia     Special Features  Status epilepticus: no  Self Injury Seizures: no  Precipitating Factors: none     Epilepsy Risk Factors:  CNS neoplasm (left temporal GBM)     Prior AEDs:  None     Prior Evaluation:  REEG 1/4/2021: normal  Awake and asleep  Brain MRI 2/112021: Continued enlargement of the left temporal lobe centrally necrotic, irregularly peripherally enhancing neoplasm now measuring 3 7 cm in transverse diameter, compared to 3 1 cm approximately 2 weeks ago  [place] mild surrounding vasogenic edema  (images personally reviewed 2/18/2021)     History Reviewed: The following were reviewed and updated as appropriate: allergies, current medications,  past medical history, past social history and problem list  Joselo, htn, hld      Psychiatric History:  None     Social History:   Driving: No  Lives Alone: No      Objective    /60 (BP Location: Left arm, Patient Position: Sitting, Cuff Size: Large)   Pulse 80   Temp 97 8 °F (36 6 °C) (Temporal)   Resp 14   Ht 6' 3" (1 905 m)   Wt 102 kg (225 lb 6 4 oz)   BMI 28 17 kg/m²      General Exam  No acute distress  Neurologic Exam  Mental Status:  Alert and oriented x 3  Language: normal fluency and comprehension  Cranial Nerves:  VFFTC  EOMI, no nystagums  Face symmetric  No dysarthria  Motor:  No drift  Strength 5/5 throughout  Coordination: Finger to nose intact  Gait: Normal casual gait  ROS:    Review of Systems   Constitutional: Negative  Negative for appetite change and fever  HENT: Negative  Negative for hearing loss, tinnitus, trouble swallowing and voice change  Eyes: Negative  Negative for photophobia and pain  Respiratory: Negative  Negative for shortness of breath  Cardiovascular: Negative  Negative for palpitations  Gastrointestinal: Negative  Negative for nausea and vomiting  Endocrine: Negative  Negative for cold intolerance  Genitourinary: Negative  Negative for dysuria, frequency and urgency  Musculoskeletal: Negative  Negative for myalgias and neck pain  Skin: Negative  Negative for rash  Neurological: Positive for seizures  Negative for dizziness, tremors, syncope, facial asymmetry, speech difficulty, weakness, light-headedness, numbness and headaches  Hematological: Negative  Does not bruise/bleed easily  Psychiatric/Behavioral: Negative  Negative for confusion, hallucinations and sleep disturbance  ROS reviewed and updated as appropriate

## 2022-08-22 ENCOUNTER — ANESTHESIA (OUTPATIENT)
Dept: GASTROENTEROLOGY | Facility: HOSPITAL | Age: 67
End: 2022-08-22

## 2022-08-22 ENCOUNTER — ANESTHESIA EVENT (OUTPATIENT)
Dept: GASTROENTEROLOGY | Facility: HOSPITAL | Age: 67
End: 2022-08-22

## 2022-08-22 ENCOUNTER — HOSPITAL ENCOUNTER (OUTPATIENT)
Dept: GASTROENTEROLOGY | Facility: HOSPITAL | Age: 67
Setting detail: OUTPATIENT SURGERY
Discharge: HOME | End: 2022-08-22
Admitting: INTERNAL MEDICINE
Payer: MEDICARE

## 2022-08-22 VITALS
RESPIRATION RATE: 16 BRPM | HEART RATE: 76 BPM | BODY MASS INDEX: 27.98 KG/M2 | HEIGHT: 75 IN | OXYGEN SATURATION: 94 % | TEMPERATURE: 97.8 F | WEIGHT: 225 LBS | DIASTOLIC BLOOD PRESSURE: 94 MMHG | SYSTOLIC BLOOD PRESSURE: 148 MMHG

## 2022-08-22 DIAGNOSIS — K59.00 CONSTIPATION, UNSPECIFIED CONSTIPATION TYPE: ICD-10-CM

## 2022-08-22 PROCEDURE — 88305 TISSUE EXAM BY PATHOLOGIST: CPT | Performed by: PATHOLOGY

## 2022-08-22 PROCEDURE — 45385 COLONOSCOPY W/LESION REMOVAL: CPT | Performed by: INTERNAL MEDICINE

## 2022-08-22 RX ORDER — PROPOFOL 10 MG/ML
INJECTION, EMULSION INTRAVENOUS CONTINUOUS PRN
Status: DISCONTINUED | OUTPATIENT
Start: 2022-08-22 | End: 2022-08-22

## 2022-08-22 RX ORDER — PROPOFOL 10 MG/ML
INJECTION, EMULSION INTRAVENOUS AS NEEDED
Status: DISCONTINUED | OUTPATIENT
Start: 2022-08-22 | End: 2022-08-22

## 2022-08-22 RX ORDER — SODIUM CHLORIDE, SODIUM LACTATE, POTASSIUM CHLORIDE, CALCIUM CHLORIDE 600; 310; 30; 20 MG/100ML; MG/100ML; MG/100ML; MG/100ML
125 INJECTION, SOLUTION INTRAVENOUS CONTINUOUS
Status: CANCELLED | OUTPATIENT
Start: 2022-08-22

## 2022-08-22 RX ORDER — SODIUM CHLORIDE, SODIUM LACTATE, POTASSIUM CHLORIDE, CALCIUM CHLORIDE 600; 310; 30; 20 MG/100ML; MG/100ML; MG/100ML; MG/100ML
125 INJECTION, SOLUTION INTRAVENOUS CONTINUOUS
Status: DISCONTINUED | OUTPATIENT
Start: 2022-08-22 | End: 2022-08-22

## 2022-08-22 RX ORDER — LIDOCAINE HYDROCHLORIDE 20 MG/ML
INJECTION, SOLUTION EPIDURAL; INFILTRATION; INTRACAUDAL; PERINEURAL AS NEEDED
Status: DISCONTINUED | OUTPATIENT
Start: 2022-08-22 | End: 2022-08-22

## 2022-08-22 RX ADMIN — PROPOFOL 130 MCG/KG/MIN: 10 INJECTION, EMULSION INTRAVENOUS at 10:29

## 2022-08-22 RX ADMIN — SODIUM CHLORIDE, SODIUM LACTATE, POTASSIUM CHLORIDE, AND CALCIUM CHLORIDE 125 ML/HR: .6; .31; .03; .02 INJECTION, SOLUTION INTRAVENOUS at 10:06

## 2022-08-22 RX ADMIN — PROPOFOL 100 MG: 10 INJECTION, EMULSION INTRAVENOUS at 10:29

## 2022-08-22 RX ADMIN — LIDOCAINE HYDROCHLORIDE 50 MG: 20 INJECTION, SOLUTION EPIDURAL; INFILTRATION; INTRACAUDAL; PERINEURAL at 10:29

## 2022-08-22 NOTE — ANESTHESIA POSTPROCEDURE EVALUATION
Post-Op Assessment Note    CV Status:  Stable  Pain Score: 0    Pain management: adequate     Mental Status:  Arousable   Hydration Status:  Stable   PONV Controlled:  None   Airway Patency:  Patent      Post Op Vitals Reviewed: Yes      Staff: CRNA         No complications documented      BP   138/93   Temp      Pulse  64   Resp   16   SpO2   98

## 2022-08-22 NOTE — ANESTHESIA PREPROCEDURE EVALUATION
Procedure:  COLONOSCOPY    Relevant Problems   CARDIO   (+) Essential hypertension   (+) Mixed hyperlipidemia      ENDO   (+) Subclinical hypothyroidism      NEURO/PSYCH   (+) 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 ([address] )      PULMONARY   (+) Obstructive sleep apnea syndrome        Physical Exam    Airway    Mallampati score: II  TM Distance: >3 FB  Neck ROM: full     Dental   No notable dental hx     Cardiovascular      Pulmonary      Other Findings        Anesthesia Plan  ASA Score- 3     Anesthesia Type- IV sedation with anesthesia with ASA Monitors  Additional Monitors:   Airway Plan:           Plan Factors-Exercise tolerance (METS): >4 METS  Chart reviewed  EKG reviewed  Patient summary reviewed  Patient is not a current smoker  Induction- intravenous  Postoperative Plan-     Informed Consent- Anesthetic plan and risks discussed with patient  I personally reviewed this patient with the CRNA  Discussed and agreed on the Anesthesia Plan with the CRNA  Clinton Santos

## 2022-08-22 NOTE — H&P
History and Physical - [facility]  Caitlyn Vasquez 78 y o  male MRN: [id number]                  HPI: Caitlyn Vasquez is a 78y o  year old male who presents for colonoscopy for previous poor prep/CRC screening      REVIEW OF SYSTEMS: Per the HPI, and otherwise unremarkable      Historical Information   Past Medical History:   Diagnosis Date    Brain tumor ([facility] [address] ) 2021    Bruxism (teeth grinding)     GBM (glioblastoma multiforme) (HCC)     Hypercholesterolemia     Hypertension     Obesity     Sleep apnea     CPAP nightly     Past Surgical History:   Procedure Laterality Date    FL LUMBAR PUNCTURE DIAGNOSTIC  2021    HAND FRACTURE REPAIR      left thumb    IN STEREO BX/ASPIR/EXCIS,INTRACRANIAL LESN Left 2021    Procedure: BIOPSY BRAIN IMAGE-GUIDED NEEDLE - LEFT TEMPORAL;  Surgeon: Ivan Kolb MD;  [facility];  Service: Neurosurgery    TONSILLECTOMY       Social History   Social History     Substance and Sexual Activity   Alcohol Use Not Currently     Social History     Substance and Sexual Activity   Drug Use Not Currently     Social History     Tobacco Use   Smoking Status Former Smoker    Types: Cigarettes    Quit date: 12    Years since quittin 6   Smokeless Tobacco Never Used     Family History   Problem Relation Age of Onset    Heart disease Mother     Heart attack Father     Leukemia Brother        Meds/Allergies       Current Outpatient Medications:     amLODIPine (NORVASC) 10 mg tablet    aspirin (ECOTRIN LOW STRENGTH) 81 mg EC tablet    atorvastatin (LIPITOR) 10 mg tablet    fenofibrate (TRICOR) 54 MG tablet    hydrochlorothiazide (MICROZIDE) 12 5 mg capsule    levETIRAcetam (KEPPRA) 250 mg tablet    levothyroxine 25 mcg tablet    valsartan (DIOVAN) 80 mg tablet    Bevacizumab (AVASTIN IV)    levETIRAcetam (KEPPRA) 1000 MG tablet    polyethylene glycol (GLYCOLAX) 17 GM/SCOOP powder    polyethylene glycol (GOLYTELY) 4000 mL solution    Current Facility-Administered Medications:     lactated ringers infusion, 125 mL/hr, Intravenous, Continuous, Continue from Pre-op at 08/22/22 1010    No Known Allergies    Objective     /90   Pulse 71   Temp 97 6 °F (36 4 °C) (Temporal)   Resp 16   Ht 6' 3" (1 905 m)   Wt 102 kg (225 lb)   SpO2 96%   BMI 28 12 kg/m²       PHYSICAL EXAM    Gen: NAD  Head: NCAT  CV: RRR  CHEST: Clear  ABD: soft, NT/ND  EXT: no edema      ASSESSMENT/PLAN:  This is a 78y o  year old male here for colonoscopy, and he is stable and optimized for his procedure

## 2022-08-23 ENCOUNTER — APPOINTMENT (OUTPATIENT)
Dept: LAB | Facility: HOSPITAL | Age: 67
End: 2022-08-23
Attending: PSYCHIATRY & NEUROLOGY
Payer: MEDICARE

## 2022-08-23 DIAGNOSIS — G40.909 EPILEPSY DUE TO INTRACRANIAL TUMOR (HCC): ICD-10-CM

## 2022-08-23 DIAGNOSIS — D49.6 EPILEPSY DUE TO INTRACRANIAL TUMOR (HCC): ICD-10-CM

## 2022-08-23 PROCEDURE — 80177 DRUG SCRN QUAN LEVETIRACETAM: CPT

## 2022-08-23 PROCEDURE — 36415 COLL VENOUS BLD VENIPUNCTURE: CPT

## 2022-08-25 ENCOUNTER — HOSPITAL ENCOUNTER (OUTPATIENT)
Dept: INFUSION CENTER | Facility: HOSPITAL | Age: 67
End: 2022-08-25
Attending: INTERNAL MEDICINE
Payer: MEDICARE

## 2022-08-25 VITALS
HEART RATE: 64 BPM | HEIGHT: 75 IN | DIASTOLIC BLOOD PRESSURE: 78 MMHG | BODY MASS INDEX: 28.04 KG/M2 | OXYGEN SATURATION: 97 % | TEMPERATURE: 96.8 F | SYSTOLIC BLOOD PRESSURE: 138 MMHG | WEIGHT: 225.53 LBS | RESPIRATION RATE: 16 BRPM

## 2022-08-25 DIAGNOSIS — C71.9 GLIOBLASTOMA (HCC): Primary | ICD-10-CM

## 2022-08-25 LAB — LEVETIRACETAM SERPL-MCNC: 20 UG/ML (ref 10–40)

## 2022-08-25 PROCEDURE — 96413 CHEMO IV INFUSION 1 HR: CPT

## 2022-08-25 RX ORDER — SODIUM CHLORIDE 9 MG/ML
20 INJECTION, SOLUTION INTRAVENOUS ONCE
Status: COMPLETED | OUTPATIENT
Start: 2022-08-25 | End: 2022-08-25

## 2022-08-25 RX ADMIN — SODIUM CHLORIDE 20 ML/HR: 0.9 INJECTION, SOLUTION INTRAVENOUS at 08:58

## 2022-08-25 RX ADMIN — BEVACIZUMAB 1600 MG: 400 INJECTION, SOLUTION INTRAVENOUS at 09:11

## 2022-08-26 ENCOUNTER — OFFICE VISIT (OUTPATIENT)
Dept: FAMILY MEDICINE CLINIC | Facility: CLINIC | Age: 67
End: 2022-08-26
Payer: MEDICARE

## 2022-08-26 VITALS
TEMPERATURE: 96.7 F | DIASTOLIC BLOOD PRESSURE: 78 MMHG | OXYGEN SATURATION: 97 % | HEART RATE: 65 BPM | HEIGHT: 75 IN | BODY MASS INDEX: 27.78 KG/M2 | SYSTOLIC BLOOD PRESSURE: 138 MMHG | WEIGHT: 223.4 LBS

## 2022-08-26 DIAGNOSIS — Z00.00 ENCOUNTER FOR SUBSEQUENT ANNUAL WELLNESS VISIT (AWV) IN MEDICARE PATIENT: Primary | ICD-10-CM

## 2022-08-26 DIAGNOSIS — E78.6 ABNORMALLY LOW HIGH DENSITY LIPOPROTEIN (HDL) CHOLESTEROL WITH HYPERTRIGLYCERIDEMIA: ICD-10-CM

## 2022-08-26 DIAGNOSIS — R53.83 OTHER FATIGUE: ICD-10-CM

## 2022-08-26 DIAGNOSIS — L72.9 CYST OF BUTTOCKS: ICD-10-CM

## 2022-08-26 DIAGNOSIS — E78.1 ABNORMALLY LOW HIGH DENSITY LIPOPROTEIN (HDL) CHOLESTEROL WITH HYPERTRIGLYCERIDEMIA: ICD-10-CM

## 2022-08-26 DIAGNOSIS — E66.3 OVERWEIGHT WITH BODY MASS INDEX (BMI) OF 27 TO 27.9 IN ADULT: ICD-10-CM

## 2022-08-26 DIAGNOSIS — E03.8 SUBCLINICAL HYPOTHYROIDISM: ICD-10-CM

## 2022-08-26 DIAGNOSIS — E78.2 MIXED HYPERLIPIDEMIA: ICD-10-CM

## 2022-08-26 DIAGNOSIS — C71.9 GLIOBLASTOMA (HCC): ICD-10-CM

## 2022-08-26 DIAGNOSIS — I10 ESSENTIAL HYPERTENSION: ICD-10-CM

## 2022-08-26 PROCEDURE — G0438 PPPS, INITIAL VISIT: HCPCS | Performed by: FAMILY MEDICINE

## 2022-08-26 PROCEDURE — 99214 OFFICE O/P EST MOD 30 MIN: CPT | Performed by: FAMILY MEDICINE

## 2022-08-26 RX ORDER — DOXYCYCLINE HYCLATE 100 MG/1
100 CAPSULE ORAL EVERY 12 HOURS SCHEDULED
Qty: 14 CAPSULE | Refills: 0 | Status: SHIPPED | OUTPATIENT
Start: 2022-08-26 | End: 2022-09-02

## 2022-08-26 NOTE — PROGRESS NOTES
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    Relevant Orders    Lipid panel    Mixed hyperlipidemia      Other Visit Diagnoses     Encounter for subsequent annual wellness visit (AWV) in Medicare patient    -  Primary    Other fatigue        Will have appt with Neurology to discuss sleep study    Cyst of buttocks        Relevant Medications    doxycycline hyclate (VIBRAMYCIN) 100 mg capsule    Overweight with body mass index (BMI) of 27 to 27 9 in adult            BMI Counseling: Body mass index is 27 92 kg/m²  The BMI is above normal  Nutrition recommendations include decreasing portion sizes, encouraging healthy choices of fruits and vegetables, decreasing fast food intake, consuming healthier snacks, limiting drinks that contain sugar and increasing intake of lean protein  Exercise recommendations include vigorous physical activity 75 minutes/week and exercising 3-5 times per week  No pharmacotherapy was ordered  Rationale for BMI follow-up plan is due to patient being overweight or obese  Depression Screening and Follow-up Plan: Patient was screened for depression during today's encounter  They screened negative with a PHQ-2 score of 0  Preventive health issues were discussed with patient, and age appropriate screening tests were ordered as noted in patient's After Visit Summary  Personalized health advice and appropriate referrals for health education or preventive services given if needed, as noted in patient's After Visit Summary       History of Present Illness:     Patient presents for a [employer] Visit    Patient presents for AWV and follow up of chronic conditions, he did not get labs done as he was unsure if he needed any  He is following with H/O for glioblastoma which he reports has been stable, he is receiving infusions  He has an MRI pending in the next 1-2 weeks  The fatigue has not improved with the addition of synthroid, again labs not available  He also reports a buttock lump x 2 months which waxes and wanes  He does have difficulty with memory and tangential thoughts  Patient Care Team:  Philip Hightower DO as PCP - General (Family Medicine)  Jelly Álvarez MD (Neurosurgery)  Franki Ambriz MD (Radiation Oncology)  Stephenie Watts RD (Nutrition)     Review of Systems:     Review of Systems   Constitutional: Positive for fatigue  Negative for activity change, chills and fever  HENT: Negative  Negative for hearing loss  Eyes: Negative  Negative for visual disturbance  Respiratory: Negative for cough, chest tightness, shortness of breath and wheezing  Cardiovascular: Negative for chest pain and palpitations  Gastrointestinal: Negative for abdominal pain, blood in stool, constipation, diarrhea, nausea and vomiting  Endocrine: Negative  Negative for polydipsia, polyphagia and polyuria  Genitourinary: Negative for difficulty urinating, dysuria, frequency, hematuria and urgency  Musculoskeletal: Negative for arthralgias and myalgias  Skin: Positive for wound  Approximately 1cm well circumscribed lump R  Medial gluteus   Allergic/Immunologic: Negative  Neurological: Negative for dizziness, seizures, syncope, light-headedness and headaches  Hematological: Negative for adenopathy  Psychiatric/Behavioral: Negative  Negative for dysphoric mood  The patient is not nervous/anxious           Problem List:     Patient Active Problem List   Diagnosis    Class 1 obesity due to excess calories with serious comorbidity and body mass index (BMI) of 33 0 to 33 9 in adult  Negative depression screening    Essential hypertension    Obstructive sleep apnea syndrome    Sleep disturbance    Daytime sleepiness    Glioblastoma (HCC)    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 ([facility] )    Annual physical exam    Muscle cramps    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    Mixed hyperlipidemia    Subclinical hypothyroidism    Screening for colon cancer      Past Medical and Surgical History:     Past Medical History:   Diagnosis Date    Brain tumor ([facility] ) 2021    Bruxism (teeth grinding)     GBM (glioblastoma multiforme) (HCC)     Hypercholesterolemia     Hypertension     Obesity     Sleep apnea     CPAP nightly     Past Surgical History:   Procedure Laterality Date    [place] LUMBAR PUNCTURE DIAGNOSTIC  2021    HAND FRACTURE REPAIR      left thumb    [place] STEREO BX/ASPIR/EXCIS,INTRACRANIAL LESN Left 2021    Procedure: BIOPSY BRAIN IMAGE-GUIDED NEEDLE - LEFT TEMPORAL;  Surgeon: Alondra Childers MD;  Location: [facility];  Service: Neurosurgery    TONSILLECTOMY        Family History:     Family History   Problem Relation Age of Onset    Heart disease Mother     Heart attack Father     Leukemia Brother       Social History:     Social History     Socioeconomic History    Marital status: Single     Spouse name: None    Number of children: None    Years of education: None    Highest education level: None   Occupational History    None   Tobacco Use    Smoking status: Former Smoker     Types: Cigarettes     Quit date:      Years since quittin 6    Smokeless tobacco: Never Used   Vaping Use    Vaping Use: Never used   Substance and Sexual Activity    Alcohol use: Not Currently    Drug use: Not Currently    Sexual activity: Not Currently   Other Topics Concern    None   Social History Narrative    Daily caffeine use- 2 cups of coffee     Social Determinants of Health     Financial Resource Strain: Not on file Food Insecurity: Not on file   Transportation Needs: Not on file   Physical Activity: Not on file   Stress: Not on file   Social Connections: Not on file   Intimate Partner Violence: Not on file   Housing Stability: Not on file      Medications and Allergies:     Current Outpatient Medications   Medication Sig Dispense Refill    amLODIPine (NORVASC) 10 mg tablet Take 1 tablet by mouth once daily 90 tablet 0    aspirin (ECOTRIN LOW STRENGTH) 81 mg EC tablet Take 81 mg by mouth daily      atorvastatin (LIPITOR) 10 mg tablet Take 1 tablet (10 mg total) by mouth daily 90 tablet 3    Bevacizumab (AVASTIN IV) Infuse into a venous catheter Get infusion every two weeks ( for Glioblastoma cancer)      doxycycline hyclate (VIBRAMYCIN) 100 mg capsule Take 1 capsule (100 mg total) by mouth every 12 (twelve) hours for 7 days 14 capsule 0    fenofibrate (TRICOR) 54 MG tablet Take 1 tablet by mouth once daily 90 tablet 0    hydrochlorothiazide (MICROZIDE) 12 5 mg capsule Take 1 capsule by mouth once daily 90 capsule 0    levETIRAcetam (KEPPRA) 1000 MG tablet TAKE 1 TABLET BY MOUTH EVERY 12 HOURS 180 tablet 3    levETIRAcetam (KEPPRA) 250 mg tablet Take 1 tablet by mouth twice daily 180 tablet 3    levothyroxine 25 mcg tablet Take 1 tablet by mouth once daily 90 tablet 0    polyethylene glycol (GLYCOLAX) 17 GM/SCOOP powder Take 17 g by mouth daily 510 g 0    valsartan (DIOVAN) 80 mg tablet Take 1 tablet by mouth once daily 90 tablet 0     No current facility-administered medications for this visit  No Known Allergies   Immunizations: There is no immunization history for the selected administration types on file for this patient     Health Maintenance:         Topic Date Due    Colorectal Cancer Screening  08/20/2029    Hepatitis C Screening  Completed         Topic Date Due    COVID-19 Vaccine (1) Never done    Pneumococcal Vaccine: 65+ Years (1 - PCV) Never done    Influenza Vaccine (1) 09/01/2022 Medicare Screening Tests and Risk Assessments:     Shania Gutierrez is here for his Subsequent Wellness visit  Last Medicare Wellness visit information reviewed, patient interviewed and updates made to the record today  Health Risk Assessment:   Patient rates overall health as good  Patient feels that their physical health rating is same  Patient is very satisfied with their life  Eyesight was rated as same  Hearing was rated as same  Patient feels that their emotional and mental health rating is same  Patients states they are never, rarely angry  Patient states they are sometimes unusually tired/fatigued  Pain experienced in the last 7 days has been none  Patient states that he has experienced no weight loss or gain in last 6 months  Depression Screening:   PHQ-2 Score: 0      Fall Risk Screening: In the past year, patient has experienced: no history of falling in past year      Home Safety:  Patient does not have trouble with stairs inside or outside of their home  Patient has working smoke alarms and has working carbon monoxide detector  Home safety hazards include: none  Nutrition:   Current diet is Regular  Medications:   Patient is currently taking over-the-counter supplements  OTC medications include: see medication list  Patient is able to manage medications  Activities of Daily Living (ADLs)/Instrumental Activities of Daily Living (IADLs):   Walk and transfer into and out of bed and chair?: Yes  Dress and groom yourself?: Yes    Bathe or shower yourself?: Yes    Feed yourself? Yes  Do your laundry/housekeeping?: Yes  Manage your money, pay your bills and track your expenses?: Yes  Make your own meals?: Yes    Do your own shopping?: Yes    Previous Hospitalizations:   Any hospitalizations or ED visits within the last 12 months?: No      Advance Care Planning:   Living will: Yes    Durable POA for healthcare:  Yes    Advanced directive: Yes    Five wishes given: No    End of Life Decisions reviewed with patient: Yes    Provider agrees with end of life decisions: Yes      Cognitive Screening:   Provider or family/friend/caregiver concerned regarding cognition?: No    PREVENTIVE SCREENINGS      Cardiovascular Screening:    General: Screening Not Indicated and History Lipid Disorder      Diabetes Screening:     General: Screening Current      Colorectal Cancer Screening:     General: Screening Current      Prostate Cancer Screening:    General: Screening Current      Osteoporosis Screening:    General: Screening Not Indicated      Abdominal Aortic Aneurysm (AAA) Screening:    Risk factors include: age between 73-69 yo and tobacco use        General: Screening Not Indicated      Lung Cancer Screening:     General: Screening Not Indicated      Hepatitis C Screening:    General: Screening Current    Screening, Brief Intervention, and Referral to Treatment (SBIRT)    Screening  Typical number of drinks in a day: 0  Typical number of drinks in a week: 0  Interpretation: Low risk drinking behavior  Single Item Drug Screening:  How often have you used an illegal drug (including marijuana) or a prescription medication for non-medical reasons in the past year? never    Single Item Drug Screen Score: 0  Interpretation: Negative screen for possible drug use disorder    Other Counseling Topics:   Calcium and vitamin D intake  No exam data present     Physical Exam:     /78 (BP Location: Left arm, Patient Position: Sitting)   Pulse 65   Temp (!) 96 7 °F (35 9 °C) (Tympanic)   Ht 6' 3" (1 905 m)   Wt 101 kg (223 lb 6 4 oz)   SpO2 97%   BMI 27 92 kg/m²     Physical Exam  Vitals and nursing note reviewed  Constitutional:       General: He is not in acute distress  Appearance: Normal appearance  He is well-developed  He is not ill-appearing, toxic-appearing or diaphoretic  HENT:      Head: Normocephalic and atraumatic        Right Ear: Tympanic membrane, ear canal and external ear normal       Left Ear: Tympanic membrane, ear canal and external ear normal       Mouth/Throat:      Mouth: Mucous membranes are moist       Pharynx: Oropharynx is clear  Eyes:      Conjunctiva/sclera: Conjunctivae normal    Neck:      Vascular: No carotid bruit  Cardiovascular:      Rate and Rhythm: Normal rate and regular rhythm  Pulses: Normal pulses  Heart sounds: Murmur heard  Pulmonary:      Effort: Pulmonary effort is normal  No respiratory distress  Breath sounds: Normal breath sounds  Abdominal:      General: Abdomen is flat  There is no distension  Palpations: Abdomen is soft  There is no mass  Tenderness: There is no abdominal tenderness  Musculoskeletal:      Cervical back: Neck supple  No muscular tenderness  Right lower leg: No edema  Left lower leg: No edema  Lymphadenopathy:      Cervical: No cervical adenopathy  Skin:     General: Skin is warm and dry  Findings: Lesion present  Comments: R  Palpable lump medial buttocks   Neurological:      General: No focal deficit present  Mental Status: He is alert and oriented to person, place, and time  Sensory: No sensory deficit  Psychiatric:         Mood and Affect: Mood normal          Behavior: Behavior normal          Thought Content:  Thought content normal          Judgment: Judgment normal           Philip Hightower DO

## 2022-08-26 NOTE — PATIENT INSTRUCTIONS
Medicare Preventive Visit Patient Instructions  Thank you for completing your Welcome to Medicare Visit or Medicare Annual Wellness Visit today  Your next wellness visit will be due in one year (8/27/2023)  The screening/preventive services that you may require over the next 5-10 years are detailed below  Some tests may not apply to you based off risk factors and/or age  Screening tests ordered at today's visit but not completed yet may show as past due  Also, please note that scanned in results may not display below  Preventive Screenings:  Service Recommendations Previous Testing/Comments   Colorectal Cancer Screening  · Colonoscopy    · Fecal Occult Blood Test (FOBT)/Fecal Immunochemical Test (FIT)  · Fecal DNA/Cologuard Test  · Flexible Sigmoidoscopy Age: 39-70 years old   Colonoscopy: every 10 years (May be performed more frequently if at higher risk)  OR  FOBT/FIT: every 1 year  OR  Cologuard: every 3 years  OR  Sigmoidoscopy: every 5 years  Screening may be recommended earlier than age 39 if at higher risk for colorectal cancer  Also, an individualized decision between you and your healthcare provider will decide whether screening between the ages of 74-80 would be appropriate   Colonoscopy: 08/22/2022  FOBT/FIT: Not on file  Cologuard: Not on file  Sigmoidoscopy: Not on file    Screening Current     Prostate Cancer Screening Individualized decision between patient and health care provider in men between ages of 53-78   Medicare will cover every 12 months beginning on the day after your 50th birthday PSA: 1 8 ng/mL     Screening Current     Hepatitis C Screening Once for adults born between 1945 and 1965  More frequently in patients at high risk for Hepatitis C Hep C Antibody: 08/14/2021    Screening Current   Diabetes Screening 1-2 times per year if you're at risk for diabetes or have pre-diabetes Fasting glucose: 89 mg/dL (8/1/2022)  A1C: 5 3 % (8/14/2021)  Screening Current   Cholesterol Screening Once every 5 years if you don't have a lipid disorder  May order more often based on risk factors  Lipid panel: 04/14/2022  Screening Not Indicated  History Lipid Disorder      Other Preventive Screenings Covered by Medicare:  1  Abdominal Aortic Aneurysm (AAA) Screening: covered once if your at risk  You're considered to be at risk if you have a family history of AAA or a male between the age of 73-68 who smoking at least 100 cigarettes in your lifetime  2  Lung Cancer Screening: covers low dose CT scan once per year if you meet all of the following conditions: (1) Age 50-69; (2) No signs or symptoms of lung cancer; (3) Current smoker or have quit smoking within the last 15 years; (4) You have a tobacco smoking history of at least 20 pack years (packs per day x number of years you smoked); (5) You get a written order from a healthcare provider  3  Glaucoma Screening: covered annually if you're considered high risk: (1) You have diabetes OR (2) Family history of glaucoma OR (3)  aged 48 and older OR (3)  American aged 72 and older  3  Osteoporosis Screening: covered every 2 years if you meet one of the following conditions: (1) Have a vertebral abnormality; (2) On glucocorticoid therapy for more than 3 months; (3) Have primary hyperparathyroidism; (4) On osteoporosis medications and need to assess response to drug therapy  5  HIV Screening: covered annually if you're between the age of 12-76  Also covered annually if you are younger than 13 and older than 72 with risk factors for HIV infection  For pregnant patients, it is covered up to 3 times per pregnancy      Immunizations:  Immunization Recommendations   Influenza Vaccine Annual influenza vaccination during flu season is recommended for all persons aged >= 6 months who do not have contraindications   Pneumococcal Vaccine   * Pneumococcal conjugate vaccine = PCV13 (Prevnar 13), PCV15 (Vaxneuvance), PCV20 (Prevnar 20)  * Pneumococcal polysaccharide vaccine = PPSV23 (Pneumovax) Adults 2364 years old: 1-3 doses may be recommended based on certain risk factors  Adults 72 years old: 1-2 doses may be recommended based off what pneumonia vaccine you previously received   Hepatitis B Vaccine 3 dose series if at intermediate or high risk (ex: diabetes, end stage renal disease, liver disease)   Tetanus (Td) Vaccine - COST NOT COVERED BY MEDICARE PART B Following completion of primary series, a booster dose should be given every 10 years to maintain immunity against tetanus  Td may also be given as tetanus wound prophylaxis  Tdap Vaccine - COST NOT COVERED BY MEDICARE PART B Recommended at least once for all adults  For pregnant patients, recommended with each pregnancy  Shingles Vaccine (Shingrix) - COST NOT COVERED BY MEDICARE PART B  2 shot series recommended in those aged 48 and above     Health Maintenance Due:      Topic Date Due    Colorectal Cancer Screening  08/20/2029    Hepatitis C Screening  Completed     Immunizations Due:      Topic Date Due    COVID-19 Vaccine (1) Never done    Pneumococcal Vaccine: 65+ Years (1 - PCV) Never done    Influenza Vaccine (1) 09/01/2022     Advance Directives   What are advance directives? Advance directives are legal documents that state your wishes and plans for medical care  These plans are made ahead of time in case you lose your ability to make decisions for yourself  Advance directives can apply to any medical decision, such as the treatments you want, and if you want to donate organs  What are the types of advance directives? There are many types of advance directives, and each state has rules about how to use them  You may choose a combination of any of the following:  · Living will: This is a written record of the treatment you want  You can also choose which treatments you do not want, which to limit, and which to stop at a certain time   This includes surgery, medicine, IV fluid, and tube feedings  · Durable power of  for healthcare [place] [facility]): This is a written record that states who you want to make healthcare choices for you when you are unable to make them for yourself  This person, called a proxy, is usually a family member or a friend  You may choose more than 1 proxy  · Do not resuscitate (DNR) order:  A DNR order is used in case your heart stops beating or you stop breathing  It is a request not to have certain forms of treatment, such as CPR  A DNR order may be included in other types of advance directives  · Medical directive: This covers the care that you want if you are in a coma, near death, or unable to make decisions for yourself  You can list the treatments you want for each condition  Treatment may include pain medicine, surgery, blood transfusions, dialysis, IV or tube feedings, and a ventilator (breathing machine)  · Values history: This document has questions about your views, beliefs, and how you feel and think about life  This information can help others choose the care that you would choose  Why are advance directives important? An advance directive helps you control your care  Although spoken wishes may be used, it is better to have your wishes written down  Spoken wishes can be misunderstood, or not followed  Treatments may be given even if you do not want them  An advance directive may make it easier for your family to make difficult choices about your care  Weight Management   Why it is important to manage your weight:  Being overweight increases your risk of health conditions such as heart disease, high blood pressure, type 2 diabetes, and certain types of cancer  It can also increase your risk for osteoarthritis, sleep apnea, and other respiratory problems  Aim for a slow, steady weight loss  Even a small amount of weight loss can lower your risk of health problems    How to lose weight safely:  A safe and healthy way to lose weight is to eat fewer calories and get regular exercise  You can lose up about 1 pound a week by decreasing the number of calories you eat by 500 calories each day  Healthy meal plan for weight management:  A healthy meal plan includes a variety of foods, contains fewer calories, and helps you stay healthy  A healthy meal plan includes the following:  · Eat whole-grain foods more often  A healthy meal plan should contain fiber  Fiber is the part of grains, fruits, and vegetables that is not broken down by your body  Whole-grain foods are healthy and provide extra fiber in your diet  Some examples of whole-grain foods are whole-wheat breads and pastas, oatmeal, brown rice, and bulgur  · Eat a variety of vegetables every day  Include dark, leafy greens such as spinach, kale, leslie greens, and mustard greens  Eat yellow and orange vegetables such as carrots, sweet potatoes, and winter squash  · Eat a variety of fruits every day  Choose fresh or canned fruit (canned in its own juice or light syrup) instead of juice  Fruit juice has very little or no fiber  · Eat low-fat dairy foods  Drink fat-free (skim) milk or 1% milk  Eat fat-free yogurt and low-fat cottage cheese  Try low-fat cheeses such as mozzarella and other reduced-fat cheeses  · Choose meat and other protein foods that are low in fat  Choose beans or other legumes such as split peas or lentils  Choose fish, skinless poultry (chicken or turkey), or lean cuts of red meat (beef or pork)  Before you cook meat or poultry, cut off any visible fat  · Use less fat and oil  Try baking foods instead of frying them  Add less fat, such as margarine, sour cream, regular salad dressing and mayonnaise to foods  Eat fewer high-fat foods  Some examples of high-fat foods include french fries, doughnuts, ice cream, and cakes  · Eat fewer sweets  Limit foods and drinks that are high in sugar  This includes candy, cookies, regular soda, and sweetened drinks    Exercise:  Exercise at least 30 minutes per day on most days of the week  Some examples of exercise include walking, biking, dancing, and swimming  You can also fit in more physical activity by taking the stairs instead of the elevator or parking farther away from stores  Ask your healthcare provider about the best exercise plan for you  © Copyright [employer] 2018 Information is for End User's use only and may not be sold, redistributed or otherwise used for commercial purposes   All illustrations and images included in CareNotes® are the copyrighted property of [employer]  or [address] [place] [address]

## 2022-09-01 ENCOUNTER — RA CDI HCC (OUTPATIENT)
Dept: OTHER | Facility: HOSPITAL | Age: 67
End: 2022-09-01

## 2022-09-01 NOTE — PROGRESS NOTES
[address]  coding opportunities       Chart reviewed, no opportunity found: CHART REVIEWED, NO OPPORTUNITY FOUND        Patients Insurance     Medicare Insurance: Medicare

## 2022-09-07 ENCOUNTER — TELEPHONE (OUTPATIENT)
Dept: NEUROLOGY | Facility: CLINIC | Age: 67
End: 2022-09-07

## 2022-09-07 ENCOUNTER — APPOINTMENT (OUTPATIENT)
Dept: LAB | Facility: HOSPITAL | Age: 67
End: 2022-09-07
Attending: INTERNAL MEDICINE
Payer: MEDICARE

## 2022-09-07 DIAGNOSIS — E03.8 SUBCLINICAL HYPOTHYROIDISM: ICD-10-CM

## 2022-09-07 DIAGNOSIS — C71.9 GLIOBLASTOMA (HCC): ICD-10-CM

## 2022-09-07 DIAGNOSIS — E78.1 ABNORMALLY LOW HIGH DENSITY LIPOPROTEIN (HDL) CHOLESTEROL WITH HYPERTRIGLYCERIDEMIA: ICD-10-CM

## 2022-09-07 DIAGNOSIS — E78.6 ABNORMALLY LOW HIGH DENSITY LIPOPROTEIN (HDL) CHOLESTEROL WITH HYPERTRIGLYCERIDEMIA: ICD-10-CM

## 2022-09-07 DIAGNOSIS — I10 ESSENTIAL HYPERTENSION: ICD-10-CM

## 2022-09-07 LAB
ALBUMIN SERPL BCP-MCNC: 4.3 G/DL (ref 3.5–5)
ALP SERPL-CCNC: 43 U/L (ref 34–104)
ALT SERPL W P-5'-P-CCNC: 15 U/L (ref 7–52)
ANION GAP SERPL CALCULATED.3IONS-SCNC: 8 MMOL/L (ref 4–13)
AST SERPL W P-5'-P-CCNC: 19 U/L (ref 13–39)
BASOPHILS # BLD AUTO: 0.03 THOUSANDS/ΜL (ref 0–0.1)
BASOPHILS NFR BLD AUTO: 1 % (ref 0–1)
BILIRUB SERPL-MCNC: 0.75 MG/DL (ref 0.2–1)
BUN SERPL-MCNC: 13 MG/DL (ref 5–25)
CALCIUM SERPL-MCNC: 9.6 MG/DL (ref 8.4–10.2)
CHLORIDE SERPL-SCNC: 104 MMOL/L (ref 96–108)
CHOLEST SERPL-MCNC: 161 MG/DL
CO2 SERPL-SCNC: 29 MMOL/L (ref 21–32)
CREAT SERPL-MCNC: 1.05 MG/DL (ref 0.6–1.3)
EOSINOPHIL # BLD AUTO: 0.16 THOUSAND/ΜL (ref 0–0.61)
EOSINOPHIL NFR BLD AUTO: 3 % (ref 0–6)
ERYTHROCYTE [DISTWIDTH] IN BLOOD BY AUTOMATED COUNT: 14 % (ref 11.6–15.1)
GFR SERPL CREATININE-BSD FRML MDRD: 73 ML/MIN/1.73SQ M
GLUCOSE P FAST SERPL-MCNC: 92 MG/DL (ref 65–99)
HCT VFR BLD AUTO: 50.7 % (ref 36.5–49.3)
HDLC SERPL-MCNC: 34 MG/DL
HGB BLD-MCNC: 16.6 G/DL (ref 12–17)
IMM GRANULOCYTES # BLD AUTO: 0.01 THOUSAND/UL (ref 0–0.2)
IMM GRANULOCYTES NFR BLD AUTO: 0 % (ref 0–2)
LDLC SERPL CALC-MCNC: 96 MG/DL (ref 0–100)
LYMPHOCYTES # BLD AUTO: 1.93 THOUSANDS/ΜL (ref 0.6–4.47)
LYMPHOCYTES NFR BLD AUTO: 34 % (ref 14–44)
MCH RBC QN AUTO: 29.9 PG (ref 26.8–34.3)
MCHC RBC AUTO-ENTMCNC: 32.7 G/DL (ref 31.4–37.4)
MCV RBC AUTO: 91 FL (ref 82–98)
MONOCYTES # BLD AUTO: 0.55 THOUSAND/ΜL (ref 0.17–1.22)
MONOCYTES NFR BLD AUTO: 10 % (ref 4–12)
NEUTROPHILS # BLD AUTO: 2.96 THOUSANDS/ΜL (ref 1.85–7.62)
NEUTS SEG NFR BLD AUTO: 52 % (ref 43–75)
NONHDLC SERPL-MCNC: 127 MG/DL
NRBC BLD AUTO-RTO: 0 /100 WBCS
PLATELET # BLD AUTO: 173 THOUSANDS/UL (ref 149–390)
PMV BLD AUTO: 9.9 FL (ref 8.9–12.7)
POTASSIUM SERPL-SCNC: 3.9 MMOL/L (ref 3.5–5.3)
PROT SERPL-MCNC: 6.5 G/DL (ref 6.4–8.4)
RBC # BLD AUTO: 5.55 MILLION/UL (ref 3.88–5.62)
SODIUM SERPL-SCNC: 141 MMOL/L (ref 135–147)
TRIGL SERPL-MCNC: 156 MG/DL
TSH SERPL DL<=0.05 MIU/L-ACNC: 4.43 UIU/ML (ref 0.45–4.5)
WBC # BLD AUTO: 5.64 THOUSAND/UL (ref 4.31–10.16)

## 2022-09-07 PROCEDURE — 80061 LIPID PANEL: CPT

## 2022-09-07 PROCEDURE — 84443 ASSAY THYROID STIM HORMONE: CPT

## 2022-09-07 PROCEDURE — 80053 COMPREHEN METABOLIC PANEL: CPT

## 2022-09-07 PROCEDURE — 36415 COLL VENOUS BLD VENIPUNCTURE: CPT

## 2022-09-07 PROCEDURE — 85025 COMPLETE CBC W/AUTO DIFF WBC: CPT

## 2022-09-07 NOTE — TELEPHONE ENCOUNTER
Pt called into the [facility] this morning and stated  During his visit with  Dr Kenn Escobedo last month he was told there alexus be blood work ordered  While having labs drawn this morning for another physician he was informed there was nothing ordered by Dr Kenn Escobedo  Pt would like to have a nurse reach out to him and confirm no need for blood work or if he should be looming out for orders

## 2022-09-08 RX ORDER — SODIUM CHLORIDE 9 MG/ML
20 INJECTION, SOLUTION INTRAVENOUS ONCE
Status: CANCELLED | OUTPATIENT
Start: 2022-09-15

## 2022-09-09 ENCOUNTER — OFFICE VISIT (OUTPATIENT)
Dept: FAMILY MEDICINE CLINIC | Facility: CLINIC | Age: 67
End: 2022-09-09
Payer: MEDICARE

## 2022-09-09 VITALS
BODY MASS INDEX: 27.85 KG/M2 | HEART RATE: 64 BPM | TEMPERATURE: 96.9 F | DIASTOLIC BLOOD PRESSURE: 86 MMHG | WEIGHT: 224 LBS | OXYGEN SATURATION: 95 % | SYSTOLIC BLOOD PRESSURE: 138 MMHG | HEIGHT: 75 IN

## 2022-09-09 DIAGNOSIS — G40.909 EPILEPSY DUE TO INTRACRANIAL TUMOR (HCC): ICD-10-CM

## 2022-09-09 DIAGNOSIS — I10 ESSENTIAL HYPERTENSION: Primary | ICD-10-CM

## 2022-09-09 DIAGNOSIS — E78.1 ABNORMALLY LOW HIGH DENSITY LIPOPROTEIN (HDL) CHOLESTEROL WITH HYPERTRIGLYCERIDEMIA: ICD-10-CM

## 2022-09-09 DIAGNOSIS — E03.8 SUBCLINICAL HYPOTHYROIDISM: ICD-10-CM

## 2022-09-09 DIAGNOSIS — D49.6 EPILEPSY DUE TO INTRACRANIAL TUMOR (HCC): ICD-10-CM

## 2022-09-09 DIAGNOSIS — E78.6 ABNORMALLY LOW HIGH DENSITY LIPOPROTEIN (HDL) CHOLESTEROL WITH HYPERTRIGLYCERIDEMIA: ICD-10-CM

## 2022-09-09 PROCEDURE — 99214 OFFICE O/P EST MOD 30 MIN: CPT | Performed by: FAMILY MEDICINE

## 2022-09-09 NOTE — PROGRESS NOTES
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  Comments:  Triglycerides are borderline high at 156, continues with low HDL, recommended exercise          PHQ-2/9 Depression Screening    Little interest or pleasure in doing things: 0 - not at all  Feeling down, depressed, or hopeless: 0 - not at all  PHQ-2 Score: 0  PHQ-2 Interpretation: Negative depression screen            Subjective:      Patient ID: Ronald Zaman is a 78 y o  male  Patient presents for follow-up to review labs, his CBC CMP and TSH are within normal limits, Keppra level is normal at 20, total cholesterol 161, triglycerides slightly elevated at 156, HDL low at 34, LDL 96  He is tolerating all his medications without side effects  His blood pressure continues to be well controlled, his weight is stable      The following portions of the patient's history were reviewed and updated as appropriate: allergies, current medications, past family history, past medical history, past social history, past surgical history and problem list     Review of Systems   Constitutional: Negative  Eyes: Negative for visual disturbance  Respiratory: Negative for shortness of breath and wheezing  Cardiovascular: Negative for chest pain, palpitations and leg swelling  Neurological: Negative for dizziness, seizures, syncope, speech difficulty, weakness, light-headedness, numbness and headaches           Objective:    /86   Pulse 64   Temp (!) 96 9 °F (36 1 °C)   Ht 6' 3" (1 905 m)   Wt 102 kg (224 lb)   SpO2 95%   BMI 28 00 kg/m²      Physical Exam  Vitals and nursing note reviewed  Constitutional:       General: He is not in acute distress  Appearance: Normal appearance  He is not ill-appearing or diaphoretic  HENT:      Head: Normocephalic and atraumatic  Eyes:      Conjunctiva/sclera: Conjunctivae normal    Cardiovascular:      Rate and Rhythm: Normal rate and regular rhythm  Heart sounds: Normal heart sounds  No murmur heard  Pulmonary:      Effort: Pulmonary effort is normal  No respiratory distress  Breath sounds: Normal breath sounds  No wheezing, rhonchi or rales  Abdominal:      General: There is no distension  Palpations: Abdomen is soft  There is no mass  Tenderness: There is no abdominal tenderness  There is no guarding or rebound  Musculoskeletal:      Cervical back: Normal range of motion and neck supple  No rigidity  Right lower leg: No edema  Left lower leg: No edema  Lymphadenopathy:      Cervical: No cervical adenopathy  Neurological:      General: No focal deficit present  Mental Status: He is alert and oriented to person, place, and time  Psychiatric:         Mood and Affect: Mood normal          Behavior: Behavior normal          Thought Content:  Thought content normal          Judgment: Judgment normal

## 2022-09-12 ENCOUNTER — OFFICE VISIT (OUTPATIENT)
Dept: SLEEP CENTER | Facility: CLINIC | Age: 67
End: 2022-09-12
Payer: MEDICARE

## 2022-09-12 ENCOUNTER — APPOINTMENT (OUTPATIENT)
Dept: LAB | Facility: HOSPITAL | Age: 67
End: 2022-09-12
Attending: PSYCHIATRY & NEUROLOGY
Payer: MEDICARE

## 2022-09-12 VITALS
SYSTOLIC BLOOD PRESSURE: 152 MMHG | BODY MASS INDEX: 27.98 KG/M2 | HEIGHT: 75 IN | HEART RATE: 77 BPM | DIASTOLIC BLOOD PRESSURE: 91 MMHG | WEIGHT: 225 LBS

## 2022-09-12 DIAGNOSIS — C71.9 GLIOBLASTOMA (HCC): ICD-10-CM

## 2022-09-12 DIAGNOSIS — D49.6 EPILEPSY DUE TO INTRACRANIAL TUMOR (HCC): ICD-10-CM

## 2022-09-12 DIAGNOSIS — G47.33 OBSTRUCTIVE SLEEP APNEA SYNDROME: ICD-10-CM

## 2022-09-12 DIAGNOSIS — I10 ELEVATED BLOOD PRESSURE READING IN OFFICE WITH DIAGNOSIS OF HYPERTENSION: Primary | ICD-10-CM

## 2022-09-12 DIAGNOSIS — G40.909 EPILEPSY DUE TO INTRACRANIAL TUMOR (HCC): ICD-10-CM

## 2022-09-12 LAB
ALBUMIN SERPL BCP-MCNC: 4.5 G/DL (ref 3.5–5)
ALP SERPL-CCNC: 46 U/L (ref 34–104)
ALT SERPL W P-5'-P-CCNC: 19 U/L (ref 7–52)
ANION GAP SERPL CALCULATED.3IONS-SCNC: 7 MMOL/L (ref 4–13)
AST SERPL W P-5'-P-CCNC: 23 U/L (ref 13–39)
BACTERIA UR QL AUTO: ABNORMAL /HPF
BASOPHILS # BLD AUTO: 0.07 THOUSANDS/ΜL (ref 0–0.1)
BASOPHILS NFR BLD AUTO: 1 % (ref 0–1)
BILIRUB SERPL-MCNC: 0.69 MG/DL (ref 0.2–1)
BILIRUB UR QL STRIP: NEGATIVE
BUN SERPL-MCNC: 18 MG/DL (ref 5–25)
CALCIUM SERPL-MCNC: 9.9 MG/DL (ref 8.4–10.2)
CHLORIDE SERPL-SCNC: 104 MMOL/L (ref 96–108)
CHOLEST SERPL-MCNC: 165 MG/DL
CLARITY UR: CLEAR
CO2 SERPL-SCNC: 29 MMOL/L (ref 21–32)
COLOR UR: YELLOW
CREAT SERPL-MCNC: 0.91 MG/DL (ref 0.6–1.3)
EOSINOPHIL # BLD AUTO: 0.2 THOUSAND/ΜL (ref 0–0.61)
EOSINOPHIL NFR BLD AUTO: 3 % (ref 0–6)
ERYTHROCYTE [DISTWIDTH] IN BLOOD BY AUTOMATED COUNT: 14.2 % (ref 11.6–15.1)
GFR SERPL CREATININE-BSD FRML MDRD: 87 ML/MIN/1.73SQ M
GLUCOSE P FAST SERPL-MCNC: 90 MG/DL (ref 65–99)
GLUCOSE UR STRIP-MCNC: NEGATIVE MG/DL
HCT VFR BLD AUTO: 52.1 % (ref 36.5–49.3)
HDLC SERPL-MCNC: 38 MG/DL
HGB BLD-MCNC: 17.1 G/DL (ref 12–17)
HGB UR QL STRIP.AUTO: NEGATIVE
IMM GRANULOCYTES # BLD AUTO: 0.01 THOUSAND/UL (ref 0–0.2)
IMM GRANULOCYTES NFR BLD AUTO: 0 % (ref 0–2)
KETONES UR STRIP-MCNC: NEGATIVE MG/DL
LDLC SERPL CALC-MCNC: 93 MG/DL (ref 0–100)
LEUKOCYTE ESTERASE UR QL STRIP: NEGATIVE
LYMPHOCYTES # BLD AUTO: 2.01 THOUSANDS/ΜL (ref 0.6–4.47)
LYMPHOCYTES NFR BLD AUTO: 33 % (ref 14–44)
MCH RBC QN AUTO: 29.9 PG (ref 26.8–34.3)
MCHC RBC AUTO-ENTMCNC: 32.8 G/DL (ref 31.4–37.4)
MCV RBC AUTO: 91 FL (ref 82–98)
MONOCYTES # BLD AUTO: 0.46 THOUSAND/ΜL (ref 0.17–1.22)
MONOCYTES NFR BLD AUTO: 8 % (ref 4–12)
MUCOUS THREADS UR QL AUTO: ABNORMAL
NEUTROPHILS # BLD AUTO: 3.34 THOUSANDS/ΜL (ref 1.85–7.62)
NEUTS SEG NFR BLD AUTO: 55 % (ref 43–75)
NITRITE UR QL STRIP: NEGATIVE
NON-SQ EPI CELLS URNS QL MICRO: ABNORMAL /HPF
NONHDLC SERPL-MCNC: 127 MG/DL
NRBC BLD AUTO-RTO: 0 /100 WBCS
PH UR STRIP.AUTO: 5.5 [PH]
PLATELET # BLD AUTO: 190 THOUSANDS/UL (ref 149–390)
PMV BLD AUTO: 10.2 FL (ref 8.9–12.7)
POTASSIUM SERPL-SCNC: 4.2 MMOL/L (ref 3.5–5.3)
PROT SERPL-MCNC: 6.6 G/DL (ref 6.4–8.4)
PROT UR STRIP-MCNC: NEGATIVE MG/DL
RBC # BLD AUTO: 5.72 MILLION/UL (ref 3.88–5.62)
RBC #/AREA URNS AUTO: ABNORMAL /HPF
SODIUM SERPL-SCNC: 140 MMOL/L (ref 135–147)
SP GR UR STRIP.AUTO: 1.02 (ref 1–1.03)
TRIGL SERPL-MCNC: 169 MG/DL
TSH SERPL DL<=0.05 MIU/L-ACNC: 3.89 UIU/ML (ref 0.45–4.5)
UROBILINOGEN UR QL STRIP.AUTO: 0.2 E.U./DL
WBC # BLD AUTO: 6.09 THOUSAND/UL (ref 4.31–10.16)
WBC #/AREA URNS AUTO: ABNORMAL /HPF

## 2022-09-12 PROCEDURE — 99214 OFFICE O/P EST MOD 30 MIN: CPT | Performed by: PSYCHIATRY & NEUROLOGY

## 2022-09-12 PROCEDURE — 80177 DRUG SCRN QUAN LEVETIRACETAM: CPT

## 2022-09-12 PROCEDURE — 80053 COMPREHEN METABOLIC PANEL: CPT

## 2022-09-12 PROCEDURE — 80061 LIPID PANEL: CPT

## 2022-09-12 PROCEDURE — 36415 COLL VENOUS BLD VENIPUNCTURE: CPT

## 2022-09-12 PROCEDURE — 84443 ASSAY THYROID STIM HORMONE: CPT

## 2022-09-12 PROCEDURE — 85025 COMPLETE CBC W/AUTO DIFF WBC: CPT

## 2022-09-12 PROCEDURE — 81001 URINALYSIS AUTO W/SCOPE: CPT

## 2022-09-12 NOTE — PROGRESS NOTES
Review of Systems      Genitourinary need to urinate more than twice a night   Cardiology none   Gastrointestinal none   Neurology forgetfulness, poor concentration or confusion, , difficulty with memory and balance problems   Constitutional fatigue and weight change   Integumentary rash or dry skin and itching   Psychiatry none   Musculoskeletal none   Pulmonary snoring   ENT throat clearing and ringing in ears   Endocrine frequent urination   Hematological none

## 2022-09-12 NOTE — PROGRESS NOTES
Assessment/Plan:      1  Elevated blood pressure reading in office with diagnosis of hypertension  Comments:  Blood pressure elevated, he should follow-up with his other doctors regarding this  Consistent use of PAP may help  2  Obstructive sleep apnea syndrome  -     Ambulatory Referral to Sleep Medicine   Overall Mr Dennis Contreras appears to be well treated with regard to sleep apnea, his AHI is normal with treatment  His CPAP session length is a little bit short, it would be ideal to lengthen the sessions and use CPAP the entire night while sleeping  Moreover, he misses approximately 1 out of every 3 nights of use, we reviewed the need to avoid missing nights of use  Conceivably with longer use, he may have less sleepiness  If sleepiness persisted, we could consider medication such as modafinil, that would need to be balanced over by other considerations such as elevated blood pressure    Is conceivable his tiredness relates to his overall medical health in history      Subjective:      Patient ID: Val Estrada is a 78 y o  male  This is a 78year old male who presents for assessment of obstructive sleep apnea  He has a complicated medical history including a glioblastoma of the left temporal lobe  He has a history of obstructive sleep apnea most recently diagnosed 2020 when he had an apnea-hypopnea index of 11, weight 262 lb at that time  He has been treated with auto-CPAP at a range of 4-10 cm H2O  He has not been seen since that time  He was referred to me by Dr Sandhya Rojas from Neurology-at a recent visit in August, it was discussed that he has had fatigue and sleepiness over 1 year  He feels a little better with CPAP use  CPAP data reviewed today  ResMed AirSense 10 set at 4-10 cm H2O  Average session 5 hours 37 minutes  Used 21/30 nights  AHI 1 4  95% pressure -8 cm H2O    He reports a bedtime of 9:00 p m , is asleep by 930, and ultimately is awake a 6 am on his own     He has 3 awakenings during the night  Often falls back asleep easily  Dozes on a daily basis  With CPAP, no dryness, no nose bleeds, no aerophagia    Does not feel rested when he wakes  No drowsy driving     He feels sleepy during the day  He does not take naps  No restless legs     Caffeine- 2 cups of coffee a day     Ola Sleepiness Scale:     Sitting and reading: Moderate chance of dozing  Watching TV: Moderate chance of dozing  Sitting, inactive in a public place (e g  a theatre or a meeting): Slight chance of dozing  As a passenger in a car for an hour without a break: Slight chance of dozing  Lying down to rest in the afternoon when circumstances permit: Would never doze  Sitting and talking to someone: Would never doze  Sitting quietly after a lunch without alcohol: Moderate chance of dozing  In a car, while stopped for a few minutes in traffic: Would never doze  Total score: 8     The following portions of the patient's history were reviewed and updated as appropriate: allergies, current medications, past family history, past medical history, past social history, past surgical history, and problem list     Review of Systems    Genitourinary need to urinate more than twice a night   Cardiology none   Gastrointestinal none   Neurology forgetfulness, poor concentration or confusion, , difficulty with memory and balance problems   Constitutional fatigue and weight change   Integumentary rash or dry skin and itching   Psychiatry none   Musculoskeletal none   Pulmonary snoring   ENT throat clearing and ringing in ears   Endocrine frequent urination   Hematological none        Objective:        /91 (BP Location: Left arm, Patient Position: Sitting, Cuff Size: Adult)   Pulse 77   Ht 6' 3" (1 905 m)   Wt 102 kg (225 lb)   BMI 28 12 kg/m²     Physical Exam  Constitutional:       Appearance: Normal appearance  HENT:      Head: Normocephalic and atraumatic        Mouth/Throat:      Mouth: Mucous membranes are moist  Eyes:      Extraocular Movements: Extraocular movements intact  Pupils: Pupils are equal, round, and reactive to light  Cardiovascular:      Rate and Rhythm: Normal rate  Pulses: Normal pulses  Heart sounds: Normal heart sounds  Pulmonary:      Effort: Pulmonary effort is normal       Breath sounds: Normal breath sounds  Musculoskeletal:      Right lower leg: No edema  Left lower leg: No edema  Neurological:      Mental Status: He is alert  Psychiatric:         Mood and Affect: Mood normal          Behavior: Behavior normal          Thought Content: Thought content normal          Judgment: Judgment normal         Named 1/2 objects, 2/2 with prompting  mallampati 4, low lying soft palate, high arched     I spent 32 minutes in chart review  Face to face, and documentation    Reviewed prior brain MRIand PAP data , as well as chart notes from neuro, heme-onc etc

## 2022-09-12 NOTE — PATIENT INSTRUCTIONS
It is very important to avoid driving while drowsy, this can be very dangerous or even cause serious injury or death  If sleepy, it is not safe to get behind the wheel  If you are driving and feels sleepy, it is very important to pull over right away  Even losing control of the car for a split second can be deadly  If you feel you cannot control when sleepiness occurs and cannot prevented, it is important to not drive at all until this improves  Please let me know if you experience this as it is very important  Nursing Support:  When: Monday through Friday 7A-5PM except holidays  Where: Our direct line is [phone]  If you are having a true emergency please call 911  In the event that the line is busy or it is after hours please leave a voice message and we will return your call  Please speak clearly, leaving your full name, birth date, best number to reach you and the reason for your call  Medication refills: We will need the name of the medication, the dosage, the ordering provider, whether you get a 30 or 90 day refill, and the pharmacy name and address  Medications will be ordered by the provider only  Nurses cannot call in prescriptions  Please allow 7 days for medication refills  Physician requested updates: If your provider requested that you call with an update after starting medication, please be ready to provide us the medication and dosage, what time you take your medication, the time you attempt to fall asleep, time you fall asleep, when you wake up, and what time you get out of bed  Sleep Study Results: We will contact you with sleep study results and/or next steps after the physician has reviewed your testing         Try very hard to use your CPAP machine all night, every night, This may help lessen your sleepiness

## 2022-09-15 ENCOUNTER — HOSPITAL ENCOUNTER (OUTPATIENT)
Dept: MRI IMAGING | Facility: HOSPITAL | Age: 67
End: 2022-09-15
Attending: INTERNAL MEDICINE
Payer: MEDICARE

## 2022-09-15 ENCOUNTER — HOSPITAL ENCOUNTER (OUTPATIENT)
Dept: INFUSION CENTER | Facility: HOSPITAL | Age: 67
End: 2022-09-15
Attending: INTERNAL MEDICINE
Payer: MEDICARE

## 2022-09-15 VITALS
DIASTOLIC BLOOD PRESSURE: 86 MMHG | WEIGHT: 225.09 LBS | HEIGHT: 75 IN | BODY MASS INDEX: 27.99 KG/M2 | RESPIRATION RATE: 18 BRPM | HEART RATE: 63 BPM | OXYGEN SATURATION: 98 % | TEMPERATURE: 97.2 F | SYSTOLIC BLOOD PRESSURE: 138 MMHG

## 2022-09-15 DIAGNOSIS — C71.9 GLIOBLASTOMA (HCC): ICD-10-CM

## 2022-09-15 DIAGNOSIS — C71.9 GLIOBLASTOMA (HCC): Primary | ICD-10-CM

## 2022-09-15 DIAGNOSIS — E78.1 ABNORMALLY LOW HIGH DENSITY LIPOPROTEIN (HDL) CHOLESTEROL WITH HYPERTRIGLYCERIDEMIA: ICD-10-CM

## 2022-09-15 DIAGNOSIS — E78.6 ABNORMALLY LOW HIGH DENSITY LIPOPROTEIN (HDL) CHOLESTEROL WITH HYPERTRIGLYCERIDEMIA: ICD-10-CM

## 2022-09-15 LAB — LEVETIRACETAM SERPL-MCNC: 8.8 UG/ML (ref 10–40)

## 2022-09-15 PROCEDURE — 96413 CHEMO IV INFUSION 1 HR: CPT

## 2022-09-15 PROCEDURE — 70553 MRI BRAIN STEM W/O & W/DYE: CPT

## 2022-09-15 PROCEDURE — A9585 GADOBUTROL INJECTION: HCPCS | Performed by: INTERNAL MEDICINE

## 2022-09-15 PROCEDURE — G1004 CDSM NDSC: HCPCS

## 2022-09-15 RX ORDER — FENOFIBRATE 54 MG/1
TABLET ORAL
Qty: 42 TABLET | Refills: 0 | Status: SHIPPED | OUTPATIENT
Start: 2022-09-15

## 2022-09-15 RX ORDER — SODIUM CHLORIDE 9 MG/ML
20 INJECTION, SOLUTION INTRAVENOUS ONCE
Status: COMPLETED | OUTPATIENT
Start: 2022-09-15 | End: 2022-09-15

## 2022-09-15 RX ADMIN — BEVACIZUMAB 1000 MG: 400 INJECTION, SOLUTION INTRAVENOUS at 09:26

## 2022-09-15 RX ADMIN — SODIUM CHLORIDE 20 ML/HR: 0.9 INJECTION, SOLUTION INTRAVENOUS at 09:02

## 2022-09-15 RX ADMIN — GADOBUTROL 10 ML: 604.72 INJECTION INTRAVENOUS at 06:18

## 2022-09-16 ENCOUNTER — TELEPHONE (OUTPATIENT)
Dept: NEUROLOGY | Facility: CLINIC | Age: 67
End: 2022-09-16

## 2022-09-16 DIAGNOSIS — D49.6 EPILEPSY DUE TO INTRACRANIAL TUMOR (HCC): Primary | ICD-10-CM

## 2022-09-16 DIAGNOSIS — G40.909 EPILEPSY DUE TO INTRACRANIAL TUMOR (HCC): Primary | ICD-10-CM

## 2022-09-16 NOTE — TELEPHONE ENCOUNTER
Spoke to patient  Patient admits to missing some doses  States mostly issues with night time doses  Missed likely 2 (possibly more) in few weeks prior to labs  Labs were drawn prior to morning dose of medication  Educated patient on importance of med compliance  Suggested alarm or reminder on phone or watch  Would you like to repeat labs?

## 2022-09-16 NOTE — TELEPHONE ENCOUNTER
----- Message from Bobby Zaidi MD sent at 9/16/2022 12:46 PM EDT -----  Levetiracetam is lower than expected and suggests missed doses  Please confirm current dose, adherence during the days leading up to collection and determine timing of dose prior to collection (9/12/2022 at 06:53)

## 2022-09-23 ENCOUNTER — OFFICE VISIT (OUTPATIENT)
Dept: HEMATOLOGY ONCOLOGY | Facility: CLINIC | Age: 67
End: 2022-09-23
Payer: MEDICARE

## 2022-09-23 VITALS
BODY MASS INDEX: 28.1 KG/M2 | HEART RATE: 67 BPM | SYSTOLIC BLOOD PRESSURE: 148 MMHG | TEMPERATURE: 97.6 F | HEIGHT: 75 IN | DIASTOLIC BLOOD PRESSURE: 91 MMHG | WEIGHT: 226 LBS

## 2022-09-23 DIAGNOSIS — C71.9 GLIOBLASTOMA (HCC): Primary | ICD-10-CM

## 2022-09-23 DIAGNOSIS — E03.9 HYPOTHYROIDISM (ACQUIRED): Primary | ICD-10-CM

## 2022-09-23 PROCEDURE — 99214 OFFICE O/P EST MOD 30 MIN: CPT | Performed by: INTERNAL MEDICINE

## 2022-09-23 NOTE — PROGRESS NOTES
[address] [facility]  [id number] [facility] [place]  [place] [address] [address] [facility] KRUNAL ROJO,[birth year], [id number]  09/23/22    Discussion:   Summary, this is a 64-year-old male history of GBM as outlined  Clinically he is stable  He generally functions well  He has mild expressive aphasia but is able to make himself understood  Systolic blood pressures are running just below 150  He has some daytime somnolence  He has been under sleep study evaluation  Sleep apnea is under treatment  Recent brain MRI shows BT rads 2-no change  Continuation of Avastin is recommended at this time  I discussed the above with the patient  The patient  voiced understanding and agreement   ______________________________________________________________________    No chief complaint on file  HPI:  Oncology History Overview Note   Danitza Soares is a 60year old male is status post biopsy of a left temporal mass with pathology consistent with GBM  He has a  past medical history of hypertension, obesity, hyperlipidemia, and sleep apnea  He presented to the ED on 1/1/21 with sudden onset of expressive aphasia  He completed a course of radiation therapy to the left temporal lobe of the brain concurrent with temodar on 4/6/21 5/7/21 MRI brain w wo contrast  IMPRESSION:  Left temporal lobe GBM continues to increase in size and is currently contiguous with the lateral margin of the compressed lateral ventricular atrium  Increasing subtle ependymal enhancement and vasogenic edema  5/13/21 Dr Americo Sanchez, neurology f/y  Continue current seizure medications unchanged  Return in 6 months    5/14/21 Dr Marilin De La Cruz f/u  Relatively stable clinically, feels his gait and speech are somewhat improved compared to time of diagnosis  "Repeat MRI shows increase in size of left temporal lobe mass with increasing edema    We reviewed that it is possible that some of this represents post treatment change but certainly is concerning for progressive disease as well  Case to be reviewed at [facility]-Oncology Working group in a few days  We will call the patient with the results of that review and further recommendations "  Arrange for consolidative Temodar  Avastin could be added  6/11/21 Dr Dipika Haro  11/1/21 Neurology f/u         Brain tumor [facility]) (Resolved)   1/19/2021 Biopsy    BIOPSY BRAIN IMAGE-GUIDED NEEDLE - LEFT TEMPORAL  Surgeon: Dr Bennie Jensen    Temporal mass:  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)  Note    This case was seen in consultation with Dr Wes Bedolla, an expert in Neuropathology from [facility]  Glioblastoma ([facility] [address] )   1/19/2021 Initial Diagnosis    Glioblastoma ([facility] [address] [address] )     1/19/2021 Biopsy    BIOPSY BRAIN IMAGE-GUIDED NEEDLE - LEFT TEMPORAL  Surgeon: Dr Bennie Jensen    Temporal mass:  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)  Note    This case was seen in consultation with Dr Wes Bedolla, an expert in Neuropathology from [facility]            2/24/2021 - 4/6/2021 Radiation    Plan ID Energy Fractions Dose per Fraction (cGy) Dose Correction (cGy) Total Dose Delivered (cGy) Elapsed Days   L Temp CD 6X 7 / 7 200 0 1,400 8   L Temp Lobe 6X 23 / 23 200 0 4,600 30   Treatment dates:  C1: 2/24/2021 - 4/6/2021 2/24/2021 - 4/6/2021 Chemotherapy    Temodar 185 mg daily w/radiation therapy     5/27/2021 -  Chemotherapy    bevacizumab (AVASTIN) IVPB, 1,090 mg, Intravenous, Once, 29 of 34 cycles  Dose modification: 15 mg/kg (original dose 10 mg/kg, Cycle 17, Reason: Dose modified as per discussion with consulting physician), 10 mg/kg (original dose 10 mg/kg, Cycle 29, Reason: Anticipated Tolerance)  Administration: 1,100 mg (5/27/2021), 1,100 mg (6/10/2021), 1,100 mg (6/24/2021), 1,100 mg (7/8/2021), 1,100 mg (7/22/2021), 1,100 mg (8/5/2021), 1,100 mg (9/30/2021), 1,100 mg (9/16/2021), 1,100 mg (9/2/2021), 1,100 mg (8/19/2021), 1,100 mg (10/14/2021), 1,100 mg (10/28/2021), 1,100 mg (11/11/2021), 1,100 mg (11/26/2021), 1,600 mg (1/6/2022), 1,100 mg (12/23/2021), 1,100 mg (12/9/2021), 1,600 mg (1/27/2022), 1,600 mg (2/17/2022), 1,600 mg (4/21/2022), 1,600 mg (3/10/2022), 1,600 mg (3/31/2022), 1,600 mg (5/12/2022), 1,600 mg (6/2/2022), 1,600 mg (6/23/2022), 1,600 mg (7/14/2022), 1,600 mg (8/4/2022), 1,600 mg (8/25/2022), 1,000 mg (9/15/2022)         Interval History:  Clinically stable  ECOG-  1 - Symptomatic but completely ambulatory    Review of Systems   Constitutional: Negative for chills and fever  HENT: Negative for nosebleeds  Eyes: Negative for discharge  Respiratory: Negative for cough and shortness of breath  Cardiovascular: Negative for chest pain  Gastrointestinal: Negative for abdominal pain, constipation and diarrhea  Endocrine: Negative for polydipsia  Genitourinary: Negative for hematuria  Musculoskeletal: Negative for arthralgias  Skin: Negative for color change  Allergic/Immunologic: Negative for immunocompromised state  Neurological: Negative for dizziness and headaches  Hematological: Negative for adenopathy  Psychiatric/Behavioral: Negative for agitation         Past Medical History:   Diagnosis Date    Brain tumor ([facility] ) 1/5/2021    Bruxism (teeth grinding)     GBM (glioblastoma multiforme) (HCC)     Hypercholesterolemia     Hypertension     Obesity     Sleep apnea     CPAP nightly     Patient Active Problem List   Diagnosis    Class 1 obesity due to excess calories with serious comorbidity and body mass index (BMI) of 33 0 to 33 9 in adult    Negative depression screening    Essential hypertension    Obstructive sleep apnea syndrome    Sleep disturbance    Daytime sleepiness    Glioblastoma ([facility] )    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 ([facility] )    Annual physical exam    Muscle cramps    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    Mixed hyperlipidemia    Subclinical hypothyroidism    Screening for colon cancer       Current Outpatient Medications:     amLODIPine (NORVASC) 10 mg tablet, Take 1 tablet by mouth once daily, Disp: 90 tablet, Rfl: 0    aspirin (ECOTRIN LOW STRENGTH) 81 mg EC tablet, Take 81 mg by mouth daily, Disp: , Rfl:     atorvastatin (LIPITOR) 10 mg tablet, Take 1 tablet (10 mg total) by mouth daily, Disp: 90 tablet, Rfl: 3    Bevacizumab (AVASTIN IV), Infuse into a venous catheter Get infusion every two weeks ( for Glioblastoma cancer), Disp: , Rfl:     fenofibrate (TRICOR) 54 MG tablet, Take 1 tablet by mouth once daily, Disp: 42 tablet, Rfl: 0    hydrochlorothiazide (MICROZIDE) 12 5 mg capsule, Take 1 capsule by mouth once daily, Disp: 90 capsule, Rfl: 0    levETIRAcetam (KEPPRA) 1000 MG tablet, TAKE 1 TABLET BY MOUTH EVERY 12 HOURS, Disp: 180 tablet, Rfl: 3    levETIRAcetam (KEPPRA) 250 mg tablet, Take 1 tablet by mouth twice daily, Disp: 180 tablet, Rfl: 3    levothyroxine 25 mcg tablet, Take 1 tablet by mouth once daily, Disp: 90 tablet, Rfl: 0    polyethylene glycol (GLYCOLAX) 17 GM/SCOOP powder, Take 17 g by mouth daily, Disp: 510 g, Rfl: 0    valsartan (DIOVAN) 80 mg tablet, Take 1 tablet by mouth once daily, Disp: 90 tablet, Rfl: 0  No Known Allergies  Past Surgical History:   Procedure Laterality Date    FL LUMBAR PUNCTURE DIAGNOSTIC  1/4/2021    HAND FRACTURE REPAIR      left thumb    CT STEREO BX/ASPIR/EXCIS,INTRACRANIAL LESN Left 1/19/2021    Procedure: BIOPSY BRAIN IMAGE-GUIDED NEEDLE - LEFT TEMPORAL;  Surgeon: Jesus Tinajero MD;  Location: [facility];  Service: Neurosurgery    TONSILLECTOMY       Social History     Objective:  Vitals:    09/23/22 0839   BP: 148/91   Pulse: 67   Temp: 97 6 °F (36 4 °C)   Weight: 103 kg (226 lb)   Height: 6' 3" (1 905 m)     Physical Exam  Constitutional:       Appearance: He is well-developed  HENT:      Head: Normocephalic and atraumatic     Eyes:      Pupils: Pupils are equal, round, and reactive to light  Cardiovascular:      Rate and Rhythm: Normal rate and regular rhythm  Heart sounds: No murmur heard  Pulmonary:      Breath sounds: Normal breath sounds  No wheezing or rales  Abdominal:      Palpations: Abdomen is soft  Tenderness: There is no abdominal tenderness  Musculoskeletal:         General: No tenderness  Normal range of motion  Cervical back: Neck supple  Lymphadenopathy:      Cervical: No cervical adenopathy  Skin:     Findings: No erythema or rash  Neurological:      Mental Status: He is alert and oriented to person, place, and time  Cranial Nerves: No cranial nerve deficit  Deep Tendon Reflexes: Reflexes are normal and symmetric  Psychiatric:         Behavior: Behavior normal            Labs: I personally reviewed the labs and imaging pertinent to this patient care

## 2022-09-26 ENCOUNTER — OFFICE VISIT (OUTPATIENT)
Dept: SURGERY | Facility: CLINIC | Age: 67
End: 2022-09-26
Payer: MEDICARE

## 2022-09-26 VITALS
RESPIRATION RATE: 18 BRPM | HEART RATE: 66 BPM | SYSTOLIC BLOOD PRESSURE: 142 MMHG | BODY MASS INDEX: 28.25 KG/M2 | DIASTOLIC BLOOD PRESSURE: 90 MMHG | WEIGHT: 226 LBS | OXYGEN SATURATION: 97 % | TEMPERATURE: 97.6 F

## 2022-09-26 DIAGNOSIS — L72.0 EPIDERMOID CYST OF SKIN: Primary | ICD-10-CM

## 2022-09-26 PROCEDURE — 99203 OFFICE O/P NEW LOW 30 MIN: CPT | Performed by: SURGERY

## 2022-09-26 NOTE — PROGRESS NOTES
Assessment/Plan:    Epidermoid cyst of skin  The patient is a pleasant 46-year-old male past medical history significant for a glioblastoma presenting with a 3 month history of a right buttock lump  Patient reports a gradual reduction in size of the past 2 weeks and currently denies all symptoms referable to the lesion  On physical examination patient has a residual epidermoid cyst of the right buttock less than 1 cm in maximum diameter that is firm and well circumscribed  There is no evidence of active infection or inflammation that would benefit from surgical drainage or excision  We discussed the diagnosis and the options available to the patient which include observation and follow-up in 2 months time  As an alternative I did offer excision here in the office today under local anesthesia  Patient prefers to avoid any additional surgical interventions at the present time which I believe to be safe and reasonable  I look for seeing him back in 2 months time  He has been encouraged to follow up with the practice sooner on an as-needed basis  Subjective:      Patient ID: Zackary Nieto is a 78 y o  male  Pt is coming in the office for on lump on right buttocks 3 months , Patient states that the lump has gotten smaller over the past two weeks and the pain has gone away  Never opened and no fever/chills COLEMAN Otero MA      The following portions of the patient's history were reviewed and updated as appropriate: allergies, current medications, past family history, past medical history, past social history, past surgical history and problem list     Review of Systems   Constitutional: Negative for chills and fever  HENT: Negative for ear pain and sore throat  Eyes: Negative for pain and visual disturbance  Respiratory: Negative for cough and shortness of breath  Cardiovascular: Negative for chest pain and palpitations  Gastrointestinal: Negative for abdominal pain and vomiting  Genitourinary: Negative for dysuria and hematuria  Musculoskeletal: Negative for arthralgias and back pain  Skin: Negative for color change and rash  Neurological: Negative for seizures and syncope  All other systems reviewed and are negative  Objective:      /90 (BP Location: Left arm, Patient Position: Sitting, Cuff Size: Large)   Pulse 66   Temp 97 6 °F (36 4 °C) (Temporal)   Resp 18   Wt 103 kg (226 lb)   SpO2 97%   BMI 28 25 kg/m²          Physical Exam  Vitals and nursing note reviewed  Constitutional:       Appearance: He is well-developed  HENT:      Head: Normocephalic and atraumatic  Eyes:      Conjunctiva/sclera: Conjunctivae normal       Pupils: Pupils are equal, round, and reactive to light  Cardiovascular:      Rate and Rhythm: Normal rate and regular rhythm  Pulmonary:      Effort: Pulmonary effort is normal       Breath sounds: Normal breath sounds  Abdominal:      General: Bowel sounds are normal       Palpations: Abdomen is soft  Musculoskeletal:         General: Normal range of motion  Cervical back: Normal range of motion and neck supple  Skin:     General: Skin is warm and dry  Comments: 1 cm epidermoid cyst of the upper right buttock not actively infected or inflamed  Neurological:      Mental Status: He is alert and oriented to person, place, and time  Psychiatric:         Behavior: Behavior normal          Thought Content:  Thought content normal          Judgment: Judgment normal

## 2022-09-26 NOTE — LETTER
September 26, 2022     Chris Felipe DO  [address] [place] [address]    Patient: Garret Holter   YOB: 1955   Date of Visit: 9/26/2022       Dear Dr Kang Jules:    Thank you for referring Loly Bob to me for evaluation  Below are my notes for this consultation  If you have questions, please do not hesitate to call me  I look forward to following your patient along with you  Sincerely,        Shayy Garcia MD        CC: No Recipients  Shayy Garcia MD  9/26/2022 10:19 AM  Incomplete  Assessment/Plan:    Epidermoid cyst of skin  The patient is a pleasant 60-year-old male past medical history significant for a glioblastoma presenting with a 3 month history of a right buttock lump  Patient reports a gradual reduction in size of the past 2 weeks and currently denies all symptoms referable to the lesion  On physical examination patient has a residual epidermoid cyst of the right buttock less than 1 cm in maximum diameter that is firm and well circumscribed  There is no evidence of active infection or inflammation that would benefit from surgical drainage or excision  We discussed the diagnosis and the options available to the patient which include observation and follow-up in 2 months time  As an alternative I did offer excision here in the office today under local anesthesia  Patient prefers to avoid any additional surgical interventions at the present time which I believe to be safe and reasonable  I look for seeing him back in 2 months time  He has been encouraged to follow up with the practice sooner on an as-needed basis  Subjective:      Patient ID: Garret Holter is a 78 y o  male  Pt is coming in the office for on lump on right buttocks 3 months , Patient states that the lump has gotten smaller over the past two weeks and the pain has gone away   Never opened and no fever/chills COLEMAN Otero MA      The following portions of the patient's history were reviewed and updated as appropriate: allergies, current medications, past family history, past medical history, past social history, past surgical history and problem list     Review of Systems   Constitutional: Negative for chills and fever  HENT: Negative for ear pain and sore throat  Eyes: Negative for pain and visual disturbance  Respiratory: Negative for cough and shortness of breath  Cardiovascular: Negative for chest pain and palpitations  Gastrointestinal: Negative for abdominal pain and vomiting  Genitourinary: Negative for dysuria and hematuria  Musculoskeletal: Negative for arthralgias and back pain  Skin: Negative for color change and rash  Neurological: Negative for seizures and syncope  All other systems reviewed and are negative  Objective:      /90 (BP Location: Left arm, Patient Position: Sitting, Cuff Size: Large)   Pulse 66   Temp 97 6 °F (36 4 °C) (Temporal)   Resp 18   Wt 103 kg (226 lb)   SpO2 97%   BMI 28 25 kg/m²          Physical Exam  Vitals and nursing note reviewed  Constitutional:       Appearance: He is well-developed  HENT:      Head: Normocephalic and atraumatic  Eyes:      Conjunctiva/sclera: Conjunctivae normal       Pupils: Pupils are equal, round, and reactive to light  Cardiovascular:      Rate and Rhythm: Normal rate and regular rhythm  Pulmonary:      Effort: Pulmonary effort is normal       Breath sounds: Normal breath sounds  Abdominal:      General: Bowel sounds are normal       Palpations: Abdomen is soft  Musculoskeletal:         General: Normal range of motion  Cervical back: Normal range of motion and neck supple  Skin:     General: Skin is warm and dry  Comments: 1 cm epidermoid cyst of the upper right buttock not actively infected or inflamed  Neurological:      Mental Status: He is alert and oriented to person, place, and time     Psychiatric:         Behavior: Behavior normal          Thought Content:  Thought content normal          Judgment: Judgment normal

## 2022-09-26 NOTE — ASSESSMENT & PLAN NOTE
The patient is a pleasant 26-year-old male past medical history significant for a glioblastoma presenting with a 3 month history of a right buttock lump  Patient reports a gradual reduction in size of the past 2 weeks and currently denies all symptoms referable to the lesion  On physical examination patient has a residual epidermoid cyst of the right buttock less than 1 cm in maximum diameter that is firm and well circumscribed  There is no evidence of active infection or inflammation that would benefit from surgical drainage or excision  We discussed the diagnosis and the options available to the patient which include observation and follow-up in 2 months time  As an alternative I did offer excision here in the office today under local anesthesia  Patient prefers to avoid any additional surgical interventions at the present time which I believe to be safe and reasonable  I look for seeing him back in 2 months time  He has been encouraged to follow up with the practice sooner on an as-needed basis

## 2022-09-29 RX ORDER — SODIUM CHLORIDE 9 MG/ML
20 INJECTION, SOLUTION INTRAVENOUS ONCE
Status: CANCELLED | OUTPATIENT
Start: 2022-10-06

## 2022-10-03 ENCOUNTER — APPOINTMENT (OUTPATIENT)
Dept: LAB | Facility: HOSPITAL | Age: 67
End: 2022-10-03
Attending: PSYCHIATRY & NEUROLOGY
Payer: MEDICARE

## 2022-10-03 DIAGNOSIS — D49.6 EPILEPSY DUE TO INTRACRANIAL TUMOR (HCC): ICD-10-CM

## 2022-10-03 DIAGNOSIS — G40.909 EPILEPSY DUE TO INTRACRANIAL TUMOR (HCC): ICD-10-CM

## 2022-10-03 PROCEDURE — 36415 COLL VENOUS BLD VENIPUNCTURE: CPT

## 2022-10-03 PROCEDURE — 80177 DRUG SCRN QUAN LEVETIRACETAM: CPT

## 2022-10-05 LAB — LEVETIRACETAM SERPL-MCNC: 25.6 UG/ML (ref 10–40)

## 2022-10-06 ENCOUNTER — HOSPITAL ENCOUNTER (OUTPATIENT)
Dept: INFUSION CENTER | Facility: HOSPITAL | Age: 67
End: 2022-10-06
Attending: INTERNAL MEDICINE
Payer: MEDICARE

## 2022-10-06 VITALS
TEMPERATURE: 96.9 F | DIASTOLIC BLOOD PRESSURE: 76 MMHG | HEIGHT: 75 IN | WEIGHT: 227.07 LBS | RESPIRATION RATE: 16 BRPM | BODY MASS INDEX: 28.23 KG/M2 | SYSTOLIC BLOOD PRESSURE: 138 MMHG | HEART RATE: 64 BPM

## 2022-10-06 DIAGNOSIS — C71.9 GLIOBLASTOMA (HCC): Primary | ICD-10-CM

## 2022-10-06 PROCEDURE — 96413 CHEMO IV INFUSION 1 HR: CPT

## 2022-10-06 RX ORDER — SODIUM CHLORIDE 9 MG/ML
20 INJECTION, SOLUTION INTRAVENOUS ONCE
Status: COMPLETED | OUTPATIENT
Start: 2022-10-06 | End: 2022-10-06

## 2022-10-06 RX ADMIN — SODIUM CHLORIDE 20 ML/HR: 0.9 INJECTION, SOLUTION INTRAVENOUS at 09:11

## 2022-10-06 RX ADMIN — BEVACIZUMAB 1600 MG: 400 INJECTION, SOLUTION INTRAVENOUS at 09:15

## 2022-10-06 NOTE — PROGRESS NOTES
Recent labs reviewed  Patient tolerated Avastin chemotherapy treatment without reaction or issues  AVS given to patient  Patient ambulated off unit without incident  All personal belongings taken with patient

## 2022-10-11 ENCOUNTER — TELEPHONE (OUTPATIENT)
Dept: NEUROLOGY | Facility: CLINIC | Age: 67
End: 2022-10-11

## 2022-10-11 PROBLEM — Z12.11 SCREENING FOR COLON CANCER: Status: RESOLVED | Noted: 2022-06-17 | Resolved: 2022-10-11

## 2022-10-11 NOTE — TELEPHONE ENCOUNTER
Spoke to patient, patient had repeat levels drawn  Now WNL - 25 6  Any new recommendations for patient?     Medications confirmed:  levetiracetam 1250mg BID

## 2022-10-11 NOTE — TELEPHONE ENCOUNTER
Parker Wilson has called and asked for a call back regarding Lab results  He asked to call [phone]      Thank you,     Shirley Suh

## 2022-10-11 NOTE — TELEPHONE ENCOUNTER
Level looks good, as expected for his dose  The moderate level may suggest against the idea that levetiracetam is contributing significantly to sleepiness  No change recommended

## 2022-10-19 ENCOUNTER — TELEPHONE (OUTPATIENT)
Dept: SLEEP CENTER | Facility: CLINIC | Age: 67
End: 2022-10-19

## 2022-10-19 NOTE — TELEPHONE ENCOUNTER
Rashawn states he talked to Dr Nevaeh Rae about a medication to try and he decided he wanted to go forward with the process

## 2022-10-20 RX ORDER — SODIUM CHLORIDE 9 MG/ML
20 INJECTION, SOLUTION INTRAVENOUS ONCE
Status: CANCELLED | OUTPATIENT
Start: 2022-10-27

## 2022-10-21 NOTE — TELEPHONE ENCOUNTER
I spoke with the patient, we discussed that modafinil could be a consideration but we would need to be careful about his blood pressure  It would be ideal for him to document blood pressure reading for a few weeks at home to make sure his blood pressure is well controlled  I will also reach out to his primary care physician and neurologist to make sure they have no concerns regarding use of modafinil  The patient has a visit scheduled with me in December, as long as everyone is on the same page, I can consider prescribing medication at that time

## 2022-10-21 NOTE — TELEPHONE ENCOUNTER
Patient called and would like to discuss with you further in considering to take modafinil since sleepiness is persistant  Patient would also like to discuss potential side effects  You noted other considerations such as elevated blood pressure  Please contact patient to further discuss taking modafinil and potential side effects

## 2022-10-24 ENCOUNTER — APPOINTMENT (OUTPATIENT)
Dept: LAB | Facility: HOSPITAL | Age: 67
End: 2022-10-24
Payer: MEDICARE

## 2022-10-24 DIAGNOSIS — E03.8 SUBCLINICAL HYPOTHYROIDISM: ICD-10-CM

## 2022-10-24 DIAGNOSIS — E78.6 ABNORMALLY LOW HIGH DENSITY LIPOPROTEIN (HDL) CHOLESTEROL WITH HYPERTRIGLYCERIDEMIA: ICD-10-CM

## 2022-10-24 DIAGNOSIS — E78.2 MIXED HYPERLIPIDEMIA: ICD-10-CM

## 2022-10-24 DIAGNOSIS — E78.1 ABNORMALLY LOW HIGH DENSITY LIPOPROTEIN (HDL) CHOLESTEROL WITH HYPERTRIGLYCERIDEMIA: ICD-10-CM

## 2022-10-24 DIAGNOSIS — E03.9 HYPOTHYROIDISM (ACQUIRED): ICD-10-CM

## 2022-10-24 LAB
ALBUMIN SERPL BCP-MCNC: 4.2 G/DL (ref 3.5–5)
ALP SERPL-CCNC: 41 U/L (ref 34–104)
ALT SERPL W P-5'-P-CCNC: 14 U/L (ref 7–52)
ANION GAP SERPL CALCULATED.3IONS-SCNC: 9 MMOL/L (ref 4–13)
AST SERPL W P-5'-P-CCNC: 20 U/L (ref 13–39)
BACTERIA UR QL AUTO: ABNORMAL /HPF
BILIRUB SERPL-MCNC: 0.83 MG/DL (ref 0.2–1)
BILIRUB UR QL STRIP: NEGATIVE
BUN SERPL-MCNC: 12 MG/DL (ref 5–25)
CALCIUM SERPL-MCNC: 9.4 MG/DL (ref 8.4–10.2)
CHLORIDE SERPL-SCNC: 103 MMOL/L (ref 96–108)
CHOLEST SERPL-MCNC: 139 MG/DL
CLARITY UR: CLEAR
CO2 SERPL-SCNC: 27 MMOL/L (ref 21–32)
COLOR UR: YELLOW
CREAT SERPL-MCNC: 0.95 MG/DL (ref 0.6–1.3)
GFR SERPL CREATININE-BSD FRML MDRD: 83 ML/MIN/1.73SQ M
GLUCOSE P FAST SERPL-MCNC: 83 MG/DL (ref 65–99)
GLUCOSE UR STRIP-MCNC: NEGATIVE MG/DL
HDLC SERPL-MCNC: 36 MG/DL
HGB UR QL STRIP.AUTO: NEGATIVE
KETONES UR STRIP-MCNC: NEGATIVE MG/DL
LDLC SERPL CALC-MCNC: 74 MG/DL (ref 0–100)
LEUKOCYTE ESTERASE UR QL STRIP: NEGATIVE
NITRITE UR QL STRIP: NEGATIVE
NON-SQ EPI CELLS URNS QL MICRO: ABNORMAL /HPF
NONHDLC SERPL-MCNC: 103 MG/DL
PH UR STRIP.AUTO: 7 [PH]
POTASSIUM SERPL-SCNC: 3.8 MMOL/L (ref 3.5–5.3)
PROT SERPL-MCNC: 6.4 G/DL (ref 6.4–8.4)
PROT UR STRIP-MCNC: NEGATIVE MG/DL
RBC #/AREA URNS AUTO: ABNORMAL /HPF
SODIUM SERPL-SCNC: 139 MMOL/L (ref 135–147)
SP GR UR STRIP.AUTO: 1.01 (ref 1–1.03)
TRIGL SERPL-MCNC: 143 MG/DL
TSH SERPL DL<=0.05 MIU/L-ACNC: 3.74 UIU/ML (ref 0.45–4.5)
UROBILINOGEN UR QL STRIP.AUTO: 0.2 E.U./DL
WBC #/AREA URNS AUTO: ABNORMAL /HPF

## 2022-10-24 PROCEDURE — 36415 COLL VENOUS BLD VENIPUNCTURE: CPT

## 2022-10-24 PROCEDURE — 81001 URINALYSIS AUTO W/SCOPE: CPT

## 2022-10-24 PROCEDURE — 80061 LIPID PANEL: CPT

## 2022-10-24 PROCEDURE — 80053 COMPREHEN METABOLIC PANEL: CPT

## 2022-10-24 PROCEDURE — 84443 ASSAY THYROID STIM HORMONE: CPT

## 2022-10-25 NOTE — UTILIZATION REVIEW
Continued Stay Review    Date:  1-5-21                       Current Patient Class: inpatient  Current Level of Care: med surg    HPI:65 y o  male initially admitted on 1-1-21   For seizure like activity  Assessment/Plan:     Lumbar puncture done 1-4  CSF protein =61   MRI of liver needed to evaluate 1 5 cm liver mass  Fang Hernandez MRI ordered  Multiple lab results in process:  CSF - gram stain, protein electrophoresis, diff    Lyme disease, fungal culture, non gyne cytology, toxoplasma gondi antibody,IgG, AFB culture       Pertinent Labs/Diagnostic Results:     Results from last 7 days   Lab Units 01/01/21  1144   SARS-COV-2  Negative     Results from last 7 days   Lab Units 01/04/21  0955 01/02/21  0440 01/01/21  1144   WBC Thousand/uL  --  7 37 7 90   HEMOGLOBIN g/dL  --  15 4 16 0   HEMATOCRIT %  --  47 3 47 1*   PLATELETS Thousands/uL 201 214 204   NEUTROS ABS Thousands/µL  --  4 47  --          Results from last 7 days   Lab Units 01/02/21  0440 01/01/21  1144   SODIUM mmol/L 142 137   POTASSIUM mmol/L 4 3 4 3   CHLORIDE mmol/L 108 102   CO2 mmol/L 30 27   ANION GAP mmol/L 4 8   BUN mg/dL 10 14   CREATININE mg/dL 0 81 0 68*   EGFR ml/min/1 73sq m 94 101   CALCIUM mg/dL 9 0 9 3     Results from last 7 days   Lab Units 01/04/21  0955   TOTAL PROTEIN g/dL 6 7     Results from last 7 days   Lab Units 01/01/21  1113   POC GLUCOSE mg/dl 86     Results from last 7 days   Lab Units 01/02/21  0440 01/01/21  1144   GLUCOSE RANDOM mg/dL 115 90         Results from last 7 days   Lab Units 01/02/21  0440   HEMOGLOBIN A1C % 5 7*   EAG mg/dl 117       Results from last 7 days   Lab Units 01/01/21  1144   TROPONIN I ng/mL <0 03         Results from last 7 days   Lab Units 01/04/21  0955 01/01/21  1144   PROTIME seconds 12 6 12 0   INR  0 94 0 89   PTT seconds 29 27     Results from last 7 days   Lab Units 01/01/21  1144   TSH 3RD GENERATON uIU/mL 3 660       Results from last 7 days   Lab Units 01/01/21  1144   INFLUENZA A PCR  Negative   INFLUENZA B PCR  Negative   RSV PCR  Negative         Results from last 7 days   Lab Units 01/04/21  1438   GRAM STAIN RESULT  Rare Mononuclear Cells  No Polys or Bacteria seen     Results from last 7 days   Lab Units 01/04/21  1438   APPEARANCE CSF  CLEAR   TUBE NUM CSF  4   WBC CSF /uL 1   XANTHOCHROMIA  No   LYMPHS % (CSF) % 91   MONOCYTES % (CSF) % 9   GLUCOSE CSF mg/dL 56   PROTEIN CSF mg/dL 61*   RBC CSF uL 7   CSF CULTURE  No growth       Vital Signs:       Date/Time  Temp  Pulse  Resp  BP  MAP (mmHg)  SpO2   01/05/21 09:31:33  --  --  --  --  --  92 %   01/05/21 09:31:32  --  --  --  139/84  102  --   01/05/21 0931  --  --  --  139/84  --  --   01/05/21 07:30:52  96 °F (35 6 °C)Abnormal   66  --  128/82  97  95 %                 Medications:       amLODIPine, 10 mg, Oral, Daily  aspirin, 81 mg, Oral, Daily  atorvastatin, 40 mg, Per NG Tube, QPM  enoxaparin, 40 mg, Subcutaneous, Q24H ERICA  hydrochlorothiazide, 12 5 mg, Oral, Daily  levETIRAcetam, 1,000 mg, Oral, Q12H ERICA  LORazepam, 1 mg, Oral, Once  losartan, 100 mg, Oral, Daily      Continuous IV Infusions:     PRN Meds:  acetaminophen, 650 mg, Oral, Q6H PRN  hydrALAZINE, 10 mg, Intravenous, Q6H PRN  lidocaine (PF), 5 mL, Injection, Once in imaging        Discharge Plan: to be determined     Network Utilization Review Department  ATTENTION: Please call with any questions or concerns to [phone] and carefully listen to the prompts so that you are directed to the right person  All voicemails are confidential   Pedro Tompkins all requests for admission clinical reviews, approved or denied determinations and any other requests to dedicated fax number below belonging to the campus where the patient is receiving treatment   List of dedicated fax numbers for the Facilities:  [address] DENIALS (Administrative/Medical Necessity) [phone]   [address] (Maternity/NICU/Pediatrics) [phone] 5000 [facility] Marni Carroll [phone]   [address] [phone]   Barrett Bliss [address] [address] [place] [address] M[address] Cordero 1481 [phone]   [address] 951 [phone] Patient

## 2022-10-27 ENCOUNTER — TELEPHONE (OUTPATIENT)
Dept: HEMATOLOGY ONCOLOGY | Facility: CLINIC | Age: 67
End: 2022-10-27

## 2022-10-27 ENCOUNTER — HOSPITAL ENCOUNTER (OUTPATIENT)
Dept: INFUSION CENTER | Facility: HOSPITAL | Age: 67
End: 2022-10-27
Attending: INTERNAL MEDICINE
Payer: MEDICARE

## 2022-10-27 VITALS
RESPIRATION RATE: 16 BRPM | TEMPERATURE: 96.1 F | HEIGHT: 75 IN | SYSTOLIC BLOOD PRESSURE: 138 MMHG | WEIGHT: 227.07 LBS | DIASTOLIC BLOOD PRESSURE: 86 MMHG | HEART RATE: 62 BPM | BODY MASS INDEX: 28.23 KG/M2 | OXYGEN SATURATION: 98 %

## 2022-10-27 DIAGNOSIS — C71.9 GLIOBLASTOMA (HCC): Primary | ICD-10-CM

## 2022-10-27 RX ORDER — SODIUM CHLORIDE 9 MG/ML
20 INJECTION, SOLUTION INTRAVENOUS ONCE
Status: COMPLETED | OUTPATIENT
Start: 2022-10-27 | End: 2022-10-27

## 2022-10-27 RX ADMIN — BEVACIZUMAB 1600 MG: 400 INJECTION, SOLUTION INTRAVENOUS at 10:13

## 2022-10-27 RX ADMIN — SODIUM CHLORIDE 20 ML/HR: 0.9 INJECTION, SOLUTION INTRAVENOUS at 09:48

## 2022-10-27 NOTE — TELEPHONE ENCOUNTER
Rturned call to ARTEM  Advised him Dr Neha Brunson reviewed his plan and he has been getting 15mg/m2 with the exception of his last treatment when he got 10mg/m2  Also confirmed that he does not need blood work done before treatments, just urine every other cycle  Lab orders entered for blood to be done prior to his appt with Dr Neha Brunson in January  He voiced understanding

## 2022-10-27 NOTE — TELEPHONE ENCOUNTER
CALL TRANSFER   Reason for patient call? Patient calling with questions about his infusions  He was told he no longer needs labs prior to infusion and would like confirmation   Patient's primary physician? Dr Khushboo Guerra   RN call was transferred to and time it was transferred? Judah Score @ 3:17PM   Informed patient that the message will be forwarded to the team and someone will get back to them as soon as possible    Did you relay this information to the patient?  Yes

## 2022-10-27 NOTE — TELEPHONE ENCOUNTER
Rec'd call from pt who is concerned that he has not been getting the correct dose of his Avastin  Pt states he was told he has been getting 10mg/kg but today was increased to 15mg/kg  Advised pt I discussed this with Dr Isaias Martell earlier when the infusion staff asked and pt should be getting 15mg/kg every 3 weeks  Pt also saying he was told today that he does not need to get labs done before every treatment  Advised pt that is correct and we only need urine every other cycle as Avastin does not affect pts wbcs/rbs/plts like other drugs so we do not need to monitor them as regularly  Advised pt labs prior to his office visits with Dr Isaias Martell is sufficient  Pt aware I am going to reconfirm all of this with Dr Isaias Martell and call him back before then end of today

## 2022-10-31 DIAGNOSIS — E03.8 SUBCLINICAL HYPOTHYROIDISM: ICD-10-CM

## 2022-10-31 DIAGNOSIS — E78.1 ABNORMALLY LOW HIGH DENSITY LIPOPROTEIN (HDL) CHOLESTEROL WITH HYPERTRIGLYCERIDEMIA: ICD-10-CM

## 2022-10-31 DIAGNOSIS — E78.6 ABNORMALLY LOW HIGH DENSITY LIPOPROTEIN (HDL) CHOLESTEROL WITH HYPERTRIGLYCERIDEMIA: ICD-10-CM

## 2022-10-31 DIAGNOSIS — I10 ESSENTIAL HYPERTENSION: ICD-10-CM

## 2022-10-31 RX ORDER — LEVOTHYROXINE SODIUM 0.03 MG/1
TABLET ORAL
Qty: 90 TABLET | Refills: 0 | Status: SHIPPED | OUTPATIENT
Start: 2022-10-31

## 2022-10-31 RX ORDER — FENOFIBRATE 54 MG/1
TABLET ORAL
Qty: 42 TABLET | Refills: 0 | Status: SHIPPED | OUTPATIENT
Start: 2022-10-31

## 2022-10-31 RX ORDER — VALSARTAN 80 MG/1
TABLET ORAL
Qty: 90 TABLET | Refills: 0 | Status: SHIPPED | OUTPATIENT
Start: 2022-10-31

## 2022-11-04 ENCOUNTER — OFFICE VISIT (OUTPATIENT)
Dept: SLEEP CENTER | Facility: CLINIC | Age: 67
End: 2022-11-04

## 2022-11-04 VITALS
HEIGHT: 75 IN | HEART RATE: 71 BPM | WEIGHT: 222 LBS | BODY MASS INDEX: 27.6 KG/M2 | SYSTOLIC BLOOD PRESSURE: 132 MMHG | DIASTOLIC BLOOD PRESSURE: 90 MMHG

## 2022-11-04 DIAGNOSIS — G47.33 OSA (OBSTRUCTIVE SLEEP APNEA): Primary | ICD-10-CM

## 2022-11-04 DIAGNOSIS — F51.09 TERMINAL INSOMNIA: ICD-10-CM

## 2022-11-04 DIAGNOSIS — I10 ESSENTIAL HYPERTENSION: ICD-10-CM

## 2022-11-04 NOTE — PATIENT INSTRUCTIONS
With great difficulty falling asleep or with difficulty falling back asleep, laying in bed for a prolonged period time is not ideal   This can increase worry and rumination (having racing thoughts, not able to "turn off your brain"  After what feels like 15 minutes, if you feel wide awake, worried, anxious, or can't clear your brain, it is better to leave the bedroom to do something relaxing , The typical recommendation is to read a boring book in dim light  In general, if you leave the room, you want to do something that is relaxing, not stimulating  Avoid bright screens, doing work, doing chores, or anything that requires a lot of mental effort  Return to bed when you feel more calm, sleepy, or mentally clear  Each night, a few hours before bed, try to write down your thoughts and worries    The goal is to clear your mind so you have less to think about in the middle of the night     Try to use your CPAP each night,try to avoid skipping nights

## 2022-11-04 NOTE — PROGRESS NOTES
Assessment/Plan:      1  JESSICA (obstructive sleep apnea)  -     PAP DME Resupply/Reorder    2  Terminal insomnia    3  Essential hypertension     We discussed that overall his obstructive sleep apnea is fairly well treated as his AHI is normal   He should avoid skipping nights of use  At a previous visit, we discussed use of modafinil for residual hypersomnolence  In reviewing his blood pressure log, I have concern about use of modafinil I would not recommended  His blood pressure runs consistently high and he is on 3 antihypertensive agents  I think it would be too risky to and modafinil  It is quite likely his medical history and medications are contributing to residual sleepiness  He understood the rationale  We discussed he should try avoiding sodium containing foods in follow-up with his other doctors for his borderline high blood pressure  We discussed that he also is prone to early morning awakenings and difficulty returning to sleep  Depression is a common cause but he denies depression  I advised some strategies to help improve success in returning to sleep  If he is able to returns to sleep consistently, I suspect he will likely noticed improved daytime function  I have ordered CPAP supplies and will follow up with him in 1 year  Subjective:      Patient ID: Aviva Bennett is a 78 y o  male  Mr Buffalo Delay returns in follow-up for history of obstructive sleep apnea and hypersomnolence  We discussed that attributes visit that potentially a trial of modafinil could be considered  He returns today to discuss this further  He  Has a hx of mild JESSICA, AHI was 11 at diagnosis in 2020  He brought blood pressure logs which I reviewed  This shows systolic blood pressures in the 130s to a high of 179 in diastolic blood pressures in the 80s to a high of 90  He provided daily readings over 15 days  For hypertension, he is prescribed hydrochlorothiazide , valsartan, and amlodipine   Takes levetiracetam 1250 mg twice daily  Last seizure was 2 years ago  CPAP data reviewed today-he has used to CPAP machine 76/90 nights, 67% of the nights more than 4 hours  AHI 1 4  AirSense 10 CPAP set at 4-10 cm H2O  95% pressure -8 cm H2O      His [place] Sleepiness Scale score is 6  He is not refreshed when he wakes  Naps daily for 30-60 minutes  Somewhat refreshed when he wakes from a nap  Does not doze    Goes to bed 8 pm , Wakes 1-2 times a night (was 3-4 times without CPAP)    Wakes up at 2 AM  Gets out of bed as late as 6 am          [place] Sleepiness Scale:     Sitting and reading: Slight chance of dozing  Watching TV: Slight chance of dozing  Sitting, inactive in a public place (e g  a theatre or a meeting): Slight chance of dozing  As a passenger in a car for an hour without a break: Slight chance of dozing  Lying down to rest in the afternoon when circumstances permit: Slight chance of dozing  Sitting and talking to someone: Would never doze  Sitting quietly after a lunch without alcohol: Slight chance of dozing  In a car, while stopped for a few minutes in traffic: Would never doze  Total score: 6     The following portions of the patient's history were reviewed and updated as appropriate: allergies, current medications, past family history, past medical history, past social history, past surgical history, and problem list     Review of Systems    Genitourinary none   Cardiology none   Gastrointestinal none   Neurology none   Constitutional none   Integumentary none   Psychiatry none   Musculoskeletal none   Pulmonary none   ENT none   Endocrine none   Hematological none       Objective:        /90 (BP Location: Left arm, Patient Position: Sitting, Cuff Size: Large)   Pulse 71   Ht 6' 3" (1 905 m)   Wt 101 kg (222 lb)   BMI 27 75 kg/m²     Physical Exam   RRR  Lungs CTA  No edema

## 2022-11-07 ENCOUNTER — TELEPHONE (OUTPATIENT)
Dept: SURGERY | Facility: CLINIC | Age: 67
End: 2022-11-07

## 2022-11-07 ENCOUNTER — TELEPHONE (OUTPATIENT)
Dept: SLEEP CENTER | Facility: CLINIC | Age: 67
End: 2022-11-07

## 2022-11-07 NOTE — TELEPHONE ENCOUNTER
Patient called to cancel his appointment for 11/21/2022 for a right buttock lump  Patient stated that he is doing well and there is no need for this appointment  I stated that if anything should arise to please contact our office

## 2022-11-10 RX ORDER — SODIUM CHLORIDE 9 MG/ML
20 INJECTION, SOLUTION INTRAVENOUS ONCE
Status: CANCELLED | OUTPATIENT
Start: 2022-11-17

## 2022-11-17 ENCOUNTER — HOSPITAL ENCOUNTER (OUTPATIENT)
Dept: INFUSION CENTER | Facility: HOSPITAL | Age: 67
End: 2022-11-17
Attending: INTERNAL MEDICINE

## 2022-11-17 VITALS
WEIGHT: 223.99 LBS | RESPIRATION RATE: 18 BRPM | TEMPERATURE: 97.4 F | SYSTOLIC BLOOD PRESSURE: 134 MMHG | HEIGHT: 75 IN | BODY MASS INDEX: 27.85 KG/M2 | OXYGEN SATURATION: 98 % | HEART RATE: 73 BPM | DIASTOLIC BLOOD PRESSURE: 86 MMHG

## 2022-11-17 DIAGNOSIS — C71.9 GLIOBLASTOMA (HCC): Primary | ICD-10-CM

## 2022-11-17 RX ORDER — SODIUM CHLORIDE 9 MG/ML
20 INJECTION, SOLUTION INTRAVENOUS ONCE
Status: COMPLETED | OUTPATIENT
Start: 2022-11-17 | End: 2022-11-17

## 2022-11-17 RX ADMIN — SODIUM CHLORIDE 20 ML/HR: 0.9 INJECTION, SOLUTION INTRAVENOUS at 08:37

## 2022-11-17 RX ADMIN — BEVACIZUMAB 1600 MG: 400 INJECTION, SOLUTION INTRAVENOUS at 09:17

## 2022-12-01 RX ORDER — SODIUM CHLORIDE 9 MG/ML
20 INJECTION, SOLUTION INTRAVENOUS ONCE
Status: CANCELLED | OUTPATIENT
Start: 2022-12-08

## 2022-12-05 ENCOUNTER — APPOINTMENT (OUTPATIENT)
Dept: LAB | Facility: HOSPITAL | Age: 67
End: 2022-12-05

## 2022-12-05 DIAGNOSIS — C71.9 GLIOBLASTOMA (HCC): ICD-10-CM

## 2022-12-05 LAB
BACTERIA UR QL AUTO: ABNORMAL /HPF
BILIRUB UR QL STRIP: NEGATIVE
CLARITY UR: CLEAR
COLOR UR: YELLOW
GLUCOSE UR STRIP-MCNC: NEGATIVE MG/DL
HGB UR QL STRIP.AUTO: NEGATIVE
KETONES UR STRIP-MCNC: NEGATIVE MG/DL
LEUKOCYTE ESTERASE UR QL STRIP: NEGATIVE
NITRITE UR QL STRIP: NEGATIVE
NON-SQ EPI CELLS URNS QL MICRO: ABNORMAL /HPF
PH UR STRIP.AUTO: 6.5 [PH]
PROT UR STRIP-MCNC: ABNORMAL MG/DL
RBC #/AREA URNS AUTO: ABNORMAL /HPF
SP GR UR STRIP.AUTO: 1.01
UROBILINOGEN UR QL STRIP.AUTO: 0.2 E.U./DL
WBC #/AREA URNS AUTO: ABNORMAL /HPF

## 2022-12-07 ENCOUNTER — TELEPHONE (OUTPATIENT)
Dept: GASTROENTEROLOGY | Facility: CLINIC | Age: 67
End: 2022-12-07

## 2022-12-07 NOTE — TELEPHONE ENCOUNTER
Patients GI provider:  Dr Kerri Bueno     Number to return call: [phone] or [phone]    Reason for call: Pt requesting a sooner appt than 12/22/22  Pt states he's having abdominal pain and gas   Pt currently on wait list      Scheduled procedure/appointment date if applicable: Appt 04/92/01

## 2022-12-07 NOTE — TELEPHONE ENCOUNTER
Nothing earlier available at this time  Pt is in wait list  If something opens up, he will receive a call

## 2022-12-08 ENCOUNTER — HOSPITAL ENCOUNTER (OUTPATIENT)
Dept: INFUSION CENTER | Facility: HOSPITAL | Age: 67
End: 2022-12-08
Attending: INTERNAL MEDICINE

## 2022-12-08 VITALS
RESPIRATION RATE: 18 BRPM | HEIGHT: 75 IN | DIASTOLIC BLOOD PRESSURE: 84 MMHG | SYSTOLIC BLOOD PRESSURE: 140 MMHG | HEART RATE: 74 BPM | TEMPERATURE: 96.9 F | WEIGHT: 225.97 LBS | BODY MASS INDEX: 28.1 KG/M2 | OXYGEN SATURATION: 98 %

## 2022-12-08 DIAGNOSIS — C71.9 GLIOBLASTOMA (HCC): Primary | ICD-10-CM

## 2022-12-08 RX ORDER — SODIUM CHLORIDE 9 MG/ML
20 INJECTION, SOLUTION INTRAVENOUS ONCE
Status: COMPLETED | OUTPATIENT
Start: 2022-12-08 | End: 2022-12-08

## 2022-12-08 RX ADMIN — SODIUM CHLORIDE 20 ML/HR: 0.9 INJECTION, SOLUTION INTRAVENOUS at 09:02

## 2022-12-08 RX ADMIN — BEVACIZUMAB 1600 MG: 400 INJECTION, SOLUTION INTRAVENOUS at 09:16

## 2022-12-12 DIAGNOSIS — E78.00 HYPERCHOLESTEROLEMIA: ICD-10-CM

## 2022-12-13 RX ORDER — ATORVASTATIN CALCIUM 10 MG/1
10 TABLET, FILM COATED ORAL DAILY
Qty: 90 TABLET | Refills: 3 | Status: SHIPPED | OUTPATIENT
Start: 2022-12-13

## 2022-12-19 DIAGNOSIS — E78.1 ABNORMALLY LOW HIGH DENSITY LIPOPROTEIN (HDL) CHOLESTEROL WITH HYPERTRIGLYCERIDEMIA: ICD-10-CM

## 2022-12-19 DIAGNOSIS — E78.6 ABNORMALLY LOW HIGH DENSITY LIPOPROTEIN (HDL) CHOLESTEROL WITH HYPERTRIGLYCERIDEMIA: ICD-10-CM

## 2022-12-20 RX ORDER — FENOFIBRATE 54 MG/1
54 TABLET ORAL DAILY
Qty: 90 TABLET | Refills: 1 | Status: SHIPPED | OUTPATIENT
Start: 2022-12-20

## 2022-12-22 ENCOUNTER — HOSPITAL ENCOUNTER (OUTPATIENT)
Dept: MRI IMAGING | Facility: HOSPITAL | Age: 67
End: 2022-12-22
Attending: INTERNAL MEDICINE

## 2022-12-22 DIAGNOSIS — C71.9 GLIOBLASTOMA (HCC): ICD-10-CM

## 2022-12-22 RX ORDER — SODIUM CHLORIDE 9 MG/ML
20 INJECTION, SOLUTION INTRAVENOUS ONCE
Status: CANCELLED | OUTPATIENT
Start: 2022-12-29

## 2022-12-22 RX ADMIN — GADOBUTROL 10 ML: 604.72 INJECTION INTRAVENOUS at 06:41

## 2022-12-29 ENCOUNTER — HOSPITAL ENCOUNTER (OUTPATIENT)
Dept: INFUSION CENTER | Facility: HOSPITAL | Age: 67
End: 2022-12-29
Attending: INTERNAL MEDICINE

## 2022-12-29 VITALS
BODY MASS INDEX: 28.8 KG/M2 | DIASTOLIC BLOOD PRESSURE: 86 MMHG | SYSTOLIC BLOOD PRESSURE: 134 MMHG | TEMPERATURE: 96.9 F | HEART RATE: 69 BPM | WEIGHT: 230.38 LBS | RESPIRATION RATE: 18 BRPM

## 2022-12-29 DIAGNOSIS — C71.9 GLIOBLASTOMA (HCC): Primary | ICD-10-CM

## 2022-12-29 RX ORDER — SODIUM CHLORIDE 9 MG/ML
20 INJECTION, SOLUTION INTRAVENOUS ONCE
Status: COMPLETED | OUTPATIENT
Start: 2022-12-29 | End: 2022-12-29

## 2022-12-29 RX ADMIN — SODIUM CHLORIDE 20 ML/HR: 0.9 INJECTION, SOLUTION INTRAVENOUS at 08:58

## 2022-12-29 RX ADMIN — BEVACIZUMAB 1600 MG: 400 INJECTION, SOLUTION INTRAVENOUS at 08:58

## 2022-12-29 NOTE — PROGRESS NOTES
Pt offers no complaints  Tolerated chemotherapy infusion well without incident  Discharged in stable condition and pt aware of next infusion appointment  Pt aware to have urine specimen drawn prior to next infusion  AVS provided

## 2023-01-02 ENCOUNTER — APPOINTMENT (OUTPATIENT)
Dept: LAB | Facility: HOSPITAL | Age: 68
End: 2023-01-02

## 2023-01-02 DIAGNOSIS — C71.9 GLIOBLASTOMA (HCC): ICD-10-CM

## 2023-01-02 LAB
ALBUMIN SERPL BCP-MCNC: 4.2 G/DL (ref 3.5–5)
ALP SERPL-CCNC: 44 U/L (ref 34–104)
ALT SERPL W P-5'-P-CCNC: 18 U/L (ref 7–52)
ANION GAP SERPL CALCULATED.3IONS-SCNC: 6 MMOL/L (ref 4–13)
AST SERPL W P-5'-P-CCNC: 21 U/L (ref 13–39)
BACTERIA UR QL AUTO: ABNORMAL /HPF
BASOPHILS # BLD AUTO: 0.04 THOUSANDS/ÂΜL (ref 0–0.1)
BASOPHILS NFR BLD AUTO: 1 % (ref 0–1)
BILIRUB SERPL-MCNC: 0.82 MG/DL (ref 0.2–1)
BILIRUB UR QL STRIP: NEGATIVE
BUN SERPL-MCNC: 19 MG/DL (ref 5–25)
CALCIUM SERPL-MCNC: 9.9 MG/DL (ref 8.4–10.2)
CHLORIDE SERPL-SCNC: 102 MMOL/L (ref 96–108)
CLARITY UR: CLEAR
CO2 SERPL-SCNC: 32 MMOL/L (ref 21–32)
COLOR UR: YELLOW
CREAT SERPL-MCNC: 1.75 MG/DL (ref 0.6–1.3)
EOSINOPHIL # BLD AUTO: 0.14 THOUSAND/ÂΜL (ref 0–0.61)
EOSINOPHIL NFR BLD AUTO: 2 % (ref 0–6)
ERYTHROCYTE [DISTWIDTH] IN BLOOD BY AUTOMATED COUNT: 13.9 % (ref 11.6–15.1)
GFR SERPL CREATININE-BSD FRML MDRD: 39 ML/MIN/1.73SQ M
GLUCOSE P FAST SERPL-MCNC: 86 MG/DL (ref 65–99)
GLUCOSE UR STRIP-MCNC: NEGATIVE MG/DL
HCT VFR BLD AUTO: 51 % (ref 36.5–49.3)
HGB BLD-MCNC: 16.8 G/DL (ref 12–17)
HGB UR QL STRIP.AUTO: NEGATIVE
IMM GRANULOCYTES # BLD AUTO: 0.02 THOUSAND/UL (ref 0–0.2)
IMM GRANULOCYTES NFR BLD AUTO: 0 % (ref 0–2)
KETONES UR STRIP-MCNC: NEGATIVE MG/DL
LEUKOCYTE ESTERASE UR QL STRIP: NEGATIVE
LYMPHOCYTES # BLD AUTO: 1.63 THOUSANDS/ÂΜL (ref 0.6–4.47)
LYMPHOCYTES NFR BLD AUTO: 25 % (ref 14–44)
MCH RBC QN AUTO: 30.4 PG (ref 26.8–34.3)
MCHC RBC AUTO-ENTMCNC: 32.9 G/DL (ref 31.4–37.4)
MCV RBC AUTO: 92 FL (ref 82–98)
MONOCYTES # BLD AUTO: 0.47 THOUSAND/ÂΜL (ref 0.17–1.22)
MONOCYTES NFR BLD AUTO: 7 % (ref 4–12)
NEUTROPHILS # BLD AUTO: 4.17 THOUSANDS/ÂΜL (ref 1.85–7.62)
NEUTS SEG NFR BLD AUTO: 65 % (ref 43–75)
NITRITE UR QL STRIP: NEGATIVE
NON-SQ EPI CELLS URNS QL MICRO: ABNORMAL /HPF
NRBC BLD AUTO-RTO: 0 /100 WBCS
PH UR STRIP.AUTO: 7.5 [PH]
PLATELET # BLD AUTO: 177 THOUSANDS/UL (ref 149–390)
PMV BLD AUTO: 10.6 FL (ref 8.9–12.7)
POTASSIUM SERPL-SCNC: 3.9 MMOL/L (ref 3.5–5.3)
PROT SERPL-MCNC: 6.4 G/DL (ref 6.4–8.4)
PROT UR STRIP-MCNC: ABNORMAL MG/DL
RBC # BLD AUTO: 5.53 MILLION/UL (ref 3.88–5.62)
RBC #/AREA URNS AUTO: ABNORMAL /HPF
SODIUM SERPL-SCNC: 140 MMOL/L (ref 135–147)
SP GR UR STRIP.AUTO: 1.01
UROBILINOGEN UR QL STRIP.AUTO: 0.2 E.U./DL
WBC # BLD AUTO: 6.47 THOUSAND/UL (ref 4.31–10.16)
WBC #/AREA URNS AUTO: ABNORMAL /HPF

## 2023-01-04 ENCOUNTER — OFFICE VISIT (OUTPATIENT)
Dept: GASTROENTEROLOGY | Facility: CLINIC | Age: 68
End: 2023-01-04

## 2023-01-04 VITALS
SYSTOLIC BLOOD PRESSURE: 140 MMHG | HEART RATE: 64 BPM | OXYGEN SATURATION: 96 % | WEIGHT: 228 LBS | BODY MASS INDEX: 28.35 KG/M2 | HEIGHT: 75 IN | DIASTOLIC BLOOD PRESSURE: 94 MMHG | RESPIRATION RATE: 18 BRPM

## 2023-01-04 DIAGNOSIS — Z86.010 PERSONAL HISTORY OF COLONIC POLYPS: ICD-10-CM

## 2023-01-04 DIAGNOSIS — K86.9 LESION OF PANCREAS: ICD-10-CM

## 2023-01-04 DIAGNOSIS — R12 HEARTBURN: Primary | ICD-10-CM

## 2023-01-04 DIAGNOSIS — K59.00 CONSTIPATION, UNSPECIFIED CONSTIPATION TYPE: ICD-10-CM

## 2023-01-04 RX ORDER — FAMOTIDINE 20 MG/1
20 TABLET, FILM COATED ORAL 2 TIMES DAILY
Qty: 60 TABLET | Refills: 3 | Status: SHIPPED | OUTPATIENT
Start: 2023-01-04

## 2023-01-04 NOTE — PATIENT INSTRUCTIONS
For your heartburn:   - please start pepcid/famotidine twice daily   - please have the barium swallow completed to look at the anatomy of your upper GI tract  - continue with small frequent meals, avoiding eating near bedtime   - if there are any abnormalities on the barium swallow, or your symptoms persist, we should discus upper endoscopy     For your finding of a pancreas lesion, I am ordering another MRI       Please continue on miralax and take this daily (17g mixed into 8 ounces of a liquid of your choice)

## 2023-01-04 NOTE — PROGRESS NOTES
[facility] - Outpatient Follow-up Note  Teena Stapleton 78 y o  male MRN: [id number]  Encounter: [id number]    ASSESSMENT AND PLAN:      1  Heartburn    Patient here today to discuss intermittent heartburn symptoms over the past month  No alarm features at this time  Patient is a non-smoker, no family history of esophageal cancer  We discussed given the lack of alarm features, would be reasonable to trial acid suppression medication and evaluate noninvasively with something like an esophagram   Would recommend short interval follow-up and we discussed that if there are abnormalities on the esophagram or his symptoms persist, would have low threshold to proceed with upper endoscopy  He does have underlying brain tumor with risk of seizures along with sleep apnea, which does put him at higher risk for anesthesia complications, however, pt shares he would be interested in invasive evaluation  Trial H2B daily now  Recommend diet and lifestyle modifications for GERD  Barium swallow, f/u in approx 4 weeks  +/- EGD in future  - FL barium swallow; Future  - famotidine (PEPCID) 20 mg tablet; Take 1 tablet (20 mg total) by mouth 2 (two) times a day  Dispense: 60 tablet; Refill: 3    2  Lesion of pancreas    Noted during chart review  3 mm pancreatic neck cyst with no alarming features, suspicious for sidebranch IPMN  Interval imaging in approximately 1 year was recommended at that time  We discussed whether patient is interested in proceeding with surveillance imaging, and he is interested  Orders have been placed  Pt had decreased renal function on recent lab testing on 01/02/23, he should have renal function repeated prior to upcoming MRI w/ contrast (although less nephrotoxic than iodinated contrast)    - MRI abdomen w wo contrast and mrcp; Future    3  Personal history of colonic polyps  4   Constipation, unspecified constipation type    Patient is up-to-date on colonoscopy  He had tubular adenomas removed with a repeat colonoscopy recommended in 7 years  We discussed that he should restart his MiraLAX daily, as this had previously helped to improve sensation of incomplete evacuation  We will follow up in approx 1 month to reassess symptoms  ______________________________________________________________________    SUBJECTIVE: Patient is a 78 y o  male who presents today for follow-up regarding heartburn  Past medical history significant for glioblastoma, epilepsy due to intracranial lesion, JESSICA, HTN, HLD, subclinical hypothyroidism  Patient is new to me  He previously followed with Dr Josue Lentz for constipation and colon cancer screening  He is here today to discuss intermittent heartburn symptoms he has had for about the past month  Symptoms are occurring less than once daily  He describes postprandial heartburn as well as heartburn after taking his pills  He takes Tums as needed and this alleviates his symptoms  He denies any dysphagia, odynophagia, early satiety, nausea, vomiting  He denies any unintentional weight loss in the past 6 to 12 months  He heard a family friend developed an ulcer and is concerned he could have one too  He denies exertional chest pain or shortness of breath  Pertaining to his bowel habits, he is still dealing with some constipation  The MiraLAX had been helpful in the past though he is not taking this regularly  He has intermittent straining, he is having a bowel movement every day to every other day  He denies any BRBPR or melena  He did have an episode of lower abdominal discomfort the other day, this has abated and not recurred  At times he endorses a sense of incomplete evacuation of stool  No nocturnal BMs  No EtOH intake  No NSAID use  No tobacco use (remote hx, smoked in teens)  No history of CRC or esophageal cancer  MRI/MRCP: 01/2021: 1 3 cm cavernous hemangioma left hepatic lobe    No suspicious hepatic lesions  3 mm pancreatic neck cyst could represent a tiny sidebranch IPMN  For simple cyst(s) less than 1 5 cm, recommend yearly followup 5 times, then every 2 year for 2 times  If cyst(s) stable after 9 years, no further followups  Recommend next followup   MRI/MRCP in 1 year  Bilateral renal cysts  01/2023: Hb 16 8, Hct 51 0, Plt 177, MCV 92, BUN 19, Cr 1 75, AST 21, ALT 18, ALP 44, albumin 4 2, t bili 0 82     Endoscopic history:   EGD: none   Colon:   07/2022: Nodular mucosa in the proximal transverse colon; performed cold forceps biopsy  Few small diverticula in the transverse colon and sigmoid colon  A  Transverse colon mass (biopsy): Colonic mucosa with no diagnostic abnormality; Negative for dysplasia  08/2022: Two sessile polyps in transverse colon; previous transverse colon mucosal abnormality resolved- tattoo site visualized  Small internal hemorrhoids  A   Transverse Colon, polyps x2, polypectomies: Tubular adenoma, negative for high grade dysplasia    Review of Systems   Per HPI    Historical Information   Past Medical History:   Diagnosis Date   • Brain tumor ([address] ) 1/5/2021   • Bruxism (teeth grinding)    • GBM (glioblastoma multiforme) (HCC)    • Hypercholesterolemia    • Hypertension    • Obesity    • Sleep apnea     CPAP nightly     Past Surgical History:   Procedure Laterality Date   • FL LUMBAR PUNCTURE DIAGNOSTIC  1/4/2021   • HAND FRACTURE REPAIR      left thumb   • CA STRTCTC BX ASPIR/EXC PATITO ICRA LESION W/CT&I/MR Left 1/19/2021    Procedure: BIOPSY BRAIN IMAGE-GUIDED NEEDLE - LEFT TEMPORAL;  Surgeon: Allan Vidal MD;  [facility];  Service: Neurosurgery   • TONSILLECTOMY       Social History   Social History     Substance and Sexual Activity   Alcohol Use Not Currently     Social History     Substance and Sexual Activity   Drug Use Not Currently     Social History     Tobacco Use   Smoking Status Former   • Types: Cigarettes   • Quit date: 1976   • Years since quitting: 47 0   Smokeless Tobacco Never     Family History   Problem Relation Age of Onset   • Heart disease Mother    • Heart attack Father    • Leukemia Brother        Meds/Allergies       Current Outpatient Medications:   •  amLODIPine (NORVASC) 10 mg tablet  •  aspirin (ECOTRIN LOW STRENGTH) 81 mg EC tablet  •  atorvastatin (LIPITOR) 10 mg tablet  •  Bevacizumab (AVASTIN IV)  •  famotidine (PEPCID) 20 mg tablet  •  fenofibrate (TRICOR) 54 MG tablet  •  hydrochlorothiazide (MICROZIDE) 12 5 mg capsule  •  levETIRAcetam (KEPPRA) 1000 MG tablet  •  levETIRAcetam (KEPPRA) 250 mg tablet  •  levothyroxine 25 mcg tablet  •  mometasone (ELOCON) 0 1 % lotion  •  valsartan (DIOVAN) 80 mg tablet  •  polyethylene glycol (GLYCOLAX) 17 GM/SCOOP powder    No Known Allergies    Objective     Blood pressure 140/94, pulse 64, resp  rate 18, height 6' 3" (1 905 m), weight 103 kg (228 lb), SpO2 96 %  Body mass index is 28 5 kg/m²  Physical Exam  Vitals and nursing note reviewed  Constitutional:       Appearance: Normal appearance  HENT:      Head: Normocephalic and atraumatic  Eyes:      General: No scleral icterus  Conjunctiva/sclera: Conjunctivae normal    Cardiovascular:      Rate and Rhythm: Normal rate  Heart sounds: Normal heart sounds  Pulmonary:      Effort: Pulmonary effort is normal       Breath sounds: Normal breath sounds  Abdominal:      General: Abdomen is flat  Bowel sounds are normal  There is no distension  Palpations: Abdomen is soft  There is no mass  Tenderness: There is no abdominal tenderness  There is no guarding or rebound  Hernia: Hernia: umbilical    Skin:     General: Skin is warm and dry  Coloration: Skin is not jaundiced  Neurological:      General: No focal deficit present  Mental Status: He is alert and oriented to person, place, and time        Comments: Mild expressive aphasia   Psychiatric:         Mood and Affect: Mood normal          Behavior: Behavior normal        Lab Results:   No visits with results within 1 Day(s) from this visit     Latest known visit with results is:   Appointment on 01/02/2023   Component Date Value   • Color, UA 01/02/2023 Yellow    • Clarity, UA 01/02/2023 Clear    • Specific Gravity, UA 01/02/2023 1 015    • pH, UA 01/02/2023 7 5    • Leukocytes, UA 01/02/2023 Negative    • Nitrite, UA 01/02/2023 Negative    • Protein, UA 01/02/2023 Trace (A)    • Glucose, UA 01/02/2023 Negative    • Ketones, UA 01/02/2023 Negative    • Urobilinogen, UA 01/02/2023 0 2    • Bilirubin, UA 01/02/2023 Negative    • Occult Blood, UA 01/02/2023 Negative    • RBC, UA 01/02/2023 0-5 (A)    • WBC, UA 01/02/2023 0-1 (A)    • Epithelial Cells 01/02/2023 Occasional    • Bacteria, UA 01/02/2023 None Seen    • WBC 01/02/2023 6 47    • RBC 01/02/2023 5 53    • Hemoglobin 01/02/2023 16 8    • Hematocrit 01/02/2023 51 0 (H)    • MCV 01/02/2023 92    • MCH 01/02/2023 30 4    • MCHC 01/02/2023 32 9    • RDW 01/02/2023 13 9    • MPV 01/02/2023 10 6    • Platelets 85/82/7438 177    • nRBC 01/02/2023 0    • Neutrophils Relative 01/02/2023 65    • Immat GRANS % 01/02/2023 0    • Lymphocytes Relative 01/02/2023 25    • Monocytes Relative 01/02/2023 7    • Eosinophils Relative 01/02/2023 2    • Basophils Relative 01/02/2023 1    • Neutrophils Absolute 01/02/2023 4 17    • Immature Grans Absolute 01/02/2023 0 02    • Lymphocytes Absolute 01/02/2023 1 63    • Monocytes Absolute 01/02/2023 0 47    • Eosinophils Absolute 01/02/2023 0 14    • Basophils Absolute 01/02/2023 0 04    • Sodium 01/02/2023 140    • Potassium 01/02/2023 3 9    • Chloride 01/02/2023 102    • CO2 01/02/2023 32    • ANION GAP 01/02/2023 6    • BUN 01/02/2023 19    • Creatinine 01/02/2023 1 75 (H)    • Glucose, Fasting 01/02/2023 86    • Calcium 01/02/2023 9 9    • AST 01/02/2023 21    • ALT 01/02/2023 18    • Alkaline Phosphatase 01/02/2023 44    • Total Protein 01/02/2023 6 4    • Albumin 01/02/2023 4 2    • Total Bilirubin 01/02/2023 0 82    • eGFR 01/02/2023 39          Radiology Results:   MRI brain w wo contrast    Result Date: 12/27/2022  Narrative: MRI BRAIN WITH AND WITHOUT CONTRAST INDICATION: C71 9: Malignant neoplasm of brain, unspecified  Tumor Type: Glioblastoma, WHO grade 4 Surgical History: 1/19/2021 left temporal lobe biopsy Radiation History: 12/24/2021 -4/6/2021 Relevant Medications: Avastin, last given 9/15/2022 COMPARISON:  9/15/2022 TECHNIQUE: Multiplanar, multisequence imaging of the brain and sella was performed before and after gadolinium administration  IV Contrast:  10 mL of Gadobutrol injection (SINGLE-DOSE)  IMAGE QUALITY:   Diagnostic  FINDINGS: BRAIN TUMOR TREATMENT BED:  Cystic encephalomalacia posterior laterally in the left temporal lobe with surrounding FLAIR hyperintensity and high signal on diffusion imaging is stable around the treatment bed  Perfusion: Not performed  Diffusion: Again demonstrated is high signal on diffusion imaging around the resection cavity/treatment bed exemplified on series 3, image 13, stable  No new parenchymal diffusion restriction  OTHER FINDINGS: A few scattered subcortical FLAIR hyperintensities noted elsewhere  VENTRICLES:  Mild ex vacuo dilatation of the atrium and temporal horn left lateral ventricle stable  SELLA AND PITUITARY GLAND:  Normal  ORBITS:  Normal  PARANASAL SINUSES:  Mucosal thickening of the sinuses with no air fluid levels  VASCULATURE:  Evaluation of the major intracranial vasculature demonstrates appropriate flow voids  Again demonstrated is a 0 7 cm aneurysm in the left MCA bifurcation region  This is seen on series 10, image 54 CALVARIUM AND SKULL BASE:  Normal  EXTRACRANIAL SOFT TISSUES:  Normal      Impression: Status post treatment of Glioblastoma  BT-RADS Category 2 - No change  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  BT-RADS LEGEND: 0 - New baseline, incomplete study, otherwise unable to categorize   1A - Improvement in imaging findings suspected to reflect decreasing tumor burden and/or treatment effect 1B - Improvement in imaging findings potentially due to effect from medications such as steroids or avastin therapy  2 - No change  3A - Worsening imaging findings favored to represent treatment effects including radiation therapy and medications  3B - Worsening imaging findings favored to represent an indeterminant mix of treatment effects and tumor worsening  3C - Worsening imaging findings favored to represent increasing burden of tumor  4 - Worsening of imaging findings highly suspicious for tumor progression  Workstation performed: [id number]     Al Banks PA-C    **Please note:  Dictation voice to text software may have been used in the creation of this record  Occasional wrong word or “sound alike” substitutions may have occurred due to the inherent limitations of voice recognition software  Read the chart carefully and recognize, using context, where substitutions have occurred  **

## 2023-01-06 ENCOUNTER — OFFICE VISIT (OUTPATIENT)
Dept: HEMATOLOGY ONCOLOGY | Facility: CLINIC | Age: 68
End: 2023-01-06

## 2023-01-06 VITALS
SYSTOLIC BLOOD PRESSURE: 146 MMHG | HEIGHT: 75 IN | HEART RATE: 69 BPM | OXYGEN SATURATION: 96 % | TEMPERATURE: 97.6 F | DIASTOLIC BLOOD PRESSURE: 88 MMHG | WEIGHT: 230 LBS | BODY MASS INDEX: 28.6 KG/M2

## 2023-01-06 DIAGNOSIS — K21.9 GASTROESOPHAGEAL REFLUX DISEASE, UNSPECIFIED WHETHER ESOPHAGITIS PRESENT: ICD-10-CM

## 2023-01-06 DIAGNOSIS — C71.9 GLIOBLASTOMA (HCC): Primary | ICD-10-CM

## 2023-01-06 DIAGNOSIS — R79.89 ELEVATED SERUM CREATININE: ICD-10-CM

## 2023-01-06 NOTE — PROGRESS NOTES
[facility] [place]  [address] [id number] [phone]  [address],[birth year], [id number]  01/06/23    Discussion:   In summary, this is a 81-year-old male with history of progressive GBM  He has been on Avastin every 3 weeks  He tolerates this without difficulty  He recently started famotidine a few days ago for heartburn symptoms  EGD is planned  Recent brain MRI shows BT RADS 2, no change compared to prior study of 3 months earlier  Blood pressure is less than 805 systolic  CBC is normal   CMP shows creatinine 1 75, baseline 0 9  Etiology of renal dysfunction is unclear  He is asymptomatic  I suggested increasing oral hydration and will recheck BMP in a few days  I asked him to call the next day to review the results  If consistently abnormal, evaluation under his PCP and/or nephrology would be applicable  I do not believe this is related to his GBM or Avastin treatment  He takes a baby aspirin once a day  He uses no other nonsteroidals  I do not believe this is the cause of elevated creatinine  I discussed the above with the patient  The patient  voiced understanding and agreement   ______________________________________________________________________    No chief complaint on file  HPI:  Oncology History Overview Note   Yoshi Wasserman is a 60year old male is status post biopsy of a left temporal mass with pathology consistent with GBM  He has a  past medical history of hypertension, obesity, hyperlipidemia, and sleep apnea  He presented to the ED on 1/1/21 with sudden onset of expressive aphasia  He completed a course of radiation therapy to the left temporal lobe of the brain concurrent with temodar on 4/6/21 5/7/21 MRI brain w wo contrast  IMPRESSION:  Left temporal lobe GBM continues to increase in size and is currently contiguous with the lateral margin of the compressed lateral ventricular atrium    Increasing subtle ependymal enhancement and vasogenic edema  5/13/21 Dr Simone Alvarez, neurology f/y  Continue current seizure medications unchanged  Return in 6 months    5/14/21 Dr Priya Lin f/u  Relatively stable clinically, feels his gait and speech are somewhat improved compared to time of diagnosis  "Repeat MRI shows increase in size of left temporal lobe mass with increasing edema  We reviewed that it is possible that some of this represents post treatment change but certainly is concerning for progressive disease as well  Case to be reviewed at [facility]-Oncology Working group in a few days  We will call the patient with the results of that review and further recommendations "  Arrange for consolidative Temodar  Avastin could be added  6/11/21 Dr Priya Lin  11/1/21 Neurology f/u         Brain tumor [facility]) (Resolved)   1/19/2021 Biopsy    BIOPSY BRAIN IMAGE-GUIDED NEEDLE - LEFT TEMPORAL  Surgeon: Dr Ivan Kolb    Temporal mass:  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)  Note    This case was seen in consultation with Dr Federico Chacon, an expert in Neuropathology from [facility]  Glioblastoma ([facility] [address] )   1/19/2021 Initial Diagnosis    Glioblastoma ([facility] [address] )     1/19/2021 Biopsy    BIOPSY BRAIN IMAGE-GUIDED NEEDLE - LEFT TEMPORAL  Surgeon: Dr Ivan Kolb    Temporal mass:  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)  Note    This case was seen in consultation with Dr Federico Chacon, an expert in Neuropathology from [facility]            2/24/2021 - 4/6/2021 Radiation    Plan ID Energy Fractions Dose per Fraction (cGy) Dose Correction (cGy) Total Dose Delivered (cGy) Elapsed Days   L Temp CD 6X 7 / 7 200 0 1,400 8   L Temp Lobe 6X 23 / 23 200 0 4,600 30   Treatment dates:  C1: 2/24/2021 - 4/6/2021 2/24/2021 - 4/6/2021 Chemotherapy    Temodar 185 mg daily w/radiation therapy     5/27/2021 -  Chemotherapy    bevacizumab (AVASTIN) IVPB, 1,090 mg, Intravenous, Once, 34 of 36 cycles  Dose modification: 15 mg/kg (original dose 10 mg/kg, Cycle 17, Reason: Dose modified as per discussion with consulting physician), 10 mg/kg (original dose 10 mg/kg, Cycle 29, Reason: Anticipated Tolerance), 10 mg/kg (original dose 10 mg/kg, Cycle 30, Reason: Anticipated Tolerance), 15 mg/kg (original dose 10 mg/kg, Cycle 30, Reason: Anticipated Tolerance)  Administration: 1,100 mg (5/27/2021), 1,100 mg (6/10/2021), 1,100 mg (6/24/2021), 1,100 mg (7/8/2021), 1,100 mg (7/22/2021), 1,100 mg (8/5/2021), 1,100 mg (9/30/2021), 1,100 mg (9/16/2021), 1,100 mg (9/2/2021), 1,100 mg (8/19/2021), 1,100 mg (10/14/2021), 1,100 mg (10/28/2021), 1,100 mg (11/11/2021), 1,100 mg (11/26/2021), 1,600 mg (1/6/2022), 1,100 mg (12/23/2021), 1,100 mg (12/9/2021), 1,600 mg (1/27/2022), 1,600 mg (2/17/2022), 1,600 mg (4/21/2022), 1,600 mg (3/10/2022), 1,600 mg (3/31/2022), 1,600 mg (5/12/2022), 1,600 mg (6/2/2022), 1,600 mg (6/23/2022), 1,600 mg (7/14/2022), 1,600 mg (8/4/2022), 1,600 mg (8/25/2022), 1,000 mg (9/15/2022), 1,600 mg (10/6/2022), 1,600 mg (10/27/2022), 1,600 mg (11/17/2022), 1,600 mg (12/8/2022), 1,600 mg (12/29/2022)         Interval History: Clinically stable  ECOG-  0 - Asymptomatic    Review of Systems   Constitutional: Negative for chills and fever  HENT: Negative for nosebleeds  Eyes: Negative for discharge  Respiratory: Negative for cough and shortness of breath  Cardiovascular: Negative for chest pain  Gastrointestinal: Negative for abdominal pain, constipation and diarrhea  Endocrine: Negative for polydipsia  Genitourinary: Negative for hematuria  Musculoskeletal: Negative for arthralgias  Skin: Negative for color change  Allergic/Immunologic: Negative for immunocompromised state  Neurological: Negative for dizziness and headaches  Hematological: Negative for adenopathy  Psychiatric/Behavioral: Negative for agitation         Past Medical History:   Diagnosis Date   • [facility] ) 1/5/2021   • Bruxism (teeth grinding)    • GBM (glioblastoma multiforme) (HCC)    • Hypercholesterolemia    • Hypertension    • Obesity    • Sleep apnea     CPAP nightly     Patient Active Problem List   Diagnosis   • Class 1 obesity due to excess calories with serious comorbidity and body mass index (BMI) of 33 0 to 33 9 in adult   • Negative depression screening   • Essential hypertension   • Obstructive sleep apnea syndrome   • Sleep disturbance   • Daytime sleepiness   • Glioblastoma (HCC)   • 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 [facility])   • Annual physical exam   • Muscle cramps   • 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   • Mixed hyperlipidemia   • Subclinical hypothyroidism   • Epidermoid cyst of skin       Current Outpatient Medications:   •  amLODIPine (NORVASC) 10 mg tablet, Take 1 tablet by mouth once daily, Disp: 90 tablet, Rfl: 6  •  aspirin (ECOTRIN LOW STRENGTH) 81 mg EC tablet, Take 81 mg by mouth daily, Disp: , Rfl:   •  atorvastatin (LIPITOR) 10 mg tablet, Take 1 tablet (10 mg total) by mouth daily, Disp: 90 tablet, Rfl: 3  •  Bevacizumab (AVASTIN IV), Infuse into a venous catheter Get infusion every two weeks ( for Glioblastoma cancer), Disp: , Rfl:   •  famotidine (PEPCID) 20 mg tablet, Take 1 tablet (20 mg total) by mouth 2 (two) times a day, Disp: 60 tablet, Rfl: 3  •  fenofibrate (TRICOR) 54 MG tablet, Take 1 tablet (54 mg total) by mouth daily, Disp: 90 tablet, Rfl: 1  •  hydrochlorothiazide (MICROZIDE) 12 5 mg capsule, Take 1 capsule by mouth once daily, Disp: 90 capsule, Rfl: 6  •  levETIRAcetam (KEPPRA) 1000 MG tablet, TAKE 1 TABLET BY MOUTH EVERY 12 HOURS, Disp: 180 tablet, Rfl: 3  •  levETIRAcetam (KEPPRA) 250 mg tablet, Take 1 tablet by mouth twice daily, Disp: 180 tablet, Rfl: 3  •  levothyroxine 25 mcg tablet, Take 1 tablet by mouth once daily, Disp: 90 tablet, Rfl: 0  •  valsartan (DIOVAN) 80 mg tablet, Take 1 tablet by mouth once daily, Disp: 90 tablet, Rfl: 0  •  mometasone (ELOCON) 0 1 % lotion, Apply 1-2 drops to ears qhs as needed  (Patient not taking: Reported on 1/6/2023), Disp: 60 mL, Rfl: 3  •  polyethylene glycol (GLYCOLAX) 17 GM/SCOOP powder, Take 17 g by mouth daily, Disp: 510 g, Rfl: 0  No Known Allergies  Past Surgical History:   Procedure Laterality Date   • FL LUMBAR PUNCTURE DIAGNOSTIC  1/4/2021   • HAND FRACTURE REPAIR      left thumb   • CA STRTCTC BX ASPIR/EXC PATITO ICRA LESION W/CT&I/MR Left 1/19/2021    Procedure: BIOPSY BRAIN IMAGE-GUIDED NEEDLE - LEFT TEMPORAL;  Surgeon: Gunner Thakkar MD;  Location: [facility];  Service: Neurosurgery   • TONSILLECTOMY       Social History     Objective:  Vitals:    01/06/23 0825   BP: 146/88   BP Location: Left arm   Patient Position: Sitting   Cuff Size: Standard   Pulse: 69   Temp: 97 6 °F (36 4 °C)   TempSrc: Tympanic   SpO2: 96%   Weight: 104 kg (230 lb)   Height: 6' 3" (1 905 m)     Physical Exam  Constitutional:       Appearance: He is well-developed  HENT:      Head: Normocephalic and atraumatic  Eyes:      Pupils: Pupils are equal, round, and reactive to light  Cardiovascular:      Rate and Rhythm: Normal rate and regular rhythm  Heart sounds: No murmur heard  Pulmonary:      Breath sounds: Normal breath sounds  No wheezing or rales  Abdominal:      Palpations: Abdomen is soft  Tenderness: There is no abdominal tenderness  Musculoskeletal:         General: No tenderness  Normal range of motion  Cervical back: Neck supple  Lymphadenopathy:      Cervical: No cervical adenopathy  Skin:     Findings: No erythema or rash  Neurological:      Mental Status: He is alert and oriented to person, place, and time  Cranial Nerves: No cranial nerve deficit  Deep Tendon Reflexes: Reflexes are normal and symmetric  Psychiatric:         Behavior: Behavior normal            Labs: I personally reviewed the labs and imaging pertinent to this patient care

## 2023-01-09 ENCOUNTER — HOSPITAL ENCOUNTER (OUTPATIENT)
Dept: RADIOLOGY | Facility: HOSPITAL | Age: 68
Discharge: HOME | End: 2023-01-09

## 2023-01-09 ENCOUNTER — APPOINTMENT (OUTPATIENT)
Dept: LAB | Facility: HOSPITAL | Age: 68
End: 2023-01-09

## 2023-01-09 DIAGNOSIS — C71.9 GLIOBLASTOMA (HCC): ICD-10-CM

## 2023-01-09 DIAGNOSIS — R12 HEARTBURN: ICD-10-CM

## 2023-01-09 LAB
ANION GAP SERPL CALCULATED.3IONS-SCNC: 5 MMOL/L (ref 4–13)
BUN SERPL-MCNC: 15 MG/DL (ref 5–25)
CALCIUM SERPL-MCNC: 9.6 MG/DL (ref 8.4–10.2)
CHLORIDE SERPL-SCNC: 105 MMOL/L (ref 96–108)
CO2 SERPL-SCNC: 29 MMOL/L (ref 21–32)
CREAT SERPL-MCNC: 0.93 MG/DL (ref 0.6–1.3)
GFR SERPL CREATININE-BSD FRML MDRD: 85 ML/MIN/1.73SQ M
GLUCOSE P FAST SERPL-MCNC: 97 MG/DL (ref 65–99)
POTASSIUM SERPL-SCNC: 4.9 MMOL/L (ref 3.5–5.3)
SODIUM SERPL-SCNC: 139 MMOL/L (ref 135–147)

## 2023-01-11 ENCOUNTER — HOSPITAL ENCOUNTER (OUTPATIENT)
Dept: MRI IMAGING | Facility: HOSPITAL | Age: 68
Discharge: HOME | End: 2023-01-11

## 2023-01-11 DIAGNOSIS — K86.9 LESION OF PANCREAS: ICD-10-CM

## 2023-01-11 RX ADMIN — GADOBUTROL 10 ML: 604.72 INJECTION INTRAVENOUS at 07:39

## 2023-01-12 ENCOUNTER — TELEPHONE (OUTPATIENT)
Dept: HEMATOLOGY ONCOLOGY | Facility: CLINIC | Age: 68
End: 2023-01-12

## 2023-01-12 RX ORDER — SODIUM CHLORIDE 9 MG/ML
20 INJECTION, SOLUTION INTRAVENOUS ONCE
Status: CANCELLED | OUTPATIENT
Start: 2023-01-19

## 2023-01-12 NOTE — TELEPHONE ENCOUNTER
Returned call to pt  Pt asking about repeat labs done on 1/9  Advised pt creat returned to normal and no need for further workup  He voiced understandign

## 2023-01-12 NOTE — TELEPHONE ENCOUNTER
CALL RETURN FORM   Reason for patient call? Patient  stating he has a  Questions about Dr Zora Riddle and would like to speak with either Dr Zora Riddle or the nurse  Patient's primary oncologist? Dr Zora Riddle     Name of person the patient was calling for? Nurse     Any additional information to add, if applicable? Best call back number [phone]   Informed patient that the message will be forwarded to the team and someone will get back to them as soon as possible    Did you relay this information to the patient?  Yes

## 2023-01-19 ENCOUNTER — HOSPITAL ENCOUNTER (OUTPATIENT)
Dept: INFUSION CENTER | Facility: HOSPITAL | Age: 68
Discharge: HOME | End: 2023-01-19
Attending: INTERNAL MEDICINE

## 2023-01-19 VITALS
OXYGEN SATURATION: 98 % | SYSTOLIC BLOOD PRESSURE: 138 MMHG | TEMPERATURE: 96.5 F | BODY MASS INDEX: 28.8 KG/M2 | HEART RATE: 69 BPM | RESPIRATION RATE: 18 BRPM | WEIGHT: 230.38 LBS | DIASTOLIC BLOOD PRESSURE: 84 MMHG

## 2023-01-19 DIAGNOSIS — C71.9 GLIOBLASTOMA (HCC): Primary | ICD-10-CM

## 2023-01-19 RX ORDER — SODIUM CHLORIDE 9 MG/ML
20 INJECTION, SOLUTION INTRAVENOUS ONCE
Status: COMPLETED | OUTPATIENT
Start: 2023-01-19 | End: 2023-01-19

## 2023-01-19 RX ADMIN — SODIUM CHLORIDE 20 ML/HR: 0.9 INJECTION, SOLUTION INTRAVENOUS at 09:05

## 2023-01-19 RX ADMIN — BEVACIZUMAB 1600 MG: 400 INJECTION, SOLUTION INTRAVENOUS at 09:50

## 2023-01-20 ENCOUNTER — TELEPHONE (OUTPATIENT)
Dept: GASTROENTEROLOGY | Facility: CLINIC | Age: 68
End: 2023-01-20

## 2023-01-20 NOTE — TELEPHONE ENCOUNTER
Diego Martinez called to find out more about umbilical hernia mentioned by Mele at last visit  Patient wanted to make sure that he did not need surgical intervention at this time? Looking for further recommendation regarding umbilical hernia   # [phone]

## 2023-01-30 ENCOUNTER — PROCEDURE VISIT (OUTPATIENT)
Dept: SURGERY | Facility: CLINIC | Age: 68
End: 2023-01-30

## 2023-01-30 ENCOUNTER — TELEPHONE (OUTPATIENT)
Dept: NEUROLOGY | Facility: CLINIC | Age: 68
End: 2023-01-30

## 2023-01-30 VITALS
RESPIRATION RATE: 18 BRPM | SYSTOLIC BLOOD PRESSURE: 146 MMHG | BODY MASS INDEX: 28.47 KG/M2 | WEIGHT: 229 LBS | HEIGHT: 75 IN | OXYGEN SATURATION: 97 % | HEART RATE: 77 BPM | TEMPERATURE: 97.7 F | DIASTOLIC BLOOD PRESSURE: 86 MMHG

## 2023-01-30 DIAGNOSIS — K42.9 UMBILICAL HERNIA WITHOUT OBSTRUCTION AND WITHOUT GANGRENE: Primary | ICD-10-CM

## 2023-01-30 RX ORDER — SODIUM CHLORIDE, SODIUM LACTATE, POTASSIUM CHLORIDE, CALCIUM CHLORIDE 600; 310; 30; 20 MG/100ML; MG/100ML; MG/100ML; MG/100ML
125 INJECTION, SOLUTION INTRAVENOUS CONTINUOUS
OUTPATIENT
Start: 2023-01-30

## 2023-01-30 NOTE — ASSESSMENT & PLAN NOTE
The patient is a pleasant 70-year-old male with past medical history significant for glioblastoma presenting with a newly symptomatic umbilical hernia for which definitive treatment by laparoscopic mesh repair is now indicated  The options benefits risks and alternatives to laparoscopic ventral herniorrhaphy with mesh were reviewed with the patient  Specifically the option for no surgery in 3 or 6-month follow-up for discussed  Specific risks related to anesthesia, bleeding, infection, the use of mesh, 10% lifetime risk of recurrence and 1% risk of iatrogenic bowel injury reviewed and informed consent obtained to proceed

## 2023-01-30 NOTE — PROGRESS NOTES
Assessment/Plan:    Umbilical hernia without obstruction and without gangrene  The patient is a pleasant 56-year-old male with past medical history significant for glioblastoma presenting with a newly symptomatic umbilical hernia for which definitive treatment by laparoscopic mesh repair is now indicated  The options benefits risks and alternatives to laparoscopic ventral herniorrhaphy with mesh were reviewed with the patient  Specifically the option for no surgery in 3 or 6-month follow-up for discussed  Specific risks related to anesthesia, bleeding, infection, the use of mesh, 10% lifetime risk of recurrence and 1% risk of iatrogenic bowel injury reviewed and informed consent obtained to proceed  Subjective:      Patient ID: Lindy Marinelli is a 78 y o  male  Patient came in today for a Possible pre op  for Umb Hernia that he found out he had by his GI doctor about two weeks ago  Patient denies any pain, nausea/vomiting, diarrhea/constipation, or fevers or chills  Patient states its only bulging  No fevers or chills  TAYLOR Leon MA       The following portions of the patient's history were reviewed and updated as appropriate: allergies, current medications, past family history, past medical history, past social history, past surgical history and problem list     Review of Systems   Constitutional: Negative for chills and fever  HENT: Negative for ear pain and sore throat  Eyes: Negative for pain and visual disturbance  Respiratory: Negative for cough and shortness of breath  Cardiovascular: Negative for chest pain and palpitations  Gastrointestinal: Negative for abdominal pain and vomiting  Genitourinary: Negative for dysuria and hematuria  Musculoskeletal: Negative for arthralgias and back pain  Skin: Negative for color change and rash  Neurological: Negative for seizures and syncope  All other systems reviewed and are negative          Objective:      /86 (BP Location: Left arm, Patient Position: Sitting, Cuff Size: Standard)   Pulse 77   Temp 97 7 °F (36 5 °C) (Temporal)   Resp 18   Ht 6' 3" (1 905 m)   Wt 104 kg (229 lb)   SpO2 97%   BMI 28 62 kg/m²          Physical Exam  Vitals and nursing note reviewed  Constitutional:       Appearance: He is well-developed  HENT:      Head: Normocephalic and atraumatic  Eyes:      Conjunctiva/sclera: Conjunctivae normal       Pupils: Pupils are equal, round, and reactive to light  Cardiovascular:      Rate and Rhythm: Normal rate and regular rhythm  Pulmonary:      Effort: Pulmonary effort is normal       Breath sounds: Normal breath sounds  Abdominal:      General: Bowel sounds are normal       Palpations: Abdomen is soft  Comments: Reducible umbilical hernia present   Musculoskeletal:         General: Normal range of motion  Cervical back: Normal range of motion and neck supple  Skin:     General: Skin is warm and dry  Neurological:      Mental Status: He is alert and oriented to person, place, and time  Psychiatric:         Behavior: Behavior normal          Thought Content:  Thought content normal          Judgment: Judgment normal

## 2023-01-30 NOTE — LETTER
January 30, 2023     [place] [id number]    Patient: Padmini Ziegler   YOB: 1955   Date of Visit: 1/30/2023       Dear Dr Pardeep Cooney:    Thank you for referring Crystal Braga to me for evaluation  Below are my notes for this consultation  If you have questions, please do not hesitate to call me  I look forward to following your patient along with you  Sincerely,        Christina Morris MD        CC: No Recipients  Christina Morris MD  1/30/2023 10:28 AM  Sign when Signing Visit  Assessment/Plan:    Umbilical hernia without obstruction and without gangrene  The patient is a pleasant 31-year-old male with past medical history significant for glioblastoma presenting with a newly symptomatic umbilical hernia for which definitive treatment by laparoscopic mesh repair is now indicated  The options benefits risks and alternatives to laparoscopic ventral herniorrhaphy with mesh were reviewed with the patient  Specifically the option for no surgery in 3 or 6-month follow-up for discussed  Specific risks related to anesthesia, bleeding, infection, the use of mesh, 10% lifetime risk of recurrence and 1% risk of iatrogenic bowel injury reviewed and informed consent obtained to proceed  Subjective:     Patient ID: Padmini Ziegler is a 78 y o  male  Patient came in today for a Possible pre op  for Umb Hernia that he found out he had by his GI doctor about two weeks ago  Patient denies any pain, nausea/vomiting, diarrhea/constipation, or fevers or chills  Patient states its only bulging  No fevers or chills  Amilia,O MA       The following portions of the patient's history were reviewed and updated as appropriate: allergies, current medications, past family history, past medical history, past social history, past surgical history and problem list     Review of Systems   Constitutional: Negative for chills and fever     HENT: Negative for ear pain and sore throat  Eyes: Negative for pain and visual disturbance  Respiratory: Negative for cough and shortness of breath  Cardiovascular: Negative for chest pain and palpitations  Gastrointestinal: Negative for abdominal pain and vomiting  Genitourinary: Negative for dysuria and hematuria  Musculoskeletal: Negative for arthralgias and back pain  Skin: Negative for color change and rash  Neurological: Negative for seizures and syncope  All other systems reviewed and are negative  Objective:      /86 (BP Location: Left arm, Patient Position: Sitting, Cuff Size: Standard)   Pulse 77   Temp 97 7 °F (36 5 °C) (Temporal)   Resp 18   Ht 6' 3" (1 905 m)   Wt 104 kg (229 lb)   SpO2 97%   BMI 28 62 kg/m²         Physical Exam  Vitals and nursing note reviewed  Constitutional:       Appearance: He is well-developed  HENT:      Head: Normocephalic and atraumatic  Eyes:      Conjunctiva/sclera: Conjunctivae normal       Pupils: Pupils are equal, round, and reactive to light  Cardiovascular:      Rate and Rhythm: Normal rate and regular rhythm  Pulmonary:      Effort: Pulmonary effort is normal       Breath sounds: Normal breath sounds  Abdominal:      General: Bowel sounds are normal       Palpations: Abdomen is soft  Comments: Reducible umbilical hernia present   Musculoskeletal:         General: Normal range of motion  Cervical back: Normal range of motion and neck supple  Skin:     General: Skin is warm and dry  Neurological:      Mental Status: He is alert and oriented to person, place, and time  Psychiatric:         Behavior: Behavior normal          Thought Content:  Thought content normal          Judgment: Judgment normal

## 2023-01-30 NOTE — H&P
Assessment/Plan:    Umbilical hernia without obstruction and without gangrene  The patient is a pleasant 72-year-old male with past medical history significant for glioblastoma presenting with a newly symptomatic umbilical hernia for which definitive treatment by laparoscopic mesh repair is now indicated  The options benefits risks and alternatives to laparoscopic ventral herniorrhaphy with mesh were reviewed with the patient  Specifically the option for no surgery in 3 or 6-month follow-up for discussed  Specific risks related to anesthesia, bleeding, infection, the use of mesh, 10% lifetime risk of recurrence and 1% risk of iatrogenic bowel injury reviewed and informed consent obtained to proceed  Subjective:     Patient ID: Christina Fletcher is a 78 y o  male  Patient came in today for a Possible pre op  for Umb Hernia that he found out he had by his GI doctor about two weeks ago  Patient denies any pain, nausea/vomiting, diarrhea/constipation, or fevers or chills  Patient states its only bulging  No fevers or chills  TAYLOR Leon MA       The following portions of the patient's history were reviewed and updated as appropriate: allergies, current medications, past family history, past medical history, past social history, past surgical history and problem list     Review of Systems   Constitutional: Negative for chills and fever  HENT: Negative for ear pain and sore throat  Eyes: Negative for pain and visual disturbance  Respiratory: Negative for cough and shortness of breath  Cardiovascular: Negative for chest pain and palpitations  Gastrointestinal: Negative for abdominal pain and vomiting  Genitourinary: Negative for dysuria and hematuria  Musculoskeletal: Negative for arthralgias and back pain  Skin: Negative for color change and rash  Neurological: Negative for seizures and syncope  All other systems reviewed and are negative         Objective:      /86 (BP Location: Left arm, Patient Position: Sitting, Cuff Size: Standard)   Pulse 77   Temp 97 7 °F (36 5 °C) (Temporal)   Resp 18   Ht 6' 3" (1 905 m)   Wt 104 kg (229 lb)   SpO2 97%   BMI 28 62 kg/m²         Physical Exam  Vitals and nursing note reviewed  Constitutional:       Appearance: He is well-developed  HENT:      Head: Normocephalic and atraumatic  Eyes:      Conjunctiva/sclera: Conjunctivae normal       Pupils: Pupils are equal, round, and reactive to light  Cardiovascular:      Rate and Rhythm: Normal rate and regular rhythm  Pulmonary:      Effort: Pulmonary effort is normal       Breath sounds: Normal breath sounds  Abdominal:      General: Bowel sounds are normal       Palpations: Abdomen is soft  Comments: Reducible umbilical hernia present   Musculoskeletal:         General: Normal range of motion  Cervical back: Normal range of motion and neck supple  Skin:     General: Skin is warm and dry  Neurological:      Mental Status: He is alert and oriented to person, place, and time  Psychiatric:         Behavior: Behavior normal          Thought Content:  Thought content normal          Judgment: Judgment normal

## 2023-02-02 RX ORDER — SODIUM CHLORIDE 9 MG/ML
20 INJECTION, SOLUTION INTRAVENOUS ONCE
Status: CANCELLED | OUTPATIENT
Start: 2023-02-09

## 2023-02-06 ENCOUNTER — APPOINTMENT (OUTPATIENT)
Dept: LAB | Facility: HOSPITAL | Age: 68
End: 2023-02-06

## 2023-02-07 DIAGNOSIS — C71.9 GLIOBLASTOMA (HCC): Primary | ICD-10-CM

## 2023-02-09 ENCOUNTER — HOSPITAL ENCOUNTER (OUTPATIENT)
Dept: INFUSION CENTER | Facility: HOSPITAL | Age: 68
Discharge: HOME | End: 2023-02-09
Attending: INTERNAL MEDICINE

## 2023-02-09 VITALS
WEIGHT: 227.29 LBS | BODY MASS INDEX: 28.26 KG/M2 | HEART RATE: 71 BPM | DIASTOLIC BLOOD PRESSURE: 82 MMHG | TEMPERATURE: 97.6 F | HEIGHT: 75 IN | OXYGEN SATURATION: 97 % | SYSTOLIC BLOOD PRESSURE: 138 MMHG | RESPIRATION RATE: 16 BRPM

## 2023-02-09 DIAGNOSIS — C71.9 GLIOBLASTOMA (HCC): Primary | ICD-10-CM

## 2023-02-09 RX ORDER — SODIUM CHLORIDE 9 MG/ML
20 INJECTION, SOLUTION INTRAVENOUS ONCE
Status: COMPLETED | OUTPATIENT
Start: 2023-02-09 | End: 2023-02-09

## 2023-02-09 RX ADMIN — SODIUM CHLORIDE 20 ML/HR: 0.9 INJECTION, SOLUTION INTRAVENOUS at 10:40

## 2023-02-09 RX ADMIN — BEVACIZUMAB 1600 MG: 400 INJECTION, SOLUTION INTRAVENOUS at 10:48

## 2023-02-09 NOTE — PROGRESS NOTES
Recent labs reviewed  Patient tolerated Avastin  treatment without reaction or issues  No further appointment request noted in kumar Carrera RN notified of above  [facility] also notified that patient is having hernia surgery on 3/14/23  She will verify when future treatments will need to be scheduled  She will then reach out to infusion  to set up future appointments  AVS given to patient  Patient ambulated off unit without incident  All personal belongings taken with patient

## 2023-02-12 ENCOUNTER — TELEPHONE (OUTPATIENT)
Dept: NEUROLOGY | Facility: CLINIC | Age: 68
End: 2023-02-12

## 2023-02-12 NOTE — TELEPHONE ENCOUNTER
Patient is calling regarding cancelling an appointment      Date/Time: 2/12/2023  7:30    Patient was rescheduled: YES [] NO [x]    Patient requesting call back to reschedule: YES [] NO [x]

## 2023-02-13 NOTE — TELEPHONE ENCOUNTER
Office called patient to reschedule cancelled appointment with Lucie Tellez  Patient stated he wanted to wait a while to reschedule and would call when ready to do so

## 2023-02-15 ENCOUNTER — TELEPHONE (OUTPATIENT)
Dept: FAMILY MEDICINE CLINIC | Facility: CLINIC | Age: 68
End: 2023-02-15

## 2023-02-15 DIAGNOSIS — E78.00 HYPERCHOLESTEROLEMIA: ICD-10-CM

## 2023-02-15 DIAGNOSIS — I10 ESSENTIAL HYPERTENSION: ICD-10-CM

## 2023-02-15 DIAGNOSIS — Z12.5 SCREENING FOR PROSTATE CANCER: Primary | ICD-10-CM

## 2023-02-15 NOTE — TELEPHONE ENCOUNTER
Patient would like to know if he should have any labs done prior to visit in March  Please call him at [phone] to let him know

## 2023-02-17 ENCOUNTER — OFFICE VISIT (OUTPATIENT)
Dept: GASTROENTEROLOGY | Facility: CLINIC | Age: 68
End: 2023-02-17

## 2023-02-17 VITALS
OXYGEN SATURATION: 96 % | DIASTOLIC BLOOD PRESSURE: 86 MMHG | RESPIRATION RATE: 18 BRPM | BODY MASS INDEX: 28.1 KG/M2 | HEIGHT: 75 IN | SYSTOLIC BLOOD PRESSURE: 128 MMHG | WEIGHT: 226 LBS | HEART RATE: 67 BPM

## 2023-02-17 DIAGNOSIS — Z86.010 PERSONAL HISTORY OF COLONIC POLYPS: ICD-10-CM

## 2023-02-17 DIAGNOSIS — D49.0 IPMN (INTRADUCTAL PAPILLARY MUCINOUS NEOPLASM): ICD-10-CM

## 2023-02-17 DIAGNOSIS — K59.00 CONSTIPATION, UNSPECIFIED CONSTIPATION TYPE: ICD-10-CM

## 2023-02-17 DIAGNOSIS — R12 HEARTBURN: Primary | ICD-10-CM

## 2023-02-17 DIAGNOSIS — R93.3 ABNORMAL ESOPHAGRAM: ICD-10-CM

## 2023-02-17 RX ORDER — FAMOTIDINE 20 MG/1
20 TABLET, FILM COATED ORAL 2 TIMES DAILY
Qty: 180 TABLET | Refills: 3 | Status: SHIPPED | OUTPATIENT
Start: 2023-02-17

## 2023-02-17 NOTE — PATIENT INSTRUCTIONS
Please stay on famotidine twice daily  Continue with diet and lifestyle modifications for GERD  For your bowels:   - stay on miralax once daily  - add in benefiber once daily  - stay well hydrated with 8 glasses of non-caffeinated beverages daily  - if you are still constipated, in to miralax twice daily  If you develop any difficulty swallowing, nausea, vomiting, or weight loss, please contact office

## 2023-02-17 NOTE — PROGRESS NOTES
[facility] - Outpatient Follow-up Note  Nishant Siddiqui 78 y o  male MRN: [id number]  Encounter: [id number]    ASSESSMENT AND PLAN:      1  Heartburn  2  Abnormal esophagram    Patient is here today to follow-up on his heartburn/indigestion symptoms  They have been well controlled since starting famotidine daily  He does not have any alarm features at this time  He did have an esophagram completed which demonstrated mild tertiary contractions in the distal esophagus, though no discrete mucosal lesion, ulceration, or fold thickening  We reviewed results with patient in detail today  There may be a component of dysmotility though he is not having any significant dysphagia symptoms  At this time, for completeness, did offer upper endoscopy for definitive evaluation for any obvious mucosal lesions  However, patient shares that given the stability of his symptoms, he would like to wait and monitor at this time  He politely declined EGD  We reviewed any new symptoms for which she should contact us immediately and proceed with endoscopic evaluation  Continue on H2B daily  Continue with diet and lifestyle modifications for GERD  - famotidine (PEPCID) 20 mg tablet; Take 1 tablet (20 mg total) by mouth 2 (two) times a day  Dispense: 180 tablet; Refill: 3    3  Constipation, unspecified constipation type    Patient is still having some sense of incomplete evacuation  Recommend continuing on daily MiraLAX and adding in daily Benefiber with excellent hydration  If symptoms persist, would increase MiraLAX to twice daily dosing  Up-to-date on colonoscopy  4  IPMN (intraductal papillary mucinous neoplasm)    Reviewed stability of lesion, 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, no significant changes since previous imaging in 2021  Recommend repeating MRI in approximately 1 year      5  Personal history of colonic polyps    Up-to-date on colonoscopy, completed in 07/22, with his history of tubular adenomas  recommend repeat colonoscopy in approximately 7 years  We will follow-up in approximately 3 months, sooner if any new symptoms arise   ______________________________________________________________________    SUBJECTIVE: Patient is a 78 y o  male who presents today for follow-up regarding heartburn  Pmhx sig for glioblastoma, epilepsy due to intracranial lesion, JESSICA, HTN, HLD, subclinical hypothyroidism  Pt was initially evaluated in 01/2023 for postprandial heartburn over the past month  He was not having any alarm features at that time  It was recommended that he start on famotidine twice daily and have noninvasive testing with an esophagram   This demonstrated subtle tertiary contractions in the distal esophagus, no evidence of GERD or hiatal hernia  He was also complaining of constipation, and was recommended to take MiraLAX daily  02/17/23:     Pt notes that H2B twice daily is working well  Notes only rare episodes of post-prandial epigastric pain/burning  Denies significant heartburn, indigestion, nausea, emesis, dysphagia or odynophagia  Denies early satiety, unintentional weight loss  Pt is still having some issues with straining and sense of incomplete evacuation  Is taking miralax daily  Denies BRBPR or melenic stools  No sig abdominal pain related to defecation  No nocturnal BMs  Patient shares he is planning to have a ventral hernia repair  Esophagram: 01/2023: Subtle tertiary contractions within the distal esophagus as can be seen in the setting of mild esophageal dysmotility  No evidence of hiatal hernia  No gastroesophageal reflux was directly observed  MRI/MRCP: 01/2023: Stable size of sidebranch IPMN in the pancreatic neck  Pancreatic duct normal in caliber  No solid pancreatic lesions  Hemangioma in segment 2 of the liver redemonstrated  MRI/MRCP: 01/2021: 1 3 cm cavernous hemangioma left hepatic lobe  No suspicious hepatic lesions   3 mm pancreatic neck cyst could represent a tiny sidebranch IPMN     2023: Hb 16 8, Hct 51 0, Plt 177, MCV 92, BUN 19, Cr 1 75, AST 21, ALT 18, ALP 44, albumin 4 2, t bili 0 82      Endoscopic history:   EGD: none   Colon:   2022: Nodular mucosa in the proximal transverse colon; performed cold forceps biopsy  Few small diverticula in the transverse colon and sigmoid colon  A  Transverse colon mass (biopsy): Colonic mucosa with no diagnostic abnormality; Negative for dysplasia  2022: Two sessile polyps in transverse colon; previous transverse colon mucosal abnormality resolved- tattoo site visualized  Small internal hemorrhoids  A   Transverse Colon, polyps x2, polypectomies: Tubular adenoma, negative for high grade dysplasia    Review of Systems   Per HPI    Historical Information   Past Medical History:   Diagnosis Date   • Brain tumor ([address] ) 2021   • Bruxism (teeth grinding)    • GBM (glioblastoma multiforme) (HCC)    • Hypercholesterolemia    • Hypertension    • Obesity    • Sleep apnea     CPAP nightly     Past Surgical History:   Procedure Laterality Date   • FL LUMBAR PUNCTURE DIAGNOSTIC  2021   • HAND FRACTURE REPAIR      left thumb   • TX STRTCTC BX ASPIR/EXC PATITO ICRA LESION W/CT&I/MR Left 2021    Procedure: BIOPSY BRAIN IMAGE-GUIDED NEEDLE - LEFT TEMPORAL;  Surgeon: Vanessa Chatterjee MD;  Location: [facility];  Service: Neurosurgery   • TONSILLECTOMY       Social History   Social History     Substance and Sexual Activity   Alcohol Use Not Currently     Social History     Substance and Sexual Activity   Drug Use Not Currently     Social History     Tobacco Use   Smoking Status Former   • Types: Cigarettes   • Quit date:    • Years since quittin 1   Smokeless Tobacco Never     Family History   Problem Relation Age of Onset   • Heart disease Mother    • Heart attack Father    • Leukemia Brother        Meds/Allergies       Current Outpatient Medications:   •  amLODIPine (NORVASC) 10 mg tablet  •  aspirin (ECOTRIN LOW STRENGTH) 81 mg EC tablet  •  atorvastatin (LIPITOR) 10 mg tablet  •  Bevacizumab (AVASTIN IV)  •  famotidine (PEPCID) 20 mg tablet  •  fenofibrate (TRICOR) 54 MG tablet  •  hydrochlorothiazide (MICROZIDE) 12 5 mg capsule  •  levETIRAcetam (KEPPRA) 1000 MG tablet  •  levETIRAcetam (KEPPRA) 250 mg tablet  •  levothyroxine 25 mcg tablet  •  valsartan (DIOVAN) 80 mg tablet  •  mometasone (ELOCON) 0 1 % lotion  •  polyethylene glycol (GLYCOLAX) 17 GM/SCOOP powder    No Known Allergies    Objective     Blood pressure 128/86, pulse 67, resp  rate 18, height 6' 3" (1 905 m), weight 103 kg (226 lb), SpO2 96 %  Body mass index is 28 25 kg/m²  Physical Exam  Vitals and nursing note reviewed  Constitutional:       Appearance: Normal appearance  HENT:      Head: Normocephalic and atraumatic  Eyes:      General: No scleral icterus  Conjunctiva/sclera: Conjunctivae normal    Cardiovascular:      Rate and Rhythm: Normal rate  Pulmonary:      Effort: Pulmonary effort is normal  No respiratory distress  Abdominal:      General: Abdomen is flat  Bowel sounds are normal  There is no distension  Palpations: Abdomen is soft  Tenderness: There is no abdominal tenderness  There is no guarding  Hernia: A hernia is present  Skin:     General: Skin is warm and dry  Coloration: Skin is not jaundiced  Neurological:      Mental Status: He is alert and oriented to person, place, and time  Mental status is at baseline  Psychiatric:         Mood and Affect: Mood normal        Lab Results:   No visits with results within 1 Day(s) from this visit     Latest known visit with results is:   Transcribe Orders on 02/06/2023   Component Date Value   • Color, UA 02/06/2023 Yellow    • Clarity, UA 02/06/2023 Clear    • Specific [place], UA 02/06/2023 1 025    • pH, UA 02/06/2023 6 0    • Leukocytes, UA 02/06/2023 Negative    • Nitrite, UA 02/06/2023 Negative    • Protein, UA 02/06/2023 2+ (A)    • Glucose, UA 02/06/2023 Negative    • Ketones, UA 02/06/2023 Negative    • Urobilinogen, UA 02/06/2023 0 2    • Bilirubin, UA 02/06/2023 Negative    • Occult Blood, UA 02/06/2023 Negative    • RBC, UA 02/06/2023 0-1    • WBC, UA 02/06/2023 0-1    • Epithelial Cells 02/06/2023 Occasional    • Bacteria, UA 02/06/2023 Occasional    • Hyaline Casts, UA 02/06/2023 0-1 (A)    • Ca Oxalate [place]lie, UA 02/06/2023 Occasional (A)    • MUCUS THREADS 02/06/2023 Occasional (A)      Radiology Results:   No results found  Ward Gutierrez PA-C    **Please note:  Dictation voice to text software may have been used in the creation of this record  Occasional wrong word or “sound alike” substitutions may have occurred due to the inherent limitations of voice recognition software  Read the chart carefully and recognize, using context, where substitutions have occurred  **

## 2023-02-22 ENCOUNTER — TELEPHONE (OUTPATIENT)
Dept: HEMATOLOGY ONCOLOGY | Facility: HOSPITAL | Age: 68
End: 2023-02-22

## 2023-02-22 ENCOUNTER — TELEPHONE (OUTPATIENT)
Dept: HEMATOLOGY ONCOLOGY | Facility: CLINIC | Age: 68
End: 2023-02-22

## 2023-02-22 NOTE — TELEPHONE ENCOUNTER
CALL RETURN FORM   Reason for patient call? Patient calling in wanting to speak with someone on Dr Ismael Arriola team  Patient has concerns and did not seem to want to disclose further what he would like to discuss  Patient's primary oncologist? Dr DEVEN BRISENOY [facility]    Name of person the patient was calling for? Jocelynn    Any additional information to add, if applicable? Patient's best call back number is [phone]   Informed patient that the message will be forwarded to the team and someone will get back to them as soon as possible    Did you relay this information to the patient?  Yes

## 2023-02-22 NOTE — TELEPHONE ENCOUNTER
Patient expected to have hernia repair ob 3/14/23  Last Avastin infusion was 2/9/23  Plan placed on hold until after surgery, can resume 4/11/23  Patient has f/u with Dr Lena Valdez on 4/7/23  Will resume plan then

## 2023-02-22 NOTE — TELEPHONE ENCOUNTER
Rec'd call from patient to let us know that he is getting a hernia repair on 3/14 and wants to make sure that this is OK with our office that he is getting this done  He also has no more Bevacizumab scheduled and wants to make sure that is correct  I told the patient that I will speak with the provider and see if he needs anymore infusions scheduled  Patient also stated that he had a fall last night, where he was walking and lost his balance and fell over  Patient states he did not lose consciousness and did not hit his head on anything  Patient states that he does not have any pain or injuries from the fall  Patient also states that he does tend to lose his balance  I educated patient on fall prevention techniques and offered to order him a walker if needed  Patient declines wanting a walker, but he will call in the future if he needs one  I told the patient that I will review this with the provider and call him back shortly  Patient verbalized understanding

## 2023-02-22 NOTE — TELEPHONE ENCOUNTER
Returned call to patient to let him know that his treatment plan is on hold until he sees Alona Gu in the office on 4/7  Patient verbalized understanding and is in agreement with the plan

## 2023-02-28 ENCOUNTER — TELEPHONE (OUTPATIENT)
Dept: GASTROENTEROLOGY | Facility: CLINIC | Age: 68
End: 2023-02-28

## 2023-02-28 NOTE — TELEPHONE ENCOUNTER
OOV 2/17/23 [place]  FU not scheduled    H/O as per last OV note  1  Heartburn  2  Abnormal esophagram  3  Constipation, unspecified constipation type   4  IPMN (intraductal papillary mucinous neoplasm)   5  Personal history of colonic polyps    Medications   Pepcid 20 mg BID    Spoke with pt, who appeared to have difficulties in recall of why he was calling  His mother was in the back ground providing prompting with conversation  He is  reporting that he had an episode of waking up in the middle of the night with severe gas and intermittent MLQ abd pain 8/10 that has since resolved after taking Tums  Pt denies alarming s/s with this phone call  Pt reports formed, soft brown BMs daily, + flatus w/o bloating  AT this time pt denies GI symptoms  Reviewed GERD dietary and behavior modifications, constipation, Bloating and gas discomfort interventions, over  the counter anti gas medications to help with the symptoms  Pt agreeable and verbalized understanding of all information

## 2023-02-28 NOTE — TELEPHONE ENCOUNTER
Pt called  He stated he has been having pain in the middle of the night (like 2-3 am) the past to nights  He took Tums for the pain  Woman joined on the phone conversation and stated that the Miralax and benefiber is causing the pain  Please advise  Mandie Sesay

## 2023-03-01 ENCOUNTER — ANESTHESIA EVENT (OUTPATIENT)
Dept: PERIOP | Facility: HOSPITAL | Age: 68
End: 2023-03-01

## 2023-03-02 ENCOUNTER — RA CDI HCC (OUTPATIENT)
Dept: OTHER | Facility: HOSPITAL | Age: 68
End: 2023-03-02

## 2023-03-02 ENCOUNTER — OFFICE VISIT (OUTPATIENT)
Dept: LAB | Facility: HOSPITAL | Age: 68
End: 2023-03-02

## 2023-03-02 ENCOUNTER — HOSPITAL ENCOUNTER (OUTPATIENT)
Dept: RADIOLOGY | Facility: HOSPITAL | Age: 68
End: 2023-03-02

## 2023-03-02 ENCOUNTER — APPOINTMENT (OUTPATIENT)
Dept: LAB | Facility: HOSPITAL | Age: 68
End: 2023-03-02

## 2023-03-02 DIAGNOSIS — E78.00 HYPERCHOLESTEROLEMIA: ICD-10-CM

## 2023-03-02 DIAGNOSIS — K42.9 UMBILICAL HERNIA WITHOUT OBSTRUCTION AND WITHOUT GANGRENE: ICD-10-CM

## 2023-03-02 DIAGNOSIS — Z12.5 SCREENING FOR PROSTATE CANCER: ICD-10-CM

## 2023-03-02 DIAGNOSIS — I10 ESSENTIAL HYPERTENSION: ICD-10-CM

## 2023-03-02 LAB
ATRIAL RATE: 68 BPM
CHOLEST SERPL-MCNC: 140 MG/DL
HDLC SERPL-MCNC: 31 MG/DL
LDLC SERPL CALC-MCNC: 86 MG/DL (ref 0–100)
NONHDLC SERPL-MCNC: 109 MG/DL
P AXIS: 38 DEGREES
PR INTERVAL: 206 MS
PSA SERPL-MCNC: 2.1 NG/ML (ref 0–4)
QRS AXIS: 77 DEGREES
QRSD INTERVAL: 108 MS
QT INTERVAL: 400 MS
QTC INTERVAL: 425 MS
T WAVE AXIS: 37 DEGREES
TRIGL SERPL-MCNC: 116 MG/DL
TSH SERPL DL<=0.05 MIU/L-ACNC: 3.14 UIU/ML (ref 0.45–4.5)
VENTRICULAR RATE: 68 BPM

## 2023-03-07 NOTE — PRE-PROCEDURE INSTRUCTIONS
Pre-Surgery Instructions:   Medication Instructions   • amLODIPine (NORVASC) 10 mg tablet Take day of surgery  • aspirin (ECOTRIN LOW STRENGTH) 81 mg EC tablet 3/7/23 pt plans to hold 5 days before surgery   • atorvastatin (LIPITOR) 10 mg tablet Take night before surgery   • Bevacizumab (AVASTIN IV) Instructions provided by MD   • famotidine (PEPCID) 20 mg tablet Take day of surgery  • fenofibrate (TRICOR) 54 MG tablet Take night before surgery   • hydrochlorothiazide (MICROZIDE) 12 5 mg capsule Hold day of surgery  • levETIRAcetam (KEPPRA) 1000 MG tablet Take day of surgery  • levETIRAcetam (KEPPRA) 250 mg tablet Take day of surgery  • levothyroxine 25 mcg tablet Take day of surgery  • polyethylene glycol (GLYCOLAX) 17 GM/SCOOP powder Hold day of surgery  • valsartan (DIOVAN) 80 mg tablet Hold day of surgery  Medication instructions for day surgery reviewed  Please use only a sip of water to take your instructed medications  Avoid all over the counter vitamins, supplements and NSAIDS for one week prior to surgery per anesthesia guidelines  Tylenol is ok to take as needed  You will receive a call one business day prior to surgery with an arrival time and hospital directions  If your surgery is scheduled on a Monday, the hospital will be calling you on the Friday prior to your surgery  If you have not heard from anyone by 8pm, please call the hospital supervisor through the hospital  at [phone]  [facility] [phone])  Do not eat or drink anything after midnight the night before your surgery, including candy, mints, lifesavers, or chewing gum  Do not drink alcohol 24hrs before your surgery  Try not to smoke at least 24hrs before your surgery  Follow the pre surgery showering instructions as listed in the [facility] Surgical Experience Booklet” or otherwise provided by your surgeon's office  Do not shave the surgical area 24 hours before surgery   Do not apply any lotions, creams, including makeup, cologne, deodorant, or perfumes after showering on the day of your surgery  No contact lenses, eye make-up, or artificial eyelashes  Remove nail polish, including gel polish, and any artificial, gel, or acrylic nails if possible  Remove all jewelry including rings and body piercing jewelry  Wear causal clothing that is easy to take on and off  Consider your type of surgery  Keep any valuables, jewelry, piercings at home  Please bring any specially ordered equipment (sling, braces) if indicated  Arrange for a responsible person to drive you to and from the hospital on the day of your surgery  Visitor Guidelines discussed  Call the surgeon's office with any new illnesses, exposures, or additional questions prior to surgery  Please reference your [facility] Surgical Experience Booklet” for additional information to prepare for your upcoming surgery

## 2023-03-09 ENCOUNTER — CONSULT (OUTPATIENT)
Dept: FAMILY MEDICINE CLINIC | Facility: CLINIC | Age: 68
End: 2023-03-09

## 2023-03-09 VITALS
OXYGEN SATURATION: 95 % | SYSTOLIC BLOOD PRESSURE: 114 MMHG | BODY MASS INDEX: 27.6 KG/M2 | WEIGHT: 222 LBS | DIASTOLIC BLOOD PRESSURE: 76 MMHG | TEMPERATURE: 98.7 F | HEIGHT: 75 IN | HEART RATE: 68 BPM

## 2023-03-09 DIAGNOSIS — Z12.5 SCREENING FOR PROSTATE CANCER: ICD-10-CM

## 2023-03-09 DIAGNOSIS — Z23 ENCOUNTER FOR IMMUNIZATION: ICD-10-CM

## 2023-03-09 DIAGNOSIS — E03.9 ACQUIRED HYPOTHYROIDISM: ICD-10-CM

## 2023-03-09 DIAGNOSIS — K43.9 VENTRAL HERNIA WITHOUT OBSTRUCTION OR GANGRENE: Primary | ICD-10-CM

## 2023-03-09 DIAGNOSIS — R73.03 PREDIABETES: ICD-10-CM

## 2023-03-09 DIAGNOSIS — C71.9 GLIOBLASTOMA (HCC): ICD-10-CM

## 2023-03-09 DIAGNOSIS — I10 ESSENTIAL HYPERTENSION: ICD-10-CM

## 2023-03-09 DIAGNOSIS — E78.00 HYPERCHOLESTEROLEMIA: ICD-10-CM

## 2023-03-09 NOTE — PROGRESS NOTES
Assessment/Plan:    No problem-specific Assessment & Plan notes found for this encounter  Diagnoses and all orders for this visit:    Ventral hernia without obstruction or gangrene  Comments:  pt is a low cardiovascular risk for proposed procedure by ACC/AHA guidelines    Essential hypertension  Comments:  good control no change in the medication    Hypercholesterolemia  Comments:  pt counseled on diet and exercise    Prediabetes  Comments:  normal glucose    Encounter for immunization    Screening for prostate cancer    Acquired hypothyroidism  Comments:  good control no change in the medication    Glioblastoma ([facility] [address] )  Comments:  per oncology          PHQ-2/9 Depression Screening    Little interest or pleasure in doing things: 0 - not at all  Feeling down, depressed, or hopeless: 0 - not at all  PHQ-2 Score: 0  PHQ-2 Interpretation: Negative depression screen            Subjective:      Patient ID: Beltran Shoemaker is a 78 y o  male  Pt here for preoperative cardiovascular evaluation for ventral hernia repair, pt has no new complaints today, pt states that he feels well  The following portions of the patient's history were reviewed and updated as appropriate: allergies, current medications, past family history, past medical history, past social history, past surgical history and problem list     Review of Systems   Constitutional: Positive for fatigue  Negative for chills and fever  HENT: Negative  Negative for hearing loss  Eyes: Negative  Negative for visual disturbance  Respiratory: Negative for shortness of breath and wheezing  Cardiovascular: Negative for chest pain and palpitations  Gastrointestinal: Negative for abdominal pain, blood in stool, constipation, diarrhea, nausea and vomiting  Endocrine: Negative  Genitourinary: Negative for difficulty urinating and dysuria  Musculoskeletal: Negative for arthralgias and myalgias  Skin: Negative  Allergic/Immunologic: Negative  Neurological: Negative for seizures and syncope  Hematological: Negative for adenopathy  Psychiatric/Behavioral: Negative  Objective:    /76 (BP Location: Left arm, Patient Position: Sitting)   Pulse 68   Temp 98 7 °F (37 1 °C) (Tympanic)   Ht 6' 3" (1 905 m)   Wt 101 kg (222 lb)   SpO2 95%   BMI 27 75 kg/m²      Physical Exam  Vitals and nursing note reviewed  Constitutional:       General: He is not in acute distress  Appearance: Normal appearance  He is well-developed  He is not ill-appearing, toxic-appearing or diaphoretic  HENT:      Head: Normocephalic and atraumatic  Right Ear: Tympanic membrane, ear canal and external ear normal  There is no impacted cerumen  Left Ear: Tympanic membrane, ear canal and external ear normal  There is no impacted cerumen  Nose: Nose normal  No congestion or rhinorrhea  Mouth/Throat:      Mouth: Mucous membranes are moist       Pharynx: Oropharynx is clear  No oropharyngeal exudate or posterior oropharyngeal erythema  Eyes:      General: No scleral icterus  Right eye: No discharge  Left eye: No discharge  Extraocular Movements: Extraocular movements intact  Conjunctiva/sclera: Conjunctivae normal       Pupils: Pupils are equal, round, and reactive to light  Cardiovascular:      Rate and Rhythm: Normal rate and regular rhythm  Pulses: Normal pulses  Heart sounds: Normal heart sounds  No murmur heard  No friction rub  No gallop  Pulmonary:      Effort: Pulmonary effort is normal  No respiratory distress  Breath sounds: Normal breath sounds  No wheezing, rhonchi or rales  Abdominal:      General: Bowel sounds are normal  There is no distension  Palpations: Abdomen is soft  There is no mass  Tenderness: There is no abdominal tenderness  There is no guarding or rebound  Musculoskeletal:         General: No swelling or deformity  Normal range of motion        Cervical back: Normal range of motion and neck supple  No rigidity  Right lower leg: No edema  Left lower leg: No edema  Lymphadenopathy:      Cervical: No cervical adenopathy  Skin:     General: Skin is warm and dry  Findings: No rash  Neurological:      General: No focal deficit present  Mental Status: He is alert and oriented to person, place, and time  Sensory: No sensory deficit  Motor: No weakness  Coordination: Coordination normal       Gait: Gait normal       Deep Tendon Reflexes: Reflexes are normal and symmetric  Reflexes normal    Psychiatric:         Mood and Affect: Mood normal          Behavior: Behavior normal          Thought Content:  Thought content normal          Judgment: Judgment normal

## 2023-03-09 NOTE — H&P (VIEW-ONLY)
Assessment/Plan:    No problem-specific Assessment & Plan notes found for this encounter  Diagnoses and all orders for this visit:    Ventral hernia without obstruction or gangrene  Comments:  pt is a low cardiovascular risk for proposed procedure by ACC/AHA guidelines    Essential hypertension  Comments:  good control no change in the medication    Hypercholesterolemia  Comments:  pt counseled on diet and exercise    Prediabetes  Comments:  normal glucose    Encounter for immunization    Screening for prostate cancer    Acquired hypothyroidism  Comments:  good control no change in the medication    Glioblastoma ([facility] [address] )  Comments:  per oncology          PHQ-2/9 Depression Screening    Little interest or pleasure in doing things: 0 - not at all  Feeling down, depressed, or hopeless: 0 - not at all  PHQ-2 Score: 0  PHQ-2 Interpretation: Negative depression screen            Subjective:      Patient ID: Tracie Johnson is a 78 y o  male  Pt here for preoperative cardiovascular evaluation for ventral hernia repair, pt has no new complaints today, pt states that he feels well  The following portions of the patient's history were reviewed and updated as appropriate: allergies, current medications, past family history, past medical history, past social history, past surgical history and problem list     Review of Systems   Constitutional: Positive for fatigue  Negative for chills and fever  HENT: Negative  Negative for hearing loss  Eyes: Negative  Negative for visual disturbance  Respiratory: Negative for shortness of breath and wheezing  Cardiovascular: Negative for chest pain and palpitations  Gastrointestinal: Negative for abdominal pain, blood in stool, constipation, diarrhea, nausea and vomiting  Endocrine: Negative  Genitourinary: Negative for difficulty urinating and dysuria  Musculoskeletal: Negative for arthralgias and myalgias  Skin: Negative  Allergic/Immunologic: Negative  Neurological: Negative for seizures and syncope  Hematological: Negative for adenopathy  Psychiatric/Behavioral: Negative  Objective:    /76 (BP Location: Left arm, Patient Position: Sitting)   Pulse 68   Temp 98 7 °F (37 1 °C) (Tympanic)   Ht 6' 3" (1 905 m)   Wt 101 kg (222 lb)   SpO2 95%   BMI 27 75 kg/m²      Physical Exam  Vitals and nursing note reviewed  Constitutional:       General: He is not in acute distress  Appearance: Normal appearance  He is well-developed  He is not ill-appearing, toxic-appearing or diaphoretic  HENT:      Head: Normocephalic and atraumatic  Right Ear: Tympanic membrane, ear canal and external ear normal  There is no impacted cerumen  Left Ear: Tympanic membrane, ear canal and external ear normal  There is no impacted cerumen  Nose: Nose normal  No congestion or rhinorrhea  Mouth/Throat:      Mouth: Mucous membranes are moist       Pharynx: Oropharynx is clear  No oropharyngeal exudate or posterior oropharyngeal erythema  Eyes:      General: No scleral icterus  Right eye: No discharge  Left eye: No discharge  Extraocular Movements: Extraocular movements intact  Conjunctiva/sclera: Conjunctivae normal       Pupils: Pupils are equal, round, and reactive to light  Cardiovascular:      Rate and Rhythm: Normal rate and regular rhythm  Pulses: Normal pulses  Heart sounds: Normal heart sounds  No murmur heard  No friction rub  No gallop  Pulmonary:      Effort: Pulmonary effort is normal  No respiratory distress  Breath sounds: Normal breath sounds  No wheezing, rhonchi or rales  Abdominal:      General: Bowel sounds are normal  There is no distension  Palpations: Abdomen is soft  There is no mass  Tenderness: There is no abdominal tenderness  There is no guarding or rebound  Musculoskeletal:         General: No swelling or deformity  Normal range of motion        Cervical back: Normal range of motion and neck supple  No rigidity  Right lower leg: No edema  Left lower leg: No edema  Lymphadenopathy:      Cervical: No cervical adenopathy  Skin:     General: Skin is warm and dry  Findings: No rash  Neurological:      General: No focal deficit present  Mental Status: He is alert and oriented to person, place, and time  Sensory: No sensory deficit  Motor: No weakness  Coordination: Coordination normal       Gait: Gait normal       Deep Tendon Reflexes: Reflexes are normal and symmetric  Reflexes normal    Psychiatric:         Mood and Affect: Mood normal          Behavior: Behavior normal          Thought Content:  Thought content normal          Judgment: Judgment normal

## 2023-03-13 NOTE — ANESTHESIA PREPROCEDURE EVALUATION
Procedure:  REPAIR HERNIA VENTRAL LAPAROSCOPIC WITH MESH (Abdomen)    Prior GA MAC 3 G2b view  ECG: NSR  TTE 2021: Indication TIA - EF 60%, RV wnl    JESSICA on CPAP  Progressive GBM with hx of L temporal biopsy  Radiation and chemotherapy  Avastin   Keppra - took this am  Amlodipine, HCTZ, valsartan - took this am    Has mild difficulty with word finding which is baseline    Denies the following: CP/SOB with exertion, asthma, COPD,stroke/TIA        Relevant Problems   CARDIO   (+) Essential hypertension   (+) Mixed hyperlipidemia      ENDO   (+) Subclinical hypothyroidism      GI/HEPATIC   (+) GERD (gastroesophageal reflux disease)      NEURO/PSYCH   (+) 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 (HCC)      PULMONARY   (+) Obstructive sleep apnea syndrome        Physical Exam    Airway    Mallampati score: II  TM Distance: >3 FB  Neck ROM: full     Dental       Cardiovascular      Pulmonary      Other Findings        Anesthesia Plan  ASA Score- 3     Anesthesia Type- general with ASA Monitors  Additional Monitors:   Airway Plan: ETT  Plan Factors-Exercise tolerance (METS): >4 METS  Chart reviewed  EKG reviewed  Existing labs reviewed  Patient summary reviewed  Patient is not a current smoker  Obstructive sleep apnea risk education given perioperatively  Induction- intravenous  Postoperative Plan-     Informed Consent- Anesthetic plan and risks discussed with patient  I personally reviewed this patient with the CRNA  Discussed and agreed on the Anesthesia Plan with the CRNA  Rigo Lew

## 2023-03-14 ENCOUNTER — ANESTHESIA (OUTPATIENT)
Dept: PERIOP | Facility: HOSPITAL | Age: 68
End: 2023-03-14

## 2023-03-14 ENCOUNTER — HOSPITAL ENCOUNTER (OUTPATIENT)
Facility: HOSPITAL | Age: 68
Setting detail: OUTPATIENT SURGERY
Discharge: HOME | End: 2023-03-14
Attending: SURGERY | Admitting: SURGERY

## 2023-03-14 VITALS
HEIGHT: 75 IN | OXYGEN SATURATION: 96 % | TEMPERATURE: 98.2 F | SYSTOLIC BLOOD PRESSURE: 111 MMHG | RESPIRATION RATE: 20 BRPM | WEIGHT: 220 LBS | DIASTOLIC BLOOD PRESSURE: 67 MMHG | BODY MASS INDEX: 27.35 KG/M2 | HEART RATE: 86 BPM

## 2023-03-14 DIAGNOSIS — K43.9 VENTRAL HERNIA WITHOUT OBSTRUCTION OR GANGRENE: Primary | ICD-10-CM

## 2023-03-14 DEVICE — FIXATION SECURESTRAP 5 MM ABSORB DISP: Type: IMPLANTABLE DEVICE | Site: ABDOMEN | Status: FUNCTIONAL

## 2023-03-14 DEVICE — VENTRALIGHT ST MESH WITH ECHO PS POSITONING SYSTEM
Type: IMPLANTABLE DEVICE | Site: ABDOMEN | Status: FUNCTIONAL
Brand: VENTRALIGHT ST MESH WITH ECHO PS POSITONING SYSTEM

## 2023-03-14 RX ORDER — HYDROMORPHONE HCL/PF 1 MG/ML
0.2 SYRINGE (ML) INJECTION
Status: DISCONTINUED | OUTPATIENT
Start: 2023-03-14 | End: 2023-03-14 | Stop reason: HOSPADM

## 2023-03-14 RX ORDER — ACETAMINOPHEN 325 MG/1
650 TABLET ORAL EVERY 6 HOURS PRN
Status: DISCONTINUED | OUTPATIENT
Start: 2023-03-14 | End: 2023-03-14 | Stop reason: HOSPADM

## 2023-03-14 RX ORDER — HYDROCODONE BITARTRATE AND ACETAMINOPHEN 5; 325 MG/1; MG/1
2 TABLET ORAL EVERY 6 HOURS PRN
Status: DISCONTINUED | OUTPATIENT
Start: 2023-03-14 | End: 2023-03-14 | Stop reason: HOSPADM

## 2023-03-14 RX ORDER — OXYCODONE HYDROCHLORIDE 5 MG/1
5 TABLET ORAL EVERY 6 HOURS PRN
Qty: 15 TABLET | Refills: 0 | Status: SHIPPED | OUTPATIENT
Start: 2023-03-14 | End: 2023-03-24

## 2023-03-14 RX ORDER — ROCURONIUM BROMIDE 10 MG/ML
INJECTION, SOLUTION INTRAVENOUS AS NEEDED
Status: DISCONTINUED | OUTPATIENT
Start: 2023-03-14 | End: 2023-03-14

## 2023-03-14 RX ORDER — CEFAZOLIN SODIUM 2 G/50ML
2000 SOLUTION INTRAVENOUS ONCE
Status: DISCONTINUED | OUTPATIENT
Start: 2023-03-14 | End: 2023-03-14

## 2023-03-14 RX ORDER — SODIUM CHLORIDE, SODIUM LACTATE, POTASSIUM CHLORIDE, CALCIUM CHLORIDE 600; 310; 30; 20 MG/100ML; MG/100ML; MG/100ML; MG/100ML
125 INJECTION, SOLUTION INTRAVENOUS CONTINUOUS
Status: DISCONTINUED | OUTPATIENT
Start: 2023-03-14 | End: 2023-03-14

## 2023-03-14 RX ORDER — HYDROCODONE BITARTRATE AND ACETAMINOPHEN 5; 325 MG/1; MG/1
1 TABLET ORAL EVERY 6 HOURS PRN
Status: DISCONTINUED | OUTPATIENT
Start: 2023-03-14 | End: 2023-03-14 | Stop reason: HOSPADM

## 2023-03-14 RX ORDER — GLYCOPYRROLATE 0.2 MG/ML
INJECTION INTRAMUSCULAR; INTRAVENOUS AS NEEDED
Status: DISCONTINUED | OUTPATIENT
Start: 2023-03-14 | End: 2023-03-14

## 2023-03-14 RX ORDER — ONDANSETRON 2 MG/ML
INJECTION INTRAMUSCULAR; INTRAVENOUS AS NEEDED
Status: DISCONTINUED | OUTPATIENT
Start: 2023-03-14 | End: 2023-03-14

## 2023-03-14 RX ORDER — HYDROMORPHONE HCL/PF 1 MG/ML
0.5 SYRINGE (ML) INJECTION
Status: DISCONTINUED | OUTPATIENT
Start: 2023-03-14 | End: 2023-03-14 | Stop reason: HOSPADM

## 2023-03-14 RX ORDER — FENTANYL CITRATE 50 UG/ML
INJECTION, SOLUTION INTRAMUSCULAR; INTRAVENOUS AS NEEDED
Status: DISCONTINUED | OUTPATIENT
Start: 2023-03-14 | End: 2023-03-14

## 2023-03-14 RX ORDER — MAGNESIUM HYDROXIDE 1200 MG/15ML
LIQUID ORAL AS NEEDED
Status: DISCONTINUED | OUTPATIENT
Start: 2023-03-14 | End: 2023-03-14 | Stop reason: HOSPADM

## 2023-03-14 RX ORDER — LIDOCAINE HYDROCHLORIDE 10 MG/ML
INJECTION, SOLUTION EPIDURAL; INFILTRATION; INTRACAUDAL; PERINEURAL AS NEEDED
Status: DISCONTINUED | OUTPATIENT
Start: 2023-03-14 | End: 2023-03-14

## 2023-03-14 RX ORDER — SODIUM CHLORIDE, SODIUM LACTATE, POTASSIUM CHLORIDE, CALCIUM CHLORIDE 600; 310; 30; 20 MG/100ML; MG/100ML; MG/100ML; MG/100ML
75 INJECTION, SOLUTION INTRAVENOUS CONTINUOUS
Status: DISCONTINUED | OUTPATIENT
Start: 2023-03-14 | End: 2023-03-14 | Stop reason: HOSPADM

## 2023-03-14 RX ORDER — BUPIVACAINE HYDROCHLORIDE 5 MG/ML
INJECTION, SOLUTION PERINEURAL AS NEEDED
Status: DISCONTINUED | OUTPATIENT
Start: 2023-03-14 | End: 2023-03-14 | Stop reason: HOSPADM

## 2023-03-14 RX ORDER — FENTANYL CITRATE/PF 50 MCG/ML
25 SYRINGE (ML) INJECTION
Status: DISCONTINUED | OUTPATIENT
Start: 2023-03-14 | End: 2023-03-14 | Stop reason: HOSPADM

## 2023-03-14 RX ORDER — CEFAZOLIN SODIUM 2 G/50ML
SOLUTION INTRAVENOUS AS NEEDED
Status: DISCONTINUED | OUTPATIENT
Start: 2023-03-14 | End: 2023-03-14

## 2023-03-14 RX ORDER — PROPOFOL 10 MG/ML
INJECTION, EMULSION INTRAVENOUS AS NEEDED
Status: DISCONTINUED | OUTPATIENT
Start: 2023-03-14 | End: 2023-03-14

## 2023-03-14 RX ORDER — ONDANSETRON 2 MG/ML
4 INJECTION INTRAMUSCULAR; INTRAVENOUS ONCE AS NEEDED
Status: DISCONTINUED | OUTPATIENT
Start: 2023-03-14 | End: 2023-03-14 | Stop reason: HOSPADM

## 2023-03-14 RX ORDER — NEOSTIGMINE METHYLSULFATE 1 MG/ML
INJECTION INTRAVENOUS AS NEEDED
Status: DISCONTINUED | OUTPATIENT
Start: 2023-03-14 | End: 2023-03-14

## 2023-03-14 RX ORDER — ALBUTEROL SULFATE 2.5 MG/3ML
2.5 SOLUTION RESPIRATORY (INHALATION) ONCE AS NEEDED
Status: DISCONTINUED | OUTPATIENT
Start: 2023-03-14 | End: 2023-03-14 | Stop reason: HOSPADM

## 2023-03-14 RX ORDER — DEXAMETHASONE SODIUM PHOSPHATE 10 MG/ML
INJECTION, SOLUTION INTRAMUSCULAR; INTRAVENOUS AS NEEDED
Status: DISCONTINUED | OUTPATIENT
Start: 2023-03-14 | End: 2023-03-14

## 2023-03-14 RX ADMIN — SODIUM CHLORIDE, SODIUM LACTATE, POTASSIUM CHLORIDE, AND CALCIUM CHLORIDE 125 ML/HR: .6; .31; .03; .02 INJECTION, SOLUTION INTRAVENOUS at 08:15

## 2023-03-14 RX ADMIN — ACETAMINOPHEN 650 MG: 325 TABLET ORAL at 11:03

## 2023-03-14 RX ADMIN — NEOSTIGMINE METHYLSULFATE 4 MG: 1 INJECTION, SOLUTION INTRAVENOUS at 10:11

## 2023-03-14 RX ADMIN — PROPOFOL 200 MG: 10 INJECTION, EMULSION INTRAVENOUS at 09:25

## 2023-03-14 RX ADMIN — LIDOCAINE HYDROCHLORIDE 100 MG: 10 INJECTION, SOLUTION EPIDURAL; INFILTRATION; INTRACAUDAL at 09:25

## 2023-03-14 RX ADMIN — SODIUM CHLORIDE, SODIUM LACTATE, POTASSIUM CHLORIDE, AND CALCIUM CHLORIDE: .6; .31; .03; .02 INJECTION, SOLUTION INTRAVENOUS at 08:59

## 2023-03-14 RX ADMIN — ROCURONIUM BROMIDE 50 MG: 10 INJECTION INTRAVENOUS at 09:25

## 2023-03-14 RX ADMIN — HYDROCODONE BITARTRATE AND ACETAMINOPHEN 1 TABLET: 5; 325 TABLET ORAL at 12:19

## 2023-03-14 RX ADMIN — GLYCOPYRROLATE 0.4 MG: 0.2 INJECTION, SOLUTION INTRAMUSCULAR; INTRAVENOUS at 10:11

## 2023-03-14 RX ADMIN — ONDANSETRON 4 MG: 2 INJECTION INTRAMUSCULAR; INTRAVENOUS at 09:53

## 2023-03-14 RX ADMIN — FENTANYL CITRATE 100 MCG: 50 INJECTION, SOLUTION INTRAMUSCULAR; INTRAVENOUS at 09:54

## 2023-03-14 RX ADMIN — FENTANYL CITRATE 50 MCG: 50 INJECTION, SOLUTION INTRAMUSCULAR; INTRAVENOUS at 09:38

## 2023-03-14 RX ADMIN — HYDROCODONE BITARTRATE AND ACETAMINOPHEN 1 TABLET: 5; 325 TABLET ORAL at 11:08

## 2023-03-14 RX ADMIN — FENTANYL CITRATE 50 MCG: 50 INJECTION, SOLUTION INTRAMUSCULAR; INTRAVENOUS at 09:25

## 2023-03-14 RX ADMIN — CEFAZOLIN SODIUM 2000 MG: 2 SOLUTION INTRAVENOUS at 09:20

## 2023-03-14 RX ADMIN — DEXAMETHASONE SODIUM PHOSPHATE 10 MG: 10 INJECTION, SOLUTION INTRAMUSCULAR; INTRAVENOUS at 09:44

## 2023-03-14 NOTE — OP NOTE
OPERATIVE REPORT  PATIENT NAME: Jitendra Kidd    :  1955  MRN: [id number]  Pt Location: [facility] OR ROOM 01    SURGERY DATE: 3/14/2023    Surgeon(s) and Role:     Mari Dasilva MD - Primary     * Kobe Collazo PA-C - Assisting  The PA was necessary to provide expert assistance; i e  in the form of providing optimal exposure with retraction, suturing, and assistance with dissection in order to perform the most efficient operation and in order to optimize patient safety in the abscence of a qualified surgical resident  Preop Diagnosis:  Umbilical hernia without obstruction and without gangrene [K42 9]    Post-Op Diagnosis Codes:     * Umbilical hernia without obstruction and without gangrene [K42 9]    Procedure(s):  REPAIR HERNIA VENTRAL LAPAROSCOPIC WITH MESH    Specimen(s):  * No specimens in log *    Estimated Blood Loss:   Minimal    Drains:  * No LDAs found *    Anesthesia Type:   General    Operative Indications:  Umbilical hernia without obstruction and without gangrene [K42 9]  The patient is a 69-year-old male presenting with an umbilical hernia for which definitive treatment by laparoscopic mesh repair is now indicated  Operative Findings:  12 mm fascial defect centered at the umbilicus  68 0 circular ventral light ST mesh used for the repair    Complications:   None    Procedure and Technique:  The patient was taken to the operating room where they are properly identified, monitored and anesthetized with general endotracheal anesthesia  They received antibiotics perioperatively  Venodynes used for DVT prophylaxis  Time-out performed  Skin prepped and draped  Skin incised in the left upper quadrant  A blunt-tipped 12 mm trocar was advanced into the peritoneal cavity and pneumoperitoneum established to 15 mm of mercury  A 10 mm 30 degree scope was advanced  The 4 quadrants of the peritoneal cavity was inspected laparoscopically  The ventral hernia was identified   A 2nd 5 mm trocar was placed in the left lower quadrant  All adhesions of omentum were taken down laparoscopically  The falciform ligament was taken down  The Ventralight STmesh was rolled up and delivered through the left upper quadrant trocar site  A stab wound made in the anterior abdominal wall  The grasping device used to grasp the blue catheter on the mesh  The green lattice was inflated  The mesh brought up to the anterior abdominal wall and oriented  It was secured in 2 rows with absorabable tacks  The green lattice was removed through the left upper quadrant trocar site  Two additional working ports were placed on the right side of the abdomen to help secure the mesh circumferentially  Satisfied with the lie of the mesh, the procedure completed with closure of the left upper quadrant trocar site with a suture grasping device and an 0 Vicryl suture  The pneumoperitoneum was released  All skin incisions closed with subcuticular 4 Monocryl suture  Wounds infiltrated with 0 5% Marcaine  Wounds dressed  The patient extubated and taken to recovery in stable condition       I was present for the entire procedure    Patient Disposition:  PACU         SIGNATURE: Jerrica Sheridan MD  DATE: March 14, 2023  TIME: 10:15 AM

## 2023-03-14 NOTE — DISCHARGE INSTR - AVS FIRST PAGE
[facility]    Discharge Instructions  Light activity for 2 weeks  No heavy lifting for 2 weeks  Max 10 lbs for 2 weeks  No driving for 3-7 days or until pain is well controlled  Remove dressing in 3-5 days  Surgical glue will fall off with time  You may shower over dressing, replace if soiled  Take discharge medications as prescribed  Notify our office for nausea, vomiting, fever, diarrhea, chest pain, trouble breathing  Follow up in our office in 2 weeks or sooner if needed  Call with additional questions or concerns [phone]  For pain you may take ibuprofen 600 mg every 6 hours scheduled for 3 days then as needed  You can also take acetaminophen 650 mg every 6 hours scheduled for 3 days then as needed  For ongoing pain you can alternate ibuprofen and acetaminophen every 3 hours  If pain not controlled with this regimen you can add Oxycodone as prescribed  Ice packs may be helpful, 20 min on, 20 min off alternating and monitoring skin

## 2023-03-14 NOTE — INTERVAL H&P NOTE
H&P reviewed  After examining the patient I find no changes in the patients condition since the H&P had been written      Vitals:    03/14/23 0809   BP: 138/86   Pulse: 71   Resp: 16   Temp: 97 5 °F (36 4 °C)   SpO2: 96%

## 2023-03-14 NOTE — ANESTHESIA POSTPROCEDURE EVALUATION
Post-Op Assessment Note    CV Status:  Stable  Pain Score: 0    Pain management: adequate     Mental Status:  Awake and sleepy   Hydration Status:  Euvolemic   PONV Controlled:  Controlled   Airway Patency:  Patent      Post Op Vitals Reviewed: Yes      Staff: CRNA         No notable events documented      BP   143/85   Temp   98 2   Pulse  66   Resp   12   SpO2   98

## 2023-03-15 ENCOUNTER — TELEPHONE (OUTPATIENT)
Dept: OBGYN CLINIC | Facility: OTHER | Age: 68
End: 2023-03-15

## 2023-03-15 NOTE — TELEPHONE ENCOUNTER
Spoke with patient, will evaluate restart of treatment at next follow-up  Confirmed time and date of follow-up

## 2023-03-15 NOTE — TELEPHONE ENCOUNTER
Patient Call Form   Reason for patient call? Pt called asking when Dr Valentin Elder wants him to restart his treatment  Pt had hernia surgery yesterday  Patient's primary oncologist? Dr Valentin lEder    Name of person the patient was calling for? Dr Valentin Elder nurse   Does the patient issue require a call back? Yes   If call back required, inform patient that the message will be forwarded to the team and someone will get back to them as soon as possible    Did you relay this information to the patient?  Yes

## 2023-03-16 DIAGNOSIS — E03.8 SUBCLINICAL HYPOTHYROIDISM: ICD-10-CM

## 2023-03-17 ENCOUNTER — TELEPHONE (OUTPATIENT)
Dept: SURGERY | Facility: CLINIC | Age: 68
End: 2023-03-17

## 2023-03-17 RX ORDER — LEVOTHYROXINE SODIUM 0.03 MG/1
25 TABLET ORAL DAILY
Qty: 90 TABLET | Refills: 0 | Status: SHIPPED | OUTPATIENT
Start: 2023-03-17

## 2023-03-17 NOTE — TELEPHONE ENCOUNTER
Patient's mother called stating that since her son's operation he has been constipated and would like to know what he should take? I verbally spoke with Dr Irwin Barraza who stated he should take Milk of magnesia today and if he doesn't have a bowel movement after talking milk of magnesia, to start taking Murelax starting tomorrow daily  Patient's mother verbalized understanding and had no further questions

## 2023-03-23 ENCOUNTER — TELEPHONE (OUTPATIENT)
Dept: SURGERY | Facility: CLINIC | Age: 68
End: 2023-03-23

## 2023-03-23 NOTE — TELEPHONE ENCOUNTER
Patient called about getting an MRI done after having surgery  I had contacted our [address] [place] [address] and he stated that yes the patient could go for his MRI because there was no metal placed during his surgery  I made the patient aware that and he said thankyou and was very pleased and happy with my answer

## 2023-03-27 ENCOUNTER — OFFICE VISIT (OUTPATIENT)
Dept: SURGERY | Facility: CLINIC | Age: 68
End: 2023-03-27

## 2023-03-27 VITALS — TEMPERATURE: 97.5 F

## 2023-03-27 DIAGNOSIS — K43.9 VENTRAL HERNIA WITHOUT OBSTRUCTION OR GANGRENE: Primary | ICD-10-CM

## 2023-03-27 PROBLEM — Z48.89 AFTERCARE FOLLOWING SURGERY: Status: ACTIVE | Noted: 2023-03-27

## 2023-03-27 NOTE — LETTER
March 27, 2023     Alia Sinclair   [address] [place] [address]    Patient: Mitali Forrest   YOB: 1955   Date of Visit: 3/27/2023       Dear Dr Palacios Sep:    Thank you for referring Netta Thompson to me for evaluation  Below are my notes for this consultation  If you have questions, please do not hesitate to call me  I look forward to following your patient along with you  Sincerely,        Marisa Barr MD        CC: No Recipients  Marisa Barr MD  3/27/2023 10:31 AM  Incomplete  `Assessment/Plan:    Ventral hernia without obstruction or gangrene  The patient is a pleasant 79-year-old male returning status post laparoscopic ventral herniorrhaphy with mesh for routine scheduled follow-up  Patient denies all complaints referable to his abdomen  On physical examination he is well-appearing male in no acute distress  Pleasant competent and reliable as a historian  His abdomen is soft and benign without obvious signs of recurrent hernia formation  Patient has been instructed to resume all normal levels of activity as he feels fit  All questions answered to the satisfaction of the patient and for his mother who accompanies him in the exam room today  The patient's been encouraged to follow-up the practice at any point in the future with questions or concerns  Subjective:     Patient ID: Mitali Forrest is a 78 y o  male  Pt is coming in the office for s/p Lap Ventral hernia rpr 03/14/2023  He states that he did fall twice in three days after the surgery and had pain then but not today  No fever/chills COLEMAN Otero MA          Review of Systems   Constitutional: Negative for chills and fever  HENT: Negative for ear pain and sore throat  Eyes: Negative for pain and visual disturbance  Respiratory: Negative for cough and shortness of breath  Cardiovascular: Negative for chest pain and palpitations     Gastrointestinal: Negative for abdominal pain and vomiting  Genitourinary: Negative for dysuria and hematuria  Musculoskeletal: Positive for gait problem  Negative for arthralgias and back pain  Status post fall x2   Skin: Negative for color change and rash  Neurological: Negative for seizures and syncope  All other systems reviewed and are negative  Objective:      Temp 97 5 °F (36 4 °C) (Temporal)         Physical Exam  Vitals and nursing note reviewed  Constitutional:       Appearance: He is well-developed  HENT:      Head: Normocephalic and atraumatic  Eyes:      Conjunctiva/sclera: Conjunctivae normal       Pupils: Pupils are equal, round, and reactive to light  Cardiovascular:      Rate and Rhythm: Normal rate and regular rhythm  Pulmonary:      Effort: Pulmonary effort is normal       Breath sounds: Normal breath sounds  Abdominal:      General: Bowel sounds are normal       Palpations: Abdomen is soft  Comments: Benign abdominal exam   Surgical wounds clean dry intact no signs of early recurrent hernia  Musculoskeletal:         General: Normal range of motion  Cervical back: Normal range of motion and neck supple  Skin:     General: Skin is warm and dry  Neurological:      Mental Status: He is alert and oriented to person, place, and time  Psychiatric:         Behavior: Behavior normal          Thought Content:  Thought content normal          Judgment: Judgment normal

## 2023-03-27 NOTE — PROGRESS NOTES
`Assessment/Plan:    Ventral hernia without obstruction or gangrene  The patient is a pleasant 71-year-old male returning status post laparoscopic ventral herniorrhaphy with mesh for routine scheduled follow-up  Patient denies all complaints referable to his abdomen  On physical examination he is well-appearing male in no acute distress  Pleasant competent and reliable as a historian  His abdomen is soft and benign without obvious signs of recurrent hernia formation  Patient has been instructed to resume all normal levels of activity as he feels fit  All questions answered to the satisfaction of the patient and for his mother who accompanies him in the exam room today  The patient's been encouraged to follow-up the practice at any point in the future with questions or concerns  Aftercare following surgery  Patient is status post laparoscopic ventral herniorrhaphy with mesh           Subjective:      Patient ID: Jackosn Oliveros is a 78 y o  male  Pt is coming in the office for s/p Lap Ventral hernia rpr 03/14/2023  He states that he did fall twice in three days after the surgery and had pain then but not today  No fever/chills COLEMAN Otero MA          Review of Systems   Constitutional: Negative for chills and fever  HENT: Negative for ear pain and sore throat  Eyes: Negative for pain and visual disturbance  Respiratory: Negative for cough and shortness of breath  Cardiovascular: Negative for chest pain and palpitations  Gastrointestinal: Negative for abdominal pain and vomiting  Genitourinary: Negative for dysuria and hematuria  Musculoskeletal: Positive for gait problem  Negative for arthralgias and back pain  Status post fall x2   Skin: Negative for color change and rash  Neurological: Negative for seizures and syncope  All other systems reviewed and are negative          Objective:      Temp 97 5 °F (36 4 °C) (Temporal)          Physical Exam  Vitals and nursing note reviewed  Constitutional:       Appearance: He is well-developed  HENT:      Head: Normocephalic and atraumatic  Eyes:      Conjunctiva/sclera: Conjunctivae normal       Pupils: Pupils are equal, round, and reactive to light  Cardiovascular:      Rate and Rhythm: Normal rate and regular rhythm  Pulmonary:      Effort: Pulmonary effort is normal       Breath sounds: Normal breath sounds  Abdominal:      General: Bowel sounds are normal       Palpations: Abdomen is soft  Comments: Benign abdominal exam   Surgical wounds clean dry intact no signs of early recurrent hernia  Musculoskeletal:         General: Normal range of motion  Cervical back: Normal range of motion and neck supple  Skin:     General: Skin is warm and dry  Neurological:      Mental Status: He is alert and oriented to person, place, and time  Psychiatric:         Behavior: Behavior normal          Thought Content:  Thought content normal          Judgment: Judgment normal

## 2023-03-27 NOTE — ASSESSMENT & PLAN NOTE
The patient is a pleasant 59-year-old male returning status post laparoscopic ventral herniorrhaphy with mesh for routine scheduled follow-up  Patient denies all complaints referable to his abdomen  On physical examination he is well-appearing male in no acute distress  Pleasant competent and reliable as a historian  His abdomen is soft and benign without obvious signs of recurrent hernia formation  Patient has been instructed to resume all normal levels of activity as he feels fit  All questions answered to the satisfaction of the patient and for his mother who accompanies him in the exam room today  The patient's been encouraged to follow-up the practice at any point in the future with questions or concerns

## 2023-03-30 ENCOUNTER — HOSPITAL ENCOUNTER (OUTPATIENT)
Dept: MRI IMAGING | Facility: HOSPITAL | Age: 68
End: 2023-03-30
Attending: INTERNAL MEDICINE

## 2023-03-30 DIAGNOSIS — C71.9 GLIOBLASTOMA (HCC): ICD-10-CM

## 2023-03-30 RX ADMIN — GADOBUTROL 10 ML: 604.72 INJECTION INTRAVENOUS at 06:36

## 2023-04-06 ENCOUNTER — TELEPHONE (OUTPATIENT)
Dept: HEMATOLOGY ONCOLOGY | Facility: CLINIC | Age: 68
End: 2023-04-06

## 2023-04-06 NOTE — TELEPHONE ENCOUNTER
I called the Patient and spoke with his Mother informing her of labs needed for upcoming appointment

## 2023-04-07 ENCOUNTER — TELEPHONE (OUTPATIENT)
Dept: HEMATOLOGY ONCOLOGY | Facility: CLINIC | Age: 68
End: 2023-04-07

## 2023-04-07 ENCOUNTER — APPOINTMENT (OUTPATIENT)
Dept: LAB | Facility: HOSPITAL | Age: 68
End: 2023-04-07

## 2023-04-07 ENCOUNTER — OFFICE VISIT (OUTPATIENT)
Dept: HEMATOLOGY ONCOLOGY | Facility: CLINIC | Age: 68
End: 2023-04-07

## 2023-04-07 VITALS
HEART RATE: 68 BPM | WEIGHT: 217 LBS | OXYGEN SATURATION: 98 % | TEMPERATURE: 96.6 F | SYSTOLIC BLOOD PRESSURE: 132 MMHG | BODY MASS INDEX: 26.98 KG/M2 | HEIGHT: 75 IN | DIASTOLIC BLOOD PRESSURE: 84 MMHG

## 2023-04-07 DIAGNOSIS — C71.9 GLIOBLASTOMA (HCC): ICD-10-CM

## 2023-04-07 DIAGNOSIS — C71.9 GLIOBLASTOMA (HCC): Primary | ICD-10-CM

## 2023-04-07 LAB
ALBUMIN SERPL BCP-MCNC: 4.1 G/DL (ref 3.5–5)
ALP SERPL-CCNC: 45 U/L (ref 34–104)
ALT SERPL W P-5'-P-CCNC: 12 U/L (ref 7–52)
ANION GAP SERPL CALCULATED.3IONS-SCNC: 6 MMOL/L (ref 4–13)
AST SERPL W P-5'-P-CCNC: 16 U/L (ref 13–39)
BASOPHILS # BLD AUTO: 0.05 THOUSANDS/ÂΜL (ref 0–0.1)
BASOPHILS NFR BLD AUTO: 1 % (ref 0–1)
BILIRUB SERPL-MCNC: 0.52 MG/DL (ref 0.2–1)
BUN SERPL-MCNC: 12 MG/DL (ref 5–25)
CALCIUM SERPL-MCNC: 9.2 MG/DL (ref 8.4–10.2)
CHLORIDE SERPL-SCNC: 106 MMOL/L (ref 96–108)
CO2 SERPL-SCNC: 29 MMOL/L (ref 21–32)
CREAT SERPL-MCNC: 0.82 MG/DL (ref 0.6–1.3)
EOSINOPHIL # BLD AUTO: 0.3 THOUSAND/ÂΜL (ref 0–0.61)
EOSINOPHIL NFR BLD AUTO: 4 % (ref 0–6)
ERYTHROCYTE [DISTWIDTH] IN BLOOD BY AUTOMATED COUNT: 14.1 % (ref 11.6–15.1)
GFR SERPL CREATININE-BSD FRML MDRD: 92 ML/MIN/1.73SQ M
GLUCOSE P FAST SERPL-MCNC: 96 MG/DL (ref 65–99)
HCT VFR BLD AUTO: 48.8 % (ref 36.5–49.3)
HGB BLD-MCNC: 16 G/DL (ref 12–17)
IMM GRANULOCYTES # BLD AUTO: 0.02 THOUSAND/UL (ref 0–0.2)
IMM GRANULOCYTES NFR BLD AUTO: 0 % (ref 0–2)
LYMPHOCYTES # BLD AUTO: 2.26 THOUSANDS/ÂΜL (ref 0.6–4.47)
LYMPHOCYTES NFR BLD AUTO: 31 % (ref 14–44)
MCH RBC QN AUTO: 29.7 PG (ref 26.8–34.3)
MCHC RBC AUTO-ENTMCNC: 32.8 G/DL (ref 31.4–37.4)
MCV RBC AUTO: 91 FL (ref 82–98)
MONOCYTES # BLD AUTO: 0.65 THOUSAND/ÂΜL (ref 0.17–1.22)
MONOCYTES NFR BLD AUTO: 9 % (ref 4–12)
NEUTROPHILS # BLD AUTO: 4.07 THOUSANDS/ÂΜL (ref 1.85–7.62)
NEUTS SEG NFR BLD AUTO: 55 % (ref 43–75)
NRBC BLD AUTO-RTO: 0 /100 WBCS
PLATELET # BLD AUTO: 186 THOUSANDS/UL (ref 149–390)
PMV BLD AUTO: 10.2 FL (ref 8.9–12.7)
POTASSIUM SERPL-SCNC: 4 MMOL/L (ref 3.5–5.3)
PROT SERPL-MCNC: 6.2 G/DL (ref 6.4–8.4)
RBC # BLD AUTO: 5.39 MILLION/UL (ref 3.88–5.62)
SODIUM SERPL-SCNC: 141 MMOL/L (ref 135–147)
WBC # BLD AUTO: 7.35 THOUSAND/UL (ref 4.31–10.16)

## 2023-04-07 RX ORDER — DEXAMETHASONE 4 MG/1
TABLET ORAL
Qty: 42 TABLET | Refills: 0 | Status: SHIPPED | OUTPATIENT
Start: 2023-04-07

## 2023-04-07 NOTE — TELEPHONE ENCOUNTER
Spoke with patient's mother, 4/11 is okay to start, but patient has appointments following treatment days that are scheduled, can you please reschedule the other 2 to Thursdays? Thank you!

## 2023-04-07 NOTE — PROGRESS NOTES
[facility] [place]  [address] [facility] [address]  [phone]  [address] [place] [address] KRUNAL HACKETT[birth year], [phone]  04/07/23    Discussion:   In summary, this is a 73-year-old male with a history of GBM as outlined  He has been on Avastin for about 2 years with generally good disease control  Over the past month or so he has had increase in symptoms with increased fatigue, speech difficulties  He had had hernia repair on March 14  Avastin was held for a month prior to that time  His mother leaves symptoms may have arisen just prior to the surgery  MRI now shows progressive disease, BT RADS-4  Whether this is involving due to treatment suspension or evolving resistance is uncertain  The simplest maneuver would be to reinstitute therapy and observe for response  In the interim, we will institute Decadron 4 mg p o  3 times daily x1 week, twice daily x1 week, every morning x1 week  Hope for response in the next few days  Reimaging in 6 weeks  NexGen ration sequencing is requested  I discussed the above with the patient  The patient and his mother voiced understanding and agreement   ______________________________________________________________________    No chief complaint on file  HPI:  Oncology History Overview Note   Riddhi Helton is a 60year old male is status post biopsy of a left temporal mass with pathology consistent with GBM  He has a  past medical history of hypertension, obesity, hyperlipidemia, and sleep apnea  He presented to the ED on 1/1/21 with sudden onset of expressive aphasia  He completed a course of radiation therapy to the left temporal lobe of the brain concurrent with temodar on 4/6/21 5/7/21 MRI brain w wo contrast  IMPRESSION:  Left temporal lobe GBM continues to increase in size and is currently contiguous with the lateral margin of the compressed lateral ventricular atrium    Increasing subtle ependymal enhancement and vasogenic "edema  5/13/21 Dr Sydney Miller, neurology f/y  Continue current seizure medications unchanged  Return in 6 months    5/14/21 Dr Milo Preston f/u  Relatively stable clinically, feels his gait and speech are somewhat improved compared to time of diagnosis  \"Repeat MRI shows increase in size of left temporal lobe mass with increasing edema  We reviewed that it is possible that some of this represents post treatment change but certainly is concerning for progressive disease as well  Case to be reviewed at Neuro-Oncology Working group in a few days  We will call the patient with the results of that review and further recommendations  \"  Arrange for consolidative Temodar  Avastin could be added  6/11/21 Dr Milo Preston  11/1/21 Neurology f/u         Brain tumor [facility]) (Resolved)   1/19/2021 Biopsy    BIOPSY BRAIN IMAGE-GUIDED NEEDLE - LEFT TEMPORAL  Surgeon: Dr Dick Rodriguez    Temporal mass:  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)  Note    This case was seen in consultation with Dr Aurora Gonzales, an expert in Neuropathology from [facility]  Glioblastoma ([facility] [address] )   1/19/2021 Initial Diagnosis    Glioblastoma ([facility] [address] )     1/19/2021 Biopsy    BIOPSY BRAIN IMAGE-GUIDED NEEDLE - LEFT TEMPORAL  Surgeon: Dr Dick Rodriguez    Temporal mass:  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)  Note    This case was seen in consultation with Dr Aurora Gonzales, an expert in Neuropathology from [facility]            2/24/2021 - 4/6/2021 Radiation    Plan ID Energy Fractions Dose per Fraction (cGy) Dose Correction (cGy) Total Dose Delivered (cGy) Elapsed Days   L Temp CD 6X 7 / 7 200 0 1,400 8   L Temp Lobe 6X 23 / 23 200 0 4,600 30   Treatment dates:  C1: 2/24/2021 - 4/6/2021 2/24/2021 - 4/6/2021 Chemotherapy    Temodar 185 mg daily w/radiation therapy     5/27/2021 -  Chemotherapy    bevacizumab (AVASTIN) IVPB, 1,090 mg, Intravenous, Once, 36 of 36 cycles  Dose modification: 15 mg/kg (original dose " 10 mg/kg, Cycle 17, Reason: Dose modified as per discussion with consulting physician), 10 mg/kg (original dose 10 mg/kg, Cycle 29, Reason: Anticipated Tolerance), 10 mg/kg (original dose 10 mg/kg, Cycle 30, Reason: Anticipated Tolerance), 15 mg/kg (original dose 10 mg/kg, Cycle 30, Reason: Anticipated Tolerance)  Administration: 1,100 mg (5/27/2021), 1,100 mg (6/10/2021), 1,100 mg (6/24/2021), 1,100 mg (7/8/2021), 1,100 mg (7/22/2021), 1,100 mg (8/5/2021), 1,100 mg (9/30/2021), 1,100 mg (9/16/2021), 1,100 mg (9/2/2021), 1,100 mg (8/19/2021), 1,100 mg (10/14/2021), 1,100 mg (10/28/2021), 1,100 mg (11/11/2021), 1,100 mg (11/26/2021), 1,600 mg (1/6/2022), 1,100 mg (12/23/2021), 1,100 mg (12/9/2021), 1,600 mg (1/27/2022), 1,600 mg (2/17/2022), 1,600 mg (4/21/2022), 1,600 mg (3/10/2022), 1,600 mg (3/31/2022), 1,600 mg (5/12/2022), 1,600 mg (6/2/2022), 1,600 mg (6/23/2022), 1,600 mg (7/14/2022), 1,600 mg (8/4/2022), 1,600 mg (8/25/2022), 1,000 mg (9/15/2022), 1,600 mg (10/6/2022), 1,600 mg (10/27/2022), 1,600 mg (11/17/2022), 1,600 mg (12/8/2022), 1,600 mg (12/29/2022), 1,600 mg (1/19/2023), 1,600 mg (2/9/2023)         Interval History: Clinically stable  ECOG-  3-symptomatic, greater than 50% sedentary    Review of Systems   Constitutional: Positive for fatigue  Negative for chills and fever  HENT: Negative for nosebleeds  Eyes: Negative for discharge  Respiratory: Negative for cough and shortness of breath  Cardiovascular: Negative for chest pain  Gastrointestinal: Negative for abdominal pain, constipation and diarrhea  Endocrine: Negative for polydipsia  Genitourinary: Negative for hematuria  Musculoskeletal: Negative for arthralgias  Skin: Negative for color change  Allergic/Immunologic: Negative for immunocompromised state  Neurological: Positive for speech difficulty and weakness  Negative for dizziness and headaches  Hematological: Negative for adenopathy     Psychiatric/Behavioral: "Negative for agitation         Past Medical History:   Diagnosis Date   • Brain tumor ([address] ) 01/05/2021   • Bruxism (teeth grinding)    • Colon polyp    • GBM (glioblastoma multiforme) (HCC)    • GERD (gastroesophageal reflux disease)    • History of cancer chemotherapy     [facility] Dr Queen Apo   • History of motion sickness    • History of radiation therapy    • History of seizures     per pt and Mom \"first seizure 1/1/2021 and thats when they discovered brain tumor--and last seizure middle Jan 2021\"   • Hypercholesterolemia    • Hypertension    • Obesity    • Risk for falls    • Sleep apnea     CPAP nightly   • Tinnitus    • Tiredness     per pt \"feels from chemotherapy\"   • Umbilical hernia    • Wears glasses      Patient Active Problem List   Diagnosis   • Class 1 obesity due to excess calories with serious comorbidity and body mass index (BMI) of 33 0 to 33 9 in adult   • Negative depression screening   • Essential hypertension   • Obstructive sleep apnea syndrome   • Sleep disturbance   • Daytime sleepiness   • Glioblastoma (HCC)   • 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 (HCC)   • Annual physical exam   • Muscle cramps   • 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   • Mixed hyperlipidemia   • Subclinical hypothyroidism   • Epidermoid cyst of skin   • GERD (gastroesophageal reflux disease)   • Elevated serum creatinine   • Aftercare following surgery       Current Outpatient Medications:   •  amLODIPine (NORVASC) 10 mg tablet, Take 1 tablet by mouth once daily, Disp: 90 tablet, Rfl: 6  •  aspirin (ECOTRIN LOW STRENGTH) 81 mg EC tablet, Take 81 mg by mouth daily, Disp: , Rfl:   •  atorvastatin (LIPITOR) 10 mg tablet, Take 1 tablet (10 mg total) by mouth daily (Patient taking differently: Take 10 mg by mouth every evening), Disp: 90 tablet, Rfl: 3  •  Bevacizumab (AVASTIN IV), Inject into a catheter in a vein Get infusion every three weeks ( for Glioblastoma cancer), Disp: , " "Rfl:   •  famotidine (PEPCID) 20 mg tablet, Take 1 tablet (20 mg total) by mouth 2 (two) times a day, Disp: 180 tablet, Rfl: 3  •  fenofibrate (TRICOR) 54 MG tablet, Take 1 tablet (54 mg total) by mouth daily (Patient taking differently: Take 54 mg by mouth every evening), Disp: 90 tablet, Rfl: 1  •  hydrochlorothiazide (MICROZIDE) 12 5 mg capsule, Take 1 capsule by mouth once daily, Disp: 90 capsule, Rfl: 6  •  levETIRAcetam (KEPPRA) 1000 MG tablet, TAKE 1 TABLET BY MOUTH EVERY 12 HOURS, Disp: 180 tablet, Rfl: 3  •  levETIRAcetam (KEPPRA) 250 mg tablet, Take 1 tablet by mouth twice daily, Disp: 180 tablet, Rfl: 3  •  levothyroxine 25 mcg tablet, Take 1 tablet (25 mcg total) by mouth daily, Disp: 90 tablet, Rfl: 0  •  valsartan (DIOVAN) 80 mg tablet, Take 1 tablet by mouth once daily, Disp: 90 tablet, Rfl: 0  •  mometasone (ELOCON) 0 1 % lotion, Apply 1-2 drops to ears qhs as needed  (Patient not taking: Reported on 1/6/2023), Disp: 60 mL, Rfl: 3  No Known Allergies  Past Surgical History:   Procedure Laterality Date   • COLONOSCOPY     • FL LUMBAR PUNCTURE DIAGNOSTIC  01/04/2021   • HAND FRACTURE REPAIR      left thumb   • MI RPR AA HERNIA 1ST < 3 CM REDUCIBLE N/A 3/14/2023    Procedure: REPAIR HERNIA VENTRAL LAPAROSCOPIC WITH MESH;  Surgeon: Krystal Escobedo MD;  Location: [facility];  Service: General   • MI STRTCTC BX ASPIR/EXC PATITO ICRA LESION W/CT&I/MR Left 01/19/2021    Procedure: BIOPSY BRAIN IMAGE-GUIDED NEEDLE - LEFT TEMPORAL;  Surgeon: Janessa Ambriz MD;  Location: [facility];  Service: Neurosurgery   • TONSILLECTOMY       Social History     Objective:  Vitals:    04/07/23 0819   BP: 132/84   BP Location: Left arm   Patient Position: Sitting   Cuff Size: Standard   Pulse: 68   Temp: (!) 96 6 °F (35 9 °C)   TempSrc: Tympanic   SpO2: 98%   Weight: 98 4 kg (217 lb)   Height: 6' 3\" (1 905 m)     Physical Exam  Constitutional:       Appearance: He is well-developed     HENT:      Head: Normocephalic and " atraumatic  Eyes:      Pupils: Pupils are equal, round, and reactive to light  Cardiovascular:      Rate and Rhythm: Normal rate and regular rhythm  Heart sounds: No murmur heard  Pulmonary:      Breath sounds: Normal breath sounds  No wheezing or rales  Abdominal:      Palpations: Abdomen is soft  Tenderness: There is no abdominal tenderness  Musculoskeletal:         General: No tenderness  Normal range of motion  Cervical back: Neck supple  Lymphadenopathy:      Cervical: No cervical adenopathy  Skin:     Findings: No erythema or rash  Neurological:      Mental Status: He is alert and oriented to person, place, and time  Cranial Nerves: No cranial nerve deficit  Deep Tendon Reflexes: Reflexes are normal and symmetric  Psychiatric:         Behavior: Behavior normal            Labs: I personally reviewed the labs and imaging pertinent to this patient care

## 2023-04-07 NOTE — TELEPHONE ENCOUNTER
While we try to accommodate patient requests, our priority is to schedule treatment according to Doctor's orders and site availability  1  Does the Provider use the intake sheet or checkout note? No  2  What would be a preferred day of the week that would work best for your infusion appointment? Any   3  Do you prefer mornings or afternoons for your appointments? Any   4  Are there any days or dates that do not work for your schedule, including any upcoming vacations? No   5  We are going to try our best to schedule you at the infusion center closest to your home  In the event that we are unable to what would be your next preferred infusion site or sites? [facility] ([address] [place] [address] infusion)    6  Do you have transportation to take you to all of your appointments? yes  7  Would you like the infusion center to draw labs from your port? (disregard if patient doesn't have a port or need labs for infusion appointment) No     Dr Shad Salas requsting start infusion early next

## 2023-04-07 NOTE — TELEPHONE ENCOUNTER
Can you please order gabriele Baker for this pt  His specimen is from 2 years ago from his brain    TY

## 2023-04-07 NOTE — TELEPHONE ENCOUNTER
Please schedule treatment for any day any time  Provider would prefer to start him ASAP next week if possible  Thank you!

## 2023-04-19 ENCOUNTER — LAB REQUISITION (OUTPATIENT)
Dept: LAB | Facility: HOSPITAL | Age: 68
End: 2023-04-19

## 2023-04-19 DIAGNOSIS — D49.6 NEOPLASM OF UNSPECIFIED BEHAVIOR OF BRAIN (HCC): ICD-10-CM

## 2023-04-19 DIAGNOSIS — C71.9 MALIGNANT NEOPLASM OF BRAIN, UNSPECIFIED (HCC): ICD-10-CM

## 2023-04-27 RX ORDER — SODIUM CHLORIDE 9 MG/ML
20 INJECTION, SOLUTION INTRAVENOUS ONCE
OUTPATIENT
Start: 2023-05-04

## 2023-05-01 DIAGNOSIS — C71.9 GLIOBLASTOMA (HCC): Primary | ICD-10-CM

## 2023-05-04 ENCOUNTER — HOSPITAL ENCOUNTER (INPATIENT)
Facility: HOSPITAL | Age: 68
LOS: 4 days | Discharge: NURSING, REHAB OR LONG-TERM CARE | End: 2023-05-09
Attending: EMERGENCY MEDICINE | Admitting: HOSPITALIST

## 2023-05-04 ENCOUNTER — APPOINTMENT (EMERGENCY)
Dept: RADIOLOGY | Facility: HOSPITAL | Age: 68
End: 2023-05-04

## 2023-05-04 ENCOUNTER — APPOINTMENT (OUTPATIENT)
Dept: MRI IMAGING | Facility: HOSPITAL | Age: 68
End: 2023-05-04

## 2023-05-04 ENCOUNTER — APPOINTMENT (EMERGENCY)
Dept: CT IMAGING | Facility: HOSPITAL | Age: 68
End: 2023-05-04

## 2023-05-04 ENCOUNTER — HOSPITAL ENCOUNTER (OUTPATIENT)
Dept: INFUSION CENTER | Facility: HOSPITAL | Age: 68
Discharge: HOME | End: 2023-05-04
Attending: INTERNAL MEDICINE

## 2023-05-04 ENCOUNTER — APPOINTMENT (OUTPATIENT)
Dept: NON INVASIVE DIAGNOSTICS | Facility: HOSPITAL | Age: 68
End: 2023-05-04

## 2023-05-04 VITALS
SYSTOLIC BLOOD PRESSURE: 137 MMHG | HEART RATE: 86 BPM | RESPIRATION RATE: 16 BRPM | TEMPERATURE: 96.9 F | DIASTOLIC BLOOD PRESSURE: 100 MMHG

## 2023-05-04 DIAGNOSIS — E78.6 ABNORMALLY LOW HIGH DENSITY LIPOPROTEIN (HDL) CHOLESTEROL WITH HYPERTRIGLYCERIDEMIA: ICD-10-CM

## 2023-05-04 DIAGNOSIS — E78.1 ABNORMALLY LOW HIGH DENSITY LIPOPROTEIN (HDL) CHOLESTEROL WITH HYPERTRIGLYCERIDEMIA: ICD-10-CM

## 2023-05-04 DIAGNOSIS — C71.9 GLIOBLASTOMA (HCC): ICD-10-CM

## 2023-05-04 DIAGNOSIS — E78.00 HYPERCHOLESTEROLEMIA: ICD-10-CM

## 2023-05-04 DIAGNOSIS — I77.71 CAROTID ARTERY DISSECTION (HCC): ICD-10-CM

## 2023-05-04 DIAGNOSIS — R29.810 FACIAL DROOP: Primary | ICD-10-CM

## 2023-05-04 PROBLEM — R29.90 STROKE-LIKE SYMPTOM: Status: ACTIVE | Noted: 2023-05-04

## 2023-05-04 LAB
2HR DELTA HS TROPONIN: 0 NG/L
ANION GAP SERPL CALCULATED.3IONS-SCNC: 6 MMOL/L (ref 4–13)
AORTIC ROOT: 4.1 CM
AORTIC VALVE MEAN VELOCITY: 8 M/S
APICAL FOUR CHAMBER EJECTION FRACTION: 54 %
APTT PPP: 27 SECONDS (ref 23–37)
ASCENDING AORTA: 4 CM
AV LVOT MEAN GRADIENT: 2 MMHG
AV LVOT PEAK GRADIENT: 4 MMHG
AV MEAN GRADIENT: 3 MMHG
AV PEAK GRADIENT: 5 MMHG
AV VELOCITY RATIO: 0.88
BUN SERPL-MCNC: 17 MG/DL (ref 5–25)
CALCIUM SERPL-MCNC: 9.3 MG/DL (ref 8.4–10.2)
CARDIAC TROPONIN I PNL SERPL HS: 5 NG/L
CARDIAC TROPONIN I PNL SERPL HS: 5 NG/L
CHLORIDE SERPL-SCNC: 97 MMOL/L (ref 96–108)
CO2 SERPL-SCNC: 31 MMOL/L (ref 21–32)
CREAT SERPL-MCNC: 0.79 MG/DL (ref 0.6–1.3)
DOP CALC AO PEAK VEL: 1.16 M/S
DOP CALC AO VTI: 21.9 CM
DOP CALC LVOT PEAK VEL VTI: 18.62 CM
DOP CALC LVOT PEAK VEL: 1.02 M/S
E WAVE DECELERATION TIME: 195 MS
ERYTHROCYTE [DISTWIDTH] IN BLOOD BY AUTOMATED COUNT: 15.9 % (ref 11.6–15.1)
FLUAV RNA RESP QL NAA+PROBE: NEGATIVE
FLUBV RNA RESP QL NAA+PROBE: NEGATIVE
FRACTIONAL SHORTENING: 31 (ref 28–44)
GFR SERPL CREATININE-BSD FRML MDRD: 93 ML/MIN/1.73SQ M
GLUCOSE SERPL-MCNC: 91 MG/DL (ref 65–140)
GLUCOSE SERPL-MCNC: 96 MG/DL (ref 65–140)
HCT VFR BLD AUTO: 55.8 % (ref 36.5–49.3)
HGB BLD-MCNC: 18.8 G/DL (ref 12–17)
INR PPP: 0.86 (ref 0.84–1.19)
INTERVENTRICULAR SEPTUM IN DIASTOLE (PARASTERNAL SHORT AXIS VIEW): 1.6 CM
INTERVENTRICULAR SEPTUM: 1.6 CM (ref 0.6–1.1)
LAAS-AP2: 12.9 CM2
LAAS-AP4: 14.7 CM2
LEFT ATRIUM SIZE: 4.3 CM
LEFT INTERNAL DIMENSION IN SYSTOLE: 3.1 CM (ref 2.1–4)
LEFT VENTRICULAR INTERNAL DIMENSION IN DIASTOLE: 4.5 CM (ref 3.5–6)
LEFT VENTRICULAR POSTERIOR WALL IN END DIASTOLE: 1 CM
LEFT VENTRICULAR STROKE VOLUME: 54 ML
LVSV (TEICH): 54 ML
MCH RBC QN AUTO: 30.5 PG (ref 26.8–34.3)
MCHC RBC AUTO-ENTMCNC: 33.7 G/DL (ref 31.4–37.4)
MCV RBC AUTO: 91 FL (ref 82–98)
MV E'TISSUE VEL-SEP: 6 CM/S
MV PEAK A VEL: 0.5 M/S
MV PEAK E VEL: 31 CM/S
MV STENOSIS PRESSURE HALF TIME: 56 MS
MV VALVE AREA P 1/2 METHOD: 3.93
PLATELET # BLD AUTO: 143 THOUSANDS/UL (ref 149–390)
PMV BLD AUTO: 9.4 FL (ref 8.9–12.7)
POTASSIUM SERPL-SCNC: 3.9 MMOL/L (ref 3.5–5.3)
PROTHROMBIN TIME: 11.7 SECONDS (ref 11.6–14.5)
RBC # BLD AUTO: 6.16 MILLION/UL (ref 3.88–5.62)
RIGHT VENTRICLE ID DIMENSION: 4.1 CM
RSV RNA RESP QL NAA+PROBE: NEGATIVE
SARS-COV-2 RNA RESP QL NAA+PROBE: NEGATIVE
SINOTUBULAR JUNCTION: 3.5 CM
SL CV LEFT ATRIUM LENGTH A2C: 4.2 CM
SL CV LV EF: 60
SL CV PED ECHO LEFT VENTRICLE DIASTOLIC VOLUME (MOD BIPLANE) 2D: 91 ML
SL CV PED ECHO LEFT VENTRICLE SYSTOLIC VOLUME (MOD BIPLANE) 2D: 37 ML
SL CV SINUS OF VALSALVA 2D: 4.2 CM
SODIUM SERPL-SCNC: 134 MMOL/L (ref 135–147)
STJ: 3.5 CM
TRICUSPID ANNULAR PLANE SYSTOLIC EXCURSION: 2.1 CM
WBC # BLD AUTO: 7.66 THOUSAND/UL (ref 4.31–10.16)

## 2023-05-04 RX ORDER — ATORVASTATIN CALCIUM 10 MG/1
10 TABLET, FILM COATED ORAL EVERY EVENING
Status: DISCONTINUED | OUTPATIENT
Start: 2023-05-04 | End: 2023-05-09 | Stop reason: HOSPADM

## 2023-05-04 RX ORDER — LEVETIRACETAM 250 MG/1
250 TABLET ORAL 2 TIMES DAILY
Status: DISCONTINUED | OUTPATIENT
Start: 2023-05-04 | End: 2023-05-09 | Stop reason: HOSPADM

## 2023-05-04 RX ORDER — LEVETIRACETAM 500 MG/1
1500 TABLET ORAL ONCE
Status: COMPLETED | OUTPATIENT
Start: 2023-05-04 | End: 2023-05-04

## 2023-05-04 RX ORDER — FAMOTIDINE 20 MG/1
20 TABLET, FILM COATED ORAL 2 TIMES DAILY
Status: DISCONTINUED | OUTPATIENT
Start: 2023-05-04 | End: 2023-05-09 | Stop reason: HOSPADM

## 2023-05-04 RX ORDER — MINERAL OIL AND PETROLATUM 150; 830 MG/G; MG/G
OINTMENT OPHTHALMIC 4 TIMES DAILY
Status: DISCONTINUED | OUTPATIENT
Start: 2023-05-04 | End: 2023-05-09 | Stop reason: HOSPADM

## 2023-05-04 RX ORDER — LEVOTHYROXINE SODIUM 0.03 MG/1
25 TABLET ORAL DAILY
Status: DISCONTINUED | OUTPATIENT
Start: 2023-05-04 | End: 2023-05-09 | Stop reason: HOSPADM

## 2023-05-04 RX ORDER — CLOPIDOGREL BISULFATE 75 MG/1
300 TABLET ORAL ONCE
Status: COMPLETED | OUTPATIENT
Start: 2023-05-04 | End: 2023-05-04

## 2023-05-04 RX ORDER — FENOFIBRATE 48 MG/1
48 TABLET, COATED ORAL EVERY EVENING
Status: DISCONTINUED | OUTPATIENT
Start: 2023-05-04 | End: 2023-05-09 | Stop reason: HOSPADM

## 2023-05-04 RX ORDER — FENOFIBRATE 54 MG/1
54 TABLET ORAL EVERY EVENING
Status: DISCONTINUED | OUTPATIENT
Start: 2023-05-04 | End: 2023-05-04

## 2023-05-04 RX ORDER — ASPIRIN 325 MG
325 TABLET ORAL ONCE
Status: COMPLETED | OUTPATIENT
Start: 2023-05-04 | End: 2023-05-04

## 2023-05-04 RX ORDER — LEVETIRACETAM 500 MG/1
1000 TABLET ORAL EVERY 12 HOURS
Status: DISCONTINUED | OUTPATIENT
Start: 2023-05-04 | End: 2023-05-09 | Stop reason: HOSPADM

## 2023-05-04 RX ADMIN — ATORVASTATIN CALCIUM 10 MG: 10 TABLET, FILM COATED ORAL at 17:16

## 2023-05-04 RX ADMIN — WHITE PETROLATUM 57.7 %-MINERAL OIL 31.9 % EYE OINTMENT: at 23:00

## 2023-05-04 RX ADMIN — IOHEXOL 85 ML: 350 INJECTION, SOLUTION INTRAVENOUS at 09:25

## 2023-05-04 RX ADMIN — ASPIRIN 325 MG: 325 TABLET ORAL at 10:16

## 2023-05-04 RX ADMIN — LEVETIRACETAM 250 MG: 250 TABLET, FILM COATED ORAL at 22:40

## 2023-05-04 RX ADMIN — LEVETIRACETAM 1000 MG: 500 TABLET, FILM COATED ORAL at 22:40

## 2023-05-04 RX ADMIN — GADOBUTROL 10 ML: 604.72 INJECTION INTRAVENOUS at 21:17

## 2023-05-04 RX ADMIN — CLOPIDOGREL BISULFATE 300 MG: 75 TABLET ORAL at 10:16

## 2023-05-04 RX ADMIN — FENOFIBRATE 48 MG: 48 TABLET, FILM COATED ORAL at 17:16

## 2023-05-04 RX ADMIN — LEVOTHYROXINE SODIUM 25 MCG: 25 TABLET ORAL at 14:06

## 2023-05-04 RX ADMIN — FAMOTIDINE 20 MG: 20 TABLET, FILM COATED ORAL at 14:06

## 2023-05-04 RX ADMIN — LEVETIRACETAM 1500 MG: 500 TABLET, FILM COATED ORAL at 11:22

## 2023-05-04 NOTE — ASSESSMENT & PLAN NOTE
• Patient with speech issues and left-sided facial droop admitted for stroke pathway, further complicating presentation is finding of possible dissection of right ICA as well as outpatient MRI demonstrating progressive/worsening GBM  • CT scan    1  Patent major vasculature of the [place] without high-grade stenosis  2   A thin elongated intraluminal filling defect arising from the posterolateral wall of right ICA origin  Finding is new and may indicate elevated intima/dissection flap  No hemodynamic significant stenosis  3   Stable 5 mm aneurysmal enlargement at the left MCA M2 bifurcation  Recommend follow-up with neurovascular service    • MRI ordered   • ECHO  • Admission NIHSS  • Stroke pathway  • Permissive hypertension -for now unless otherwise instructed by neurology  • DVT prophylaxis -mechanical unless otherwise instructed by neurology  • Antiplatelet therapy per neurology, was loaded with aspirin and Plavix in ER  • Statin continue home dose unless otherwise instructed by neurology  • Lipid panel/TSH/Hgb A1C ordered   • Neurology consult; recommendations appreciated

## 2023-05-04 NOTE — ED PROVIDER NOTES
History  Chief Complaint   Patient presents with   • Slurred Speech     Patient reports waking up this morning 0700 with slurred speech and facial droop  Patient reports ambulatory dysfunction for 2 days  Hx brain tumor     29-year-old male with history of glioblastoma multiform a for which he is on Avastin treatment for coming over from infusion center after noted to have left-sided facial droop and word finding difficulty along with slurred speech this morning  Patient states he noticed this upon waking around 7 AM this morning  States that he went to bed around 8 PM last night and did not have any symptoms however he has been having difficulty with ambulation for the last 2 to 3 days  Denies recent illness, fevers, night sweats, chills, sore throat, cough, headache, chest pain, shortness of breath, nausea or vomiting, diarrhea constipation, dysuria, hematuria  Denies any falls or trauma  Prior to Admission Medications   Prescriptions Last Dose Informant Patient Reported? Taking? Bevacizumab (AVASTIN IV)   Yes No   Sig: Inject into a catheter in a vein Get infusion every three weeks ( for Glioblastoma cancer)   amLODIPine (NORVASC) 10 mg tablet   No No   Sig: Take 1 tablet by mouth once daily   aspirin (ECOTRIN LOW STRENGTH) 81 mg EC tablet   Yes No   Sig: Take 81 mg by mouth daily   atorvastatin (LIPITOR) 10 mg tablet   No No   Sig: Take 1 tablet (10 mg total) by mouth daily   Patient taking differently: Take 10 mg by mouth every evening   dexamethasone (DECADRON) 4 mg tablet   No No   Sig: Take 1 tablet by mouth 3 times per day with meals  After 1 week decrease to breakfast and dinner, the following week decreased to breakfast only     famotidine (PEPCID) 20 mg tablet   No No   Sig: Take 1 tablet (20 mg total) by mouth 2 (two) times a day   fenofibrate (TRICOR) 54 MG tablet   No No   Sig: Take 1 tablet (54 mg total) by mouth daily   Patient taking differently: Take 54 mg by mouth every evening "  hydrochlorothiazide (MICROZIDE) 12 5 mg capsule   No No   Sig: Take 1 capsule by mouth once daily   levETIRAcetam (KEPPRA) 1000 MG tablet   No No   Sig: TAKE 1 TABLET BY MOUTH EVERY 12 HOURS   levETIRAcetam (KEPPRA) 250 mg tablet   No No   Sig: Take 1 tablet by mouth twice daily   levothyroxine 25 mcg tablet   No No   Sig: Take 1 tablet (25 mcg total) by mouth daily   mometasone (ELOCON) 0 1 % lotion   No No   Sig: Apply 1-2 drops to ears qhs as needed     Patient not taking: Reported on 1/6/2023   valsartan (DIOVAN) 80 mg tablet   No No   Sig: Take 1 tablet (80 mg total) by mouth daily      Facility-Administered Medications: None       Past Medical History:   Diagnosis Date   • Brain tumor ([facility] [address] ) 01/05/2021   • Bruxism (teeth grinding)    • Colon polyp    • GBM (glioblastoma multiforme) (HCC)    • GERD (gastroesophageal reflux disease)    • History of cancer chemotherapy     [facility] Dr Dayana Ramírze   • History of motion sickness    • History of radiation therapy    • History of seizures     per pt and Mom \"first seizure 1/1/2021 and thats when they discovered brain tumor--and last seizure middle Jan 2021\"   • Hypercholesterolemia    • Hypertension    • Obesity    • Risk for falls    • Sleep apnea     CPAP nightly   • Tinnitus    • Tiredness     per pt \"feels from chemotherapy\"   • Umbilical hernia    • Wears glasses        Past Surgical History:   Procedure Laterality Date   • COLONOSCOPY     • FL LUMBAR PUNCTURE DIAGNOSTIC  01/04/2021   • HAND FRACTURE REPAIR      left thumb   • MO RPR AA HERNIA 1ST < 3 CM REDUCIBLE N/A 3/14/2023    Procedure: REPAIR HERNIA VENTRAL LAPAROSCOPIC WITH MESH;  Surgeon: Doreen Ramirez MD;  Location: [facility];  Service: General   • MO STRTCTC BX ASPIR/EXC PATITO ICRA LESION W/CT&I/MR Left 01/19/2021    Procedure: BIOPSY BRAIN IMAGE-GUIDED NEEDLE - LEFT TEMPORAL;  Surgeon: Madonna Iverson MD;  Location: [facility];  Service: Neurosurgery   • TONSILLECTOMY         Family History   Problem " Relation Age of Onset   • Heart disease Mother    • Heart attack Father    • Leukemia Brother      I have reviewed and agree with the history as documented  E-Cigarette/Vaping   • E-Cigarette Use Never User      E-Cigarette/Vaping Substances   • Nicotine No    • THC No    • CBD No    • Flavoring No    • Other No    • Unknown No      Social History     Tobacco Use   • Smoking status: Former     Types: Cigarettes     Quit date:      Years since quittin 3   • Smokeless tobacco: Never   Vaping Use   • Vaping Use: Never used   Substance Use Topics   • Alcohol use: Not Currently   • Drug use: Not Currently       Review of Systems   Neurological: Positive for facial asymmetry and speech difficulty  All other systems reviewed and are negative  Physical Exam  Physical Exam  Vitals and nursing note reviewed  Constitutional:       General: He is not in acute distress  Appearance: He is well-developed  He is not diaphoretic  HENT:      Head: Normocephalic and atraumatic  Right Ear: External ear normal       Left Ear: External ear normal       Nose: Nose normal    Eyes:      General: No scleral icterus  Right eye: No discharge  Left eye: No discharge  Conjunctiva/sclera: Conjunctivae normal    Cardiovascular:      Rate and Rhythm: Normal rate and regular rhythm  Heart sounds: Normal heart sounds  No murmur heard  No friction rub  No gallop  Pulmonary:      Effort: Pulmonary effort is normal  No respiratory distress  Breath sounds: Normal breath sounds  No wheezing or rales  Abdominal:      General: Bowel sounds are normal  There is no distension  Palpations: Abdomen is soft  There is no mass  Tenderness: There is no abdominal tenderness  There is no guarding  Musculoskeletal:         General: No tenderness or deformity  Normal range of motion  Cervical back: Normal range of motion and neck supple  Skin:     General: Skin is warm and dry  Coloration: Skin is not pale  Findings: No erythema or rash  Neurological:      Mental Status: He is alert and oriented to person, place, and time  Comments: L sided facial droop with mild dysarthria  No noted aphasia on exam  Strength  B/L UEs: 5/5  B/L LEs 4/5  Gross sensation intact  L sided dysmetria  L eye unable to cross midline  No reports of visual field cuts on examination   Psychiatric:         Behavior: Behavior normal          Thought Content:  Thought content normal          Judgment: Judgment normal          Vital Signs  ED Triage Vitals   Temperature Pulse Respirations Blood Pressure SpO2   05/04/23 0946 05/04/23 0910 05/04/23 0910 05/04/23 0910 05/04/23 0910   (!) 97 1 °F (36 2 °C) 75 16 155/98 93 %      Temp Source Heart Rate Source Patient Position - Orthostatic VS BP Location FiO2 (%)   05/04/23 0946 05/04/23 0910 05/04/23 0910 -- --   Tympanic Monitor Lying        Pain Score       --                  Vitals:    05/04/23 1130 05/04/23 1230 05/04/23 1300 05/04/23 1330   BP: 139/98 (!) 136/102 140/77 140/77   Pulse: 74 73 69 69   Patient Position - Orthostatic VS: Lying Lying  Lying         Visual Acuity  Visual Acuity    Flowsheet Row Most Recent Value   L Pupil Size (mm) 4   R Pupil Size (mm) 4          ED Medications  Medications   atorvastatin (LIPITOR) tablet 10 mg (has no administration in time range)   famotidine (PEPCID) tablet 20 mg (20 mg Oral Given 5/4/23 1406)   levETIRAcetam (KEPPRA) tablet 1,000 mg (has no administration in time range)   levETIRAcetam (KEPPRA) tablet 250 mg (has no administration in time range)   levothyroxine tablet 25 mcg (25 mcg Oral Given 5/4/23 1406)   fenofibrate (TRICOR) tablet 48 mg (has no administration in time range)   iohexol (OMNIPAQUE) 350 MG/ML injection (SINGLE-DOSE) 85 mL (85 mL Intravenous Given 5/4/23 0925)   aspirin tablet 325 mg (325 mg Oral Given 5/4/23 1016)   clopidogrel (PLAVIX) tablet 300 mg (300 mg Oral Given 5/4/23 1016) levETIRAcetam (KEPPRA) tablet 1,500 mg (1,500 mg Oral Given 5/4/23 1122)       Diagnostic Studies  Results Reviewed     Procedure Component Value Units Date/Time    HS Troponin I 2hr [[id number]]  (Normal) Collected: 05/04/23 1126    Lab Status: Final result Specimen: Blood from Arm, Left Updated: 05/04/23 1214     hs TnI 2hr 5 ng/L      Delta 2hr hsTnI 0 ng/L     FLU/RSV/COVID - if FLU/RSV clinically relevant [[id number]]  (Normal) Collected: 05/04/23 0945    Lab Status: Final result Specimen: Nares from Nose Updated: 05/04/23 1025     SARS-CoV-2 Negative     INFLUENZA A PCR Negative     INFLUENZA B PCR Negative     RSV PCR Negative    Narrative:      FOR PEDIATRIC PATIENTS - copy/paste COVID Guidelines URL to browser: [email or web address]    SARS-CoV-2 assay is a Nucleic Acid Amplification assay intended for the  qualitative detection of nucleic acid from SARS-CoV-2 in nasopharyngeal  swabs  Results are for the presumptive identification of SARS-CoV-2 RNA  Positive results are indicative of infection with SARS-CoV-2, the virus  causing COVID-19, but do not rule out bacterial infection or co-infection  with other viruses  Laboratories within the [place] and its  territories are required to report all positive results to the appropriate  public health authorities  Negative results do not preclude SARS-CoV-2  infection and should not be used as the sole basis for treatment or other  patient management decisions  Negative results must be combined with  clinical observations, patient history, and epidemiological information  This test has not been FDA cleared or approved  This test has been authorized by FDA under an Emergency Use Authorization  (EUA)   This test is only authorized for the duration of time the  declaration that circumstances exist justifying the authorization of the  emergency use of an in vitro diagnostic tests for detection of SARS-CoV-2  virus and/or diagnosis of COVID-19 infection under section 564(b)(1) of  the Act, 21 U  S C  566GHP-8(G)(4), unless the authorization is terminated  or revoked sooner  The test has been validated but independent review by FDA  and CLIA is pending  Test performed using [employer] Gene[employer]pert: This RT-PCR assay targets N2,  a region unique to SARS-CoV-2  A conserved region in the E-gene was chosen  for pan-Sarbecovirus detection which includes SARS-CoV-2  According to CMS-2020-01-R, this platform meets the definition of high-throughput technology  Levetiracetam level [[id number]] Collected: 05/04/23 0957    Lab Status:  In process Specimen: Blood from Arm, Left Updated: 05/04/23 1002    HS Troponin 0hr (reflex protocol) [[id number]]  (Normal) Collected: 05/04/23 0912    Lab Status: Final result Specimen: Blood from Arm, Left Updated: 05/04/23 0945     hs TnI 0hr 5 ng/L     Protime-INR [[id number]]  (Normal) Collected: 05/04/23 0912    Lab Status: Final result Specimen: Blood from Arm, Left Updated: 05/04/23 0942     Protime 11 7 seconds      INR 0 86    APTT [[id number]]  (Normal) Collected: 05/04/23 0912    Lab Status: Final result Specimen: Blood from Arm, Left Updated: 05/04/23 0942     PTT 27 seconds     Basic metabolic panel [[id number]]  (Abnormal) Collected: 05/04/23 0912    Lab Status: Final result Specimen: Blood from Arm, Left Updated: 05/04/23 0939     Sodium 134 mmol/L      Potassium 3 9 mmol/L      Chloride 97 mmol/L      CO2 31 mmol/L      ANION GAP 6 mmol/L      BUN 17 mg/dL      Creatinine 0 79 mg/dL      Glucose 96 mg/dL      Calcium 9 3 mg/dL      eGFR 93 ml/min/1 73sq m     Narrative:      [place] guidelines for Chronic Kidney Disease (CKD):   •  Stage 1 with normal or high GFR (GFR > 90 mL/min/1 73 square meters)  •  Stage 2 Mild CKD (GFR = 60-89 mL/min/1 73 square meters)  •  Stage 3A Moderate CKD (GFR = 45-59 mL/min/1 73 square meters)  •  Stage 3B Moderate CKD (GFR = 30-44 mL/min/1 73 square meters)  •  Stage 4 Severe CKD (GFR = 15-29 mL/min/1 73 square meters)  •  Stage 5 End Stage CKD (GFR <15 mL/min/1 73 square meters)  Note: GFR calculation is accurate only with a steady state creatinine    CBC and Platelet [[id number]]  (Abnormal) Collected: 05/04/23 0912    Lab Status: Final result Specimen: Blood from Arm, Left Updated: 05/04/23 0930     WBC 7 66 Thousand/uL      RBC 6 16 Million/uL      Hemoglobin 18 8 g/dL      Hematocrit 55 8 %      MCV 91 fL      MCH 30 5 pg      MCHC 33 7 g/dL      RDW 15 9 %      Platelets 608 Thousands/uL      MPV 9 4 fL     Fingerstick Glucose (POCT) [[id number]]  (Normal) Collected: 05/04/23 0910    Lab Status: Final result Updated: 05/04/23 0910     POC Glucose 91 mg/dl                  X-ray chest 1 view portable   Final Result by Saurav Leo MD (05/04 1210)      No acute cardiopulmonary disease  Workstation performed: [id number]         CTA stroke alert (head/neck)   Final Result by Kelli Martin MD (05/04 2009)      1  Patent major vasculature of the [place] without high-grade stenosis  2   A thin elongated intraluminal filling defect arising from the posterolateral wall of right ICA origin  Finding is new and may indicate elevated intima/dissection flap  No hemodynamic significant stenosis  3   Stable 5 mm aneurysmal enlargement at the left MCA M2 bifurcation  Recommend follow-up with neurovascular service  Findings were directly discussed with Lashanda Rivero at 9:35 am       This study was marked in [facility] for notification and follow-up  Workstation performed: [id number]         CT stroke alert brain   Final Result by Kelli Martin MD (05/04 5102)      1  No acute intracranial CT abnormality  2   Redemonstrated left temporal lobe resection cavity in keeping with history of glioblastoma  Please refer to the report of brain MRI 3/30/2023 for details  Findings were directly discussed with Daron Koch at 9:35 am          Workstation performed: [id number]         MRI brain w wo contrast    (Results Pending)              Procedures  Procedures         ED Course  ED Course as of 05/04/23 1445   Thu May 04, 2023   0911 Patient states he went to bed last night around 8 PM and had no symptoms of facial droop or speech difficulty  At this time patient states he woke up at around 7 AM this morning and has been having the symptoms since waking  6600 In discussion with patient he is unsure if he took his aspirin this morning we will give 325 aspirin along with 300 Plavix at the recommendation of neurology who is seeing a right ICA dissection which is also confirmed by radiology  Patient denies any obvious trauma, recent chiropractic visits or one-sided neck pain  He does complain of posterior neck pain  Does state that he fell 4 or so days ago but the did not strike his head and did not lose consciousness               HEART Risk Score    Flowsheet Row Most Recent Value   Heart Score Risk Calculator    History 0 Filed at: 05/04/2023 1444   ECG 0 Filed at: 05/04/2023 1444   Age 2 Filed at: 05/04/2023 1444   Risk Factors 2 Filed at: 05/04/2023 1444   Troponin 0 Filed at: 05/04/2023 1444   HEART Score 4 Filed at: 05/04/2023 1444           Stroke Assessment     Row Name 05/04/23 0935             NIH Stroke Scale    Interval Baseline      Level of Consciousness (1a ) 0      LOC Questions (1b ) 0      LOC Commands (1c ) 0      Best Gaze (2 ) 1      Visual (3 ) 0      Facial Palsy (4 ) 3      Motor Arm, Left (5a ) 0      Motor Arm, Right (5b ) 0      Motor Leg, Left (6a ) 0      Motor Leg, Right (6b ) 0      Limb Ataxia (7 ) 1      Sensory (8 ) 0      Best Language (9 ) 0      Dysarthria (10 ) 1      Extinction and Inattention (11 ) (Formerly Neglect) 0      Total 6              Flowsheet Row Most Recent Value   Thrombolytic Decision Options    Thrombolytic Decision Patient not a candidate  Patient is not a candidate options Unclear time of onset outside appropriate time window  , comment  [Patient outside window, also with underlying active GBM]                    SBIRT 20yo+    Flowsheet Row Most Recent Value   Initial Alcohol Screen: US AUDIT-C     1  How often do you have a drink containing alcohol? 0 Filed at: 05/04/2023 0944   2  How many drinks containing alcohol do you have on a typical day you are drinking? 0 Filed at: 05/04/2023 0944   3a  Male UNDER 65: How often do you have five or more drinks on one occasion? 0 Filed at: 05/04/2023 0944   3b  FEMALE Any Age, or MALE 65+: How often do you have 4 or more drinks on one occassion? 0 Filed at: 05/04/2023 0944   Audit-C Score 0 Filed at: 05/04/2023 8387   DANIELE: How many times in the past year have you    Used an illegal drug or used a prescription medication for non-medical reasons? Never Filed at: 05/04/2023 0944                    Medical Decision Making  77 yo male presenting to the ed for left-sided facial droop and dysarthria which he noted earlier today upon waking at 7 AM   Last known well was 8 PM last night  Patient has GBM for which he is receiving Avastin  Patient made a stroke alert as symptoms started within the last 24 hours however he is outside the window for TNK and being a GBM patient would not qualify either secondary to increased risk of bleeding  Patient noted to have a carotid dissection on CTA  Neurology recommending aspirin and Plavix loading and admission stroke pathway  Patient admitted to medicine with telemetry stable time of admission  Amount and/or Complexity of Data Reviewed  Labs: ordered  Radiology: ordered  Risk  OTC drugs  Prescription drug management  Decision regarding hospitalization            Disposition  Final diagnoses:   Facial droop   Carotid artery dissection ([facility] [address] )     Time reflects when diagnosis was documented in both MDM as applicable and the Disposition within this note     Time User Action Codes Description Comment    5/4/2023  9:10 AM Moriah Stewart Add [R29 810] Facial droop     5/4/2023 10:24 AM Moriah Stewart Add [I77 71] Carotid artery dissection [facility])       ED Disposition     ED Disposition   Admit    Condition   Stable    Date/Time   Thu May 4, 2023 10:24 AM    Comment   Case was discussed with GIAN and the patient's admission status was agreed to be Admission Status: observation status to the service of Dr Violeta Carballo  Follow-up Information    None         Patient's Medications   Discharge Prescriptions    No medications on file       No discharge procedures on file      PDMP Review     None          ED Provider  Electronically Signed by           Arthur Medina DO  05/04/23 5367

## 2023-05-04 NOTE — ED NOTES
Echo at bedside     Rubén Mattson, [facility]  05/04/23 8846

## 2023-05-04 NOTE — H&P
[address]  H&P  Name: Yisel Sampson 78 y o  male I MRN: [id number]  Unit/Bed#: ED 21 I Date of Admission: 5/4/2023   Date of Service: 5/4/2023 I Hospital Day: 0      Assessment/Plan   Subclinical hypothyroidism  Assessment & Plan  Cont synthroid ,    Mixed hyperlipidemia  Assessment & Plan  Cont statin  Glioblastoma ([address] )  Assessment & Plan  Con avastin  Recently completed decadron  Essential hypertension  Assessment & Plan  Oral meds on hold d/t permissive HTN  * Stroke-like symptom  Assessment & Plan  • Patient with speech issues and left-sided facial droop admitted for stroke pathway, further complicating presentation is finding of possible dissection of right ICA as well as outpatient MRI demonstrating progressive/worsening GBM  • CT scan    1  Patent major vasculature of the [place] without high-grade stenosis  2   A thin elongated intraluminal filling defect arising from the posterolateral wall of right ICA origin  Finding is new and may indicate elevated intima/dissection flap  No hemodynamic significant stenosis  3   Stable 5 mm aneurysmal enlargement at the left MCA M2 bifurcation  Recommend follow-up with neurovascular service  • MRI ordered   • ECHO  • Admission NIHSS  • Stroke pathway  • Permissive hypertension -for now unless otherwise instructed by neurology  • DVT prophylaxis -mechanical unless otherwise instructed by neurology  • Antiplatelet therapy per neurology, was loaded with aspirin and Plavix in ER  • Statin continue home dose unless otherwise instructed by neurology  • Lipid panel/TSH/Hgb A1C ordered   • Neurology consult; recommendations appreciated             Chief Complaint   Patient presents with   • Slurred Speech     Patient reports waking up this morning 0700 with slurred speech and facial droop  Patient reports ambulatory dysfunction for 2 days   Hx brain tumor        HPI:  Yisel Sampson is a 78 y o  male who presents with concerns "for neurological symptoms  Patient was at the infusion center and noted to have new left facial droop  Patient's mother reports ambulatory dysfunction for the last few days  Patient was noted to have slower speech at the infusion center than baseline but this has probably improved per patient mother  Patient without chest pain  Patient without shortness of breath  Patient without fevers    Per neurology patient will be admitted to stroke pathway    Historical Information   Past Medical History:   Diagnosis Date   • Brain tumor ([address] ) 01/05/2021   • Bruxism (teeth grinding)    • Colon polyp    • GBM (glioblastoma multiforme) (HCC)    • GERD (gastroesophageal reflux disease)    • History of cancer chemotherapy     [facility] Dr Neela Spencer   • History of motion sickness    • History of radiation therapy    • History of seizures     per pt and Mom \"first seizure 1/1/2021 and thats when they discovered brain tumor--and last seizure middle Jan 2021\"   • Hypercholesterolemia    • Hypertension    • Obesity    • Risk for falls    • Sleep apnea     CPAP nightly   • Tinnitus    • Tiredness     per pt \"feels from chemotherapy\"   • Umbilical hernia    • Wears glasses      Past Surgical History:   Procedure Laterality Date   • COLONOSCOPY     • FL LUMBAR PUNCTURE DIAGNOSTIC  01/04/2021   • HAND FRACTURE REPAIR      left thumb   • DE RPR AA HERNIA 1ST < 3 CM REDUCIBLE N/A 3/14/2023    Procedure: REPAIR HERNIA VENTRAL LAPAROSCOPIC WITH MESH;  Surgeon: Bienvenido Lopez MD;  Location: [facility];  Service: General   • DE STRTCTC BX ASPIR/EXC PATITO ICRA LESION W/CT&I/MR Left 01/19/2021    Procedure: BIOPSY BRAIN IMAGE-GUIDED NEEDLE - LEFT TEMPORAL;  Surgeon: Summer Dumont MD;  Location: [facility];  Service: Neurosurgery   • TONSILLECTOMY       Social History   Social History     Substance and Sexual Activity   Alcohol Use Not Currently     Social History     Substance and Sexual Activity   Drug Use Not Currently     Social History " Tobacco Use   Smoking Status Former   • Types: Cigarettes   • Quit date:    • Years since quittin 3   Smokeless Tobacco Never     Family History   Problem Relation Age of Onset   • Heart disease Mother    • Heart attack Father    • Leukemia Brother        Meds/Allergies   No Known Allergies    Meds:  No current facility-administered medications for this encounter  Current Outpatient Medications:   •  amLODIPine (NORVASC) 10 mg tablet, Take 1 tablet by mouth once daily, Disp: 90 tablet, Rfl: 6  •  aspirin (ECOTRIN LOW STRENGTH) 81 mg EC tablet, Take 81 mg by mouth daily, Disp: , Rfl:   •  atorvastatin (LIPITOR) 10 mg tablet, Take 1 tablet (10 mg total) by mouth daily (Patient taking differently: Take 10 mg by mouth every evening), Disp: 90 tablet, Rfl: 3  •  Bevacizumab (AVASTIN IV), Inject into a catheter in a vein Get infusion every three weeks ( for Glioblastoma cancer), Disp: , Rfl:   •  dexamethasone (DECADRON) 4 mg tablet, Take 1 tablet by mouth 3 times per day with meals  After 1 week decrease to breakfast and dinner, the following week decreased to breakfast only  , Disp: 42 tablet, Rfl: 0  •  famotidine (PEPCID) 20 mg tablet, Take 1 tablet (20 mg total) by mouth 2 (two) times a day, Disp: 180 tablet, Rfl: 3  •  fenofibrate (TRICOR) 54 MG tablet, Take 1 tablet (54 mg total) by mouth daily (Patient taking differently: Take 54 mg by mouth every evening), Disp: 90 tablet, Rfl: 1  •  hydrochlorothiazide (MICROZIDE) 12 5 mg capsule, Take 1 capsule by mouth once daily, Disp: 90 capsule, Rfl: 6  •  levETIRAcetam (KEPPRA) 1000 MG tablet, TAKE 1 TABLET BY MOUTH EVERY 12 HOURS, Disp: 180 tablet, Rfl: 3  •  levETIRAcetam (KEPPRA) 250 mg tablet, Take 1 tablet by mouth twice daily, Disp: 180 tablet, Rfl: 3  •  levothyroxine 25 mcg tablet, Take 1 tablet (25 mcg total) by mouth daily, Disp: 90 tablet, Rfl: 0  •  mometasone (ELOCON) 0 1 % lotion, Apply 1-2 drops to ears qhs as needed   (Patient not taking: Reported on 1/6/2023), Disp: 60 mL, Rfl: 3  •  valsartan (DIOVAN) 80 mg tablet, Take 1 tablet (80 mg total) by mouth daily, Disp: 90 tablet, Rfl: 1    (Not in a hospital admission)        Review of Systems:    A complete and comprehensive 14 point organ system review was performed and all other systems are negative other than stated above in the HPI    Current Vitals:   Blood Pressure: (!) 179/112 (05/04/23 1030)  Pulse: 82 (05/04/23 1030)  Temperature: (!) 97 1 °F (36 2 °C) (05/04/23 0946)  Temp Source: Tympanic (05/04/23 0946)  Respirations: 17 (05/04/23 1030)  SpO2: 95 % (05/04/23 1030)  SPO2 RA Rest    Flowsheet Row ED from 5/4/2023 in [facility]   SpO2 95 %   SpO2 Activity --   O2 Device None (Room air)   O2 Flow Rate --        No intake or output data in the 24 hours ending 05/04/23 1123  There is no height or weight on file to calculate BMI  Physical Exam:    General: well appearing, no acute distress  HEENT: atraumatic, PERRLA, moist mucosa, normal pharynx, normal tonsils and adenoids, normal tongue, no fluid in sinuses  Neck: Trachea midline, no carotid bruit, no masses  Respiratory: normal chest wall expansion, CTA B, no r/r/w, no rubs  Cardiovascular: RRR, no m/r/g, Normal S1 and S2  Abdomen: Soft, non-tender, non-distended, normal bowel sounds in all quadrants, no hepatosplenomegaly, no tympany  Rectal: deferred  Musculoskeletal: normal ROM in upper and lower extremities  Integumentary: warm, dry, and pink, with no rash, purpura, or petechia  Heme/Lymph: no lymphadenopathy, no bruises  Neurological: Cranial Nerves II-XII grossly intact; no focal deficits in sensation or strength, A  x O x 3    Patient has left facial droop when smiling  Psychiatric: cooperative with normal mood, affect, and cognition    Lab Results:   CBC:   Lab Results   Component Value Date    WBC 7 66 05/04/2023    HGB 18 8 (H) 05/04/2023    HCT 55 8 (H) 05/04/2023    MCV 91 05/04/2023    PLT 143 (L) 05/04/2023    MCH 30 5 05/04/2023    MCHC 33 7 05/04/2023    RDW 15 9 (H) 05/04/2023    MPV 9 4 05/04/2023    NRBC 0 04/07/2023     CMP:  Lab Results   Component Value Date    CL 97 05/04/2023    CO2 31 05/04/2023    BUN 17 05/04/2023    CREATININE 0 79 05/04/2023    CALCIUM 9 3 05/04/2023    AST 16 04/07/2023    ALT 12 04/07/2023    ALKPHOS 45 04/07/2023    EGFR 93 05/04/2023     Lab Results   Component Value Date    TROPONINI <0 03 08/06/2021     Coagulation:   Lab Results   Component Value Date    INR 0 86 05/04/2023    Urinalysis:  Lab Results   Component Value Date    COLORU Yellow 02/06/2023    CLARITYU Clear 02/06/2023    SPECGRAV 1 025 02/06/2023    PHUR 6 0 02/06/2023    LEUKOCYTESUR Negative 02/06/2023    NITRITE Negative 02/06/2023    GLUCOSEU Negative 02/06/2023    KETONESU Negative 02/06/2023    BILIRUBINUR Negative 02/06/2023    BLOODU Negative 02/06/2023      Amylase: No results found for: AMYLASE  Lipase: No results found for: LIPASE     Imaging: CT stroke alert brain    Result Date: 5/4/2023  Narrative: CT BRAIN - STROKE ALERT PROTOCOL INDICATION:   Stroke Alert  COMPARISON: Brain MRI 3/30/2023  TECHNIQUE:  CT examination of the brain was performed  In addition to axial images, coronal reformatted images were created and submitted for interpretation  Radiation dose length product (DLP) for this visit:  919 09 mGy-cm   This examination, like all CT scans performed in the [facility], was performed utilizing techniques to minimize radiation dose exposure, including the use of iterative  reconstruction and automated exposure control  IMAGE QUALITY:  Diagnostic  FINDINGS: PARENCHYMA: Redemonstrated left temporal lobe resection cavity  Gray-white matter differentiation is grossly preserved  No acute parenchymal hemorrhage  VENTRICLES AND EXTRA-AXIAL SPACES:  Normal for the patient's age  VISUALIZED ORBITS: Normal visualized orbits   PARANASAL SINUSES: Normal visualized paranasal sinuses  CALVARIUM AND EXTRACRANIAL SOFT TISSUES:   Normal      Impression: 1  No acute intracranial CT abnormality  2   Redemonstrated left temporal lobe resection cavity in keeping with history of glioblastoma  Please refer to the report of brain MRI 3/30/2023 for details  Findings were directly discussed with Anastasiia Meeks at 9:35 am  Workstation performed: [id number]     CTA stroke alert (head/neck)    Result Date: 5/4/2023  Narrative: CTA NECK AND BRAIN WITH CONTRAST INDICATION: Stroke Alert COMPARISON:   CTA head and neck 8/6/2021  TECHNIQUE:   Post contrast imaging was performed after administration of iodinated contrast through the neck and brain  Post contrast axial 0 625 mm images timed to opacify the arterial system  3D rendering was performed on an independent workstation  MIP reconstructions performed  Coronal reconstructions were performed of the noncontrast portion of the brain  Radiation dose length product (DLP) for this visit:  478 65 mGy-cm   This examination, like all CT scans performed in the [facility], was performed utilizing techniques to minimize radiation dose exposure, including the use of iterative  reconstruction and automated exposure control  IV Contrast:  85 mL of iohexol (OMNIPAQUE) IMAGE QUALITY:   Diagnostic FINDINGS: CERVICAL VASCULATURE AORTIC ARCH AND GREAT VESSELS: Aortic arch and great vessel origins are unremarkable  RIGHT VERTEBRAL ARTERY CERVICAL SEGMENT:The vessel origin is unremarkable  The vessel is patent throughout the neck without high-grade stenosis  LEFT VERTEBRAL ARTERY CERVICAL SEGMENT: The vessel origin is unremarkable  The vessel is patent throughout the neck without high-grade stenosis  RIGHT EXTRACRANIAL CAROTID SEGMENT: There is a thin elongated intraluminal filling defect arising from the posterolateral wall of the ICA origin seen on series 3 images 380-385 and series 602 image 86   Finding is new from prior exam  There is no hemodynamically significant stenosis  LEFT EXTRACRANIAL CAROTID SEGMENT: The carotid bifurcation and cervical internal carotid artery are unremarkable  No stenosis or dissection  INTRACRANIAL VASCULATURE INTERNAL CAROTID ARTERIES: Minimal atherosclerotic disease of cavernous and supraclinoid segments of bilateral internal carotid arteries without critical stenosis  Normal ophthalmic artery origins  ANTERIOR CIRCULATION:  Normal A1 segments  Bilateral anterior cerebral arteries are unremarkable  Normal anterior comminuting artery  MIDDLE CEREBRAL ARTERY CIRCULATION: Bilateral M1 segments and major M2 branches are patent without high-grade stenosis  There is 5 mm aneurysmal enlargement of left MCA M2 bifurcation (series 3 images 156-159), stable  DISTAL VERTEBRAL ARTERIES:  Distal vertebral arteries are patent without high-grade stenosis  Patent left PICA and right AICA/PICA origins  BASILAR ARTERY:  Basilar artery is unremarkable  Normal superior cerebellar arteries  POSTERIOR CEREBRAL ARTERIES: Persistent fetal origin of right posterior cerebral arteries  Bilateral posterior cerebral arteries are patent without high-grade stenosis  Absent/hypoplastic left posterior communicating artery  DURAL VENOUS SINUSES:  Unremarkable  NON VASCULAR ANATOMY BONY STRUCTURES: No acute or aggressive appearing osseous abnormality  SOFT TISSUES OF THE NECK:  Unremarkable  THORACIC INLET:  Unremarkable  Impression: 1  Patent major vasculature of the [place] without high-grade stenosis  2   A thin elongated intraluminal filling defect arising from the posterolateral wall of right ICA origin  Finding is new and may indicate elevated intima/dissection flap  No hemodynamic significant stenosis  3   Stable 5 mm aneurysmal enlargement at the left MCA M2 bifurcation  Recommend follow-up with neurovascular service   Findings were directly discussed with Kelsie Holliday at 9:35 am  This study was marked in [facility] for "notification and follow-up  Workstation performed: [id number]     EKG, Pathology, and Other Studies: I have personally reviewed the results  VTE   Prophylaxis: In place    Code Status: Prior    Anticipated Length of Stay:  Patient will be admitted on an Observation basis with an anticipated length of stay of  less 2 midnights  Counseling / Coordination of Care  Total floor / unit time spent today  38 minutes  Greater than 50% of total time was spent with the patient and / or family counseling and / or coordination of care       Patient mother updated    \"This note has been constructed using a voice recognition system\"      Lorenzo Magana MD  5/4/2023, 11:23 AM        "

## 2023-05-04 NOTE — PROGRESS NOTES
Patient arrived for treatment today  Patient noted to have left facial droop  Per mother it started yesterday  Patient has been unable to walk times 2 days  Denies SOB, chest pain, headache, dizziness  + vision changes x 3 weeks  + blurry vision  Smile uneven, tongue deviates slightly to the right when he sticks it out   + difficulty finding words  A &O x 3  Speech is clear but slower than usual  Bilateral hand grasp even  + equal ROM to all extremities  Denies seizure activity  Reports decreased urination  Only urinated 3 times yesterday and 1 time today  Call placed to Iveth Gill RN advised of above  Patient to be evaluated at ER  911 called  Report given to EMS  Patient transported via stretcher without incident  All personal belongings taken with patient  Patient's mother aware of above  Report called to [place] [facility]  Spoke with Dr Liu Flatten

## 2023-05-05 ENCOUNTER — TELEPHONE (OUTPATIENT)
Dept: HEMATOLOGY ONCOLOGY | Facility: CLINIC | Age: 68
End: 2023-05-05

## 2023-05-05 PROBLEM — G51.0 FACIAL NERVE PALSY, SECONDARY: Status: ACTIVE | Noted: 2023-05-04

## 2023-05-05 LAB
ALBUMIN SERPL BCP-MCNC: 3.6 G/DL (ref 3.5–5)
ALP SERPL-CCNC: 49 U/L (ref 34–104)
ALT SERPL W P-5'-P-CCNC: 22 U/L (ref 7–52)
AMPHETAMINES SERPL QL SCN: NEGATIVE
ANION GAP SERPL CALCULATED.3IONS-SCNC: 8 MMOL/L (ref 4–13)
AST SERPL W P-5'-P-CCNC: 17 U/L (ref 13–39)
ATRIAL RATE: 71 BPM
ATRIAL RATE: 73 BPM
BACTERIA UR QL AUTO: ABNORMAL /HPF
BARBITURATES UR QL: NEGATIVE
BASOPHILS # BLD AUTO: 0.03 THOUSANDS/ÂΜL (ref 0–0.1)
BASOPHILS NFR BLD AUTO: 0 % (ref 0–1)
BENZODIAZ UR QL: NEGATIVE
BILIRUB SERPL-MCNC: 1.73 MG/DL (ref 0.2–1)
BILIRUB UR QL STRIP: ABNORMAL
BUN SERPL-MCNC: 17 MG/DL (ref 5–25)
CALCIUM SERPL-MCNC: 8.8 MG/DL (ref 8.4–10.2)
CHLORIDE SERPL-SCNC: 99 MMOL/L (ref 96–108)
CHOLEST SERPL-MCNC: 154 MG/DL
CLARITY UR: CLEAR
CO2 SERPL-SCNC: 27 MMOL/L (ref 21–32)
COCAINE UR QL: NEGATIVE
COLOR UR: YELLOW
CREAT SERPL-MCNC: 0.72 MG/DL (ref 0.6–1.3)
EOSINOPHIL # BLD AUTO: 0.17 THOUSAND/ÂΜL (ref 0–0.61)
EOSINOPHIL NFR BLD AUTO: 3 % (ref 0–6)
ERYTHROCYTE [DISTWIDTH] IN BLOOD BY AUTOMATED COUNT: 15.3 % (ref 11.6–15.1)
GFR SERPL CREATININE-BSD FRML MDRD: 97 ML/MIN/1.73SQ M
GLUCOSE P FAST SERPL-MCNC: 68 MG/DL (ref 65–99)
GLUCOSE SERPL-MCNC: 68 MG/DL (ref 65–140)
GLUCOSE UR STRIP-MCNC: NEGATIVE MG/DL
HCT VFR BLD AUTO: 53.1 % (ref 36.5–49.3)
HDLC SERPL-MCNC: 38 MG/DL
HGB BLD-MCNC: 17.9 G/DL (ref 12–17)
HGB UR QL STRIP.AUTO: ABNORMAL
HYALINE CASTS #/AREA URNS LPF: ABNORMAL /LPF
IMM GRANULOCYTES # BLD AUTO: 0.02 THOUSAND/UL (ref 0–0.2)
IMM GRANULOCYTES NFR BLD AUTO: 0 % (ref 0–2)
KETONES UR STRIP-MCNC: ABNORMAL MG/DL
LDLC SERPL CALC-MCNC: 87 MG/DL (ref 0–100)
LEUKOCYTE ESTERASE UR QL STRIP: NEGATIVE
LYMPHOCYTES # BLD AUTO: 1.99 THOUSANDS/ÂΜL (ref 0.6–4.47)
LYMPHOCYTES NFR BLD AUTO: 29 % (ref 14–44)
MCH RBC QN AUTO: 30.1 PG (ref 26.8–34.3)
MCHC RBC AUTO-ENTMCNC: 33.7 G/DL (ref 31.4–37.4)
MCV RBC AUTO: 89 FL (ref 82–98)
METHADONE UR QL: NEGATIVE
MONOCYTES # BLD AUTO: 0.61 THOUSAND/ÂΜL (ref 0.17–1.22)
MONOCYTES NFR BLD AUTO: 9 % (ref 4–12)
MUCOUS THREADS UR QL AUTO: ABNORMAL
NEUTROPHILS # BLD AUTO: 4.01 THOUSANDS/ÂΜL (ref 1.85–7.62)
NEUTS SEG NFR BLD AUTO: 59 % (ref 43–75)
NITRITE UR QL STRIP: NEGATIVE
NON-SQ EPI CELLS URNS QL MICRO: ABNORMAL /HPF
NRBC BLD AUTO-RTO: 0 /100 WBCS
OPIATES UR QL SCN: NEGATIVE
OXYCODONE+OXYMORPHONE UR QL SCN: NEGATIVE
P AXIS: 30 DEGREES
P AXIS: 47 DEGREES
PCP UR QL: NEGATIVE
PH UR STRIP.AUTO: 6 [PH]
PLATELET # BLD AUTO: 136 THOUSANDS/UL (ref 149–390)
PMV BLD AUTO: 9.1 FL (ref 8.9–12.7)
POTASSIUM SERPL-SCNC: 3.8 MMOL/L (ref 3.5–5.3)
PR INTERVAL: 196 MS
PR INTERVAL: 202 MS
PROT SERPL-MCNC: 5.7 G/DL (ref 6.4–8.4)
PROT UR STRIP-MCNC: ABNORMAL MG/DL
QRS AXIS: -78 DEGREES
QRS AXIS: -88 DEGREES
QRSD INTERVAL: 100 MS
QRSD INTERVAL: 98 MS
QT INTERVAL: 384 MS
QT INTERVAL: 392 MS
QTC INTERVAL: 417 MS
QTC INTERVAL: 431 MS
RBC # BLD AUTO: 5.95 MILLION/UL (ref 3.88–5.62)
RBC #/AREA URNS AUTO: ABNORMAL /HPF
SODIUM SERPL-SCNC: 134 MMOL/L (ref 135–147)
SP GR UR STRIP.AUTO: >=1.03
T WAVE AXIS: 25 DEGREES
T WAVE AXIS: 31 DEGREES
THC UR QL: NEGATIVE
TRIGL SERPL-MCNC: 147 MG/DL
UROBILINOGEN UR QL STRIP.AUTO: 0.2 E.U./DL
VENTRICULAR RATE: 71 BPM
VENTRICULAR RATE: 73 BPM
WBC # BLD AUTO: 6.83 THOUSAND/UL (ref 4.31–10.16)
WBC #/AREA URNS AUTO: ABNORMAL /HPF

## 2023-05-05 RX ORDER — LOSARTAN POTASSIUM 50 MG/1
100 TABLET ORAL DAILY
Status: DISCONTINUED | OUTPATIENT
Start: 2023-05-05 | End: 2023-05-09 | Stop reason: HOSPADM

## 2023-05-05 RX ORDER — AMLODIPINE BESYLATE 10 MG/1
10 TABLET ORAL DAILY
Status: DISCONTINUED | OUTPATIENT
Start: 2023-05-05 | End: 2023-05-09 | Stop reason: HOSPADM

## 2023-05-05 RX ORDER — HYDRALAZINE HYDROCHLORIDE 20 MG/ML
10 INJECTION INTRAMUSCULAR; INTRAVENOUS EVERY 6 HOURS PRN
Status: DISCONTINUED | OUTPATIENT
Start: 2023-05-05 | End: 2023-05-09 | Stop reason: HOSPADM

## 2023-05-05 RX ORDER — DEXAMETHASONE SODIUM PHOSPHATE 10 MG/ML
6 INJECTION, SOLUTION INTRAMUSCULAR; INTRAVENOUS ONCE
Status: COMPLETED | OUTPATIENT
Start: 2023-05-05 | End: 2023-05-05

## 2023-05-05 RX ORDER — HYDROCHLOROTHIAZIDE 12.5 MG/1
12.5 TABLET ORAL DAILY
Status: DISCONTINUED | OUTPATIENT
Start: 2023-05-05 | End: 2023-05-09 | Stop reason: HOSPADM

## 2023-05-05 RX ORDER — ASPIRIN 81 MG/1
81 TABLET ORAL DAILY
Status: DISCONTINUED | OUTPATIENT
Start: 2023-05-06 | End: 2023-05-05

## 2023-05-05 RX ORDER — ASPIRIN 81 MG/1
81 TABLET ORAL DAILY
Status: DISCONTINUED | OUTPATIENT
Start: 2023-05-05 | End: 2023-05-09 | Stop reason: HOSPADM

## 2023-05-05 RX ORDER — DEXAMETHASONE 0.5 MG/1
2 TABLET ORAL EVERY 12 HOURS SCHEDULED
Status: DISCONTINUED | OUTPATIENT
Start: 2023-05-05 | End: 2023-05-09 | Stop reason: HOSPADM

## 2023-05-05 RX ADMIN — DEXAMETHASONE 2 MG: 0.5 TABLET ORAL at 20:21

## 2023-05-05 RX ADMIN — AMLODIPINE BESYLATE 10 MG: 10 TABLET ORAL at 15:11

## 2023-05-05 RX ADMIN — FENOFIBRATE 48 MG: 48 TABLET, FILM COATED ORAL at 17:57

## 2023-05-05 RX ADMIN — FAMOTIDINE 20 MG: 20 TABLET, FILM COATED ORAL at 08:30

## 2023-05-05 RX ADMIN — LOSARTAN POTASSIUM 100 MG: 50 TABLET, FILM COATED ORAL at 15:10

## 2023-05-05 RX ADMIN — ASPIRIN 81 MG: 81 TABLET, COATED ORAL at 09:48

## 2023-05-05 RX ADMIN — LEVETIRACETAM 1000 MG: 500 TABLET, FILM COATED ORAL at 23:13

## 2023-05-05 RX ADMIN — LEVETIRACETAM 250 MG: 250 TABLET, FILM COATED ORAL at 08:30

## 2023-05-05 RX ADMIN — DEXAMETHASONE SODIUM PHOSPHATE 6 MG: 10 INJECTION, SOLUTION INTRAMUSCULAR; INTRAVENOUS at 10:22

## 2023-05-05 RX ADMIN — ATORVASTATIN CALCIUM 10 MG: 10 TABLET, FILM COATED ORAL at 17:58

## 2023-05-05 RX ADMIN — WHITE PETROLATUM 57.7 %-MINERAL OIL 31.9 % EYE OINTMENT: at 12:03

## 2023-05-05 RX ADMIN — WHITE PETROLATUM 57.7 %-MINERAL OIL 31.9 % EYE OINTMENT 1 APPLICATION.: at 21:55

## 2023-05-05 RX ADMIN — HYDROCHLOROTHIAZIDE 12.5 MG: 12.5 TABLET ORAL at 15:11

## 2023-05-05 RX ADMIN — WHITE PETROLATUM 57.7 %-MINERAL OIL 31.9 % EYE OINTMENT: at 17:58

## 2023-05-05 RX ADMIN — FAMOTIDINE 20 MG: 20 TABLET, FILM COATED ORAL at 17:57

## 2023-05-05 RX ADMIN — WHITE PETROLATUM 57.7 %-MINERAL OIL 31.9 % EYE OINTMENT: at 08:30

## 2023-05-05 RX ADMIN — LEVETIRACETAM 250 MG: 250 TABLET, FILM COATED ORAL at 17:57

## 2023-05-05 RX ADMIN — LEVOTHYROXINE SODIUM 25 MCG: 25 TABLET ORAL at 05:29

## 2023-05-05 RX ADMIN — LEVETIRACETAM 1000 MG: 500 TABLET, FILM COATED ORAL at 12:03

## 2023-05-05 NOTE — ASSESSMENT & PLAN NOTE
I was able to review the MRI directly with radiology and unfortunately this shows ongoing progression compared with his prior scan from March  I was able to coordinate directly with his oncologist, Dr Oz Flor, who will review with the patient and his family in the office what next steps might be  -We will plan for dexamethasone 6 mg IV x1 in the hospital  -We will clarify if he has been on any dexamethasone at home, and if not we will begin low-dose dexamethasone at 2 mg every 12 hours to determine if this might be somewhat beneficial  -Prior to discharge we will ensure that he is not having any significant dysphagia that would impact his ability to swallow and maintain hydration/nutrition/medication compliance  -His gait/fall risk should also be evaluated prior to discharge if possible  -Progression of glioblastoma tends to represent a poor prognosis neurologically speaking  Our team will be happy to continue working with the patient and his care team to provide any additional insight or guidance

## 2023-05-05 NOTE — ASSESSMENT & PLAN NOTE
At this time there is no report of breakthrough seizure    -Maintain typical seizure precautions including avoiding alcohol and long periods of insomnia  -Continue levetiracetam 1250 mg every 12 hours  -We will plan for outpatient follow-up in epilepsy clinic

## 2023-05-05 NOTE — ARC ADMISSION
Referral received for consideration of patient for inpatient acute rehab  Reviewed patient's case with [facility] physician - patient is recommended for SNF/subacute rehab for ongoing therapy needs, as patient has no significant acute medical necessity requiring frequent physician oversight  CM has been updated

## 2023-05-05 NOTE — OCCUPATIONAL THERAPY NOTE
Patient is a 78 y o  male seen for OT evaluation s/p admit to  [address] on 5/4/2023 w/Facial nerve palsy, secondary + commorbidities/PMHx (as listed in medical record) affecting patient's functional performance c ADL tasks at time of assessment  OT orders placed for evaluation and treatment to assess pt's ADLs, cognitive status + performance during functional tasks in order to formulate appropriate d/c recommendations  Therapist performed at least two patient identifiers during session including name and wristband  Personal factors affecting patient at time of initial evaluation include: inaccessible home environment, step(s) to enter environment, limited home support, inability to perform ADLs, inability to ambulate household distances and decreased initiation and engagement  Pt's clinical presentation is currently unstable/unpredictable given new onset deficits that effect pt's occupational performance and ability to safely return to PLOF including decrease activity tolerance, decrease standing balance, decrease sitting balance, decrease performance during ADL tasks, decrease UB MS, decrease generalized strength, decrease activity engagement and decrease performance during functional transfers combined with medical complications of hypertension , pain impacting overall mobility status, abnormal renal lab values, change in mental status, abnormal sodium values and need for input for mobility technique/safety  This evaluation required an extensive review of medical and/or therapy records and additional review of physical, cognitive and psychosocial history related to functional performance  Based upon functional performance deficits and assessments, this evaluation has been identified as a  high complexity evaluation        Patient to benefit from continued Occupational Therapy treatment while in the hospital to address aforementioned deficits and maximize level of functional independence with ADLs and functional mobility  Pt currently requires A to facilitate appropriate d/c plan and demonstrates F awareness in d/c plan  From OT standpoint, recommendation at time of d/c would be Short Term Rehab          as pt appears to be at baseline level of function  Pt may benefit from rehab if  wife is unable to provide physical assist for mobility and ADLs    @ this time, pt presents @ baseline fxn including I c ADLs/fxl mobility  D/t aforementioned factors, no further skilled occupational therapy services warranted during hospitalization/ following return home

## 2023-05-05 NOTE — TELEPHONE ENCOUNTER
Appointment Change  Cancel, Reschedule, Change to Virtual      Who are you speaking with? Yanira from [facility]    If it is not the patient, are they listed on an active communication consent form? N/A   Which provider is the appointment scheduled with? PAMELA Palma   When is the appointment scheduled? Please list date and time 05/05 at 10:30am    At which location is the appointment scheduled to take place? [place]   Was the appointment rescheduled or changed from an in person visit to a virtual visit? If so, please list the details of the change  No    What is the reason for the appointment change? Hospitalized    Was [facility] transport scheduled for this visit? No   Does [facility] transport need to be scheduled for the new visit (if applicable) No   Does the patient need an infusion appointment rescheduled? No   Does the patient have an infusion appointment scheduled? If so, when? No   Is the patient undergoing chemotherapy? No   Was the no-show policy reviewed for appointments being changed with less then 24 hours of notice?  Yes

## 2023-05-05 NOTE — NUTRITION
05/05/23 1406   Biochemical Data,Medical Tests, and Procedures   Biochemical Data/Medical Tests/Procedures Lab values reviewed; Meds reviewed   Labs (Comment) 5/5/23 Na 134, total bilirubin 1 73, HDL 38, H&H 17 9/53 1   Meds (Comment) atorvastatin, pepcid, kepra, levothyroxine   Nutrition-Focused Physical Exam   Nutrition-Focused Physical Exam Findings RN skin assessment reviewed; No skin issues documented   Medical-Related Concerns HTN, HLD, hypothyroidism, JESSICA, GERD, glioblastoma on treatment   Current PO Intake   Current Diet Order Cardiac diet   Current Meal Intake 50-75%   Estimated calorie intake compared to estimated need Nutrient needs likely not met  PES Statement   Oral or Nutritional Support Intake (2) Inadequate oral intake NI-2 1   Related to Increased energy needs; Increased PRO needs   As evidenced by: Other (Comment)  (glioblastoma)   Recommendations/Interventions   Malnutrition/BMI Present No   Summary Consult - Stroke; ST consulted; Nutrition Risk - wound  Presents with stroke-like symptoms  Past medical history significant for HTN, HLD, hypothyroidism, JESSICA, GERD, glioblastoma on treatment  Weight history reviewed  No significant changes to note  No pressure areas  Ordered for Cardiac diet  Meal intake documented at 50%  Nutrient needs likely not met  Pt wound benefit from nutrition supplementation  Recommend adding Ensure Compact BID to encourage PO intake at meals  Interventions/Recommendations Continue current diet order;Supplement initiate;Monitor I & O's   Education Assessment   Education Education not indicated at this time   Patient Nutrition Goals   Goal Adequate hydration; Adequate intake;Meet PO needs

## 2023-05-05 NOTE — ASSESSMENT & PLAN NOTE
· Initial concern was a stroke to explain his underlying clinical symptoms  · Acute CVA has been worked up and ruled out  · Symptoms are secondary to a progressing glioblastoma multiforme  · Case reviewed with Dr Silke Evans-neurology  · Will start Decadron 2 mg p o  every 12 hours  · No further inpatient testing, treatment, and or work-up is needed  · Patient to eventually follow-up in the outpatient setting with hematology oncology  · Status post a PT and OT evaluation-they recommend postacute rehabilitation services  · Case management has been notified, case management is working on placement options  · Continue all other present medical management until that point

## 2023-05-05 NOTE — ASSESSMENT & PLAN NOTE
· Patient normally is on Avastin  · Start steroids as outlined above  · Additional outpatient work-up as per neurology and hematology oncology in the outpatient setting

## 2023-05-05 NOTE — PLAN OF CARE
Problem: Potential for Falls  Goal: Patient will remain free of falls  Description: INTERVENTIONS:  - Educate patient/family on patient safety including physical limitations  - Instruct patient to call for assistance with activity   - Consult OT/PT to assist with strengthening/mobility   - Keep Call bell within reach  - Keep bed low and locked with side rails adjusted as appropriate  - Keep care items and personal belongings within reach  - Initiate and maintain comfort rounds  - Make Fall Risk Sign visible to staff  - Offer Toileting every 2 Hours, in advance of need  - Obtain necessary fall risk management equipment  - Apply yellow socks and bracelet for high fall risk patients  - Consider moving patient to room near nurses station  Outcome: Progressing     Problem: NEUROSENSORY - ADULT  Goal: Achieves stable or improved neurological status  Description: INTERVENTIONS  - Monitor and report changes in neurological status  - Monitor vital signs such as temperature, blood pressure, glucose, and any other labs ordered   - Initiate measures to prevent increased intracranial pressure  - Monitor for seizure activity and implement precautions if appropriate      Outcome: Progressing     Problem: CARDIOVASCULAR - ADULT  Goal: Maintains optimal cardiac output and hemodynamic stability  Description: INTERVENTIONS:  - Monitor I/O, vital signs and rhythm  - Monitor for S/S and trends of decreased cardiac output  - Administer and titrate ordered vasoactive medications to optimize hemodynamic stability  - Assess quality of pulses, skin color and temperature  - Assess for signs of decreased coronary artery perfusion  - Instruct patient to report change in severity of symptoms  Outcome: Progressing     Problem: RESPIRATORY - ADULT  Goal: Achieves optimal ventilation and oxygenation  Description: INTERVENTIONS:  - Assess for changes in respiratory status  - Assess for changes in mentation and behavior  - Position to facilitate oxygenation and minimize respiratory effort  - Oxygen administered by appropriate delivery if ordered  - Initiate smoking cessation education as indicated  - Encourage broncho-pulmonary hygiene including cough, deep breathe, Incentive Spirometry  - Assess the need for suctioning and aspirate as needed  - Assess and instruct to report SOB or any respiratory difficulty  - Respiratory Therapy support as indicated  Outcome: Progressing     Problem: SKIN/TISSUE INTEGRITY - ADULT  Goal: Incision(s), wounds(s) or drain site(s) healing without S/S of infection  Description: INTERVENTIONS  - Assess and document dressing, incision, wound bed, drain sites and surrounding tissue  - Provide patient and family education  - Perform skin care/dressing changes every shift  Outcome: Progressing

## 2023-05-05 NOTE — PLAN OF CARE
Problem: OCCUPATIONAL THERAPY ADULT  Goal: Performs self-care activities at highest level of function for planned discharge setting  See evaluation for individualized goals  Description: Treatment Interventions: ADL retraining, Functional transfer training, Endurance training, Patient/family training, Compensatory technique education, Energy conservation, Activityengagement          See flowsheet documentation for full assessment, interventions and recommendations  Note: Limitation: Decreased ADL status, Decreased UE strength, Decreased cognition, Decreased endurance, Decreased self-care trans, Decreased high-level ADLs  Prognosis: Fair  Assessment: Patient is a 78 y o  male seen for OT evaluation s/p admit to  [address] on 5/4/2023 w/Facial nerve palsy, secondary + commorbidities/PMHx (as listed in medical record) affecting patient's functional performance c ADL tasks at time of assessment  OT orders placed for evaluation and treatment to assess pt's ADLs, cognitive status + performance during functional tasks in order to formulate appropriate d/c recommendations  Therapist performed at least two patient identifiers during session including name and wristband  Personal factors affecting patient at time of initial evaluation include: inaccessible home environment, step(s) to enter environment, limited home support, inability to perform ADLs, inability to ambulate household distances and decreased initiation and engagement     Pt's clinical presentation is currently unstable/unpredictable given new onset deficits that effect pt's occupational performance and ability to safely return to PLOF including decrease activity tolerance, decrease standing balance, decrease sitting balance, decrease performance during ADL tasks, decrease UB MS, decrease generalized strength, decrease activity engagement and decrease performance during functional transfers combined with medical complications of hypertension , pain impacting overall mobility status, abnormal renal lab values, change in mental status, abnormal sodium values and need for input for mobility technique/safety  This evaluation required an extensive review of medical and/or therapy records and additional review of physical, cognitive and psychosocial history related to functional performance  Based upon functional performance deficits and assessments, this evaluation has been identified as a  high complexity evaluation  Patient to benefit from continued Occupational Therapy treatment while in the hospital to address aforementioned deficits and maximize level of functional independence with ADLs and functional mobility  Pt currently requires A to facilitate appropriate d/c plan and demonstrates F awareness in d/c plan  From OT standpoint, recommendation at time of d/c would be Short Term Rehab       OT Discharge Recommendation: Post acute rehabilitation services

## 2023-05-05 NOTE — PHYSICAL THERAPY NOTE
"Physical Therapy Evaluation   Time in: 0953  Time out: 1010  Total evaluation time: 17 minutes    Patient's Name: Michelle Suresh    Admitting Diagnosis  Slurred speech [R47 [id number]]    Problem List  Patient Active Problem List   Diagnosis    Class 1 obesity due to excess calories with serious comorbidity and body mass index (BMI) of 33 0 to 33 9 in adult    Negative depression screening    Essential hypertension    Obstructive sleep apnea syndrome    Sleep disturbance    Daytime sleepiness    Glioblastoma (HCC)    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 ([facility] [address] )    Annual physical exam    Muscle cramps    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    Mixed hyperlipidemia    Subclinical hypothyroidism    Epidermoid cyst of skin    GERD (gastroesophageal reflux disease)    Elevated serum creatinine    Aftercare following surgery    Facial nerve palsy, secondary       Past Medical History  Past Medical History:   Diagnosis Date    Brain tumor ([facility] [address] ) 01/05/2021    Bruxism (teeth grinding)     Colon polyp     GBM (glioblastoma multiforme) (HCC)     GERD (gastroesophageal reflux disease)     History of cancer chemotherapy     [facility] Dr Manisha Manrique    History of motion sickness     History of radiation therapy     History of seizures     per pt and Mom \"first seizure 1/1/2021 and thats when they discovered brain tumor--and last seizure middle Jan 2021\"    Hypercholesterolemia     Hypertension     Obesity     Risk for falls     Sleep apnea     CPAP nightly    Tinnitus     Tiredness     per pt \"feels from chemotherapy\"    Umbilical hernia     Wears glasses        Past Surgical History  Past Surgical History:   Procedure Laterality Date    COLONOSCOPY      FL LUMBAR PUNCTURE DIAGNOSTIC  01/04/2021    HAND FRACTURE REPAIR      left thumb    OK RPR AA HERNIA 1ST < 3 CM REDUCIBLE N/A 3/14/2023    Procedure: REPAIR HERNIA VENTRAL LAPAROSCOPIC WITH MESH;  Surgeon: Alexey Gloria MD;  Location: [facility]" "OR;  Service: General    NV STRTCTC BX ASPIR/EXC PATITO ICRA LESION W/CT&I/MR Left 01/19/2021    Procedure: BIOPSY BRAIN IMAGE-GUIDED NEEDLE - LEFT TEMPORAL;  Surgeon: Abigail Goncalves MD;  [facility];  Service: Neurosurgery    TONSILLECTOMY         PT performed at least 2 patient identifiers during session: Name and wristband  05/05/23 1010   PT Last Visit   PT Visit Date 05/05/23   Note Type   Note type Evaluation   Pain Assessment   Pain Assessment Tool 0-10   Pain Score No Pain   Restrictions/Precautions   Weight Bearing Precautions Per Order No   Other Precautions Fall Risk;Telemetry; Bed Alarm   Home Living   Type of [address] [place], [place] [address]e One level;Performs ADLs on one level; Able to live on main level with bedroom/bathroom;Stairs to enter with rails  (4 [address])   Bathroom Shower/Tub Walk-in shower   Bathroom Toilet Raised   Bathroom Equipment Grab bars in shower; Shower chair;Commode   Bathroom Accessibility Accessible   Home Equipment Cane;Walker; Hospital bed  (Reports primary use of cane)   Prior Function   Level of [place] Independent with ADLs; Needs assistance with functional mobility; Needs assistance with IADLS  (Mother LUPE c fxl mobility/IADLs)   Lives With Family  (\"mother\")   Receives Help From Family; Neighbor   IADLs Family/Friend/Other provides transportation; Family/Friend/Other provides meals; Family/Friend/Other provides medication management   Falls in the last 6 months 1 to 4  (2x)   Vocational Retired  ()   General   Family/Caregiver Present No   Cognition   Overall Cognitive Status Impaired   Arousal/Participation Alert   Orientation Level Oriented to person;Oriented to place; Disoriented to time;Disoriented to situation   Memory Decreased recall of precautions;Decreased recall of biographical information   Following Commands Follows one step commands with increased time or repetition  (pt unable to follow 2-step commands, 1-step command following noted intermittently " "with delay vs normal response time)   Comments pt agreeable to PT session   Subjective   Subjective \"I don't know how I'll do with walking\"   RLE Assessment   RLE Assessment X  (grossly 4/5 throughout)   LLE Assessment   LLE Assessment X  (grossly 4/5 throughout)   Vision-Basic Assessment   Current Vision Wears glasses all the time   Visual History   (pt reports worsening visual impairments recently - peripheral vision and blurriness)   Vestibular   Spontaneous Nystagmus (-) no evidence of nystagmus at rest in room light   Coordination   Movements are Fluid and Coordinated 0   Coordination and Movement Description grossly unsteady; motor planning impairment, jessica with OOB motor tasks with turning of RW and sequencing LB advancement   Sensation WFL   Bed Mobility   Supine to Sit 5  Supervision   Additional items Assist x 1;HOB elevated; Bedrails; Increased time required   Transfers   Sit to Stand   (CGA)   Additional items Assist x 2; Increased time required;Verbal cues  (VCs for hand placement)   Stand to Sit   (CGA)   Additional items Assist x 2; Increased time required   Additional Comments BP post session = 156/104   Ambulation/Elevation   Gait pattern Improper Weight shift;Decreased foot clearance;Shuffling;Excessively slow;Scissoring   Gait Assistance   (CGA)   Additional items Assist x 2   Assistive Device Rolling walker   Distance 5 ft  (further distance unable to be performed d/t B LE weakness and fatigue, gross unsteadiness)   Stair Management Assistance Not tested   Balance   Static Sitting Good   Dynamic Sitting Fair   Static Standing Fair -   Dynamic Standing Poor +   Ambulatory Poor +   Endurance Deficit   Endurance Deficit Yes   Activity Tolerance   Activity Tolerance Patient limited by fatigue;Treatment limited secondary to medical complications (Comment)  (gross unsteadiness)   Medical Staff Made Aware coordination of care provided with SANDY Pastor   Nurse Made Aware Yes, AZALEA Galvan made aware of " outcomes/recs   Assessment   Prognosis Fair   Problem List Decreased strength;Decreased endurance; Impaired balance;Decreased mobility; Impaired judgement;Decreased safety awareness;Decreased coordination   Assessment Pt is 78 y o  male seen for high-complexity PT evaluation on 5/5/2023 s/p admit to [facility] on 5/4/2023 w/ Facial nerve palsy, secondary  PT was consulted to assess pt's functional mobility and d/c needs  Order placed for PT eval and tx, w/ up and OOB as tolerated order  PTA, pt lives c mother in [address], amb c RW at baseline, (I) ADLs per pt report at baseline  At time of eval, pt performs supine>sit at SUP level, transfers and amb x 5 ft with RW at CGAx2  Upon evaluation, pt presenting with impaired functional mobility d/t decreased strength, decreased endurance, impaired balance, decreased mobility, decreased coordination, impaired judgement and decreased safety awareness  Pertinent PMHx and current co-morbidities affecting pt's physical performance at time of assessment include: glioblastoma multiforme, GERD, h/o seizures, HTN, hypercholesterolemia, sleep apnea  Personal factors affecting pt at time of eval include: positive fall history and limited insight into impairments  The following objective measures performed on IE also reveal limitations: AM-PAC 6-Clicks: 02/98  Pt's clinical presentation is currently unstable/unpredictable seen in pt's presentation of need for input for task focus and mobility technique, ongoing medical assessment and gross unsteadiness, visual impairments  Overall, pt's rehab potential and prognosis to return to PLOF is fair as impacted by objective findings, warranting pt to receive further skilled PT interventions to address identified impairments, activity limitation(s), and participation restriction(s)   Pt to benefit from continued PT tx to address deficits as defined above and maximize level of functional independent mobility and consistency in "order for pt to improve safety with mobility to prevent any further falls  From PT/mobility standpoint, recommendation at time of d/c would be post acute rehabilitation services pending progress in order to facilitate return to PLOF  Barriers to Discharge Inaccessible home environment;Decreased caregiver support  (pt lives with elderly mother)   Goals   Patient Goals \"to feel better\"   [facility] Expiration Date 05/15/23   Short Term Goal #1 In 7-10 days: Increase bilateral LE strength 1/2 grade to facilitate independent mobility, Perform all bed mobility tasks modified independent to decrease caregiver burden, Perform all transfers modified independent to improve independence, Ambulate > 150 ft  with least restrictive assistive device modified independent w/o LOB and w/ normalized gait pattern 100% of the time, Navigate 1-2 stairs modified independent with unilateral handrail to facilitate return to previous living environment, Increase all balance 1/2 grade to decrease risk for falls, Complete exercise program independently and Tolerate 4 hr OOB to faciliate upright tolerance   PT Treatment Day 0   Plan   Treatment/Interventions Functional transfer training;LE strengthening/ROM; Elevations; Endurance training; Therapeutic exercise;Patient/family training;Equipment eval/education; Bed mobility;Gait training;Spoke to nursing;Spoke to case management;Spoke to MD   PT Frequency 4-6x/wk   Recommendation   PT Discharge Recommendation Post acute rehabilitation services   Equipment Recommended   (continue RW use)   Additional Comments Pt's raw score on the AM-[facility] Basic Mobility inpatient short form is 17, standardized score is 39 67  Patients at this level are likely to benefit from DC to [address], however, please refer to therapist recommendation for safe DC planning     AM-PAC Basic Mobility Inpatient   Turning in Flat Bed Without Bedrails 4   Lying on Back to Sitting on Edge of Flat Bed Without Bedrails 4   Moving Bed to Chair 3 " Standing Up From Chair Using Arms 3   Walk in Room 2   Climb 3-5 Stairs With Railing 1   Basic Mobility Inpatient Raw Score 17   Basic Mobility Standardized Score 39 67   Highest Level Of Mobility   -HLM Goal 5: Stand one or more mins   -HLM Achieved 4: Move to chair/commode   End of Consult   Patient Position at End of Consult Bedside chair; All needs within reach       Tessy Kelleys, PT, DPT

## 2023-05-05 NOTE — WOUND OSTOMY CARE
Consult Note - Wound   Kyara  78 y o  male MRN: [id number]  Unit/Bed#: -01 Encounter: [id number]      History and Present Illness:  78year old male patient admitted with facial nerve palsy  Wound care consulted for wounds on the buttocks and back  Patient history significant for HTN, glioblastoma, epilepsy due to intracranial tumor, sleep disturbance and epidermoid cyst of skin  Assessment Findings:   Patient in bed for assessment, he is awake, alert and cooperative with assessment  His mother is present at the bedside  He is able to turn in the bed without assistance  He is able to feed himself with assist for set up, he uses the bedside urinal and is not incontinent  Per patient's mother, sores have been present on the buttocks and she has been treating at home with moisturizer cream  Patient states he scratches the sores because they itch and makes them worse with scratching  1  POA-scattered dry beefy red wound beds on bilateral buttocks, unknown etiology  Peeling skin noted to the edges of the irregular shaped wound beds also with yellow dried scaly skin on wound beds on the left buttock  Hyperpigmented light purple skin noted to the outer edges  Wound beds slow to kaela  Ecchymotic area noted to the right upper buttock  Left buttock area of erosion is 9 5 X 4 X 0 1, and right measures 4 X 10 X 0 1  For documenting purposes right and left buttock wounds measured as one  May have pressure component as patient's mother states the patient has been sitting more than usual  Ehob offloading cushion given to patient and instruction on use explained to his mother for use at home  2  Bilateral heels intact and blanchable  3  Abdominal and groin folds intact    Discussed with Dr Main Rolle, at this time Calazime to be applied as ordered, effectiveness to be evaluated     Skin and Wound Care Plan:   1  Ehob offloading cushion to chair when OOB  2  Elevate heels off the bed with pillows   3   Turn and reposition every two hours, encourage patient to turn in bed Q2 hours and frequently when OOB in the chair  4  Hydraguard to bilateral heels BID and PRN  5  Skin nourishing cream daily  6   Cleanse bilateral buttock wounds with soap and water then apply thin layer of Calazime TID and PRN    Wounds:  Wound 05/05/23 Sacrum (Active)   Wound Image     05/05/23 1641   Wound Description Beefy red;Dry;Fragile;Light purple 05/05/23 1641   Tamara-wound Assessment Intact 05/05/23 1641   Wound Length (cm) 12 cm 05/05/23 1641   Wound Width (cm) 24 cm 05/05/23 1641   Wound Depth (cm) 0 1 cm 05/05/23 1641   Wound Surface Area (cm^2) 288 cm^2 05/05/23 1641   Wound Volume (cm^3) 28 8 cm^3 05/05/23 1641   Calculated Wound Volume (cm^3) 28 8 cm^3 05/05/23 1641   Drainage Amount None 05/05/23 1641   Treatments Cleansed 05/05/23 1641   Dressing Protective barrier 05/05/23 1641   Patient Tolerance Tolerated well 05/05/23 1641     Reviewed plan of care with primary RN Elder Rogers  Recommendations written as orders  Wound care team to follow weekly while admitted  Questions or concerns [facility] Nurse    Sandra ECHOLSN, RN, Yuval Redd

## 2023-05-05 NOTE — PLAN OF CARE
Problem: PHYSICAL THERAPY ADULT  Goal: Performs mobility at highest level of function for planned discharge setting  See evaluation for individualized goals  Description: Treatment/Interventions: Functional transfer training, LE strengthening/ROM, Elevations, Endurance training, Therapeutic exercise, Patient/family training, Equipment eval/education, Bed mobility, Gait training, Spoke to nursing, Spoke to case management, Spoke to MD  Equipment Recommended:  (continue RW use)       See flowsheet documentation for full assessment, interventions and recommendations  Note: Prognosis: Fair  Problem List: Decreased strength, Decreased endurance, Impaired balance, Decreased mobility, Impaired judgement, Decreased safety awareness, Decreased coordination  Assessment: Pt is 78 y o  male seen for high-complexity PT evaluation on 5/5/2023 s/p admit to [facility] on 5/4/2023 w/ Facial nerve palsy, secondary  PT was consulted to assess pt's functional mobility and d/c needs  Order placed for PT eval and tx, w/ up and OOB as tolerated order  PTA, pt lives c mother in [address], amb c RW at baseline, (I) ADLs per pt report at baseline  At time of eval, pt performs supine>sit at SUP level, transfers and amb x 5 ft with RW at CGAx2  Upon evaluation, pt presenting with impaired functional mobility d/t decreased strength, decreased endurance, impaired balance, decreased mobility, decreased coordination, impaired judgement and decreased safety awareness  Pertinent PMHx and current co-morbidities affecting pt's physical performance at time of assessment include: glioblastoma multiforme, GERD, h/o seizures, HTN, hypercholesterolemia, sleep apnea  Personal factors affecting pt at time of eval include: positive fall history and limited insight into impairments  The following objective measures performed on IE also reveal limitations: AM-PAC 6-Clicks: 62/32   Pt's clinical presentation is currently unstable/unpredictable seen in pt's presentation of need for input for task focus and mobility technique, ongoing medical assessment and gross unsteadiness, visual impairments  Overall, pt's rehab potential and prognosis to return to PLOF is fair as impacted by objective findings, warranting pt to receive further skilled PT interventions to address identified impairments, activity limitation(s), and participation restriction(s)  Pt to benefit from continued PT tx to address deficits as defined above and maximize level of functional independent mobility and consistency in order for pt to improve safety with mobility to prevent any further falls  From PT/mobility standpoint, recommendation at time of d/c would be post acute rehabilitation services pending progress in order to facilitate return to PLOF  Barriers to Discharge: Inaccessible home environment, Decreased caregiver support (pt lives with elderly mother)     PT Discharge Recommendation: Post acute rehabilitation services    See flowsheet documentation for full assessment

## 2023-05-05 NOTE — ED NOTES
Dr Vincent Mcarthur  on video call [employer] [place] with the pt  Mandy CEE   [id number] , [address] [place] [address]  05/05/23 [id number]

## 2023-05-05 NOTE — CASE MANAGEMENT
Case Management Discharge Planning Note    Patient name Yuval Cardona  Location /-64 MRN [id number]  : 1955 Date 2023       Current Admission Date: 2023  Current Admission Diagnosis:Facial nerve palsy, secondary   Patient Active Problem List    Diagnosis Date Noted   • Facial nerve palsy, secondary 2023   • Aftercare following surgery 2023   • GERD (gastroesophageal reflux disease) 2023   • Elevated serum creatinine 2023   • Epidermoid cyst of skin 2022   • 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 2021   • Mixed hyperlipidemia 2021   • Subclinical hypothyroidism 2021   • Muscle cramps 2021   • Annual physical exam 2021   • 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 ([facility] 75 ) 2021   • Glioblastoma ([facility] 75 ) 2021   • Obstructive sleep apnea syndrome    • Sleep disturbance    • Daytime sleepiness    • Class 1 obesity due to excess calories with serious comorbidity and body mass index (BMI) of 33 0 to 33 9 in adult 2020   • Negative depression screening 2020   • Essential hypertension 2020      LOS (days): 0  Geometric Mean LOS (GMLOS) (days):   Days to GMLOS:     OBJECTIVE:            Current admission status: Observation   Preferred Pharmacy:   [facility], [address] [address]  Phone: [phone] Fax: [address] [place] [address] [address] [facility]  Phone: [phone] Fax: [phone]    [id number] - [place]man, [address] [facility] [address]  [address] [place] [address]  Phone: [phone] Fax: [phone]    Primary Care Provider: Jeanie Branch DO    Primary Insurance: MEDICARE  Secondary Insurance: AARP    DISCHARGE DETAILS:    Discharge planning discussed with[de-identified] Patient and mother  Freedom of Choice: Yes  Comments - Freedom of Choice: Agreeable to referral for rehab  [place] referrals made  CM contacted family/caregiver?: Yes  Were Treatment Team discharge recommendations reviewed with patient/caregiver?: Yes  Did patient/caregiver verbalize understanding of patient care needs?: Yes  Were patient/caregiver advised of the risks associated with not following Treatment Team discharge recommendations?: Yes    Contacts  Patient Contacts: Mor Guevara (Mother)   [phone] (Home Phone)  Relationship to Patient[de-identified] Family  Contact Method: Phone  Phone Number: Mor Guevara (Mother)   [phone] (Home Phone)  Reason/Outcome: Emergency Contact, Discharge Planning    Other Referral/Resources/Interventions Provided:  Interventions: Short Term Rehab     Additional Comments: Met with patient at bedside and called mother  Discussed rehab  [place] referrals made    CM department following thru discharge

## 2023-05-05 NOTE — CASE MANAGEMENT
Case Management Assessment & Discharge Planning Note    Patient name Yuval Cardona  Location ED 21/ED 21 MRN [id number]  : 1955 Date 2023       Current Admission Date: 2023  Current Admission Diagnosis:Stroke-like symptom   Patient Active Problem List    Diagnosis Date Noted   • Stroke-like symptom 2023   • Aftercare following surgery 2023   • GERD (gastroesophageal reflux disease) 2023   • Elevated serum creatinine 2023   • Epidermoid cyst of skin 2022   • 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 2021   • Mixed hyperlipidemia 2021   • Subclinical hypothyroidism 2021   • Muscle cramps 2021   • Annual physical exam 2021   • 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 ) 2021   • Glioblastoma ([facility] ) 2021   • Obstructive sleep apnea syndrome    • Sleep disturbance    • Daytime sleepiness    • Class 1 obesity due to excess calories with serious comorbidity and body mass index (BMI) of 33 0 to 33 9 in adult 2020   • Negative depression screening 2020   • Essential hypertension 2020      LOS (days): 0  Geometric Mean LOS (GMLOS) (days):   Days to GMLOS:     OBJECTIVE:           Current admission status: Observation  Referral Reason: Other (Ischemic stroke)    Preferred Pharmacy:   [address] [place] [address]  6107 [address] [place] [address]  Phone: [phone] Fax: [address]  Phone: [phone] Fax: [phone]    [facility] ([id number] - [place] [address]  Phone: [phone] Fax: [phone]    Primary Care Provider: Jeanie Branch DO    Primary Insurance: MEDICARE  Secondary Insurance: AARP    ASSESSMENT:  Active Health Care Proxies    There are no active Health Care Proxies on file  Advance Directives  Does patient have a [address]?: No  Was patient offered paperwork?: Yes (Declines)  Does patient currently have a Health Care decision maker?: No  Does patient have Advance Directives?: No  Was patient offered paperwork?: Yes (Declines)  Primary Contact: Carlotta Whitlock (Mother)   [phone] (Home Phone)    Readmission Root Cause  30 Day Readmission: No    Patient Information  Admitted from[de-identified] Home  Mental Status: Alert  During Assessment patient was accompanied by: Not accompanied during assessment  Assessment information provided by[de-identified] Patient, Parent  Primary Caregiver: Self  Support Systems: Family members, Self, Parent  [place] of Residence: [address] [place] [address] Avenue do you live in?: Via [employer] entry access options   Select all that apply : Stairs  Number of steps to enter home : 5  Do the steps have railings?: Yes  Type of Current Residence: [place]  In the last 12 months, was there a time when you were not able to pay the mortgage or rent on time?: No  In the last 12 months, how many places have you lived?: 1  In the last 12 months, was there a time when you did not have a steady place to sleep or slept in a shelter (including now)?: No  Homeless/housing insecurity resource given?: N/A  Living Arrangements: Lives w/ Parent(s)  Is patient a ?: No    Activities of Daily Living Prior to Admission  Functional Status: Independent  Completes ADLs independently?: Yes  Ambulates independently?: Yes  Does patient use assisted devices?: Yes  Assisted Devices (DME) used: Kamlesh Mims  Does patient currently own DME?: Yes  What DME does the patient currently own?: [employer]  Does patient have a history of Outpatient Therapy (PT/OT)?: No  Does the patient have a history of Short-Term Rehab?: No  Does patient have a history of HHC?: No  Does patient currently have [facility]?: No    Patient Information Continued  Income Source: Pension/long term  Does patient have prescription coverage?: Yes  Within the past 12 months, you worried that your food would run out before you got the money to buy more : Never true  Within the past 12 months, the food you bought just didn't last and you didn't have money to get more : Never true  Food insecurity resource given?: N/A  Does patient receive dialysis treatments?: No  Does patient have a history of substance abuse?: No  Does patient have a history of Mental Health Diagnosis?: No    Means of Transportation  Means of Transport to Appts[de-identified] Family transport  In the past 12 months, has lack of transportation kept you from medical appointments or from getting medications?: No  In the past 12 months, has lack of transportation kept you from meetings, work, or from getting things needed for daily living?: No  Was application for public transport provided?: N/A    DISCHARGE DETAILS:    Discharge planning discussed with[de-identified] Patient and mother Alexander Flynn  Freedom of Choice: Yes     CM contacted family/caregiver?: Yes  Were Treatment Team discharge recommendations reviewed with patient/caregiver?: Yes  Did patient/caregiver verbalize understanding of patient care needs?: Yes  Were patient/caregiver advised of the risks associated with not following Treatment Team discharge recommendations?: Yes    Contacts  Patient Contacts: Lawson Ocampo (Mother)   [phone] (Home Phone)  Relationship to Patient[de-identified] Family  Contact Method: Phone  Phone Number: Lawson Ocampo (Mother)   [phone] (Home Phone)  Reason/Outcome: Emergency Contact     Additional Comments: Met with patient at bedside in ED  Call to mother Alexander Flynn and obtained additional information  Patient's mother lives with him however she is 80years old and has some of her own health issues  Patient is independent with ADL's  Currently recieving Chemo for Glioblastoma  Mother reports decline in memory since March 2023  Mother states 2 falls in past 2 weeks    CM department following thru discharge

## 2023-05-05 NOTE — PLAN OF CARE
Health Care Home - Access Care Plan    About Me  Patient Name:  Joce Velazquez    YOB: 1934  Age:                            83 year old   Del MRN:         [id number] Telephone Information:     Home Phone [phone]   Mobile [phone]   Mobile [phone]       Address:    [address] [place] [address] Email address:  [email or web address]      Emergency Contact(s)  Name Relationship Lgl Grd Work Phone Home Phone Mobile Phone   1. SALINAS VELAZQUEZ Spouse No  [phone]   2. DAYSI VELAZQUEZ Son   [phone]   3. HANY VELAZQUEZ Son   [phone]             Health Maintenance:      My Access Plan  Medical Emergency 911   Questions or concerns during clinic hours Primary Clinic Line, I will call the clinic directly: Primary Clinic: [facility] [phone]   24 Hour Appointment Line [phone] or  [phone])  (toll free)   24 Hour Nurse Line [phone] (toll free)   Questions or concerns outside clinic hours 24 Hour Appointment Line, I will call the after-hours on-call line:   [facility] [phone] or [phone] ([phone]) (toll-free)   Preferred Urgent Care Preferred Urgent Care: Other   Preferred Hospital Preferred Hospital: [facility]  [phone]   Preferred Pharmacy [facility] [place], [place] - [address] Behavioral Health Crisis Line Crisis Connection, [phone] or [phone]     My Care Team Members  Patient Care Team       Relationship Specialty Notifications Start End    Jacob Jha MD PCP - General Internal Medicine  2/2/17     Phone: [phone] Fax: [phone]         [facility] [address]  [place] [address]    Mireille Sevilla, RN Clinic Care Coordinator Internal Medicine Admissions 5/8/17     Comment:  PH: [phone]    Dialysis, [facility] Other (see comments)   5/19/17   Problem: RESPIRATORY - ADULT  Goal: Achieves optimal ventilation and oxygenation  Description: INTERVENTIONS:  - Assess for changes in respiratory status  - Assess for changes in mentation and behavior  - Position to facilitate oxygenation and minimize respiratory effort  - Oxygen administered by appropriate delivery if ordered  - Initiate smoking cessation education as indicated  - Encourage broncho-pulmonary hygiene including cough, deep breathe, Incentive Spirometry  - Assess the need for suctioning and aspirate as needed  - Assess and instruct to report SOB or any respiratory difficulty  - Respiratory Therapy support as indicated  Outcome: Progressing    Comment:  Dialysis unit [phone]    Phone: [phone] Fax: [phone] [address] [facility] [address]        My Medical and Care Information  Problem List   Patient Active Problem List   Diagnosis     GERD (gastroesophageal reflux disease)     Chronic kidney disease, stage V (H)     Gout, chronic     Neuropathy (H)     RA (rheumatoid arthritis) (H)     Hyperlipidemia LDL goal <100     ASHD (arteriosclerotic heart disease)     Secondary renal hyperparathyroidism (H)     Iron (Fe) deficiency anemia     Chest pain     NSTEMI (non-ST elevated myocardial infarction) (H)     Health Care Home     Shoulder joint pain     Back pain     Advanced directives, counseling/discussion     Osteoarthritis of left wrist     Scapholunate dissociation of left wrist     Osteoarthritis of carpometacarpal joint of left thumb     Primary distal radioulnar joint osteoarthrosis, left     Osteoarthritis of finger, left     S/P CABG (coronary artery bypass graft)     DRUJ (distal radioulnar joint) arthrosis, primary, left     ACS (acute coronary syndrome) (H)     Essential hypertension with goal blood pressure less than 140/90     Unstable angina (H)     Inguinal hernia     Paroxysmal atrial fibrillation (H)     3-vessel coronary artery disease     Hemodialysis patient (H)     Hypoxic     Chest tightness      Current Medications and Allergies:  See printed Medication Report

## 2023-05-05 NOTE — PROGRESS NOTES
[address]  Progress Note  Name: Wendie Franks  MRN: [id number]  Unit/Bed#: -01 I Date of Admission: 5/4/2023   Date of Service: 5/5/2023 I Hospital Day: 0    Assessment/Plan   * Facial nerve palsy, secondary  Assessment & Plan  · Initial concern was a stroke to explain his underlying clinical symptoms  · Acute CVA has been worked up and ruled out  · Symptoms are secondary to a progressing glioblastoma multiforme  · Case reviewed with Dr Juvenal Evans-neurology  · Will start Decadron 2 mg p o  every 12 hours  · No further inpatient testing, treatment, and or work-up is needed  · Patient to eventually follow-up in the outpatient setting with hematology oncology  · Status post a PT and OT evaluation-they recommend postacute rehabilitation services  · Case management has been notified, case management is working on placement options  · Continue all other present medical management until that point      [facility])  Assessment & Plan  · Patient normally is on Avastin  · Start steroids as outlined above  · Additional outpatient work-up as per neurology and hematology oncology in the outpatient setting    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 ([facility] )  Assessment & Plan  · Continue Keppra 1250 mg p o  twice daily    Subclinical hypothyroidism  Assessment & Plan  · Continue Synthroid    Mixed hyperlipidemia  Assessment & Plan  · Patient with mixed dyslipidemia  · Continue atorvastatin, and fenofibrate    Essential hypertension  Assessment & Plan  · We will restart Cozaar, hydrochlorothiazide, and amlodipine             VTE Prophylaxis:  SCDs only    Patient Centered Rounds: I have performed bedside rounds with nursing staff today      Discussions with Specialists or Other Care Team Provider: Neurology, case management, nursing  Education and Discussions with Family / Patient: Patient was brought up to par with the plan of care for today    Current Length of Stay: 0 day(s)    Current Patient Status: Observation   Certification Statement: The patient will continue to require additional inpatient hospital stay due to Need for placement    Discharge Plan: Patient is medically stable for discharge, case management is working on placement options    Code Status: Level 1 - Full Code    Subjective:   Patient seen, sitting up in bed, appears comfortable, no new medical complaints    Objective:     Vitals:   Temp (24hrs), Av 7 °F (36 5 °C), Min:97 4 °F (36 3 °C), Max:98 1 °F (36 7 °C)    Temp:  [97 4 °F (36 3 °C)-98 1 °F (36 7 °C)] 97 4 °F (36 3 °C)  HR:  [65-83] 79  Resp:  [13-27] 20  BP: (138-182)/() 145/101  SpO2:  [93 %-95 %] 95 %  Body mass index is 27 42 kg/m²  Input and Output Summary (last 24 hours): Intake/Output Summary (Last 24 hours) at 2023 1435  Last data filed at 2023 1255  Gross per 24 hour   Intake 100 ml   Output 350 ml   Net -250 ml       Physical Exam:   Physical Exam  Vitals and nursing note reviewed  Constitutional:       General: He is not in acute distress  Appearance: Normal appearance  He is not ill-appearing  HENT:      Head: Normocephalic and atraumatic  Nose: Nose normal    Eyes:      General:         Left eye: Discharge present  Extraocular Movements: Extraocular movements intact  Conjunctiva/sclera:      Left eye: Left conjunctiva is injected  Chemosis, exudate and hemorrhage present  Pupils: Pupils are equal, round, and reactive to light  Cardiovascular:      Rate and Rhythm: Normal rate and regular rhythm  Pulses: Normal pulses  Heart sounds: Normal heart sounds  No murmur heard  No friction rub  No gallop  Pulmonary:      Effort: Pulmonary effort is normal       Breath sounds: Normal breath sounds  Abdominal:      General: There is no distension  Palpations: Abdomen is soft  There is no mass  Tenderness: There is no abdominal tenderness  There is no guarding or rebound     Musculoskeletal: General: No swelling or tenderness  Normal range of motion  Cervical back: Normal range of motion and neck supple  No rigidity  No muscular tenderness  Right lower leg: No edema  Left lower leg: No edema  Skin:     General: Skin is warm  Capillary Refill: Capillary refill takes less than 2 seconds  Findings: No erythema or rash  Neurological:      General: No focal deficit present  Mental Status: He is alert and oriented to person, place, and time  Mental status is at baseline  Comments: Left-sided facial droop   Psychiatric:         Mood and Affect: Mood normal          Behavior: Behavior normal          Additional Data:     Labs:    Results from last 7 days   Lab Units 05/05/23  0407   WBC Thousand/uL 6 83   HEMOGLOBIN g/dL 17 9*   HEMATOCRIT % 53 1*   PLATELETS Thousands/uL 136*   NEUTROS PCT % 59   LYMPHS PCT % 29   MONOS PCT % 9   EOS PCT % 3     Results from last 7 days   Lab Units 05/05/23  0407   SODIUM mmol/L 134*   POTASSIUM mmol/L 3 8   CHLORIDE mmol/L 99   CO2 mmol/L 27   BUN mg/dL 17   CREATININE mg/dL 0 72   CALCIUM mg/dL 8 8   ALK PHOS U/L 49   ALT U/L 22   AST U/L 17     Results from last 7 days   Lab Units 05/04/23  0912   INR  0 86     Results from last 7 days   Lab Units 05/04/23  0910   POC GLUCOSE mg/dl 91           * I Have Reviewed All Lab Data Listed Above  * Additional Pertinent Lab Tests Reviewed:  [facility] Admission  Reviewed    Imaging:  Imaging Reports Reviewed Today Include: MRI brain-see the full report for additional details however increasing burden of tumor noted    Recent Cultures (last 7 days):           Last 24 Hours Medication List:   Current Facility-Administered Medications   Medication Dose Route Frequency Provider Last Rate   • amLODIPine  10 mg Oral Daily Marline Bailey MD     • artificial tear   Both Eyes 4x Daily Flakito Harper PA-C     • aspirin  81 mg Oral Daily Michelle Mackay MD • atorvastatin  10 mg Oral QPM Coni Mejia MD     • dexamethasone  2 mg Oral Q12H 2020 Candace Patrick MD     • famotidine  20 mg Oral BID Nighat Solomon MD     • fenofibrate  48 mg Oral QPM Nighat Solomon MD     • hydrochlorothiazide  12 5 mg Oral Daily Roman Donahue MD     • levETIRAcetam  1,000 mg Oral Q12H Nighat Solomon MD     • levETIRAcetam  250 mg Oral BID Nighat Solomon MD     • levothyroxine  25 mcg Oral Daily Nighat Solomon MD     • losartan  100 mg Oral Daily Roman Donahue MD          Today, Patient Was Seen By: Roman Donahue MD    ** Please Note: Dictation voice to text software may have been used in the creation of this document   **

## 2023-05-05 NOTE — OCCUPATIONAL THERAPY NOTE
"    Occupational Therapy Evaluation     Patient Name: Sammy Barroso  TPNIKHILUALEXANDR Date: 5/5/2023  Problem List  Principal Problem:    Facial nerve palsy, secondary  Active Problems:    Essential hypertension    Glioblastoma ([facility] )    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 ([facility] )    Mixed hyperlipidemia    Subclinical hypothyroidism    Past Medical History  Past Medical History:   Diagnosis Date   • Brain tumor ([facility] ) 01/05/2021   • Bruxism (teeth grinding)    • Colon polyp    • GBM (glioblastoma multiforme) (HCC)    • GERD (gastroesophageal reflux disease)    • History of cancer chemotherapy     [facility] Dr Dana Gonzalez   • History of motion sickness    • History of radiation therapy    • History of seizures     per pt and Mom \"first seizure 1/1/2021 and thats when they discovered brain tumor--and last seizure middle Jan 2021\"   • Hypercholesterolemia    • Hypertension    • Obesity    • Risk for falls    • Sleep apnea     CPAP nightly   • Tinnitus    • Tiredness     per pt \"feels from chemotherapy\"   • Umbilical hernia    • Wears glasses      Past Surgical History  Past Surgical History:   Procedure Laterality Date   • COLONOSCOPY     • [place] LUMBAR PUNCTURE DIAGNOSTIC  01/04/2021   • HAND FRACTURE REPAIR      left thumb   • TN RPR AA HERNIA 1ST < 3 CM REDUCIBLE N/A 3/14/2023    Procedure: REPAIR HERNIA VENTRAL LAPAROSCOPIC WITH MESH;  Surgeon: Blessing Delgado MD;  Location: [facility];  Service: General   • TN STRTCTC BX ASPIR/EXC PATITO ICRA LESION W/CT&I/MR Left 01/19/2021    Procedure: BIOPSY BRAIN IMAGE-GUIDED NEEDLE - LEFT TEMPORAL;  Surgeon: Serafin Reaves MD;  Location: [facility];  Service: Neurosurgery   • TONSILLECTOMY           05/05/23 0931   OT Last Visit   OT Visit Date 05/05/23   Note Type   Note type Evaluation   Additional Comments Nsg staff verbally cleared pt for OT evaluation  Pt received  supine in bed on this date reporting no pain + is agreeable to OT session @ this time   @ exit, pt remains in  seated in " "bedside recliner c all lines intact, all needs met, call bell in hand + nsg aware of location/disposition  Pain Assessment   Pain Assessment Tool 0-10   Pain Score No Pain   Restrictions/Precautions   Other Precautions Chair Alarm; Bed Alarm; Fall Risk;Multiple lines   Home Living   Type of [address] [place] [address] One level;Stairs to enter with rails  (4 GRACIE)   Bathroom Shower/Tub Walk-in shower   Bathroom Toilet Raised   Bathroom Equipment Grab bars in shower; Shower chair;Commode   Bathroom Accessibility Accessible   Home Equipment Cane;Walker; Hospital bed  (Reports primary use of cane)   Prior Function   Level of [place] Independent with ADLs; Needs assistance with functional mobility; Needs assistance with IADLS  (Mother A c fxl mobility/IADLs)   Lives With Family  (\"mother\")   Receives Help From Family; Neighbor   IADLs Family/Friend/Other provides transportation; Family/Friend/Other provides meals; Family/Friend/Other provides medication management   Falls in the last 6 months 1 to 4  (2x)   Vocational Retired  ()   Lifestyle   Autonomy Pt lives in  1 story home c mother + @ baseline, performs ADLs  I'ly, IADLs with A + fxl mobility with A with SPC  (-)   Pt is retired  Reciprocal Relationships Mother   Service to Others Retired   Subjective   Subjective \"do I have to stand up? \"   ADL   Eating Assistance 5  Supervision/Setup   Grooming Assistance 5  Supervision/Setup   UB Bathing Assistance 4  Minimal Assistance   LB Bathing Assistance 2  Maximal Assistance   UB Dressing Assistance 4  Minimal Assistance   LB Dressing Assistance 2  Maximal [address]  2  Maximal Assistance   Additional Comments Given fxl performance skills + medical complexity, therapist suspecting via clinical judgement + skilled analysis; pt currently requires stated assist above to perform each area of ADL d/t limitations including: generalized muscle weakness, sitting/standing balance deficits, fxl " activity tolerance limitations, difficulty performing bend/reach-anterior trunk flexion, requires external assist to complete transitional movements, decreased core strength, decreased postural control, instability in stance, high fall risk and decreased LUE strength  (Requires cueing c 1 step commands + time to process/ execute tasks )   Bed Mobility   Supine to Sit 5  Supervision   Additional items HOB elevated; Increased time required   Additional Comments DNT sit-sup; pt transitioned from EOB-bedside recliner  Transfers   Sit to Stand   (CGA)   Additional items Assist x 2;Armrests; Verbal cues  (Requires 1 step cueing)   Stand to Sit   (CGA)   Additional items Assist x 2;Armrests; Verbal cues  (Requires 1 step cueing)   Stand pivot 4  Minimal assistance   Additional items Assist x 2;Verbal cues; Increased time required   Functional Mobility   Functional Mobility 4  Minimal assistance   Additional Comments Pt performed short distance ADL related fxl mobility in room c min A, x 2  /RW simulating toileting distances  No overt LOB demonstrated + pt denies pain /  no SOB/ no dizziness during transitional movements  Reports feeling unsteady on BLE  F safety awareness noted while navigating t/o room  Transitions to bedside recliner for remainder of session  Balance   Static Sitting Fair +   Dynamic Sitting Fair   Static Standing Fair -   Dynamic Standing Poor +   Ambulatory Poor +   Activity Tolerance   Activity Tolerance Treatment limited secondary to medical complications (Comment)   Medical Staff Made Aware PT/OT co-eval on this date d/t medical complexity, ambulatory dysfunction c high fall risk, various impeding lines + requirement for skilled interdisciplinary analysis of appropriate d/c recommendations  PT/OT POC/goals I'ly determined per respective discipline  Adriane Tejada)   Nurse Manuel staff made aware of current fxn, recommendations for d/c planning, fall risk + pt's location upon exit   [facility]) "  RUE Assessment   RUE Assessment WNL  (4/5)   LUE Assessment   LUE Assessment X  (3+/5)   LUE Strength   L Shoulder Flexion 3+/5   L Shoulder Extension 3+/5   L Shoulder ABduction 3+/5   L Shoulder Internal Rotation 3+/5   L Elbow Flexion 3+/5   L Elbow Extension 3+/5   Hand Function   Gross Motor Coordination Functional   Fine Motor Coordination Functional   Vision-Basic Assessment   Current Vision Wears glasses all the time  (Reports changes c vision including blurred vision + diminished peripheral  Unable to identify timeframe of changes )   Cognition   Overall Cognitive Status Impaired   Arousal/Participation Alert; Cooperative   Attention Difficulty attending to directions   Orientation Level Oriented to person   Memory   (Accurate responses although delayed;)   Following Commands Unable to follow multistep commands   Comments Unable to follow 2 step commands; requires repeated trials c delayed response  Assessment   Limitation Decreased ADL status; Decreased UE strength;Decreased cognition;Decreased endurance;Decreased self-care trans;Decreased high-level ADLs   Prognosis Fair   Goals   Patient Goals \"to feel better\"   Plan   Treatment Interventions ADL retraining;Functional transfer training; Endurance training;Patient/family training; Compensatory technique education; Energy conservation; Activityengagement   Goal Expiration Date 05/15/23   OT Treatment Day 0   OT Frequency 3-5x/wk   Recommendation   OT Discharge Recommendation Post acute rehabilitation services   AM-PAC Daily Activity Inpatient   Lower Body Dressing 2   Bathing 2   Toileting 2   Upper Body Dressing 3   Grooming 3   Eating 3   Daily Activity Raw Score 15   Daily Activity Standardized Score (Calc for Raw Score >=11) 34 69   AM-PAC Applied Cognition Inpatient   Following a Speech/Presentation 3   Understanding Ordinary Conversation 2   Taking Medications 2   Remembering Where Things Are Placed or Put Away 2   Remembering List of 4-5 Errands 1 " Taking Care of Complicated Tasks 1   Applied Cognition Raw Score 11   Applied Cognition Standardized Score 27 03   Barthel Index   Feeding 5   Bathing 0   Grooming Score 0   Dressing Score 0   Bladder Score 0   Bowels Score 0   Toilet Use Score 0   Transfers (Bed/Chair) Score 5   Mobility (Level Surface) Score 0   Stairs Score 0   Barthel Index Score 10   End of Consult   Patient Position at End of Consult Bedside chair     Assessment:    Patient is a 78 y o  male seen for OT evaluation s/p admit to  [address] on 5/4/2023 w/Facial nerve palsy, secondary + commorbidities/PMHx (as listed in medical record) affecting patient's functional performance c ADL tasks at time of assessment  OT orders placed for evaluation and treatment to assess pt's ADLs, cognitive status + performance during functional tasks in order to formulate appropriate d/c recommendations  Therapist performed at least two patient identifiers during session including name and wristband  Personal factors affecting patient at time of initial evaluation include: inaccessible home environment, step(s) to enter environment, limited home support, inability to perform ADLs, inability to ambulate household distances and decreased initiation and engagement  Pt's clinical presentation is currently unstable/unpredictable given new onset deficits that effect pt's occupational performance and ability to safely return to PLOF including decrease activity tolerance, decrease standing balance, decrease sitting balance, decrease performance during ADL tasks, decrease UB MS, decrease generalized strength, decrease activity engagement and decrease performance during functional transfers combined with medical complications of hypertension , pain impacting overall mobility status, abnormal renal lab values, change in mental status, abnormal sodium values and need for input for mobility technique/safety   This evaluation required an extensive review of medical and/or therapy records and additional review of physical, cognitive and psychosocial history related to functional performance  Based upon functional performance deficits and assessments, this evaluation has been identified as a  high complexity evaluation  Patient to benefit from continued Occupational Therapy treatment while in the hospital to address aforementioned deficits and maximize level of functional independence with ADLs and functional mobility  Pt currently requires A to facilitate appropriate d/c plan and demonstrates F awareness in d/c plan  From OT standpoint, recommendation at time of d/c would be Short Term Rehab  The patient's raw score on the AM-PAC Daily Activity inpatient short form is 15, standardized score is 34 69, less than 39 4  Patients at this level are likely to benefit from DC to post-acute rehabilitation services  Please refer to the recommendation of the Occupational Therapist for safe DC planning  GOALS:    *ADL transfers with (S) for inc'd independence with ADLs/purposeful tasks    *UB ADL with (S) for inc'd independence with self cares    *LB ADL with Min (A) using AE prn for inc'd independence with self cares    *Toileting with Mod (A) for clothing management and hygiene for return to PLOF with personal care    *Increase stand tolerance x 2  m for inc'd tolerance with standing purposeful tasks    *Participate in 10m UE therex to increase overall stamina/activity tolerance for purposeful tasks    *Bed mobility- Min (A) for inc'd independence to manage own comfort and initiate EOB & OOB purposeful tasks    *Patient will verbalize 3 safety awareness/ principles to prevent falls in the home setting  *Patient will verbalize and demonstrate use of energy conservation/deep breathing techniques and work simplification skills during functional activities with no verbal cues       *Patient will increase OOB/sitting tolerance to 2-4 hours per day to increase participation in self-care and leisure tasks with no s/s of exertion  *Patient will engage in ongoing cognitive assessment to assist with safe discharge planning/recommendations       Susy Yan, OTR/L

## 2023-05-05 NOTE — ASSESSMENT & PLAN NOTE
The combination of facial nucleus/peripheral facial nerve distribution weakness on the left-hand side along with a left 6th nerve palsy is most consistent with dysfunction at the level of the facial nucleus in the miguel rather than Bell's palsy  There is clear evidence of abnormal MRI signal in that region concerning for tumor progression   -Recommend either artificial tears or Lacri-Lube ophthalmic ointment to the left eye with gauze and tape and/or eye patch to maintain eye closure particularly during sleep    This will help to avoid over drying of the left cornea/corneal abrasion  -Consideration could be given to physical therapy to assist with strengthening as an outpatient

## 2023-05-05 NOTE — TELEMEDICINE
TeleConsultation - Neurology   Cindy Silva 78 y o  male MRN: [id number]  Unit/Bed#: ED 21 Encounter: [id number]        REQUIRED DOCUMENTATION:     1  This service was provided via Telemedicine  2  Provider located at [employer]  3  TeleMed provider: Abimael Rowe MD   4  Identify all parties in room with patient during tele consult:  None  5  Patient was then informed that this was a Telemedicine visit and that the exam was being conducted confidentially over secure lines  My office door was closed  No one else was in the room  Patient acknowledged consent and understanding of privacy and security of the Telemedicine visit, and gave us permission to have the assistant stay in the room in order to assist with the history and to conduct the exam   I informed the patient that I have reviewed their record in Epic and presented the opportunity for them to ask any questions regarding the visit today  The patient agreed to participate  Assessment/Plan     Glioblastoma ([facility] [address] )  Assessment & Plan  I was able to review the MRI directly with radiology and unfortunately this shows ongoing progression compared with his prior scan from March  I was able to coordinate directly with his oncologist, Dr Jolie Vo, who will review with the patient and his family in the office what next steps might be  -We will plan for dexamethasone 6 mg IV x1 in the hospital  -We will clarify if he has been on any dexamethasone at home, and if not we will begin low-dose dexamethasone at 2 mg every 12 hours to determine if this might be somewhat beneficial  -Prior to discharge we will ensure that he is not having any significant dysphagia that would impact his ability to swallow and maintain hydration/nutrition/medication compliance  -His gait/fall risk should also be evaluated prior to discharge if possible  -Progression of glioblastoma tends to represent a poor prognosis neurologically speaking    Our team will be happy to continue working with the patient and his care team to provide any additional insight or guidance  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 [facility])  Assessment & Plan  At this time there is no report of breakthrough seizure  -Maintain typical seizure precautions including avoiding alcohol and long periods of insomnia  -Continue levetiracetam 1250 mg every 12 hours  -We will plan for outpatient follow-up in epilepsy clinic    Essential hypertension  Assessment & Plan  Target normotension at the discretion of the primary team    * Facial nerve palsy, secondary  Assessment & Plan  The combination of facial nucleus/peripheral facial nerve distribution weakness on the left-hand side along with a left 6th nerve palsy is most consistent with dysfunction at the level of the facial nucleus in the miguel rather than Bell's palsy  There is clear evidence of abnormal MRI signal in that region concerning for tumor progression   -Recommend either artificial tears or Lacri-Lube ophthalmic ointment to the left eye with gauze and tape and/or eye patch to maintain eye closure particularly during sleep  This will help to avoid over drying of the left cornea/corneal abrasion  -Consideration could be given to physical therapy to assist with strengthening as an outpatient      Consider follow-up in the next 4-6 weeks with the epilepsy group inasmuch as they have been following him, or general neurology/resident clinic  He will not need additional outpatient testing  History of Present Illness     Reason for Consult / Principal Problem: L facial droop  Hx and PE limited by: None  HPI: Liana Rosenberg is a 78 y o   male who presents with a L facial droop  The patient reports that over the last 2 days he has experienced some left facial droop and overall just feels unwell but he cannot really elucidate further    Note that he has some difficulty speaking which is absolutely to be expected given his history of a left temporal glioblastoma status post resection  Reportedly he had been doing quite well for about 2 years as of December 2022  His Avastin treatment had been held for 1 month leading up to a hernia surgery in March 2023, and at that MRI there was some new progression  It was hoped that this progression could possibly be attributed to holding of his Avastin and the treatment was restarted which she received in April  When he arrived for his treatment in May he was noted to have a left facial droop and there may have been some difficulty with walking  He was then sent to the emergency room and evaluated yesterday as a stroke alert  Please see the stroke alert note for additional details  Dl Mason reports that his difficulty with walking is related to weakness as well as loss of balance and difficulty with vision  He notes some intermittent eye pain  He did not endorse headaches or numbness/weakness in the arms and legs  He did feel like his speech was altered  I personally reviewed his MRI and reviewed it directly with neuroradiology  There is evidence of additional tumor progression since March and multiple areas of the brain including the right frontal lobe and the original resection cavity  There is, however, also evidence of increased T2/flair signal in the left miguel/brainstem with very minimal evidence of any diffusion restriction and some mild enhancement, there is also some mass effect with brain compression  This is most suggestive of an infiltrative process as opposed to stroke/bland inflammation  Most likely this represents multifocal glioblastoma  I spoke directly with his oncologist, Dr Abhijit Bernard, and they will review treatment options/his plan of care in the office      Inpatient consult to Neurology  Consult performed by: Nubia Sanchez MD  Consult ordered by: Konrad Bhandari MD    Consult to Neurology  Consult performed by: Nubia Sanchze MD  Consult ordered by: Rodrick Shepherd,            Review "of Systems as above    Historical Information   Past Medical History:   Diagnosis Date   • Brain tumor ([address] ) 2021   • Bruxism (teeth grinding)    • Colon polyp    • GBM (glioblastoma multiforme) (HCC)    • GERD (gastroesophageal reflux disease)    • History of cancer chemotherapy     [facility] Dr Aly Garcia   • History of motion sickness    • History of radiation therapy    • History of seizures     per pt and Mom \"first seizure 2021 and thats when they discovered brain tumor--and last seizure middle 2021\"   • Hypercholesterolemia    • Hypertension    • Obesity    • Risk for falls    • Sleep apnea     CPAP nightly   • Tinnitus    • Tiredness     per pt \"feels from chemotherapy\"   • Umbilical hernia    • Wears glasses      Past Surgical History:   Procedure Laterality Date   • COLONOSCOPY     • [place] LUMBAR PUNCTURE DIAGNOSTIC  2021   • HAND FRACTURE REPAIR      left thumb   • [place] RPR AA HERNIA 1ST < 3 CM REDUCIBLE N/A 3/14/2023    Procedure: REPAIR HERNIA VENTRAL LAPAROSCOPIC WITH MESH;  Surgeon: Sandie Miramontes MD;  Location: [facility];  Service: General   • MN STRTCTC BX ASPIR/EXC PATITO ICRA LESION W/CT&I/MR Left 2021    Procedure: BIOPSY BRAIN IMAGE-GUIDED NEEDLE - LEFT TEMPORAL;  Surgeon: Patsy Felton MD;  Location: [facility];  Service: Neurosurgery   • TONSILLECTOMY       Social History   Social History     Substance and Sexual Activity   Alcohol Use Not Currently     Social History     Substance and Sexual Activity   Drug Use Not Currently     E-Cigarette/Vaping   • E-Cigarette Use Never User      E-Cigarette/Vaping Substances   • Nicotine No    • THC No    • CBD No    • Flavoring No    • Other No    • Unknown No      Social History     Tobacco Use   Smoking Status Former   • Types: Cigarettes   • Quit date:    • Years since quittin 3   Smokeless Tobacco Never     Family History:   Family History   Problem Relation Age of Onset   • Heart disease Mother    • Heart attack Father " "   • Leukemia Brother        Review of previous medical records was completed as above    Meds/Allergies   current meds:   Current Facility-Administered Medications   Medication Dose Route Frequency   • artificial tear (LUBRIFRESH P M ) ophthalmic ointment   Both Eyes 4x Daily   • aspirin (ECOTRIN LOW STRENGTH) EC tablet 81 mg  81 mg Oral Daily   • atorvastatin (LIPITOR) tablet 10 mg  10 mg Oral QPM   • dexamethasone (PF) (DECADRON) injection 6 mg  6 mg Intravenous Once   • famotidine (PEPCID) tablet 20 mg  20 mg Oral BID   • fenofibrate (TRICOR) tablet 48 mg  48 mg Oral QPM   • levETIRAcetam (KEPPRA) tablet 1,000 mg  1,000 mg Oral Q12H   • levETIRAcetam (KEPPRA) tablet 250 mg  250 mg Oral BID   • levothyroxine tablet 25 mcg  25 mcg Oral Daily       No Known Allergies    Objective   Vitals:Blood pressure 142/97, pulse 83, temperature 97 5 °F (36 4 °C), temperature source Oral, resp  rate 20, height 6' 3\" (1 905 m), weight 99 5 kg (219 lb 5 7 oz), SpO2 95 %  ,Body mass index is 27 42 kg/m²  Intake/Output Summary (Last 24 hours) at 5/5/2023 0916  Last data filed at 5/4/2023 2230  Gross per 24 hour   Intake 100 ml   Output 200 ml   Net -100 ml       Invasive Devices: Invasive Devices     Peripheral Intravenous Line  Duration           Peripheral IV 05/04/23 Distal;Left;Upper;Ventral (anterior) Arm 1 day                Physical Exam  Neurologic Exam     At the time of my examination he was awake, alert, and in no distress  There was mild dysarthric speech  Speech was somewhat bradykinetic with a relatively flat tone but not terribly hypophonic  Pattern was somewhat halting and effortful consistent with mild expressive aphasia  There were a few word substitutions  Extraocular movement testing reveals dysfunction of the left lateral rectus (left 6th nerve palsy)    With blinking the left eye does not close all the way, the left brow does not raise and there is also a left lower facial droop (facial " nucleus/peripheral facial nerve distribution weakness)  Sensation was symmetric in the bilateral face, hearing was symmetrically intact  There was no drift  There was very minimal if any postural tremor while holding a weighted object at length  There was no obvious loss of coordination on brief finger-to-nose testing  Lab Results:   CBC:   Results from last 7 days   Lab Units 05/05/23  0407 05/04/23  0912   WBC Thousand/uL 6 83 7 66   RBC Million/uL 5 95* 6 16*   HEMOGLOBIN g/dL 17 9* 18 8*   HEMATOCRIT % 53 1* 55 8*   MCV fL 89 91   PLATELETS Thousands/uL 136* 143*   , BMP/CMP:   Results from last 7 days   Lab Units 05/05/23  0407 05/04/23  0912   SODIUM mmol/L 134* 134*   POTASSIUM mmol/L 3 8 3 9   CHLORIDE mmol/L 99 97   CO2 mmol/L 27 31   BUN mg/dL 17 17   CREATININE mg/dL 0 72 0 79   CALCIUM mg/dL 8 8 9 3   AST U/L 17  --    ALT U/L 22  --    ALK PHOS U/L 49  --    EGFR ml/min/1 73sq m 97 93   , HgBA1C:   , TSH:   , Coagulation:   Results from last 7 days   Lab Units 05/04/23  0912   INR  0 86   , Lipid Profile:   Results from last 7 days   Lab Units 05/05/23  0407   HDL mg/dL 38*   LDL CALC mg/dL 87   TRIGLYCERIDES mg/dL 147     Imaging Studies: I have personally reviewed pertinent films in PACS with a Radiologist   VTE Prophylaxis: Recommend ambulation    Code Status: Level 1 - Full Code  Advance Directive and Living Will:      Power of :    POLST:      Counseling / Coordination of Care  I have spent a total time of 80 minutes on 05/05/23 in caring for this patient including Diagnostic results, Prognosis, Risks and benefits of tx options, Instructions for management, Impressions, Counseling / Coordination of care, Documenting in the medical record, Reviewing / ordering tests, medicine, procedures  , Obtaining or reviewing history   and Communicating with other healthcare professionals

## 2023-05-06 LAB
EST. AVERAGE GLUCOSE BLD GHB EST-MCNC: 114 MG/DL
HBA1C MFR BLD: 5.6 %
LEVETIRACETAM SERPL-MCNC: 20.3 UG/ML (ref 10–40)

## 2023-05-06 RX ADMIN — WHITE PETROLATUM 57.7 %-MINERAL OIL 31.9 % EYE OINTMENT: at 09:34

## 2023-05-06 RX ADMIN — FAMOTIDINE 20 MG: 20 TABLET, FILM COATED ORAL at 17:21

## 2023-05-06 RX ADMIN — LEVETIRACETAM 1000 MG: 500 TABLET, FILM COATED ORAL at 22:47

## 2023-05-06 RX ADMIN — ATORVASTATIN CALCIUM 10 MG: 10 TABLET, FILM COATED ORAL at 17:21

## 2023-05-06 RX ADMIN — DEXAMETHASONE 2 MG: 0.5 TABLET ORAL at 21:34

## 2023-05-06 RX ADMIN — LEVOTHYROXINE SODIUM 25 MCG: 25 TABLET ORAL at 05:10

## 2023-05-06 RX ADMIN — HYDROCHLOROTHIAZIDE 12.5 MG: 12.5 TABLET ORAL at 09:32

## 2023-05-06 RX ADMIN — LOSARTAN POTASSIUM 100 MG: 50 TABLET, FILM COATED ORAL at 09:32

## 2023-05-06 RX ADMIN — DEXAMETHASONE 2 MG: 0.5 TABLET ORAL at 09:39

## 2023-05-06 RX ADMIN — ASPIRIN 81 MG: 81 TABLET, COATED ORAL at 09:32

## 2023-05-06 RX ADMIN — FAMOTIDINE 20 MG: 20 TABLET, FILM COATED ORAL at 09:32

## 2023-05-06 RX ADMIN — LEVETIRACETAM 1000 MG: 500 TABLET, FILM COATED ORAL at 12:04

## 2023-05-06 RX ADMIN — WHITE PETROLATUM 57.7 %-MINERAL OIL 31.9 % EYE OINTMENT 1 APPLICATION.: at 21:34

## 2023-05-06 RX ADMIN — WHITE PETROLATUM 57.7 %-MINERAL OIL 31.9 % EYE OINTMENT: at 12:06

## 2023-05-06 RX ADMIN — WHITE PETROLATUM 57.7 %-MINERAL OIL 31.9 % EYE OINTMENT: at 17:21

## 2023-05-06 RX ADMIN — LEVETIRACETAM 250 MG: 250 TABLET, FILM COATED ORAL at 17:21

## 2023-05-06 RX ADMIN — AMLODIPINE BESYLATE 10 MG: 10 TABLET ORAL at 09:32

## 2023-05-06 RX ADMIN — LEVETIRACETAM 250 MG: 250 TABLET, FILM COATED ORAL at 09:33

## 2023-05-06 RX ADMIN — FENOFIBRATE 48 MG: 48 TABLET, FILM COATED ORAL at 17:21

## 2023-05-06 NOTE — ASSESSMENT & PLAN NOTE
· Patient normally is on Avastin  · Continue steroids  · May benefit from an inpatient palliative care evaluation prior to discharge  · Attempts to call the patient's mother today proved futile  · Additional outpatient work-up as per neurology and hematology oncology in the outpatient setting

## 2023-05-06 NOTE — ASSESSMENT & PLAN NOTE
· Initial concern was a stroke to explain his underlying clinical symptoms  · Acute CVA has been worked up and ruled out  · Symptoms are secondary to a progressing glioblastoma multiforme  · Status post a neurology evaluation  · Continue dexamethasone 2 mg p o  every 12 hours  · No further inpatient testing, treatment, and or work-up is needed  · Patient to eventually follow-up in the outpatient setting with hematology oncology  · Status post a PT and OT evaluation-they recommend postacute rehabilitation services  · Case management has has notified us that placement has been set up for Monday, 5/8/2023 at the [address] [place] [address]  · Continue all other present medical management until that point

## 2023-05-06 NOTE — PLAN OF CARE
Problem: Potential for Falls  Goal: Patient will remain free of falls  Description: INTERVENTIONS:  - Educate patient/family on patient safety including physical limitations  - Instruct patient to call for assistance with activity   - Consult OT/PT to assist with strengthening/mobility   - Keep Call bell within reach  - Keep bed low and locked with side rails adjusted as appropriate  - Keep care items and personal belongings within reach  - Initiate and maintain comfort rounds  - Make Fall Risk Sign visible to staff  - Offer Toileting every 2 Hours, in advance of need  - Initiate/Maintain bedalarm  - Obtain necessary fall risk management equipment:   - Apply yellow socks and bracelet for high fall risk patients  - Consider moving patient to room near nurses station  Outcome: Progressing     Problem: MOBILITY - ADULT  Goal: Maintain or return to baseline ADL function  Description: INTERVENTIONS:  -  Assess patient's ability to carry out ADLs; assess patient's baseline for ADL function and identify physical deficits which impact ability to perform ADLs (bathing, care of mouth/teeth, toileting, grooming, dressing, etc )  - Assess/evaluate cause of self-care deficits   - Assess range of motion  - Assess patient's mobility; develop plan if impaired  - Assess patient's need for assistive devices and provide as appropriate  - Encourage maximum independence but intervene and supervise when necessary  - Involve family in performance of ADLs  - Assess for home care needs following discharge   - Consider OT consult to assist with ADL evaluation and planning for discharge  - Provide patient education as appropriate  Outcome: Progressing  Goal: Maintains/Returns to pre admission functional level  Description: INTERVENTIONS:  - Perform BMAT or MOVE assessment daily    - Set and communicate daily mobility goal to care team and patient/family/caregiver     - Collaborate with rehabilitation services on mobility goals if consulted  - Perform Range of Motion 3 times a day  - Reposition patient every 2 hours  - Dangle patient 3 times a day  - Stand patient 3 times a day  - Ambulate patient 3 times a day  - Out of bed to chair 3 times a day   - Out of bed for meals 3 times a day  - Out of bed for toileting  - Record patient progress and toleration of activity level   Outcome: Progressing     Problem: NEUROSENSORY - ADULT  Goal: Achieves stable or improved neurological status  Description: INTERVENTIONS  - Monitor and report changes in neurological status  - Monitor vital signs such as temperature, blood pressure, glucose, and any other labs ordered   - Initiate measures to prevent increased intracranial pressure  - Monitor for seizure activity and implement precautions if appropriate      Outcome: Progressing  Goal: Remains free of injury related to seizures activity  Description: INTERVENTIONS  - Maintain airway, patient safety  and administer oxygen as ordered  - Monitor patient for seizure activity, document and report duration and description of seizure to physician/advanced practitioner  - If seizure occurs,  ensure patient safety during seizure  - Reorient patient post seizure  - Seizure pads on all 4 side rails  - Instruct patient/family to notify RN of any seizure activity including if an aura is experienced  - Instruct patient/family to call for assistance with activity based on nursing assessment  - Administer anti-seizure medications if ordered    Outcome: Progressing  Goal: Achieves maximal functionality and self care  Description: INTERVENTIONS  - Monitor swallowing and airway patency with patient fatigue and changes in neurological status  - Encourage and assist patient to increase activity and self care     - Encourage visually impaired, hearing impaired and aphasic patients to use assistive/communication devices  Outcome: Progressing     Problem: CARDIOVASCULAR - ADULT  Goal: Maintains optimal cardiac output and hemodynamic stability  Description: INTERVENTIONS:  - Monitor I/O, vital signs and rhythm  - Monitor for S/S and trends of decreased cardiac output  - Administer and titrate ordered vasoactive medications to optimize hemodynamic stability  - Assess quality of pulses, skin color and temperature  - Assess for signs of decreased coronary artery perfusion  - Instruct patient to report change in severity of symptoms  Outcome: Progressing  Goal: Absence of cardiac dysrhythmias or at baseline rhythm  Description: INTERVENTIONS:  - Continuous cardiac monitoring, vital signs, obtain 12 lead EKG if ordered  - Administer antiarrhythmic and heart rate control medications as ordered  - Monitor electrolytes and administer replacement therapy as ordered  Outcome: Progressing     Problem: RESPIRATORY - ADULT  Goal: Achieves optimal ventilation and oxygenation  Description: INTERVENTIONS:  - Assess for changes in respiratory status  - Assess for changes in mentation and behavior  - Position to facilitate oxygenation and minimize respiratory effort  - Oxygen administered by appropriate delivery if ordered  - Initiate smoking cessation education as indicated  - Encourage broncho-pulmonary hygiene including cough, deep breathe, Incentive Spirometry  - Assess the need for suctioning and aspirate as needed  - Assess and instruct to report SOB or any respiratory difficulty  - Respiratory Therapy support as indicated  Outcome: Progressing     Problem: GASTROINTESTINAL - ADULT  Goal: Minimal or absence of nausea and/or vomiting  Description: INTERVENTIONS:  - Administer IV fluids if ordered to ensure adequate hydration  - Maintain NPO status until nausea and vomiting are resolved  - Nasogastric tube if ordered  - Administer ordered antiemetic medications as needed  - Provide nonpharmacologic comfort measures as appropriate  - Advance diet as tolerated, if ordered  - Consider nutrition services referral to assist patient with adequate nutrition and appropriate food choices  Outcome: Progressing  Goal: Maintains or returns to baseline bowel function  Description: INTERVENTIONS:  - Assess bowel function  - Encourage oral fluids to ensure adequate hydration  - Administer IV fluids if ordered to ensure adequate hydration  - Administer ordered medications as needed  - Encourage mobilization and activity  - Consider nutritional services referral to assist patient with adequate nutrition and appropriate food choices  Outcome: Progressing  Goal: Maintains adequate nutritional intake  Description: INTERVENTIONS:  - Monitor percentage of each meal consumed  - Identify factors contributing to decreased intake, treat as appropriate  - Assist with meals as needed  - Monitor I&O, weight, and lab values if indicated  - Obtain nutrition services referral as needed  Outcome: Progressing  Goal: Establish and maintain optimal ostomy function  Description: INTERVENTIONS:  - Assess bowel function  - Encourage oral fluids to ensure adequate hydration  - Administer IV fluids if ordered to ensure adequate hydration   - Administer ordered medications as needed  - Encourage mobilization and activity  - Nutrition services referral to assist patient with appropriate food choices  - Assess stoma site  - Consider wound care consult   Outcome: Progressing  Goal: Oral mucous membranes remain intact  Description: INTERVENTIONS  - Assess oral mucosa and hygiene practices  - Implement preventative oral hygiene regimen  - Implement oral medicated treatments as ordered  - Initiate Nutrition services referral as needed  Outcome: Progressing     Problem: GENITOURINARY - ADULT  Goal: Maintains or returns to baseline urinary function  Description: INTERVENTIONS:  - Assess urinary function  - Encourage oral fluids to ensure adequate hydration if ordered  - Administer IV fluids as ordered to ensure adequate hydration  - Administer ordered medications as needed  - Offer frequent toileting  - Follow urinary retention protocol if ordered  Outcome: Progressing  Goal: Absence of urinary retention  Description: INTERVENTIONS:  - Assess patient’s ability to void and empty bladder  - Monitor I/O  - Bladder scan as needed  - Discuss with physician/AP medications to alleviate retention as needed  - Discuss catheterization for long term situations as appropriate  Outcome: Progressing  Goal: Urinary catheter remains patent  Description: INTERVENTIONS:  - Assess patency of urinary catheter  - If patient has a chronic howell, consider changing catheter if non-functioning  - Follow guidelines for intermittent irrigation of non-functioning urinary catheter  Outcome: Progressing     Problem: METABOLIC, FLUID AND ELECTROLYTES - ADULT  Goal: Electrolytes maintained within normal limits  Description: INTERVENTIONS:  - Monitor labs and assess patient for signs and symptoms of electrolyte imbalances  - Administer electrolyte replacement as ordered  - Monitor response to electrolyte replacements, including repeat lab results as appropriate  - Instruct patient on fluid and nutrition as appropriate  Outcome: Progressing  Goal: Fluid balance maintained  Description: INTERVENTIONS:  - Monitor labs   - Monitor I/O and WT  - Instruct patient on fluid and nutrition as appropriate  - Assess for signs & symptoms of volume excess or deficit  Outcome: Progressing  Goal: Glucose maintained within target range  Description: INTERVENTIONS:  - Monitor Blood Glucose as ordered  - Assess for signs and symptoms of hyperglycemia and hypoglycemia  - Administer ordered medications to maintain glucose within target range  - Assess nutritional intake and initiate nutrition service referral as needed  Outcome: Progressing     PProblem: HEMATOLOGIC - ADULT  Goal: Maintains hematologic stability  Description: INTERVENTIONS  - Assess for signs and symptoms of bleeding or hemorrhage  - Monitor labs  - Administer supportive blood products/factors as ordered and appropriate  Outcome: Progressing     Problem: MUSCULOSKELETAL - ADULT  Goal: Maintain or return mobility to safest level of function  Description: INTERVENTIONS:  - Assess patient's ability to carry out ADLs; assess patient's baseline for ADL function and identify physical deficits which impact ability to perform ADLs (bathing, care of mouth/teeth, toileting, grooming, dressing, etc )  - Assess/evaluate cause of self-care deficits   - Assess range of motion  - Assess patient's mobility  - Assess patient's need for assistive devices and provide as appropriate  - Encourage maximum independence but intervene and supervise when necessary  - Involve family in performance of ADLs  - Assess for home care needs following discharge   - Consider OT consult to assist with ADL evaluation and planning for discharge  - Provide patient education as appropriate  Outcome: Progressing  Goal: Maintain proper alignment of affected body part  Description: INTERVENTIONS:  - Support, maintain and protect limb and body alignment  - Provide patient/ family with appropriate education  Outcome: Progressing     Problem: Prexisting or High Potential for Compromised Skin Integrity  Goal: Skin integrity is maintained or improved  Description: INTERVENTIONS:  - Identify patients at risk for skin breakdown  - Assess and monitor skin integrity  - Assess and monitor nutrition and hydration status  - Monitor labs   - Assess for incontinence   - Turn and reposition patient  - Assist with mobility/ambulation  - Relieve pressure over bony prominences  - Avoid friction and shearing  - Provide appropriate hygiene as needed including keeping skin clean and dry  - Evaluate need for skin moisturizer/barrier cream  - Collaborate with interdisciplinary team   - Patient/family teaching  - Consider wound care consult   Outcome: Progressing     Problem: Nutrition/Hydration-ADULT  Goal: Nutrient/Hydration intake appropriate for improving, restoring or maintaining nutritional needs  Description: Monitor and assess patient's nutrition/hydration status for malnutrition  Collaborate with interdisciplinary team and initiate plan and interventions as ordered  Monitor patient's weight and dietary intake as ordered or per policy  Utilize nutrition screening tool and intervene as necessary  Determine patient's food preferences and provide high-protein, high-caloric foods as appropriate       INTERVENTIONS:  - Monitor oral intake, urinary output, labs, and treatment plans  - Assess nutrition and hydration status and recommend course of action  - Evaluate amount of meals eaten  - Assist patient with eating if necessary   - Allow adequate time for meals  - Recommend/ encourage appropriate diets, oral nutritional supplements, and vitamin/mineral supplements  - Order, calculate, and assess calorie counts as needed  - Recommend, monitor, and adjust tube feedings and TPN/PPN based on assessed needs  - Assess need for intravenous fluids  - Provide specific nutrition/hydration education as appropriate  - Include patient/family/caregiver in decisions related to nutrition  Outcome: Progressing

## 2023-05-06 NOTE — PROGRESS NOTES
[facility]  Progress Note  Name: Santino Myers  MRN: [id number]  Unit/Bed#: -01 I Date of Admission: 5/4/2023   Date of Service: 5/6/2023 I Hospital Day: 1    Assessment/Plan   * Facial nerve palsy, secondary  Assessment & Plan  · Initial concern was a stroke to explain his underlying clinical symptoms  · Acute CVA has been worked up and ruled out  · Symptoms are secondary to a progressing glioblastoma multiforme  · Status post a neurology evaluation  · Continue dexamethasone 2 mg p o  every 12 hours  · No further inpatient testing, treatment, and or work-up is needed  · Patient to eventually follow-up in the outpatient setting with hematology oncology  · Status post a PT and OT evaluation-they recommend postacute rehabilitation services  · Case management has has notified us that placement has been set up for Monday, 5/8/2023 at the [address] [place] [address]  · Continue all other present medical management until that point      [facility])  Assessment & Plan  · Patient normally is on Avastin  · Continue steroids  · May benefit from an inpatient palliative care evaluation prior to discharge  · Attempts to call the patient's mother today proved futile  · Additional outpatient work-up as per neurology and hematology oncology in the outpatient setting    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 [facility])  Assessment & Plan  · Continue Keppra 1250 mg p o  twice daily    Essential hypertension  Assessment & Plan  · Continue Cozaar, hydrochlorothiazide, and amlodipine    Mixed hyperlipidemia  Assessment & Plan  · Patient with mixed dyslipidemia  · Continue atorvastatin, and fenofibrate    Subclinical hypothyroidism  Assessment & Plan  · Continue Synthroid             VTE Prophylaxis:  SCDs only    Patient Centered Rounds: I have performed bedside rounds with nursing staff today      Discussions with Specialists or Other Care Team Provider: Case management, nursing  Education and Discussions with Family / Patient: Attempts to call the patient's mother at the home line-no answer, on her cell phone-no answer and she does not have voicemail set up    Current Length of Stay: 1 day(s)    Current Patient Status: Inpatient   Certification Statement: The patient will continue to require additional inpatient hospital stay due to The need for placement    Discharge Plan: Discharge planning for Monday, 2023    Code Status: Level 1 - Full Code    Subjective:   Patient seen and examined, resting in bed, complains of dry eyes     Objective:     Vitals:   Temp (24hrs), Av 8 °F (36 6 °C), Min:97 °F (36 1 °C), Max:98 6 °F (37 °C)    Temp:  [97 °F (36 1 °C)-98 6 °F (37 °C)] 97 °F (36 1 °C)  HR:  [] 78  Resp:  [18-20] 18  BP: (130-165)/() 138/85  SpO2:  [89 %-95 %] 95 %  Body mass index is 27 2 kg/m²  Input and Output Summary (last 24 hours): Intake/Output Summary (Last 24 hours) at 2023 0924  Last data filed at 2023 0840  Gross per 24 hour   Intake 480 ml   Output 1250 ml   Net -770 ml       Physical Exam:   Physical Exam  Vitals and nursing note reviewed  Constitutional:       General: He is not in acute distress  Appearance: Normal appearance  He is not ill-appearing  HENT:      Head: Normocephalic and atraumatic  Ears:      Comments: Improved left eye redness     Nose: Nose normal    Eyes:      Extraocular Movements: Extraocular movements intact  Pupils: Pupils are equal, round, and reactive to light  Cardiovascular:      Rate and Rhythm: Normal rate and regular rhythm  Pulses: Normal pulses  Heart sounds: Normal heart sounds  No murmur heard  No friction rub  No gallop  Pulmonary:      Effort: Pulmonary effort is normal       Breath sounds: Normal breath sounds  Abdominal:      General: There is no distension  Palpations: Abdomen is soft  There is no mass  Tenderness: There is no abdominal tenderness  There is no guarding or rebound  Musculoskeletal:         General: No swelling or tenderness  Normal range of motion  Cervical back: Normal range of motion and neck supple  No rigidity  No muscular tenderness  Right lower leg: No edema  Left lower leg: No edema  Skin:     General: Skin is warm  Capillary Refill: Capillary refill takes less than 2 seconds  Findings: No erythema or rash  Neurological:      General: No focal deficit present  Mental Status: He is alert and oriented to person, place, and time  Mental status is at baseline  Comments: Mildly dysarthric   Psychiatric:         Mood and Affect: Mood normal          Behavior: Behavior normal          Additional Data:     Labs:    Results from last 7 days   Lab Units 05/05/23  0407   WBC Thousand/uL 6 83   HEMOGLOBIN g/dL 17 9*   HEMATOCRIT % 53 1*   PLATELETS Thousands/uL 136*   NEUTROS PCT % 59   LYMPHS PCT % 29   MONOS PCT % 9   EOS PCT % 3     Results from last 7 days   Lab Units 05/05/23  0407   SODIUM mmol/L 134*   POTASSIUM mmol/L 3 8   CHLORIDE mmol/L 99   CO2 mmol/L 27   BUN mg/dL 17   CREATININE mg/dL 0 72   CALCIUM mg/dL 8 8   ALK PHOS U/L 49   ALT U/L 22   AST U/L 17     Results from last 7 days   Lab Units 05/04/23  0912   INR  0 86     Results from last 7 days   Lab Units 05/04/23  0910   POC GLUCOSE mg/dl 91           * I Have Reviewed All Lab Data Listed Above  * Additional Pertinent Lab Tests Reviewed:  [facility] Admission  Reviewed    Imaging:  Imaging Reports Reviewed Today Include: None    Recent Cultures (last 7 days):           Last 24 Hours Medication List:   Current Facility-Administered Medications   Medication Dose Route Frequency Provider Last Rate   • amLODIPine  10 mg Oral Daily Erika Teixeira MD     • artificial tear   Both Eyes 4x Daily Lucia Erickson PA-C     • aspirin  81 mg Oral Daily Mesha Malhotra MD     • atorvastatin  10 mg Oral QPM Angelo Perez MD     • dexamethasone 2 mg Oral Q12H 2020 Candace Patrick MD     • famotidine  20 mg Oral BID Gaetano Carpenter MD     • fenofibrate  48 mg Oral QPM Gaetano Carpenter MD     • hydrALAZINE  10 mg Intravenous Q6H PRN Prosper Hess PA-C     • hydrochlorothiazide  12 5 mg Oral Daily Claudeen Amy, MD     • levETIRAcetam  1,000 mg Oral Q12H Gaetano Carpenter MD     • levETIRAcetam  250 mg Oral BID Gaetano Carpenter MD     • levothyroxine  25 mcg Oral Daily Gaetano Carpenter MD     • losartan  100 mg Oral Daily Claudeen Amy, MD          Today, Patient Was Seen By: Claudeen Amy, MD    ** Please Note: Dictation voice to text software may have been used in the creation of this document   **

## 2023-05-06 NOTE — NURSING NOTE
Pt encouraged to get oob for dinner but cont to refuse at this time, pt repositioned with good skin care done

## 2023-05-06 NOTE — PLAN OF CARE
Problem: Potential for Falls  Goal: Patient will remain free of falls  Description: INTERVENTIONS:  - Educate patient/family on patient safety including physical limitations  - Instruct patient to call for assistance with activity   - Consult OT/PT to assist with strengthening/mobility   - Keep Call bell within reach  - Keep bed low and locked with side rails adjusted as appropriate  - Keep care items and personal belongings within reach  - Initiate and maintain comfort rounds  - Make Fall Risk Sign visible to staff  - Offer Toileting every  Hours, in advance of need  - Initiate/Maintain bed alarm  - Obtain necessary fall risk management equipment: bed   - Apply yellow socks and bracelet for high fall risk patients  - Consider moving patient to room near nurses station  Outcome: Progressing     Problem: MOBILITY - ADULT  Goal: Maintain or return to baseline ADL function  Description: INTERVENTIONS:  -  Assess patient's ability to carry out ADLs; assess patient's baseline for ADL function and identify physical deficits which impact ability to perform ADLs (bathing, care of mouth/teeth, toileting, grooming, dressing, etc )  - Assess/evaluate cause of self-care deficits   - Assess range of motion  - Assess patient's mobility; develop plan if impaired  - Assess patient's need for assistive devices and provide as appropriate  - Encourage maximum independence but intervene and supervise when necessary  - Involve family in performance of ADLs  - Assess for home care needs following discharge   - Consider OT consult to assist with ADL evaluation and planning for discharge  - Provide patient education as appropriate  Outcome: Progressing  Goal: Maintains/Returns to pre admission functional level  Description: INTERVENTIONS:  - Perform BMAT or MOVE assessment daily    - Set and communicate daily mobility goal to care team and patient/family/caregiver     - Collaborate with rehabilitation services on mobility goals if consulted  - Perform Range of Motion 3 times a day  - Reposition patient every 2 hours  - Dangle patient 3 times a day  - Stand patient 3 times a day  - Ambulate patient 3 times a day  - Out of bed to chair 3 times a day   - Out of bed for meals 3 times a day  - Out of bed for toileting  - Record patient progress and toleration of activity level   Outcome: Progressing     Problem: NEUROSENSORY - ADULT  Goal: Achieves stable or improved neurological status  Description: INTERVENTIONS  - Monitor and report changes in neurological status  - Monitor vital signs such as temperature, blood pressure, glucose, and any other labs ordered   - Initiate measures to prevent increased intracranial pressure  - Monitor for seizure activity and implement precautions if appropriate      Outcome: Progressing  Goal: Remains free of injury related to seizures activity  Description: INTERVENTIONS  - Maintain airway, patient safety  and administer oxygen as ordered  - Monitor patient for seizure activity, document and report duration and description of seizure to physician/advanced practitioner  - If seizure occurs,  ensure patient safety during seizure  - Reorient patient post seizure  - Seizure pads on all 4 side rails  - Instruct patient/family to notify RN of any seizure activity including if an aura is experienced  - Instruct patient/family to call for assistance with activity based on nursing assessment  - Administer anti-seizure medications if ordered    Outcome: Progressing  Goal: Achieves maximal functionality and self care  Description: INTERVENTIONS  - Monitor swallowing and airway patency with patient fatigue and changes in neurological status  - Encourage and assist patient to increase activity and self care     - Encourage visually impaired, hearing impaired and aphasic patients to use assistive/communication devices  Outcome: Progressing     Problem: CARDIOVASCULAR - ADULT  Goal: Maintains optimal cardiac output and hemodynamic stability  Description: INTERVENTIONS:  - Monitor I/O, vital signs and rhythm  - Monitor for S/S and trends of decreased cardiac output  - Administer and titrate ordered vasoactive medications to optimize hemodynamic stability  - Assess quality of pulses, skin color and temperature  - Assess for signs of decreased coronary artery perfusion  - Instruct patient to report change in severity of symptoms  Outcome: Progressing  Goal: Absence of cardiac dysrhythmias or at baseline rhythm  Description: INTERVENTIONS:  - Continuous cardiac monitoring, vital signs, obtain 12 lead EKG if ordered  - Administer antiarrhythmic and heart rate control medications as ordered  - Monitor electrolytes and administer replacement therapy as ordered  Outcome: Progressing     Problem: RESPIRATORY - ADULT  Goal: Achieves optimal ventilation and oxygenation  Description: INTERVENTIONS:  - Assess for changes in respiratory status  - Assess for changes in mentation and behavior  - Position to facilitate oxygenation and minimize respiratory effort  - Oxygen administered by appropriate delivery if ordered  - Initiate smoking cessation education as indicated  - Encourage broncho-pulmonary hygiene including cough, deep breathe, Incentive Spirometry  - Assess the need for suctioning and aspirate as needed  - Assess and instruct to report SOB or any respiratory difficulty  - Respiratory Therapy support as indicated  Outcome: Progressing     Problem: GASTROINTESTINAL - ADULT  Goal: Minimal or absence of nausea and/or vomiting  Description: INTERVENTIONS:  - Administer IV fluids if ordered to ensure adequate hydration  - Maintain NPO status until nausea and vomiting are resolved  - Nasogastric tube if ordered  - Administer ordered antiemetic medications as needed  - Provide nonpharmacologic comfort measures as appropriate  - Advance diet as tolerated, if ordered  - Consider nutrition services referral to assist patient with adequate nutrition and appropriate food choices  Outcome: Progressing  Goal: Maintains or returns to baseline bowel function  Description: INTERVENTIONS:  - Assess bowel function  - Encourage oral fluids to ensure adequate hydration  - Administer IV fluids if ordered to ensure adequate hydration  - Administer ordered medications as needed  - Encourage mobilization and activity  - Consider nutritional services referral to assist patient with adequate nutrition and appropriate food choices  Outcome: Progressing  Goal: Maintains adequate nutritional intake  Description: INTERVENTIONS:  - Monitor percentage of each meal consumed  - Identify factors contributing to decreased intake, treat as appropriate  - Assist with meals as needed  - Monitor I&O, weight, and lab values if indicated  - Obtain nutrition services referral as needed  Outcome: Progressing  Goal: Establish and maintain optimal ostomy function  Description: INTERVENTIONS:  - Assess bowel function  - Encourage oral fluids to ensure adequate hydration  - Administer IV fluids if ordered to ensure adequate hydration   - Administer ordered medications as needed  - Encourage mobilization and activity  - Nutrition services referral to assist patient with appropriate food choices  - Assess stoma site  - Consider wound care consult   Outcome: Progressing  Goal: Oral mucous membranes remain intact  Description: INTERVENTIONS  - Assess oral mucosa and hygiene practices  - Implement preventative oral hygiene regimen  - Implement oral medicated treatments as ordered  - Initiate Nutrition services referral as needed  Outcome: Progressing     Problem: GENITOURINARY - ADULT  Goal: Maintains or returns to baseline urinary function  Description: INTERVENTIONS:  - Assess urinary function  - Encourage oral fluids to ensure adequate hydration if ordered  - Administer IV fluids as ordered to ensure adequate hydration  - Administer ordered medications as needed  - Offer frequent toileting  - Follow urinary retention protocol if ordered  Outcome: Progressing  Goal: Absence of urinary retention  Description: INTERVENTIONS:  - Assess patient’s ability to void and empty bladder  - Monitor I/O  - Bladder scan as needed  - Discuss with physician/AP medications to alleviate retention as needed  - Discuss catheterization for long term situations as appropriate  Outcome: Progressing  Goal: Urinary catheter remains patent  Description: INTERVENTIONS:  - Assess patency of urinary catheter  - If patient has a chronic howell, consider changing catheter if non-functioning  - Follow guidelines for intermittent irrigation of non-functioning urinary catheter  Outcome: Progressing     Problem: SKIN/TISSUE INTEGRITY - ADULT  Goal: Skin Integrity remains intact(Skin Breakdown Prevention)  Description: Assess:  -Perform Enrrique assessment every   -Clean and moisturize skin every   -Inspect skin when repositioning, toileting, and assisting with ADLS  -Assess under medical devices such as  every   -Assess extremities for adequate circulation and sensation     Bed Management:  -Have minimal linens on bed & keep smooth, unwrinkled  -Change linens as needed when moist or perspiring  -Avoid sitting or lying in one position for more than  hours while in bed  -Keep HOB at degrees     Toileting:  -Offer bedside commode  -Assess for incontinence every   -Use incontinent care products after each incontinent episode such as     Activity:  -Mobilize patient  times a day  -Encourage activity and walks on unit  -Encourage or provide ROM exercises   -Turn and reposition patient every  Hours  -Use appropriate equipment to lift or move patient in bed  -Instruct/ Assist with weight shifting every  when out of bed in chair  -Consider limitation of chair time  hour intervals    Skin Care:  -Avoid use of baby powder, tape, friction and shearing, hot water or constrictive clothing  -Relieve pressure over bony prominences using   -Do not massage red bony areas    Next Steps:  -Teach patient strategies to minimize risks such as    -Consider consults to  interdisciplinary teams such as   Outcome: Progressing  Goal: Incision(s), wounds(s) or drain site(s) healing without S/S of infection  Description: INTERVENTIONS  - Assess and document dressing, incision, wound bed, drain sites and surrounding tissue  - Provide patient and family education  - Perform skin care/dressing changes every   Outcome: Progressing  Goal: Pressure injury heals and does not worsen  Description: Interventions:  - Implement low air loss mattress or specialty surface (Criteria met)  - Apply silicone foam dressing  - Instruct/assist with weight shifting every  minutes when in chair   - Limit chair time to  hour intervals  - Use special pressure reducing interventions such as  when in chair   - Apply fecal or urinary incontinence containment device   - Perform passive or active ROM every   - Turn and reposition patient & offload bony prominences every hours   - Utilize friction reducing device or surface for transfers   - Consider consults to  interdisciplinary teams such as   - Use incontinent care products after each incontinent episode such as   - Consider nutrition services referral as needed  Outcome: Progressing

## 2023-05-07 RX ADMIN — AMLODIPINE BESYLATE 10 MG: 10 TABLET ORAL at 09:10

## 2023-05-07 RX ADMIN — DEXAMETHASONE 2 MG: 0.5 TABLET ORAL at 21:19

## 2023-05-07 RX ADMIN — LEVETIRACETAM 1000 MG: 500 TABLET, FILM COATED ORAL at 12:06

## 2023-05-07 RX ADMIN — DEXAMETHASONE 2 MG: 0.5 TABLET ORAL at 12:06

## 2023-05-07 RX ADMIN — LOSARTAN POTASSIUM 100 MG: 50 TABLET, FILM COATED ORAL at 09:10

## 2023-05-07 RX ADMIN — LEVETIRACETAM 1000 MG: 500 TABLET, FILM COATED ORAL at 22:18

## 2023-05-07 RX ADMIN — FAMOTIDINE 20 MG: 20 TABLET, FILM COATED ORAL at 09:11

## 2023-05-07 RX ADMIN — LEVETIRACETAM 250 MG: 250 TABLET, FILM COATED ORAL at 17:18

## 2023-05-07 RX ADMIN — LEVOTHYROXINE SODIUM 25 MCG: 25 TABLET ORAL at 05:48

## 2023-05-07 RX ADMIN — ATORVASTATIN CALCIUM 10 MG: 10 TABLET, FILM COATED ORAL at 17:18

## 2023-05-07 RX ADMIN — WHITE PETROLATUM 57.7 %-MINERAL OIL 31.9 % EYE OINTMENT: at 09:09

## 2023-05-07 RX ADMIN — WHITE PETROLATUM 57.7 %-MINERAL OIL 31.9 % EYE OINTMENT: at 21:22

## 2023-05-07 RX ADMIN — ASPIRIN 81 MG: 81 TABLET, COATED ORAL at 09:10

## 2023-05-07 RX ADMIN — FENOFIBRATE 48 MG: 48 TABLET, FILM COATED ORAL at 17:18

## 2023-05-07 RX ADMIN — FAMOTIDINE 20 MG: 20 TABLET, FILM COATED ORAL at 17:18

## 2023-05-07 RX ADMIN — LEVETIRACETAM 250 MG: 250 TABLET, FILM COATED ORAL at 09:10

## 2023-05-07 RX ADMIN — WHITE PETROLATUM 57.7 %-MINERAL OIL 31.9 % EYE OINTMENT: at 12:07

## 2023-05-07 RX ADMIN — WHITE PETROLATUM 57.7 %-MINERAL OIL 31.9 % EYE OINTMENT: at 17:18

## 2023-05-07 RX ADMIN — HYDROCHLOROTHIAZIDE 12.5 MG: 12.5 TABLET ORAL at 09:11

## 2023-05-07 NOTE — PLAN OF CARE
Problem: Potential for Falls  Goal: Patient will remain free of falls  Description: INTERVENTIONS:  - Educate patient/family on patient safety including physical limitations  - Instruct patient to call for assistance with activity   - Consult OT/PT to assist with strengthening/mobility   - Keep Call bell within reach  - Keep bed low and locked with side rails adjusted as appropriate  - Keep care items and personal belongings within reach  - Initiate and maintain comfort rounds  - Make Fall Risk Sign visible to staff  - Offer Toileting every 2 Hours, in advance of need  - Initiate/Maintain bedalarm  - Obtain necessary fall risk management equipment:   - Apply yellow socks and bracelet for high fall risk patients  - Consider moving patient to room near nurses station  Outcome: Progressing     Problem: NEUROSENSORY - ADULT  Goal: Remains free of injury related to seizures activity  Description: INTERVENTIONS  - Maintain airway, patient safety  and administer oxygen as ordered  - Monitor patient for seizure activity, document and report duration and description of seizure to physician/advanced practitioner  - If seizure occurs,  ensure patient safety during seizure  - Reorient patient post seizure  - Seizure pads on all 4 side rails  - Instruct patient/family to notify RN of any seizure activity including if an aura is experienced  - Instruct patient/family to call for assistance with activity based on nursing assessment  - Administer anti-seizure medications if ordered    Outcome: Progressing     Problem: CARDIOVASCULAR - ADULT  Goal: Maintains optimal cardiac output and hemodynamic stability  Description: INTERVENTIONS:  - Monitor I/O, vital signs and rhythm  - Monitor for S/S and trends of decreased cardiac output  - Administer and titrate ordered vasoactive medications to optimize hemodynamic stability  - Assess quality of pulses, skin color and temperature  - Assess for signs of decreased coronary artery perfusion  - Instruct patient to report change in severity of symptoms  Outcome: Progressing  Goal: Absence of cardiac dysrhythmias or at baseline rhythm  Description: INTERVENTIONS:  - Continuous cardiac monitoring, vital signs, obtain 12 lead EKG if ordered  - Administer antiarrhythmic and heart rate control medications as ordered  - Monitor electrolytes and administer replacement therapy as ordered  Outcome: Progressing     Problem: RESPIRATORY - ADULT  Goal: Achieves optimal ventilation and oxygenation  Description: INTERVENTIONS:  - Assess for changes in respiratory status  - Assess for changes in mentation and behavior  - Position to facilitate oxygenation and minimize respiratory effort  - Oxygen administered by appropriate delivery if ordered  - Initiate smoking cessation education as indicated  - Encourage broncho-pulmonary hygiene including cough, deep breathe, Incentive Spirometry  - Assess the need for suctioning and aspirate as needed  - Assess and instruct to report SOB or any respiratory difficulty  - Respiratory Therapy support as indicated  Outcome: Progressing     Problem: GASTROINTESTINAL - ADULT  Goal: Minimal or absence of nausea and/or vomiting  Description: INTERVENTIONS:  - Administer IV fluids if ordered to ensure adequate hydration  - Maintain NPO status until nausea and vomiting are resolved  - Nasogastric tube if ordered  - Administer ordered antiemetic medications as needed  - Provide nonpharmacologic comfort measures as appropriate  - Advance diet as tolerated, if ordered  - Consider nutrition services referral to assist patient with adequate nutrition and appropriate food choices  Outcome: Progressing  Goal: Maintains or returns to baseline bowel function  Description: INTERVENTIONS:  - Assess bowel function  - Encourage oral fluids to ensure adequate hydration  - Administer IV fluids if ordered to ensure adequate hydration  - Administer ordered medications as needed  - Encourage mobilization and activity  - Consider nutritional services referral to assist patient with adequate nutrition and appropriate food choices  Outcome: Progressing  Goal: Maintains adequate nutritional intake  Description: INTERVENTIONS:  - Monitor percentage of each meal consumed  - Identify factors contributing to decreased intake, treat as appropriate  - Assist with meals as needed  - Monitor I&O, weight, and lab values if indicated  - Obtain nutrition services referral as needed  Outcome: Progressing  Goal: Establish and maintain optimal ostomy function  Description: INTERVENTIONS:  - Assess bowel function  - Encourage oral fluids to ensure adequate hydration  - Administer IV fluids if ordered to ensure adequate hydration   - Administer ordered medications as needed  - Encourage mobilization and activity  - Nutrition services referral to assist patient with appropriate food choices  - Assess stoma site  - Consider wound care consult   Outcome: Progressing  Goal: Oral mucous membranes remain intact  Description: INTERVENTIONS  - Assess oral mucosa and hygiene practices  - Implement preventative oral hygiene regimen  - Implement oral medicated treatments as ordered  - Initiate Nutrition services referral as needed  Outcome: Progressing     Problem: GENITOURINARY - ADULT  Goal: Maintains or returns to baseline urinary function  Description: INTERVENTIONS:  - Assess urinary function  - Encourage oral fluids to ensure adequate hydration if ordered  - Administer IV fluids as ordered to ensure adequate hydration  - Administer ordered medications as needed  - Offer frequent toileting  - Follow urinary retention protocol if ordered  Outcome: Progressing  Goal: Absence of urinary retention  Description: INTERVENTIONS:  - Assess patient’s ability to void and empty bladder  - Monitor I/O  - Bladder scan as needed  - Discuss with physician/AP medications to alleviate retention as needed  - Discuss catheterization for long term situations as appropriate  Outcome: Progressing  Goal: Urinary catheter remains patent  Description: INTERVENTIONS:  - Assess patency of urinary catheter  - If patient has a chronic howell, consider changing catheter if non-functioning  - Follow guidelines for intermittent irrigation of non-functioning urinary catheter  Outcome: Progressing     Problem: METABOLIC, FLUID AND ELECTROLYTES - ADULT  Goal: Electrolytes maintained within normal limits  Description: INTERVENTIONS:  - Monitor labs and assess patient for signs and symptoms of electrolyte imbalances  - Administer electrolyte replacement as ordered  - Monitor response to electrolyte replacements, including repeat lab results as appropriate  - Instruct patient on fluid and nutrition as appropriate  Outcome: Progressing  Goal: Fluid balance maintained  Description: INTERVENTIONS:  - Monitor labs   - Monitor I/O and WT  - Instruct patient on fluid and nutrition as appropriate  - Assess for signs & symptoms of volume excess or deficit  Outcome: Progressing  Goal: Glucose maintained within target range  Description: INTERVENTIONS:  - Monitor Blood Glucose as ordered  - Assess for signs and symptoms of hyperglycemia and hypoglycemia  - Administer ordered medications to maintain glucose within target range  - Assess nutritional intake and initiate nutrition service referral as needed  Outcome: Progressing     Problem: MUSCULOSKELETAL - ADULT  Goal: Maintain or return mobility to safest level of function  Description: INTERVENTIONS:  - Assess patient's ability to carry out ADLs; assess patient's baseline for ADL function and identify physical deficits which impact ability to perform ADLs (bathing, care of mouth/teeth, toileting, grooming, dressing, etc )  - Assess/evaluate cause of self-care deficits   - Assess range of motion  - Assess patient's mobility  - Assess patient's need for assistive devices and provide as appropriate  - Encourage maximum independence but intervene and supervise when necessary  - Involve family in performance of ADLs  - Assess for home care needs following discharge   - Consider OT consult to assist with ADL evaluation and planning for discharge  - Provide patient education as appropriate  Outcome: Progressing  Goal: Maintain proper alignment of affected body part  Description: INTERVENTIONS:  - Support, maintain and protect limb and body alignment  - Provide patient/ family with appropriate education  Outcome: Progressing     Problem: Prexisting or High Potential for Compromised Skin Integrity  Goal: Skin integrity is maintained or improved  Description: INTERVENTIONS:  - Identify patients at risk for skin breakdown  - Assess and monitor skin integrity  - Assess and monitor nutrition and hydration status  - Monitor labs   - Assess for incontinence   - Turn and reposition patient  - Assist with mobility/ambulation  - Relieve pressure over bony prominences  - Avoid friction and shearing  - Provide appropriate hygiene as needed including keeping skin clean and dry  - Evaluate need for skin moisturizer/barrier cream  - Collaborate with interdisciplinary team   - Patient/family teaching  - Consider wound care consult   Outcome: Progressing     Problem: MOBILITY - ADULT  Goal: Maintain or return to baseline ADL function  Description: INTERVENTIONS:  -  Assess patient's ability to carry out ADLs; assess patient's baseline for ADL function and identify physical deficits which impact ability to perform ADLs (bathing, care of mouth/teeth, toileting, grooming, dressing, etc )  - Assess/evaluate cause of self-care deficits   - Assess range of motion  - Assess patient's mobility; develop plan if impaired  - Assess patient's need for assistive devices and provide as appropriate  - Encourage maximum independence but intervene and supervise when necessary  - Involve family in performance of ADLs  - Assess for home care needs following discharge   - Consider OT consult to assist with ADL evaluation and planning for discharge  - Provide patient education as appropriate  Outcome: Not Progressing  Goal: Maintains/Returns to pre admission functional level  Description: INTERVENTIONS:  - Perform BMAT or MOVE assessment daily    - Set and communicate daily mobility goal to care team and patient/family/caregiver  - Collaborate with rehabilitation services on mobility goals if consulted  - Perform Range of Motion 3 times a day  - Reposition patient every 2 hours  - Dangle patient 3 times a day  - Stand patient 3 times a day  - Ambulate patient 3 times a day  - Out of bed to chair 3 times a day   - Out of bed for meals 3 times a day  - Out of bed for toileting  - Record patient progress and toleration of activity level   Outcome: Not Progressing     Problem: NEUROSENSORY - ADULT  Goal: Achieves stable or improved neurological status  Description: INTERVENTIONS  - Monitor and report changes in neurological status  - Monitor vital signs such as temperature, blood pressure, glucose, and any other labs ordered   - Initiate measures to prevent increased intracranial pressure  - Monitor for seizure activity and implement precautions if appropriate      Outcome: Not Progressing  Goal: Achieves maximal functionality and self care  Description: INTERVENTIONS  - Monitor swallowing and airway patency with patient fatigue and changes in neurological status  - Encourage and assist patient to increase activity and self care  - Encourage visually impaired, hearing impaired and aphasic patients to use assistive/communication devices  Outcome: Not Progressing     Problem: Nutrition/Hydration-ADULT  Goal: Nutrient/Hydration intake appropriate for improving, restoring or maintaining nutritional needs  Description: Monitor and assess patient's nutrition/hydration status for malnutrition  Collaborate with interdisciplinary team and initiate plan and interventions as ordered    Monitor patient's weight and dietary intake as ordered or per policy  Utilize nutrition screening tool and intervene as necessary  Determine patient's food preferences and provide high-protein, high-caloric foods as appropriate       INTERVENTIONS:  - Monitor oral intake, urinary output, labs, and treatment plans  - Assess nutrition and hydration status and recommend course of action  - Evaluate amount of meals eaten  - Assist patient with eating if necessary   - Allow adequate time for meals  - Recommend/ encourage appropriate diets, oral nutritional supplements, and vitamin/mineral supplements  - Order, calculate, and assess calorie counts as needed  - Recommend, monitor, and adjust tube feedings and TPN/PPN based on assessed needs  - Assess need for intravenous fluids  - Provide specific nutrition/hydration education as appropriate  - Include patient/family/caregiver in decisions related to nutrition  Outcome: Not Progressing

## 2023-05-07 NOTE — ASSESSMENT & PLAN NOTE
· Initial concern was a stroke to explain his underlying clinical symptoms  · Acute CVA has been worked up and ruled out  · Symptoms are secondary to a progressing glioblastoma multiforme  · Status post a neurology evaluation  · Continue dexamethasone 2 mg p o  every 12 hours  · No further inpatient testing, treatment, and or work-up is needed  · Patient to eventually follow-up in the outpatient setting with hematology oncology  · Status post a PT and OT evaluation-they recommend postacute rehabilitation services  · Case management has has notified us that placement has been set up for Monday, 5/8/2023 at the [address] [place] [address]  · Continue all other present medical management until that point

## 2023-05-07 NOTE — PLAN OF CARE
Problem: Potential for Falls  Goal: Patient will remain free of falls  Description: INTERVENTIONS:  - Educate patient/family on patient safety including physical limitations  - Instruct patient to call for assistance with activity   - Consult OT/PT to assist with strengthening/mobility   - Keep Call bell within reach  - Keep bed low and locked with side rails adjusted as appropriate  - Keep care items and personal belongings within reach  - Initiate and maintain comfort rounds  - Make Fall Risk Sign visible to staff  - Offer Toileting every 1 Hours, in advance of need  - Initiate/Maintain bed alarm  - Obtain necessary fall risk management equipment: bed chair  - Apply yellow socks and bracelet for high fall risk patients  - Consider moving patient to room near nurses station  Outcome: Progressing     Problem: MOBILITY - ADULT  Goal: Maintain or return to baseline ADL function  Description: INTERVENTIONS:  -  Assess patient's ability to carry out ADLs; assess patient's baseline for ADL function and identify physical deficits which impact ability to perform ADLs (bathing, care of mouth/teeth, toileting, grooming, dressing, etc )  - Assess/evaluate cause of self-care deficits   - Assess range of motion  - Assess patient's mobility; develop plan if impaired  - Assess patient's need for assistive devices and provide as appropriate  - Encourage maximum independence but intervene and supervise when necessary  - Involve family in performance of ADLs  - Assess for home care needs following discharge   - Consider OT consult to assist with ADL evaluation and planning for discharge  - Provide patient education as appropriate  Outcome: Progressing  Goal: Maintains/Returns to pre admission functional level  Description: INTERVENTIONS:  - Perform BMAT or MOVE assessment daily    - Set and communicate daily mobility goal to care team and patient/family/caregiver     - Collaborate with rehabilitation services on mobility goals if consulted  - Perform Range of Motion 3 times a day  - Reposition patient every 2 hours  - Dangle patient 3 times a day  - Stand patient 3 times a day  - Ambulate patient 3 times a day  - Out of bed to chair 3 times a day   - Out of bed for meals 3 times a day  - Out of bed for toileting  - Record patient progress and toleration of activity level   Outcome: Progressing     Problem: NEUROSENSORY - ADULT  Goal: Achieves stable or improved neurological status  Description: INTERVENTIONS  - Monitor and report changes in neurological status  - Monitor vital signs such as temperature, blood pressure, glucose, and any other labs ordered   - Initiate measures to prevent increased intracranial pressure  - Monitor for seizure activity and implement precautions if appropriate      Outcome: Progressing  Goal: Remains free of injury related to seizures activity  Description: INTERVENTIONS  - Maintain airway, patient safety  and administer oxygen as ordered  - Monitor patient for seizure activity, document and report duration and description of seizure to physician/advanced practitioner  - If seizure occurs,  ensure patient safety during seizure  - Reorient patient post seizure  - Seizure pads on all 4 side rails  - Instruct patient/family to notify RN of any seizure activity including if an aura is experienced  - Instruct patient/family to call for assistance with activity based on nursing assessment  - Administer anti-seizure medications if ordered    Outcome: Progressing  Goal: Achieves maximal functionality and self care  Description: INTERVENTIONS  - Monitor swallowing and airway patency with patient fatigue and changes in neurological status  - Encourage and assist patient to increase activity and self care     - Encourage visually impaired, hearing impaired and aphasic patients to use assistive/communication devices  Outcome: Progressing     Problem: CARDIOVASCULAR - ADULT  Goal: Maintains optimal cardiac output and hemodynamic stability  Description: INTERVENTIONS:  - Monitor I/O, vital signs and rhythm  - Monitor for S/S and trends of decreased cardiac output  - Administer and titrate ordered vasoactive medications to optimize hemodynamic stability  - Assess quality of pulses, skin color and temperature  - Assess for signs of decreased coronary artery perfusion  - Instruct patient to report change in severity of symptoms  Outcome: Progressing  Goal: Absence of cardiac dysrhythmias or at baseline rhythm  Description: INTERVENTIONS:  - Continuous cardiac monitoring, vital signs, obtain 12 lead EKG if ordered  - Administer antiarrhythmic and heart rate control medications as ordered  - Monitor electrolytes and administer replacement therapy as ordered  Outcome: Progressing     Problem: RESPIRATORY - ADULT  Goal: Achieves optimal ventilation and oxygenation  Description: INTERVENTIONS:  - Assess for changes in respiratory status  - Assess for changes in mentation and behavior  - Position to facilitate oxygenation and minimize respiratory effort  - Oxygen administered by appropriate delivery if ordered  - Initiate smoking cessation education as indicated  - Encourage broncho-pulmonary hygiene including cough, deep breathe, Incentive Spirometry  - Assess the need for suctioning and aspirate as needed  - Assess and instruct to report SOB or any respiratory difficulty  - Respiratory Therapy support as indicated  Outcome: Progressing     Problem: GASTROINTESTINAL - ADULT  Goal: Minimal or absence of nausea and/or vomiting  Description: INTERVENTIONS:  - Administer IV fluids if ordered to ensure adequate hydration  - Maintain NPO status until nausea and vomiting are resolved  - Nasogastric tube if ordered  - Administer ordered antiemetic medications as needed  - Provide nonpharmacologic comfort measures as appropriate  - Advance diet as tolerated, if ordered  - Consider nutrition services referral to assist patient with adequate nutrition and appropriate food choices  Outcome: Progressing  Goal: Maintains or returns to baseline bowel function  Description: INTERVENTIONS:  - Assess bowel function  - Encourage oral fluids to ensure adequate hydration  - Administer IV fluids if ordered to ensure adequate hydration  - Administer ordered medications as needed  - Encourage mobilization and activity  - Consider nutritional services referral to assist patient with adequate nutrition and appropriate food choices  Outcome: Progressing  Goal: Maintains adequate nutritional intake  Description: INTERVENTIONS:  - Monitor percentage of each meal consumed  - Identify factors contributing to decreased intake, treat as appropriate  - Assist with meals as needed  - Monitor I&O, weight, and lab values if indicated  - Obtain nutrition services referral as needed  Outcome: Progressing  Goal: Establish and maintain optimal ostomy function  Description: INTERVENTIONS:  - Assess bowel function  - Encourage oral fluids to ensure adequate hydration  - Administer IV fluids if ordered to ensure adequate hydration   - Administer ordered medications as needed  - Encourage mobilization and activity  - Nutrition services referral to assist patient with appropriate food choices  - Assess stoma site  - Consider wound care consult   Outcome: Progressing  Goal: Oral mucous membranes remain intact  Description: INTERVENTIONS  - Assess oral mucosa and hygiene practices  - Implement preventative oral hygiene regimen  - Implement oral medicated treatments as ordered  - Initiate Nutrition services referral as needed  Outcome: Progressing     Problem: RESPIRATORY - ADULT  Goal: Achieves optimal ventilation and oxygenation  Description: INTERVENTIONS:  - Assess for changes in respiratory status  - Assess for changes in mentation and behavior  - Position to facilitate oxygenation and minimize respiratory effort  - Oxygen administered by appropriate delivery if ordered  - Initiate smoking cessation education as indicated  - Encourage broncho-pulmonary hygiene including cough, deep breathe, Incentive Spirometry  - Assess the need for suctioning and aspirate as needed  - Assess and instruct to report SOB or any respiratory difficulty  - Respiratory Therapy support as indicated  Outcome: Progressing     Problem: GENITOURINARY - ADULT  Goal: Maintains or returns to baseline urinary function  Description: INTERVENTIONS:  - Assess urinary function  - Encourage oral fluids to ensure adequate hydration if ordered  - Administer IV fluids as ordered to ensure adequate hydration  - Administer ordered medications as needed  - Offer frequent toileting  - Follow urinary retention protocol if ordered  Outcome: Progressing  Goal: Absence of urinary retention  Description: INTERVENTIONS:  - Assess patient’s ability to void and empty bladder  - Monitor I/O  - Bladder scan as needed  - Discuss with physician/AP medications to alleviate retention as needed  - Discuss catheterization for long term situations as appropriate  Outcome: Progressing  Goal: Urinary catheter remains patent  Description: INTERVENTIONS:  - Assess patency of urinary catheter  - If patient has a chronic howell, consider changing catheter if non-functioning  - Follow guidelines for intermittent irrigation of non-functioning urinary catheter  Outcome: Progressing     Problem: METABOLIC, FLUID AND ELECTROLYTES - ADULT  Goal: Electrolytes maintained within normal limits  Description: INTERVENTIONS:  - Monitor labs and assess patient for signs and symptoms of electrolyte imbalances  - Administer electrolyte replacement as ordered  - Monitor response to electrolyte replacements, including repeat lab results as appropriate  - Instruct patient on fluid and nutrition as appropriate  Outcome: Progressing  Goal: Fluid balance maintained  Description: INTERVENTIONS:  - Monitor labs   - Monitor I/O and WT  - Instruct patient on fluid and nutrition as appropriate  - Assess for signs & symptoms of volume excess or deficit  Outcome: Progressing  Goal: Glucose maintained within target range  Description: INTERVENTIONS:  - Monitor Blood Glucose as ordered  - Assess for signs and symptoms of hyperglycemia and hypoglycemia  - Administer ordered medications to maintain glucose within target range  - Assess nutritional intake and initiate nutrition service referral as needed  Outcome: Progressing

## 2023-05-07 NOTE — ASSESSMENT & PLAN NOTE
· Patient normally is on Avastin  · Continue steroids  · May benefit from an inpatient palliative care evaluation prior to discharge  · Attempts to call the patient's mother 2 days in a row have proved futile  · Additional outpatient work-up as per neurology and hematology oncology in the outpatient setting

## 2023-05-08 LAB
ANION GAP SERPL CALCULATED.3IONS-SCNC: 6 MMOL/L (ref 4–13)
BASOPHILS # BLD AUTO: 0.02 THOUSANDS/ÂΜL (ref 0–0.1)
BASOPHILS NFR BLD AUTO: 0 % (ref 0–1)
BUN SERPL-MCNC: 23 MG/DL (ref 5–25)
CALCIUM SERPL-MCNC: 9.3 MG/DL (ref 8.4–10.2)
CHLORIDE SERPL-SCNC: 99 MMOL/L (ref 96–108)
CO2 SERPL-SCNC: 27 MMOL/L (ref 21–32)
CREAT SERPL-MCNC: 0.71 MG/DL (ref 0.6–1.3)
EOSINOPHIL # BLD AUTO: 0.01 THOUSAND/ÂΜL (ref 0–0.61)
EOSINOPHIL NFR BLD AUTO: 0 % (ref 0–6)
ERYTHROCYTE [DISTWIDTH] IN BLOOD BY AUTOMATED COUNT: 15.6 % (ref 11.6–15.1)
FLUAV RNA RESP QL NAA+PROBE: NEGATIVE
FLUBV RNA RESP QL NAA+PROBE: NEGATIVE
GFR SERPL CREATININE-BSD FRML MDRD: 97 ML/MIN/1.73SQ M
GLUCOSE SERPL-MCNC: 120 MG/DL (ref 65–140)
HCT VFR BLD AUTO: 54.5 % (ref 36.5–49.3)
HGB BLD-MCNC: 18.1 G/DL (ref 12–17)
IMM GRANULOCYTES # BLD AUTO: 0.04 THOUSAND/UL (ref 0–0.2)
IMM GRANULOCYTES NFR BLD AUTO: 1 % (ref 0–2)
LYMPHOCYTES # BLD AUTO: 1.58 THOUSANDS/ÂΜL (ref 0.6–4.47)
LYMPHOCYTES NFR BLD AUTO: 20 % (ref 14–44)
MCH RBC QN AUTO: 29.9 PG (ref 26.8–34.3)
MCHC RBC AUTO-ENTMCNC: 33.2 G/DL (ref 31.4–37.4)
MCV RBC AUTO: 90 FL (ref 82–98)
MONOCYTES # BLD AUTO: 0.52 THOUSAND/ÂΜL (ref 0.17–1.22)
MONOCYTES NFR BLD AUTO: 7 % (ref 4–12)
NEUTROPHILS # BLD AUTO: 5.59 THOUSANDS/ÂΜL (ref 1.85–7.62)
NEUTS SEG NFR BLD AUTO: 72 % (ref 43–75)
NRBC BLD AUTO-RTO: 0 /100 WBCS
PLATELET # BLD AUTO: 203 THOUSANDS/UL (ref 149–390)
PMV BLD AUTO: 9.3 FL (ref 8.9–12.7)
POTASSIUM SERPL-SCNC: 4.2 MMOL/L (ref 3.5–5.3)
RBC # BLD AUTO: 6.05 MILLION/UL (ref 3.88–5.62)
RSV RNA RESP QL NAA+PROBE: NEGATIVE
SARS-COV-2 RNA RESP QL NAA+PROBE: NEGATIVE
SODIUM SERPL-SCNC: 132 MMOL/L (ref 135–147)
WBC # BLD AUTO: 7.76 THOUSAND/UL (ref 4.31–10.16)

## 2023-05-08 RX ORDER — DOCUSATE SODIUM 100 MG/1
100 CAPSULE, LIQUID FILLED ORAL 2 TIMES DAILY
Status: DISCONTINUED | OUTPATIENT
Start: 2023-05-08 | End: 2023-05-09 | Stop reason: HOSPADM

## 2023-05-08 RX ORDER — SENNOSIDES 8.6 MG
1 TABLET ORAL
Status: DISCONTINUED | OUTPATIENT
Start: 2023-05-08 | End: 2023-05-09 | Stop reason: HOSPADM

## 2023-05-08 RX ADMIN — WHITE PETROLATUM 57.7 %-MINERAL OIL 31.9 % EYE OINTMENT: at 08:25

## 2023-05-08 RX ADMIN — ATORVASTATIN CALCIUM 10 MG: 10 TABLET, FILM COATED ORAL at 17:38

## 2023-05-08 RX ADMIN — FENOFIBRATE 48 MG: 48 TABLET, FILM COATED ORAL at 17:38

## 2023-05-08 RX ADMIN — HYDROCHLOROTHIAZIDE 12.5 MG: 12.5 TABLET ORAL at 08:26

## 2023-05-08 RX ADMIN — SENNOSIDES 8.6 MG: 8.6 TABLET, FILM COATED ORAL at 15:04

## 2023-05-08 RX ADMIN — WHITE PETROLATUM 57.7 %-MINERAL OIL 31.9 % EYE OINTMENT: at 11:00

## 2023-05-08 RX ADMIN — LEVOTHYROXINE SODIUM 25 MCG: 25 TABLET ORAL at 05:22

## 2023-05-08 RX ADMIN — LEVETIRACETAM 1000 MG: 500 TABLET, FILM COATED ORAL at 11:00

## 2023-05-08 RX ADMIN — AMLODIPINE BESYLATE 10 MG: 10 TABLET ORAL at 08:26

## 2023-05-08 RX ADMIN — FAMOTIDINE 20 MG: 20 TABLET, FILM COATED ORAL at 08:26

## 2023-05-08 RX ADMIN — LEVETIRACETAM 1000 MG: 500 TABLET, FILM COATED ORAL at 22:25

## 2023-05-08 RX ADMIN — SENNOSIDES 8.6 MG: 8.6 TABLET, FILM COATED ORAL at 22:25

## 2023-05-08 RX ADMIN — FAMOTIDINE 20 MG: 20 TABLET, FILM COATED ORAL at 17:38

## 2023-05-08 RX ADMIN — LEVETIRACETAM 250 MG: 250 TABLET, FILM COATED ORAL at 17:38

## 2023-05-08 RX ADMIN — LOSARTAN POTASSIUM 100 MG: 50 TABLET, FILM COATED ORAL at 08:26

## 2023-05-08 RX ADMIN — WHITE PETROLATUM 57.7 %-MINERAL OIL 31.9 % EYE OINTMENT: at 22:26

## 2023-05-08 RX ADMIN — DEXAMETHASONE 2 MG: 0.5 TABLET ORAL at 09:03

## 2023-05-08 RX ADMIN — DEXAMETHASONE 2 MG: 0.5 TABLET ORAL at 22:25

## 2023-05-08 RX ADMIN — LEVETIRACETAM 250 MG: 250 TABLET, FILM COATED ORAL at 08:26

## 2023-05-08 RX ADMIN — WHITE PETROLATUM 57.7 %-MINERAL OIL 31.9 % EYE OINTMENT: at 17:38

## 2023-05-08 RX ADMIN — ASPIRIN 81 MG: 81 TABLET, COATED ORAL at 08:26

## 2023-05-08 RX ADMIN — DOCUSATE SODIUM 100 MG: 100 CAPSULE, LIQUID FILLED ORAL at 17:38

## 2023-05-08 NOTE — PROGRESS NOTES
[facility]  Progress Note  Name: Ashley Saravia  MRN: [id number]  Unit/Bed#: -01 I Date of Admission: 5/4/2023   Date of Service: 5/8/2023 I Hospital Day: 3    Assessment/Plan   * Facial nerve palsy, secondary  Assessment & Plan  · Initial concern was a stroke to explain his underlying clinical symptoms  · Acute CVA has been worked up and ruled out  · Symptoms are secondary to a progressing glioblastoma multiforme  · Status post a neurology evaluation  · Continue dexamethasone 2 mg p o  every 12 hours  · No further inpatient testing, treatment, and or work-up is needed  · Patient to eventually follow-up in the outpatient setting with hematology oncology  · Status post a PT and OT evaluation-they recommend postacute rehabilitation services  · Case management has has notified us that placement has been set up for Monday, 5/8/2023 at the [address] [place] [address]  · Continue all other present medical management until that point      Subclinical hypothyroidism  Assessment & Plan  · Continue Synthroid    Mixed hyperlipidemia  Assessment & Plan  · Patient with mixed dyslipidemia  · Continue atorvastatin, and fenofibrate    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 ([address] )  Assessment & Plan  · Continue Keppra 1250 mg p o  twice daily    Glioblastoma ([facility] [address] )  Assessment & Plan  · Patient normally is on Avastin  · Continue steroids  · May benefit from an inpatient palliative care evaluation prior to discharge  · Additional outpatient work-up as per neurology and hematology oncology in the outpatient setting  · Will reach out to patient's oncologist    Essential hypertension  Assessment & Plan  · Continue Cozaar, hydrochlorothiazide, and amlodipine           VTE Prophylaxis:  SCDs    Patient Centered Rounds: I have performed bedside rounds with nursing staff today      Discussions with Specialists or Other Care Team Provider: yes  Education and Discussions with Family / Patient: Spoke with patient's mother over the phone regarding plan of care    Current Length of Stay: 3 day(s)    Current Patient Status: Inpatient   Certification Statement: The patient will continue to require additional inpatient hospital stay due to Pending placement    Discharge Plan: Case management working on rehab placement    Code Status: Level 1 - Full Code    Subjective:   No overnight events noted    Objective:     Vitals:   Temp (24hrs), Av °F (36 7 °C), Min:97 9 °F (36 6 °C), Max:98 °F (36 7 °C)    Temp:  [97 9 °F (36 6 °C)-98 °F (36 7 °C)] 98 °F (36 7 °C)  HR:  [66-72] 72  Resp:  [16-20] 16  BP: (125-134)/(86-94) 134/94  SpO2:  [94 %] 94 %  Body mass index is 25 35 kg/m²  Input and Output Summary (last 24 hours): Intake/Output Summary (Last 24 hours) at 2023 1435  Last data filed at 2023 1300  Gross per 24 hour   Intake 700 ml   Output 450 ml   Net 250 ml       Physical Exam:   Physical Exam  Constitutional:       General: He is not in acute distress  HENT:      Head: Normocephalic and atraumatic  Nose: Nose normal       Mouth/Throat:      Mouth: Mucous membranes are moist    Eyes:      Extraocular Movements: Extraocular movements intact  Conjunctiva/sclera: Conjunctivae normal    Cardiovascular:      Rate and Rhythm: Normal rate and regular rhythm  Pulmonary:      Effort: Pulmonary effort is normal  No respiratory distress  Abdominal:      Palpations: Abdomen is soft  Tenderness: There is no abdominal tenderness  Musculoskeletal:         General: Normal range of motion  Cervical back: Normal range of motion and neck supple  Skin:     General: Skin is warm and dry  Neurological:      Mental Status: He is alert  Comments: Patient awake and alert  Following simple commands    Slow response time   Psychiatric:         Mood and Affect: Mood normal          Behavior: Behavior normal          Additional Data:     Labs:    Results from last 7 days   Lab Units 23  0520   WBC Thousand/uL 7 76   HEMOGLOBIN g/dL 18 1*   HEMATOCRIT % 54 5*   PLATELETS Thousands/uL 203   NEUTROS PCT % 72   LYMPHS PCT % 20   MONOS PCT % 7   EOS PCT % 0     Results from last 7 days   Lab Units 05/08/23  0520 05/05/23  0407   SODIUM mmol/L 132* 134*   POTASSIUM mmol/L 4 2 3 8   CHLORIDE mmol/L 99 99   CO2 mmol/L 27 27   BUN mg/dL 23 17   CREATININE mg/dL 0 71 0 72   CALCIUM mg/dL 9 3 8 8   ALK PHOS U/L  --  49   ALT U/L  --  22   AST U/L  --  17     Results from last 7 days   Lab Units 05/04/23  0912   INR  0 86     Results from last 7 days   Lab Units 05/04/23  0910   POC GLUCOSE mg/dl 91     Results from last 7 days   Lab Units 05/05/23  0407   HEMOGLOBIN A1C % 5 6       * I Have Reviewed All Lab Data Listed Above  * Additional Pertinent Lab Tests Reviewed:  [facility] Admission  Reviewed    Imaging:  Imaging Reports Reviewed Today Include: No new imaging    Recent Cultures (last 7 days):           Last 24 Hours Medication List:   Current Facility-Administered Medications   Medication Dose Route Frequency Provider Last Rate   • amLODIPine  10 mg Oral Daily Angel Luis Devlin MD     • artificial tear   Both Eyes 4x Daily Paradise Guerra PA-C     • aspirin  81 mg Oral Daily Kelsie Holliday MD     • atorvastatin  10 mg Oral QPM Nury Leos MD     • dexamethasone  2 mg Oral Q12H [address] Angel Luis Devlin MD     • docusate sodium  100 mg Oral BID Stacie Bui MD     • famotidine  20 mg Oral BID Nury Leos MD     • fenofibrate  48 mg Oral QPM Nury Leos MD     • hydrALAZINE  10 mg Intravenous Q6H PRN Paradise Guerra PA-C     • hydrochlorothiazide  12 5 mg Oral Daily Angel Luis Devlin MD     • levETIRAcetam  1,000 mg Oral Q12H Nury Leos MD     • levETIRAcetam  250 mg Oral BID Nury Leos MD     • levothyroxine  25 mcg Oral Daily Nury Leos MD     • losartan  100 mg Oral Daily Angel Luis Devlin MD • senna  1 tablet Oral HS Dai Mchugh MD          Today, Patient Was Seen By: Dai Mchugh MD    ** Please Note: Dictation voice to text software may have been used in the creation of this document   **

## 2023-05-08 NOTE — OCCUPATIONAL THERAPY NOTE
"   05/08/23 0947   OT Last Visit   OT Visit Date 05/08/23   Note Type   Note Type Treatment   Pain Assessment   Pain Assessment Tool 0-10   Pain Score   (\"my eyes hurt - the doctor's been treating it but it's not getting any better (in 2 months)\"  \"my eyes burn\")   Restrictions/Precautions   Weight Bearing Precautions Per Order No   Other Precautions Fall Risk;Telemetry; Chair Alarm;Cognitive   Lifestyle   Intrinsic Gratification \"i just have no interests\"; used to drive to go out to eat; it's harder for patient to watch TV per his decreased vision   ADL   Where Assessed Chair  (reports to have spent the night in this Recliner)   Grooming Comments patient brushed teeth with fair-plus results   Subjective   Subjective asked for the room to be darkened when I left   Cognition   Overall Cognitive Status Impaired   Arousal/Participation Alert; Cooperative   Attention Difficulty attending to directions   Memory Decreased short term memory   Following Commands Follows one step commands with increased time or repetition   Activity Tolerance   Activity Tolerance   (continue to assess with increased time of participation)   Assessment   Assessment Patient participated in Skilled OT session this date with interventions consisting of ADL re training with the use of correct body mechnaics and rapport-building techniques   Patient agreeable to OT treatment session, upon arrival patient was found seated OOB to Recliner - **he was agreeable to Part of what was offered  Patient requiring one step directives and occasional re-direction - participation today was limited  Patient continues to be functioning below baseline level, occupational performance remains limited secondary to factors listed above and increased risk for falls and injury  From OT standpoint, recommendation at time of d/c would be post-acute rehabilitation services     Patient to benefit from continued Occupational Therapy treatment while in the hospital to address " deficits as defined above and maximize level of functional independence with ADLs and functional mobility  Plan   Treatment Interventions ADL retraining;Patient/family training; Activityengagement   Goal Expiration Date 05/15/23   OT Treatment Day 1   Recommendation   OT Discharge Recommendation Post acute rehabilitation services   Additional Comments 2 The patient's raw score on the AM-PAC Daily Activity Inpatient Short Form is 15  A raw score of less than 19 suggests the patient may benefit from discharge to post-acute rehabilitation services  Please refer to the recommendation of the Occupational Therapist for safe discharge planning  AM-PAC Daily Activity Inpatient   Lower Body Dressing 2   Bathing 2   Toileting 2   Upper Body Dressing 3   Grooming 3   Eating 3   Daily Activity Raw Score 15   Daily Activity Standardized Score (Calc for Raw Score >=11) 34 69   AM-PAC Applied Cognition Inpatient   Following a Speech/Presentation 3   Understanding Ordinary Conversation 3   Taking Medications 2   Remembering Where Things Are Placed or Put Away 2   Remembering List of 4-5 Errands 1   Taking Care of Complicated Tasks 1   Applied Cognition Raw Score 12   Applied Cognition Standardized Score 28 82   NAVEEN Carcamo/L  *patient declined all exercise

## 2023-05-08 NOTE — PLAN OF CARE
Problem: OCCUPATIONAL THERAPY ADULT  Goal: Performs self-care activities at highest level of function for planned discharge setting  See evaluation for individualized goals  Description: Treatment Interventions: ADL retraining, Functional transfer training, Endurance training, Patient/family training, Compensatory technique education, Energy conservation, Activityengagement          See flowsheet documentation for full assessment, interventions and recommendations  Outcome: Progressing, slowly   Note: Limitation: Decreased ADL status, Decreased UE strength, Decreased cognition, Decreased endurance, Decreased self-care trans, Decreased high-level ADLs  Prognosis: Fair  Assessment: Patient participated in Skilled OT session this date with interventions consisting of ADL re training with the use of correct body mechnaics and rapport-building techniques   Patient agreeable to OT treatment session, upon arrival patient was found seated OOB to Recliner - **he was agreeable to Part of what was offered  Patient requiring one step directives and occasional re-direction - participation today was limited; he declined all exercise  Patient continues to be functioning below baseline level, occupational performance remains limited secondary to factors listed above and increased risk for falls and injury  From OT standpoint, recommendation at time of d/c would be post-acute rehabilitation services  Patient to benefit from continued Occupational Therapy treatment while in the hospital to address deficits as defined above and maximize level of functional independence with ADLs and functional mobility       OT Discharge Recommendation: Post acute rehabilitation services        [place], HODGE/L

## 2023-05-08 NOTE — SPEECH THERAPY NOTE
"Orders received and appreciated, chart review completed  MRI completed on 5/4/23 includes in part:  \"1  New heterogeneous expansile signal abnormality in the left hemipons/pontocerebellar junction extending into the upper medulla in the region of the left 7th and 8th cranial nerve complexes, worrisome for progressive tumor, potentially multicentric glioblastoma or perhaps gliomatosis cerebri in this patient with progressive nodular enhancement elsewhere in the supratentorial brain worrisome for recurrent/progressive glioblastoma infiltrating the corpus callosum/ependyma of the lateral ventricles  Differential diagnosis could include treatment related changes or perhaps toxicity from medication, although tumor progression is favored among these differential diagnoses  Further clinical assessment and follow-up advised  Overall supratentorial   disease is progressed slightly from the prior brain MRI from March 30, 2023  \"    Given MRI findings and overall presentation of patient at bedside including facial droop paired with dysarthria, unfortunately there are minimal therapy gains to be made at this time as the facial nerve is involved in brain mass changes/expansion  Exercises to address weakness, decreased ROM or ongoing dysarthria will not demonstrate improvements  There are no swallowing difficulties at this time though patient does express decreases in PO intake  These are related to appetite and desire, not dysphagia in nature  ST will sign off on patient's care, please reconsult with any needs expressed by staff or by patient    "

## 2023-05-08 NOTE — ASSESSMENT & PLAN NOTE
· Patient normally is on Avastin  · Continue steroids  · May benefit from an inpatient palliative care evaluation prior to discharge  · Additional outpatient work-up as per neurology and hematology oncology in the outpatient setting  · Will reach out to patient's oncologist

## 2023-05-08 NOTE — CASE MANAGEMENT
Case Management Progress Note    Patient name Ángel Joseph  Location /-62 MRN [id number]  : 1955 Date 2023       LOS (days): 3  Geometric Mean LOS (GMLOS) (days): 2 90  Days to GMLOS:0        OBJECTIVE:        Current admission status: Inpatient  Preferred Pharmacy:   [facility] [address] [place] [id number], [address] [address] [address] [place] [address]  Phone: [phone] Fax: 5409 [address] [address] [facility] 77 [address]  Phone: [phone] Fax: [phone]    [facility] ([id number] - [place] [address]  Phone: [phone] Fax: [phone]    Primary Care Provider: Jodeane Jeans, DO    Primary Insurance: MEDICARE  Secondary Insurance: [employer]    PROGRESS NOTE:    Met with patient and mother at bedside  Updated mother on walker and she forgot that she put it in back of car  Discussed rehab and patient now agreeable    Bed reserved at [facility] AT [facility]

## 2023-05-08 NOTE — CASE MANAGEMENT
Case Management Discharge Planning Note    Patient name Nolvia Nicolas  Location /-61 MRN [id number]  : 1955 Date 2023       Current Admission Date: 2023  Current Admission Diagnosis:Facial nerve palsy, secondary   Patient Active Problem List    Diagnosis Date Noted   • Facial nerve palsy, secondary 2023   • Aftercare following surgery 2023   • GERD (gastroesophageal reflux disease) 2023   • Elevated serum creatinine 2023   • Epidermoid cyst of skin 2022   • 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 2021   • Mixed hyperlipidemia 2021   • Subclinical hypothyroidism 2021   • Muscle cramps 2021   • Annual physical exam 2021   • 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 ([facility] ) 2021   • Glioblastoma ([facility] ) 2021   • Obstructive sleep apnea syndrome    • Sleep disturbance    • Daytime sleepiness    • Class 1 obesity due to excess calories with serious comorbidity and body mass index (BMI) of 33 0 to 33 9 in adult 2020   • Negative depression screening 2020   • Essential hypertension 2020      LOS (days): 3  Geometric Mean LOS (GMLOS) (days): 2 90  Days to GMLOS:0     OBJECTIVE:  Risk of Unplanned Readmission Score: 11 53         Current admission status: Inpatient   Preferred Pharmacy:   [facility] [address] SAIDA CONDE [facility] [address], [address] [address] [address] [address]  Phone: [phone] Fax: [phone] [facility], [address] [place] [address]  Phone: [phone] Fax: [phone]    [email or web address] ([id number] - [place], [address] - [address]  [address] [facility] [address]  Phone: [phone] Fax: [phone]    Primary Care Provider: Caden Rincon DO    Primary Insurance: MEDICARE  Secondary Insurance: [facility] DETAILS:    Discharge planning discussed with[de-identified] Patient and mother Ivana  Freedom of Choice: Yes  Comments - Freedom of Choice: Discussed bed availablility at [facility] with patient and mother  Patient wants to go home mother feels he needs rehab ,  Patient and mother will discuss this evening  [facility] bed reserved  CM contacted family/caregiver?: Yes  Were Treatment Team discharge recommendations reviewed with patient/caregiver?: Yes  Did patient/caregiver verbalize understanding of patient care needs?: Yes  Were patient/caregiver advised of the risks associated with not following Treatment Team discharge recommendations?: Yes    Contacts  Patient Contacts: Katerine Verdin (Mother)   [phone] (Home Phone)  Relationship to Patient[de-identified] Family  Contact Method: Phone  Phone Number: Katerine Verdin (Mother)   [phone] [facility] Phone)  Reason/Outcome: Emergency Contact, Discharge Planning     Additional Comments: Mother reports patient brought his walker and it was lost   Call to infusion center and patient came to infusion center in a WC and did not have a walker    CB to mother and left VM

## 2023-05-08 NOTE — PHYSICAL THERAPY NOTE
05/08/23 1001   PT Last Visit   PT Visit Date 05/08/23   Note Type   Note Type Cancelled Session   Cancel Reasons Refusal     Physical Therapy Cancellation Note    Chart review performed  At this time, PT treatment session cancelled , d/t patient refusal, patient reports he would like to wait til tomorrow  Educated patient on the importance of mobilization , patient continues to refuse  PT will follow and provide PT interventions as appropriate      [place], PTA

## 2023-05-08 NOTE — PLAN OF CARE
Problem: Potential for Falls  Goal: Patient will remain free of falls  Description: INTERVENTIONS:  - Educate patient/family on patient safety including physical limitations  - Instruct patient to call for assistance with activity   - Consult OT/PT to assist with strengthening/mobility   - Keep Call bell within reach  - Keep bed low and locked with side rails adjusted as appropriate  - Keep care items and personal belongings within reach  - Initiate and maintain comfort rounds  - Make Fall Risk Sign visible to staff  - Offer Toileting every 2Hours, in advance of need  - Apply yellow socks and bracelet for high fall risk patients  - Consider moving patient to room near nurses station  Outcome: Progressing     Problem: CARDIOVASCULAR - ADULT  Goal: Maintains optimal cardiac output and hemodynamic stability  Description: INTERVENTIONS:  - Monitor I/O, vital signs and rhythm  - Monitor for S/S and trends of decreased cardiac output  - Administer and titrate ordered vasoactive medications to optimize hemodynamic stability  - Assess quality of pulses, skin color and temperature  - Assess for signs of decreased coronary artery perfusion  - Instruct patient to report change in severity of symptoms  Outcome: Progressing  Goal: Absence of cardiac dysrhythmias or at baseline rhythm  Description: INTERVENTIONS:  - Continuous cardiac monitoring, vital signs, obtain 12 lead EKG if ordered  - Administer antiarrhythmic and heart rate control medications as ordered  - Monitor electrolytes and administer replacement therapy as ordered  Outcome: Progressing     Problem: NEUROSENSORY - ADULT  Goal: Achieves stable or improved neurological status  Description: INTERVENTIONS  - Monitor and report changes in neurological status  - Monitor vital signs such as temperature, blood pressure, glucose, and any other labs ordered   - Initiate measures to prevent increased intracranial pressure  - Monitor for seizure activity and implement precautions if appropriate      Outcome: Progressing     Problem: HEMATOLOGIC - ADULT  Goal: Maintains hematologic stability  Description: INTERVENTIONS  - Assess for signs and symptoms of bleeding or hemorrhage  - Monitor labs  - Administer supportive blood products/factors as ordered and appropriate  Outcome: Progressing     Problem: Prexisting or High Potential for Compromised Skin Integrity  Goal: Skin integrity is maintained or improved  Description: INTERVENTIONS:  - Identify patients at risk for skin breakdown  - Assess and monitor skin integrity  - Assess and monitor nutrition and hydration status  - Monitor labs   - Assess for incontinence   - Turn and reposition patient  - Assist with mobility/ambulation  - Relieve pressure over bony prominences  - Avoid friction and shearing  - Provide appropriate hygiene as needed including keeping skin clean and dry  - Evaluate need for skin moisturizer/barrier cream  - Collaborate with interdisciplinary team   - Patient/family teaching  - Consider wound care consult   Outcome: Progressing

## 2023-05-08 NOTE — PROGRESS NOTES
-- Patient: Maria Elena Calix  -- MRN: [id number]  -- Aidin Request ID: [id number]  -- Level of care reserved: translation missing: en provider  provider_type  -- Partner Reserved:  -- Clinical needs requested:  -- Geography searched: 20 [place] around [id number]  -- Start of Service:  -- Request sent: 3:00pm EDT on 5/5/2023 by Maxime Emmanuel  -- Partner reserved: by Maxime Emmanuel  -- Choice list shared: 2:37pm EDT on 5/8/2023 by Maxime Emmanuel

## 2023-05-08 NOTE — ASSESSMENT & PLAN NOTE
· Initial concern was a stroke to explain his underlying clinical symptoms  · Acute CVA has been worked up and ruled out  · Symptoms are secondary to a progressing glioblastoma multiforme  · Status post a neurology evaluation  · Continue dexamethasone 2 mg p o  every 12 hours  · No further inpatient testing, treatment, and or work-up is needed  · Patient to eventually follow-up in the outpatient setting with hematology oncology  · Status post a PT and OT evaluation-they recommend postacute rehabilitation services  · Case management has has notified us that placement has been set up for Monday, 5/8/2023 at the [address] [place] [address]  · Continue all other present medical management until that point

## 2023-05-08 NOTE — NURSING NOTE
Pt cont to sit oob in his chair in no acute distress, family at the  Bedside, encouraged to do PT when they offer it and he agrees

## 2023-05-08 NOTE — PLAN OF CARE
Problem: Potential for Falls  Goal: Patient will remain free of falls  Description: INTERVENTIONS:  - Educate patient/family on patient safety including physical limitations  - Instruct patient to call for assistance with activity   - Consult OT/PT to assist with strengthening/mobility   - Keep Call bell within reach  - Keep bed low and locked with side rails adjusted as appropriate  - Keep care items and personal belongings within reach  - Initiate and maintain comfort rounds  - Make Fall Risk Sign visible to staff  - Offer Toileting every 1 Hours, in advance of need  - Initiate/Maintain bed alarm  - Obtain necessary fall risk management equipment: bed   - Apply yellow socks and bracelet for high fall risk patients  - Consider moving patient to room near nurses station  Outcome: Progressing     Problem: MOBILITY - ADULT  Goal: Maintain or return to baseline ADL function  Description: INTERVENTIONS:  -  Assess patient's ability to carry out ADLs; assess patient's baseline for ADL function and identify physical deficits which impact ability to perform ADLs (bathing, care of mouth/teeth, toileting, grooming, dressing, etc )  - Assess/evaluate cause of self-care deficits   - Assess range of motion  - Assess patient's mobility; develop plan if impaired  - Assess patient's need for assistive devices and provide as appropriate  - Encourage maximum independence but intervene and supervise when necessary  - Involve family in performance of ADLs  - Assess for home care needs following discharge   - Consider OT consult to assist with ADL evaluation and planning for discharge  - Provide patient education as appropriate  Outcome: Progressing  Goal: Maintains/Returns to pre admission functional level  Description: INTERVENTIONS:  - Perform BMAT or MOVE assessment daily    - Set and communicate daily mobility goal to care team and patient/family/caregiver     - Collaborate with rehabilitation services on mobility goals if consulted  - Perform Range of Motion 3 times a day  - Reposition patient every 2 hours  - Dangle patient 3 times a day  - Stand patient 3 times a day  - Ambulate patient 3 times a day  - Out of bed to chair 3 times a day   - Out of bed for meals 3 times a day  - Out of bed for toileting  - Record patient progress and toleration of activity level   Outcome: Progressing     Problem: NEUROSENSORY - ADULT  Goal: Achieves stable or improved neurological status  Description: INTERVENTIONS  - Monitor and report changes in neurological status  - Monitor vital signs such as temperature, blood pressure, glucose, and any other labs ordered   - Initiate measures to prevent increased intracranial pressure  - Monitor for seizure activity and implement precautions if appropriate      Outcome: Progressing  Goal: Remains free of injury related to seizures activity  Description: INTERVENTIONS  - Maintain airway, patient safety  and administer oxygen as ordered  - Monitor patient for seizure activity, document and report duration and description of seizure to physician/advanced practitioner  - If seizure occurs,  ensure patient safety during seizure  - Reorient patient post seizure  - Seizure pads on all 4 side rails  - Instruct patient/family to notify RN of any seizure activity including if an aura is experienced  - Instruct patient/family to call for assistance with activity based on nursing assessment  - Administer anti-seizure medications if ordered    Outcome: Progressing  Goal: Achieves maximal functionality and self care  Description: INTERVENTIONS  - Monitor swallowing and airway patency with patient fatigue and changes in neurological status  - Encourage and assist patient to increase activity and self care     - Encourage visually impaired, hearing impaired and aphasic patients to use assistive/communication devices  Outcome: Progressing     Problem: CARDIOVASCULAR - ADULT  Goal: Maintains optimal cardiac output and hemodynamic stability  Description: INTERVENTIONS:  - Monitor I/O, vital signs and rhythm  - Monitor for S/S and trends of decreased cardiac output  - Administer and titrate ordered vasoactive medications to optimize hemodynamic stability  - Assess quality of pulses, skin color and temperature  - Assess for signs of decreased coronary artery perfusion  - Instruct patient to report change in severity of symptoms  Outcome: Progressing  Goal: Absence of cardiac dysrhythmias or at baseline rhythm  Description: INTERVENTIONS:  - Continuous cardiac monitoring, vital signs, obtain 12 lead EKG if ordered  - Administer antiarrhythmic and heart rate control medications as ordered  - Monitor electrolytes and administer replacement therapy as ordered  Outcome: Progressing     Problem: RESPIRATORY - ADULT  Goal: Achieves optimal ventilation and oxygenation  Description: INTERVENTIONS:  - Assess for changes in respiratory status  - Assess for changes in mentation and behavior  - Position to facilitate oxygenation and minimize respiratory effort  - Oxygen administered by appropriate delivery if ordered  - Initiate smoking cessation education as indicated  - Encourage broncho-pulmonary hygiene including cough, deep breathe, Incentive Spirometry  - Assess the need for suctioning and aspirate as needed  - Assess and instruct to report SOB or any respiratory difficulty  - Respiratory Therapy support as indicated  Outcome: Progressing     Problem: GASTROINTESTINAL - ADULT  Goal: Minimal or absence of nausea and/or vomiting  Description: INTERVENTIONS:  - Administer IV fluids if ordered to ensure adequate hydration  - Maintain NPO status until nausea and vomiting are resolved  - Nasogastric tube if ordered  - Administer ordered antiemetic medications as needed  - Provide nonpharmacologic comfort measures as appropriate  - Advance diet as tolerated, if ordered  - Consider nutrition services referral to assist patient with adequate nutrition and appropriate food choices  Outcome: Progressing  Goal: Maintains or returns to baseline bowel function  Description: INTERVENTIONS:  - Assess bowel function  - Encourage oral fluids to ensure adequate hydration  - Administer IV fluids if ordered to ensure adequate hydration  - Administer ordered medications as needed  - Encourage mobilization and activity  - Consider nutritional services referral to assist patient with adequate nutrition and appropriate food choices  Outcome: Progressing  Goal: Maintains adequate nutritional intake  Description: INTERVENTIONS:  - Monitor percentage of each meal consumed  - Identify factors contributing to decreased intake, treat as appropriate  - Assist with meals as needed  - Monitor I&O, weight, and lab values if indicated  - Obtain nutrition services referral as needed  Outcome: Progressing  Goal: Establish and maintain optimal ostomy function  Description: INTERVENTIONS:  - Assess bowel function  - Encourage oral fluids to ensure adequate hydration  - Administer IV fluids if ordered to ensure adequate hydration   - Administer ordered medications as needed  - Encourage mobilization and activity  - Nutrition services referral to assist patient with appropriate food choices  - Assess stoma site  - Consider wound care consult   Outcome: Progressing  Goal: Oral mucous membranes remain intact  Description: INTERVENTIONS  - Assess oral mucosa and hygiene practices  - Implement preventative oral hygiene regimen  - Implement oral medicated treatments as ordered  - Initiate Nutrition services referral as needed  Outcome: Progressing     Problem: GENITOURINARY - ADULT  Goal: Maintains or returns to baseline urinary function  Description: INTERVENTIONS:  - Assess urinary function  - Encourage oral fluids to ensure adequate hydration if ordered  - Administer IV fluids as ordered to ensure adequate hydration  - Administer ordered medications as needed  - Offer frequent toileting  - Follow urinary retention protocol if ordered  Outcome: Progressing  Goal: Absence of urinary retention  Description: INTERVENTIONS:  - Assess patient’s ability to void and empty bladder  - Monitor I/O  - Bladder scan as needed  - Discuss with physician/AP medications to alleviate retention as needed  - Discuss catheterization for long term situations as appropriate  Outcome: Progressing  Goal: Urinary catheter remains patent  Description: INTERVENTIONS:  - Assess patency of urinary catheter  - If patient has a chronic howell, consider changing catheter if non-functioning  - Follow guidelines for intermittent irrigation of non-functioning urinary catheter  Outcome: Progressing     Problem: METABOLIC, FLUID AND ELECTROLYTES - ADULT  Goal: Electrolytes maintained within normal limits  Description: INTERVENTIONS:  - Monitor labs and assess patient for signs and symptoms of electrolyte imbalances  - Administer electrolyte replacement as ordered  - Monitor response to electrolyte replacements, including repeat lab results as appropriate  - Instruct patient on fluid and nutrition as appropriate  Outcome: Progressing  Goal: Fluid balance maintained  Description: INTERVENTIONS:  - Monitor labs   - Monitor I/O and WT  - Instruct patient on fluid and nutrition as appropriate  - Assess for signs & symptoms of volume excess or deficit  Outcome: Progressing  Goal: Glucose maintained within target range  Description: INTERVENTIONS:  - Monitor Blood Glucose as ordered  - Assess for signs and symptoms of hyperglycemia and hypoglycemia  - Administer ordered medications to maintain glucose within target range  - Assess nutritional intake and initiate nutrition service referral as needed  Outcome: Progressing     Problem: SKIN/TISSUE INTEGRITY - ADULT  Goal: Skin Integrity remains intact(Skin Breakdown Prevention)  Description: Assess:  -Perform Enrrique assessment every shift  -Clean and moisturize skin every prn  -Inspect skin when repositioning, toileting, and assisting with ADLS  -Assess under medical devices such as  every   -Assess extremities for adequate circulation and sensation     Bed Management:  -Have minimal linens on bed & keep smooth, unwrinkled  -Change linens as needed when moist or perspiring  -Avoid sitting or lying in one position for more than 2 hours while in bed  -Keep HOB at 30degrees     Toileting:  -Offer bedside commode  -Assess for incontinence every 1  -Use incontinent care products after each incontinent episode such as     Activity:  -Mobilize patient  times a day  -Encourage activity and walks on unit  -Encourage or provide ROM exercises   -Turn and reposition patient every  Hours  -Use appropriate equipment to lift or move patient in bed  -Instruct/ Assist with weight shifting every  when out of bed in chair  -Consider limitation of chair time  hour intervals    Skin Care:  -Avoid use of baby powder, tape, friction and shearing, hot water or constrictive clothing  -Relieve pressure over bony prominences using   -Do not massage red bony areas    Next Steps:  -Teach patient strategies to minimize risks such as    -Consider consults to  interdisciplinary teams such as   Outcome: Progressing  Goal: Incision(s), wounds(s) or drain site(s) healing without S/S of infection  Description: INTERVENTIONS  - Assess and document dressing, incision, wound bed, drain sites and surrounding tissue  - Provide patient and family education  - Perform skin care/dressing changes every   Outcome: Progressing  Goal: Pressure injury heals and does not worsen  Description: Interventions:  - Implement low air loss mattress or specialty surface (Criteria met)  - Apply silicone foam dressing  - Instruct/assist with weight shifting every  minutes when in chair   - Limit chair time to  hour intervals  - Use special pressure reducing interventions such as  when in chair   - Apply fecal or urinary incontinence containment device   - Perform passive or active ROM every   - Turn and reposition patient & offload bony prominences every  hours   - Utilize friction reducing device or surface for transfers   - Consider consults to  interdisciplinary teams such as   - Use incontinent care products after each incontinent episode such as  Consider nutrition services referral as needed  Outcome: Progressing     Problem: HEMATOLOGIC - ADULT  Goal: Maintains hematologic stability  Description: INTERVENTIONS  - Assess for signs and symptoms of bleeding or hemorrhage  - Monitor labs  - Administer supportive blood products/factors as ordered and appropriate  Outcome: Progressing     Problem: MUSCULOSKELETAL - ADULT  Goal: Maintain or return mobility to safest level of function  Description: INTERVENTIONS:  - Assess patient's ability to carry out ADLs; assess patient's baseline for ADL function and identify physical deficits which impact ability to perform ADLs (bathing, care of mouth/teeth, toileting, grooming, dressing, etc )  - Assess/evaluate cause of self-care deficits   - Assess range of motion  - Assess patient's mobility  - Assess patient's need for assistive devices and provide as appropriate  - Encourage maximum independence but intervene and supervise when necessary  - Involve family in performance of ADLs  - Assess for home care needs following discharge   - Consider OT consult to assist with ADL evaluation and planning for discharge  - Provide patient education as appropriate  Outcome: Progressing  Goal: Maintain proper alignment of affected body part  Description: INTERVENTIONS:  - Support, maintain and protect limb and body alignment  - Provide patient/ family with appropriate education  Outcome: Progressing     Problem: Prexisting or High Potential for Compromised Skin Integrity  Goal: Skin integrity is maintained or improved  Description: INTERVENTIONS:  - Identify patients at risk for skin breakdown  - Assess and monitor skin integrity  - Assess and monitor nutrition and hydration status  - Monitor labs   - Assess for incontinence   - Turn and reposition patient  - Assist with mobility/ambulation  - Relieve pressure over bony prominences  - Avoid friction and shearing  - Provide appropriate hygiene as needed including keeping skin clean and dry  - Evaluate need for skin moisturizer/barrier cream  - Collaborate with interdisciplinary team   - Patient/family teaching  - Consider wound care consult   Outcome: Progressing

## 2023-05-08 NOTE — PLAN OF CARE
Problem: MOBILITY - ADULT  Goal: Maintain or return to baseline ADL function  Description: INTERVENTIONS:  -  Assess patient's ability to carry out ADLs; assess patient's baseline for ADL function and identify physical deficits which impact ability to perform ADLs (bathing, care of mouth/teeth, toileting, grooming, dressing, etc )  - Assess/evaluate cause of self-care deficits   - Assess range of motion  - Assess patient's mobility; develop plan if impaired  - Assess patient's need for assistive devices and provide as appropriate  - Encourage maximum independence but intervene and supervise when necessary  - Involve family in performance of ADLs  - Assess for home care needs following discharge   - Consider OT consult to assist with ADL evaluation and planning for discharge  - Provide patient education as appropriate  Outcome: Progressing    Problem: NEUROSENSORY - ADULT  Goal: Achieves stable or improved neurological status  Description: INTERVENTIONS  - Monitor and report changes in neurological status  - Monitor vital signs such as temperature, blood pressure, glucose, and any other labs ordered   - Initiate measures to prevent increased intracranial pressure  - Monitor for seizure activity and implement precautions if appropriate      Outcome: Progressing  Goal: Remains free of injury related to seizures activity  Description: INTERVENTIONS  - Maintain airway, patient safety  and administer oxygen as ordered  - Monitor patient for seizure activity, document and report duration and description of seizure to physician/advanced practitioner  - If seizure occurs,  ensure patient safety during seizure  - Reorient patient post seizure  - Seizure pads on all 4 side rails  - Instruct patient/family to notify RN of any seizure activity including if an aura is experienced  - Instruct patient/family to call for assistance with activity based on nursing assessment  - Administer anti-seizure medications if ordered    Outcome: Progressing  Goal: Achieves maximal functionality and self care  Description: INTERVENTIONS  - Monitor swallowing and airway patency with patient fatigue and changes in neurological status  - Encourage and assist patient to increase activity and self care     - Encourage visually impaired, hearing impaired and aphasic patients to use assistive/communication devices  Outcome: Progressing     Problem: CARDIOVASCULAR - ADULT  Goal: Maintains optimal cardiac output and hemodynamic stability  Description: INTERVENTIONS:  - Monitor I/O, vital signs and rhythm  - Monitor for S/S and trends of decreased cardiac output  - Administer and titrate ordered vasoactive medications to optimize hemodynamic stability  - Assess quality of pulses, skin color and temperature  - Assess for signs of decreased coronary artery perfusion  - Instruct patient to report change in severity of symptoms  Outcome: Progressing  Goal: Absence of cardiac dysrhythmias or at baseline rhythm  Description: INTERVENTIONS:  - Continuous cardiac monitoring, vital signs, obtain 12 lead EKG if ordered  - Administer antiarrhythmic and heart rate control medications as ordered  - Monitor electrolytes and administer replacement therapy as ordered  Outcome: Progressing     Problem: RESPIRATORY - ADULT  Goal: Achieves optimal ventilation and oxygenation  Description: INTERVENTIONS:  - Assess for changes in respiratory status  - Assess for changes in mentation and behavior  - Position to facilitate oxygenation and minimize respiratory effort  - Oxygen administered by appropriate delivery if ordered  - Initiate smoking cessation education as indicated  - Encourage broncho-pulmonary hygiene including cough, deep breathe, Incentive Spirometry  - Assess the need for suctioning and aspirate as needed  - Assess and instruct to report SOB or any respiratory difficulty  - Respiratory Therapy support as indicated  Outcome: Progressing     Problem: HEMATOLOGIC - ADULT  Goal: Maintains hematologic stability  Description: INTERVENTIONS  - Assess for signs and symptoms of bleeding or hemorrhage  - Monitor labs  - Administer supportive blood products/factors as ordered and appropriate  Outcome: Progressing     Problem: MUSCULOSKELETAL - ADULT  Goal: Maintain or return mobility to safest level of function  Description: INTERVENTIONS:  - Assess patient's ability to carry out ADLs; assess patient's baseline for ADL function and identify physical deficits which impact ability to perform ADLs (bathing, care of mouth/teeth, toileting, grooming, dressing, etc )  - Assess/evaluate cause of self-care deficits   - Assess range of motion  - Assess patient's mobility  - Assess patient's need for assistive devices and provide as appropriate  - Encourage maximum independence but intervene and supervise when necessary  - Involve family in performance of ADLs  - Assess for home care needs following discharge   - Consider OT consult to assist with ADL evaluation and planning for discharge  - Provide patient education as appropriate  Outcome: Progressing  Goal: Maintain proper alignment of affected body part  Description: INTERVENTIONS:  - Support, maintain and protect limb and body alignment  - Provide patient/ family with appropriate education  Outcome: Progressing

## 2023-05-09 ENCOUNTER — TELEPHONE (OUTPATIENT)
Dept: HEMATOLOGY ONCOLOGY | Facility: CLINIC | Age: 68
End: 2023-05-09

## 2023-05-09 ENCOUNTER — TRANSITIONAL CARE MANAGEMENT (OUTPATIENT)
Dept: FAMILY MEDICINE CLINIC | Facility: CLINIC | Age: 68
End: 2023-05-09

## 2023-05-09 VITALS
HEART RATE: 61 BPM | RESPIRATION RATE: 20 BRPM | HEIGHT: 75 IN | BODY MASS INDEX: 25.16 KG/M2 | SYSTOLIC BLOOD PRESSURE: 132 MMHG | DIASTOLIC BLOOD PRESSURE: 94 MMHG | WEIGHT: 202.38 LBS | TEMPERATURE: 96.5 F | OXYGEN SATURATION: 94 %

## 2023-05-09 PROBLEM — E44.0 MODERATE PROTEIN-CALORIE MALNUTRITION (HCC): Status: ACTIVE | Noted: 2023-05-09

## 2023-05-09 LAB — SCAN RESULT: NORMAL

## 2023-05-09 RX ORDER — FENOFIBRATE 54 MG/1
54 TABLET ORAL EVERY EVENING
Start: 2023-05-09

## 2023-05-09 RX ORDER — DEXAMETHASONE 2 MG/1
2 TABLET ORAL EVERY 12 HOURS SCHEDULED
Refills: 0
Start: 2023-05-09

## 2023-05-09 RX ORDER — DOCUSATE SODIUM 100 MG/1
100 CAPSULE, LIQUID FILLED ORAL 2 TIMES DAILY
Refills: 0
Start: 2023-05-09

## 2023-05-09 RX ORDER — MINERAL OIL AND PETROLATUM 150; 830 MG/G; MG/G
OINTMENT OPHTHALMIC 4 TIMES DAILY
Refills: 0
Start: 2023-05-09

## 2023-05-09 RX ORDER — ATORVASTATIN CALCIUM 10 MG/1
10 TABLET, FILM COATED ORAL EVERY EVENING
Start: 2023-05-09

## 2023-05-09 RX ADMIN — LEVETIRACETAM 250 MG: 250 TABLET, FILM COATED ORAL at 08:18

## 2023-05-09 RX ADMIN — DEXAMETHASONE 2 MG: 0.5 TABLET ORAL at 08:19

## 2023-05-09 RX ADMIN — HYDROCHLOROTHIAZIDE 12.5 MG: 12.5 TABLET ORAL at 08:18

## 2023-05-09 RX ADMIN — DOCUSATE SODIUM 100 MG: 100 CAPSULE, LIQUID FILLED ORAL at 08:19

## 2023-05-09 RX ADMIN — AMLODIPINE BESYLATE 10 MG: 10 TABLET ORAL at 08:18

## 2023-05-09 RX ADMIN — WHITE PETROLATUM 57.7 %-MINERAL OIL 31.9 % EYE OINTMENT: at 12:46

## 2023-05-09 RX ADMIN — LOSARTAN POTASSIUM 100 MG: 50 TABLET, FILM COATED ORAL at 08:18

## 2023-05-09 RX ADMIN — LEVOTHYROXINE SODIUM 25 MCG: 25 TABLET ORAL at 05:23

## 2023-05-09 RX ADMIN — ASPIRIN 81 MG: 81 TABLET, COATED ORAL at 08:19

## 2023-05-09 RX ADMIN — FAMOTIDINE 20 MG: 20 TABLET, FILM COATED ORAL at 08:18

## 2023-05-09 RX ADMIN — WHITE PETROLATUM 57.7 %-MINERAL OIL 31.9 % EYE OINTMENT: at 08:19

## 2023-05-09 RX ADMIN — LEVETIRACETAM 1000 MG: 500 TABLET, FILM COATED ORAL at 12:46

## 2023-05-09 NOTE — ACP (ADVANCE CARE PLANNING)
Advanced Care Planning Progress Note    Serious Illness Conversation    1  What is your understanding now of where you are with your illness? Prognostic Understanding: no understanding of prognosis  Patients mother states that they were unclear about exact prognosis  Plan was treatment and monitor response     2  How much information about what is likely to be ahead with your illness would you like to have? Information: patient wants to be fully informed     3  What did you (clinician) communicate to the patient? Prognostic Communication: Uncertain - It can be difficult to predict what will happen with your illness  I hope you will continue to live well for a long time but I’m worried that you could get sick quickly, and I think it is important to prepare for that possibility  4  If your health situation worsens, what are your most important goals? Goals: be at home, be physically comfortable     5  What are the biggest fears and worries about the future and your health? Fears/Worries: pain     6  What abilities are so critical to your life that you cannot imagine living without them? Unacceptable Function: being unable to interact with others     7  What gives you strength as you think about the future with your illness? Nothing specific but he enjoyed hiking with his mother     6  If you become sicker, how much are you willing to go through for the possibility of gaining more time? Have a feeding tube: Yes   Be in the ICU: Yes    Be on a ventilator: Yes    Be on dialysis: Yes    9  How much does your proxy and family know about your priorities and wishes? Discussion Discussion: extensive discussion with family about goals and wishes        How does this plan sound to you? I will do everything I can to help you through this    Patient verbalized understanding of the plan     I have spent 35 minutes speaking with my patient on advanced care planning today or during this visit     Advanced directives  Five Wishes: Patient does not have Five Wishes- would not like information       Most of discussion was with patients mother over the phone regarding patients current condition and what wishes were prior to patients current decline  Mother states that he has been getting treatment for the last 2 years and was stable/functional on his own  Explained to her findings on MRI brain of possible increase in disease process of patient's glioblastoma  Explained to her the potential for patient possibly worsening at rehab depending on how his glioblastoma is doing  She understands the potential for possible decline and states that she will take things 1 day at a time  If patient were to decline she would be amenable to hospice/palliative care  She agrees that she would be more focused on quality rather than quantity at that point  She also states that he did not want to live on a ventilator if there was no chance for recovery        Pat Montgomery MD

## 2023-05-09 NOTE — OCCUPATIONAL THERAPY NOTE
"   05/09/23 0933   OT Last Visit   OT Visit Date 05/09/23   Note Type   Note Type Treatment  (completed [id number])   Pain Assessment   Pain Assessment Tool 0-10   Pain Score No Pain   Restrictions/Precautions   Weight Bearing Precautions Per Order No   Other Precautions Fall Risk; Chair Alarm   ADL   Grooming Comments patient brushed teeth and combed hair with adequate results, post set-up   Therapeutic Excerise-Strength   UE Strength Yes   Right Upper Extremity- Strength   R Shoulder Flexion; Horizontal ABduction; Other (Comment)  (pro/re-traction)   R Elbow Elbow flexion;Elbow extension  (and supin/pron-ation)   R Weight/Reps/Sets 10 reps shoulders done Actively, and 10 reps elbows done with 2 pound Weight   Left Upper Extremity-Strength   L Weights/Reps/Sets all exers  same as RUE above   Coordination   Gross Motor WFL   Subjective   Subjective \"i'm bad and lazy\"; \"I want to get home to my mom and my doggie\"   Cognition   Overall Cognitive Status Impaired   Arousal/Participation Alert; Responsive; Cooperative   Attention Attends with cues to redirect   Following Commands Follows one step commands with increased time or repetition   Activity Tolerance   Activity Tolerance Patient limited by fatigue   Medical Staff Made Aware nurse Sudarshan Fiore aware of session   Assessment   Assessment Patient participated in Skilled OT session this date with interventions consisting of ADL re training with the use of correct body mechnaics, Energy Conservation techniques and therapeutic exercise to: increase functional use of BUEs, increase BUE muscle strength    Patient agreeable to OT treatment session, upon arrival patient was found seated OOB to Recliner  In comparison to previous session, patient with improvements in level of participation and activity tolerance   Patient requiring verbal cues for correct technique, verbal cues for pacing thru activity steps, one step directives and frequent rest periods, and self-care assistance as " noted in flow sheet/AM-PAC  Patient continues to be functioning below baseline level, occupational performance remains limited secondary to factors listed above and increased risk for falls and injury  From OT standpoint, recommendation at time of d/c would be post-acute rehabilitation services  Patient to benefit from continued Occupational Therapy treatment while in the hospital to address deficits as defined above and maximize level of functional independence with ADLs and functional mobility  Plan   Treatment Interventions UE strengthening/ROM; Patient/family training; Activityengagement; Energy conservation   Goal Expiration Date 05/15/23   OT Treatment Day 2   Recommendation   OT Discharge Recommendation Post acute rehabilitation services   Additional Comments 2 The patient's raw score on the AM-PAC Daily Activity Inpatient Short Form is 15  A raw score of less than 19 suggests the patient may benefit from discharge to post-acute rehabilitation services  Please refer to the recommendation of the Occupational Therapist for safe discharge planning     AM-PAC Daily Activity Inpatient   Lower Body Dressing 2   Bathing 2   Toileting 2   Upper Body Dressing 3   Grooming 3   Eating 3   Daily Activity Raw Score 15   Daily Activity Standardized Score (Calc for Raw Score >=11) 34 69   AM-PAC Applied Cognition Inpatient   Following a Speech/Presentation 3   Understanding Ordinary Conversation 3   Taking Medications 2   Remembering Where Things Are Placed or Put Away 2   Remembering List of 4-5 Errands 2   Taking Care of Complicated Tasks 2   Applied Cognition Raw Score 14   Applied Cognition Standardized Score 32 02       NAVEEN Catalan/L

## 2023-05-09 NOTE — ASSESSMENT & PLAN NOTE
· Initial concern was a stroke to explain his underlying clinical symptoms  · Acute CVA has been worked up and ruled out  · Symptoms are secondary to a progressing glioblastoma multiforme  · Status post a neurology evaluation  · Continue dexamethasone 2 mg p o  every 12 hours  · No further inpatient testing, treatment, and or work-up is needed  · Patient to eventually follow-up in the outpatient setting with hematology oncology  · Status post a PT and OT evaluation-they recommend postacute rehabilitation services  · Case management has arranged for patient to be transition to [address] [place] [address] for rehab

## 2023-05-09 NOTE — MALNUTRITION/BMI
This medical record reflects one or more clinical indicators suggestive of malnutrition and/or morbid obesity  Malnutrition Findings:   Adult Malnutrition type: Acute illness  Adult Degree of Malnutrition: Malnutrition of moderate degree  Malnutrition Characteristics: Inadequate energy, Weight loss       360 Statement: Related to acute illness as evidenced by >5% body weight decrease in 1 month, < 75% of estimated energy requirement for > 7 days  To treat with least restrictive oral diet and nutrition supplements as tolerated  BMI Findings: Body mass index is 25 3 kg/m²  See Nutrition note dated 5/9/2023 in flow sheets for additional details  Completed nutrition assessment is viewable in the nutrition documentation

## 2023-05-09 NOTE — ASSESSMENT & PLAN NOTE
· Patient normally is on Avastin  · Continue steroids  · Additional outpatient work-up as per neurology and hematology oncology in the outpatient setting  · Spoke with patient's oncologist on 5/8/2023 over the phone  Given progression in patient's disease process on MRI options for treatment are limited  · Also discussed goals of care with patient's mother over the phone  She understands that patient does have a poor prognosis    States that if patient does not improve with rehab or worsens she will consider palliative/hospice care

## 2023-05-09 NOTE — TELEPHONE ENCOUNTER
I called patient to schedule a follow up appointment with Dr Salas Level  We received a referral to schedule a consult  Patient is an existing patient  I left voice message to call [facility] and schedule appointment  I also gave [facility] phone number      I closed referral

## 2023-05-09 NOTE — PHYSICAL THERAPY NOTE
"Physical Therapy Treatment Note       05/09/23 0841   PT Last Visit   PT Visit Date 05/09/23   Note Type   Note Type Treatment   Pain Assessment   Pain Assessment Tool 0-10   Pain Score No Pain   Restrictions/Precautions   Weight Bearing Precautions Per Order No   Other Precautions Fall Risk; Chair Alarm   General   Chart Reviewed Yes   Response to Previous Treatment Patient unable to report, no changes reported from family or staff   Family/Caregiver Present No   Cognition   Arousal/Participation Alert; Responsive; Cooperative   Attention Attends with cues to redirect   Orientation Level Oriented to person;Oriented to place   Memory Decreased recall of precautions;Decreased recall of recent events   Following Commands Follows one step commands with increased time or repetition   Comments Unable to follow 2 step commands; requires repeated trials c delayed response ; pt agreeable to PT session   Subjective   Subjective \"how long will I be there? \"   Bed Mobility   Supine to Sit Unable to assess  (pt received OOB in recliner upon arrival)   Transfers   Sit to Stand   (CGA)   Additional items Assist x 2; Increased time required   Stand to Sit   (CGA)   Additional items Assist x 2;Armrests; Increased time required   Additional Comments max VCs for hand placement, sequencing, and increasing step length   Ambulation/Elevation   Gait pattern Improper Weight shift;Decreased foot clearance;Shuffling;Excessively slow;Scissoring   Gait Assistance 4  Minimal assist   Additional items Assist x 2   Assistive Device Rolling walker   Distance 30 ft   Stair Management Assistance Not tested   Balance   Static Sitting Fair +   Dynamic Sitting Fair   Static Standing Fair -   Dynamic Standing Poor +   Ambulatory Poor +   Endurance Deficit   Endurance Deficit Yes   Activity Tolerance   Activity Tolerance Patient limited by fatigue   Medical Staff Made Aware SHAYLA Doyle   Nurse Made Aware Yes, RN made aware of outcomes/recs   Assessment " "  Prognosis Fair   Problem List Decreased strength;Decreased endurance; Impaired balance;Decreased mobility; Impaired judgement;Decreased safety awareness;Decreased coordination   Assessment Pt seen for PT treatment session this date, consisting of ther act focused on functional transfer training & weight shifting multidirectional and gt training on level surfaces to improve pt safety in household environment  Since previous session, pt has made good progress in terms of improved OOB amb attempt, however with min Ax2 required for safety d/t weakness and B LE fatigue  Current goals and POC remain appropriate, pt continues to have rehab potential and is making fair progress towards STGs  Pt prognosis for achieving goals is fair, pending pt progress with hospitalization/medical status improvements, and indicated by supportive family/caregivers, eye contact and initiation level  Pt limited d/t medical instability and lack of ability to demonstrate mobility and/or self-care activities  PT recommends post acute rehabilitation services upon discharge  Pt continues to be functioning below baseline level, and remains limited 2* factors listed above  PT will continue to see pt during current hospitalization in order to address the deficits listed above and provide interventions consistent w/ POC in effort to achieve STGs  Barriers to Discharge Inaccessible home environment;Decreased caregiver support   Goals   Patient Goals \"to get stronger\"   STG Expiration Date 05/15/23   Short Term Goal #1 goals remain appropriate   PT Treatment Day 1   Plan   Treatment/Interventions Functional transfer training;LE strengthening/ROM; Elevations; Endurance training; Therapeutic exercise;Patient/family training;Equipment eval/education; Bed mobility;Gait training;Spoke to nursing;Spoke to case management   Progress Slow progress, decreased activity tolerance   PT Frequency 4-6x/wk   Recommendation   PT Discharge Recommendation Post acute " rehabilitation services   Equipment Recommended   (continue RW use)   Additional Comments Pt's raw score on the AM-PAC Basic Mobility inpatient short form is 15, standardized score is 36 97  Patients at this level are likely to benefit from DC to [facility], however, please refer to therapist recommendation for safe DC planning  AM-[facility] Basic Mobility Inpatient   Turning in Flat Bed Without Bedrails 3   Lying on Back to Sitting on Edge of Flat Bed Without Bedrails 3   Moving Bed to Chair 3   Standing Up From Chair Using Arms 3   Walk in Room 2   Climb 3-5 Stairs With Railing 1   Basic Mobility Inpatient Raw Score 15   Basic Mobility Standardized Score 36 97   Highest Level Of Mobility   JH-HLM Goal 4: Move to chair/commode   JH-HLM Achieved 7: Walk 25 feet or more   End of Consult   Patient Position at End of Consult Bedside chair;Bed/Chair alarm activated; All needs within reach  (B LEs elevated)       Lucius Aleman, PT, DPT    Time of PT treatment session: 1549-4316 (total treatment time = 40 minutes)

## 2023-05-09 NOTE — DISCHARGE SUMMARY
Chung 46  Discharge- Santiagoa Spikes 1955, 78 y o  male MRN: [id number]  Unit/Bed#: -01 Encounter: [id number]  Primary Care Provider: Jodeane Jeans, DO   Date and time admitted to hospital: 5/4/2023  9:05 AM    * Facial nerve palsy, secondary  Assessment & Plan  · Initial concern was a stroke to explain his underlying clinical symptoms  · Acute CVA has been worked up and ruled out  · Symptoms are secondary to a progressing glioblastoma multiforme  · Status post a neurology evaluation  · Continue dexamethasone 2 mg p o  every 12 hours  · No further inpatient testing, treatment, and or work-up is needed  · Patient to eventually follow-up in the outpatient setting with hematology oncology  · Status post a PT and OT evaluation-they recommend postacute rehabilitation services  · Case management has arranged for patient to be transition to [facility] for rehab      Moderate protein-calorie malnutrition ([facility] [address] )  Assessment & Plan  Malnutrition Findings:   Adult Malnutrition type: Acute illness  Adult Degree of Malnutrition: Malnutrition of moderate degree  Malnutrition Characteristics: Inadequate energy, Weight loss                  360 Statement: Related to acute illness as evidenced by >5% body weight decrease in 1 month, < 75% of estimated energy requirement for > 7 days  To treat with least restrictive oral diet and nutrition supplements as tolerated  BMI Findings: Body mass index is 25 3 kg/m²         Subclinical hypothyroidism  Assessment & Plan  · Continue Synthroid    Mixed hyperlipidemia  Assessment & Plan  · Patient with mixed dyslipidemia  · Continue atorvastatin, and fenofibrate    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 ([facility] [address] )  Assessment & Plan  · Continue Keppra 1250 mg p o  twice daily    Glioblastoma ([facility] [address] )  Assessment & Plan  · Patient normally is on Avastin  · Continue steroids  · Additional outpatient work-up as per neurology and hematology oncology in the outpatient setting  · Spoke with patient's oncologist on 5/8/2023 over the phone  Given progression in patient's disease process on MRI options for treatment are limited  · Also discussed goals of care with patient's mother over the phone  She understands that patient does have a poor prognosis  States that if patient does not improve with rehab or worsens she will consider palliative/hospice care    Essential hypertension  Assessment & Plan  · Continue home antihypertensives including Diovan, hydrochlorothiazide, and amlodipine      Discharging Physician / Practitioner: Ruel Roldan MD  PCP: Dana Light DO  Admission Date:   Admission Orders (From admission, onward)     Ordered        05/05/23 1744  Inpatient Admission  Once            05/04/23 1024  Place in Observation  Once                      Discharge Date: 05/09/23    Medical Problems     Resolved Problems  Date Reviewed: 5/4/2023   None         Consultations During Hospital Stay:  · Neurology    Procedures Performed:   · None    Significant Findings / Test Results:   · Facial nerve palsy    Incidental Findings:   · Progression of glioblastoma on MRI    Test Results Pending at Discharge (will require follow up): · None     Outpatient Tests Requested:  · Routine labs with PCP as outpatient    Complications:    • None    Reason for Admission: Facial nerve palsy    Hospital Course:     Berlin Aguillon is a 78 y o  male patient who originally presented to the hospital on 5/4/2023 due to slurred speech and facial droop concerning for stroke  Patient was admitted under stroke pathway  Neurology was consulted  MRI brain showed suspected progression of patient's glioblastoma  Case was reviewed with oncology over the phone who stated that options are likely limited however can continue steroids at this time  Neurology recommended continuing supportive care  Ophthalmic ointment  PT/OT eval recommending rehab    Case management has arranged for "patient to be discharged to rehab  Patient's prognosis likely poor at this point  Discussed goals of care with patient's mother over the phone  She understands patient's current clinical condition  Please see above list of diagnoses and related plan for additional information  Condition at Discharge: stable     Discharge Day Visit / Exam:     Subjective: No overnight events noted    Vitals: Blood Pressure: 132/94 (05/09/23 0707)  Pulse: 61 (05/09/23 0707)  Temperature: (!) 96 5 °F (35 8 °C) (05/09/23 0707)  Temp Source: Oral (05/07/23 2207)  Respirations: 20 (05/09/23 0707)  Height: 6' 3\" (190 5 cm) (05/04/23 1300)  Weight - Scale: 91 8 kg (202 lb 6 1 oz) (05/09/23 0537)  SpO2: 94 % (05/09/23 0707)     Exam:   Physical Exam  Constitutional:       General: He is not in acute distress  Appearance: He is ill-appearing  HENT:      Head: Normocephalic and atraumatic  Nose: Nose normal       Mouth/Throat:      Mouth: Mucous membranes are moist    Eyes:      Extraocular Movements: Extraocular movements intact  Conjunctiva/sclera: Conjunctivae normal       Comments: mild ptosis   Cardiovascular:      Rate and Rhythm: Normal rate and regular rhythm  Pulmonary:      Effort: Pulmonary effort is normal  No respiratory distress  Abdominal:      Palpations: Abdomen is soft  Tenderness: There is no abdominal tenderness  Musculoskeletal:         General: Normal range of motion  Cervical back: Normal range of motion and neck supple  Comments: Generalized weakness   Skin:     General: Skin is warm and dry  Neurological:      Mental Status: He is alert  Cranial Nerves: Cranial nerve deficit present  Comments: Drowsy but arousable  Following simple commands     Psychiatric:         Mood and Affect: Mood normal          Behavior: Behavior normal          Discussion with Family: Spoke with patient's mother over the phone regarding discharge plan    Discharge " instructions/Information to patient and family:   See after visit summary for information provided to patient and family  Provisions for Follow-Up Care:  See after visit summary for information related to follow-up care and any pertinent home health orders  Disposition:     [facility] Readmission:   • no     Discharge Statement:  I spent 35 minutes discharging the patient  This time was spent on the day of discharge  I had direct contact with the patient on the day of discharge  Greater than 50% of the total time was spent examining patient, answering all patient questions, arranging and discussing plan of care with patient as well as directly providing post-discharge instructions  Additional time then spent on discharge activities  Discharge Medications:  See after visit summary for reconciled discharge medications provided to patient and family        ** Please Note: This note has been constructed using a voice recognition system **

## 2023-05-09 NOTE — PLAN OF CARE
Problem: OCCUPATIONAL THERAPY ADULT  Goal: Performs self-care activities at highest level of function for planned discharge setting  See evaluation for individualized goals  Description: Treatment Interventions: ADL retraining, Functional transfer training, Endurance training, Patient/family training, Compensatory technique education, Energy conservation, Activityengagement          See flowsheet documentation for full assessment, interventions and recommendations  Outcome: Progressing  Note: Limitation: Decreased ADL status, Decreased UE strength, Decreased cognition, Decreased endurance, Decreased self-care trans, Decreased high-level ADLs  Prognosis: Fair  Assessment: Patient participated in Skilled OT session this date with interventions consisting of ADL re training with the use of correct body mechnaics, Energy Conservation techniques and therapeutic exercise to: increase functional use of BUEs, increase BUE muscle strength    Patient agreeable to OT treatment session, upon arrival patient was found seated OOB to Recliner  In comparison to previous session, patient with improvements in level of participation and activity tolerance  Patient requiring verbal cues for correct technique, verbal cues for pacing thru activity steps, one step directives and frequent rest periods, and self-care assistance as noted in flow sheet/AM-PAC  Patient continues to be functioning below baseline level, occupational performance remains limited secondary to factors listed above and increased risk for falls and injury  From OT standpoint, recommendation at time of d/c would be post-acute rehabilitation services  Patient to benefit from continued Occupational Therapy treatment while in the hospital to address deficits as defined above and maximize level of functional independence with ADLs and functional mobility       OT Discharge Recommendation: Post acute rehabilitation services     [place], HODGE/L

## 2023-05-09 NOTE — ASSESSMENT & PLAN NOTE
Malnutrition Findings:   Adult Malnutrition type: Acute illness  Adult Degree of Malnutrition: Malnutrition of moderate degree  Malnutrition Characteristics: Inadequate energy, Weight loss                  360 Statement: Related to acute illness as evidenced by >5% body weight decrease in 1 month, < 75% of estimated energy requirement for > 7 days  To treat with least restrictive oral diet and nutrition supplements as tolerated  BMI Findings: Body mass index is 25 3 kg/m²

## 2023-05-09 NOTE — CASE MANAGEMENT
Case Management Discharge Planning Note    Patient name Kelly Carbone  Location /-32 MRN [id number]  : 1955 Date 2023       Current Admission Date: 2023  Current Admission Diagnosis:Facial nerve palsy, secondary   Patient Active Problem List    Diagnosis Date Noted   • Facial nerve palsy, secondary 2023   • Aftercare following surgery 2023   • GERD (gastroesophageal reflux disease) 2023   • Elevated serum creatinine 2023   • Epidermoid cyst of skin 2022   • 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 2021   • Mixed hyperlipidemia 2021   • Subclinical hypothyroidism 2021   • Muscle cramps 2021   • Annual physical exam 2021   • 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 ([facility] ) 2021   • Glioblastoma ([facility] ) 2021   • Obstructive sleep apnea syndrome    • Sleep disturbance    • Daytime sleepiness    • Class 1 obesity due to excess calories with serious comorbidity and body mass index (BMI) of 33 0 to 33 9 in adult 2020   • Negative depression screening 2020   • Essential hypertension 2020      LOS (days): 4  Geometric Mean LOS (GMLOS) (days): 2 90  Days to GMLOS:-0 7     OBJECTIVE:  Risk of Unplanned Readmission Score: 11 94         Current admission status: Inpatient   Preferred Pharmacy:   [id number] [id number] [address]  Phone: [phone] Fax: [address] [facility] [place] [address] [place] [address]  Phone: [phone] Fax: [phone]    [id number] [address]  [address] [facility] [address]  Phone: [phone] Fax: [phone]    Primary Care Provider: Kristie Kc DO    Primary Insurance: MEDICARE  Secondary Insurance: [facility] DETAILS:    Discharge planning discussed with[de-identified] Patient and mother  Freedom of Choice: Yes     CM contacted family/caregiver?: Yes  Were Treatment Team discharge recommendations reviewed with patient/caregiver?: Yes  Did patient/caregiver verbalize understanding of patient care needs?: Yes  Were patient/caregiver advised of the risks associated with not following Treatment Team discharge recommendations?: Yes    Contacts  Patient Contacts: Feliz Blum (Mother)   [phone] (Home Phone)  Relationship to Patient[de-identified] Family  Contact Method: Phone  Phone Number: Feliz Blum (Mother)   [phone] (Home Phone)  Reason/Outcome: Emergency Contact, Discharge [address]         Is the patient interested in [address] at discharge?: No    DME Referral Provided  Referral made for DME?: No    Other Referral/Resources/Interventions Provided:  Interventions: Transportation    Treatment Team Recommendation: Short Term Rehab  Discharge Destination Plan[de-identified] Short Term Rehab  Transport at Discharge : [facility]  Dispatcher Contacted: Yes  Number/Name of Dispatcher: Via [employer]  Transported by [employer] and Unit #): [place]  ETA of Transport (Date): 05/09/23  ETA of Transport (Time): 1400     IMM Given (Date):: 05/09/23 (Copy ptovided to patient and media)        Additional Comments: Met with patient at bedside to discuss discharge planning  Patient agreeable to rehab  Call to mother Vicki Grigsby and discussed discharge planning and updated transport time  Update to dr Valere Gaucher and nurse Ancelmo Zepeda    Message to [address] via [employer] Name, [address] : [address] SNF  Receiving Facility/Agency Phone Number: [phone]  Facility/Agency Fax Number: [phone]

## 2023-05-09 NOTE — PLAN OF CARE
Problem: PHYSICAL THERAPY ADULT  Goal: Performs mobility at highest level of function for planned discharge setting  See evaluation for individualized goals  Description: Treatment/Interventions: Functional transfer training, LE strengthening/ROM, Elevations, Endurance training, Therapeutic exercise, Patient/family training, Equipment eval/education, Bed mobility, Gait training, Spoke to nursing, Spoke to case management, Spoke to MD  Equipment Recommended:  (continue RW use)       See flowsheet documentation for full assessment, interventions and recommendations  Outcome: Progressing  Note: Prognosis: Fair  Problem List: Decreased strength, Decreased endurance, Impaired balance, Decreased mobility, Impaired judgement, Decreased safety awareness, Decreased coordination  Assessment: Pt seen for PT treatment session this date, consisting of ther act focused on functional transfer training & weight shifting multidirectional and gt training on level surfaces to improve pt safety in household environment  Since previous session, pt has made good progress in terms of improved OOB amb attempt, however with min Ax2 required for safety d/t weakness and B LE fatigue  Current goals and POC remain appropriate, pt continues to have rehab potential and is making fair progress towards STGs  Pt prognosis for achieving goals is fair, pending pt progress with hospitalization/medical status improvements, and indicated by supportive family/caregivers, eye contact and initiation level  Pt limited d/t medical instability and lack of ability to demonstrate mobility and/or self-care activities  PT recommends post acute rehabilitation services upon discharge  Pt continues to be functioning below baseline level, and remains limited 2* factors listed above   PT will continue to see pt during current hospitalization in order to address the deficits listed above and provide interventions consistent w/ POC in effort to achieve STGs   Barriers to Discharge: Inaccessible home environment, Decreased caregiver support     PT Discharge Recommendation: Post acute rehabilitation services    See flowsheet documentation for full assessment

## 2023-05-09 NOTE — NURSING NOTE
Pt DC to [place] via [facility] in stable condition  All belongings packed and sent  Report called in to facility

## 2023-05-10 ENCOUNTER — TELEPHONE (OUTPATIENT)
Dept: HEMATOLOGY ONCOLOGY | Facility: CLINIC | Age: 68
End: 2023-05-10

## 2023-05-10 NOTE — TELEPHONE ENCOUNTER
Appointment Change  Cancel, Reschedule, Change to Virtual      Who are you speaking with? Parent   If it is not the patient, are they listed on an active communication consent form? N/A   Which provider is the appointment scheduled with? Dr Sandra Lopez   When is the appointment scheduled? Please list date and time 5/26/23 1140   At which location is the appointment scheduled to take place? [place]   Was the appointment rescheduled or changed from an in person visit to a virtual visit? If so, please list the details of the change  5/30/23 1140   What is the reason for the appointment change? appt conflict   Was [facility] transport scheduled for this visit? N/A   Does [facility] transport need to be scheduled for the new visit (if applicable) N/A   Does the patient need an infusion appointment rescheduled? N/A   Does the patient have an infusion appointment scheduled? If so, when? No   Is the patient undergoing chemotherapy? N/A   Was the no-show policy reviewed for appointments being changed with less then 24 hours of notice?  N/A

## 2023-05-22 ENCOUNTER — TELEPHONE (OUTPATIENT)
Dept: HEMATOLOGY ONCOLOGY | Facility: CLINIC | Age: 68
End: 2023-05-22

## 2023-05-22 NOTE — TELEPHONE ENCOUNTER
"Called patient's mother to let her know Dr Kyara Egan cancelled current treatment of Avastin and wants to discuss what options there are moving forward at patient's next appointment  Ivana, patient's mother, reports that patient is currently in rehab facility and barely able to walk and talk and isn't sure they are going to keep patient's appointment with Dr Kyara Egan next week  She states \"they only gave him two months to live\" and isn't sure what they want to do to move forward, but would like a phone call from Dr Kyara Egan  I will relay message to Dr Kyara Egan     "

## 2023-05-22 NOTE — TELEPHONE ENCOUNTER
I talked with the patient's mother by phone today  MRI 2 weeks ago showed noticeable worsening in disease  Performance status is currently poor, ECOG-4  We reviewed that further treatment will likely be more harmful than helpful  Hospice is favored as alternative  She will communicate with facility staff to move in that direction  His mother voiced understanding and agreement

## 2023-05-25 ENCOUNTER — HOSPITAL ENCOUNTER (OUTPATIENT)
Dept: INFUSION CENTER | Facility: HOSPITAL | Age: 68
Discharge: HOME | End: 2023-05-25
Attending: INTERNAL MEDICINE

## 2023-06-06 DIAGNOSIS — E78.1 ABNORMALLY LOW HIGH DENSITY LIPOPROTEIN (HDL) CHOLESTEROL WITH HYPERTRIGLYCERIDEMIA: ICD-10-CM

## 2023-06-06 DIAGNOSIS — E78.6 ABNORMALLY LOW HIGH DENSITY LIPOPROTEIN (HDL) CHOLESTEROL WITH HYPERTRIGLYCERIDEMIA: ICD-10-CM

## 2023-06-06 RX ORDER — FENOFIBRATE 54 MG/1
TABLET ORAL
Qty: 90 TABLET | Refills: 0 | Status: SHIPPED | OUTPATIENT
Start: 2023-06-06

## 2023-06-14 DIAGNOSIS — E03.8 SUBCLINICAL HYPOTHYROIDISM: ICD-10-CM

## 2023-06-16 RX ORDER — LEVOTHYROXINE SODIUM 0.03 MG/1
TABLET ORAL
Qty: 90 TABLET | Refills: 0 | Status: SHIPPED | OUTPATIENT
Start: 2023-06-16

## 2023-07-17 DIAGNOSIS — G40.909 EPILEPSY DUE TO INTRACRANIAL TUMOR (HCC): ICD-10-CM

## 2023-07-17 DIAGNOSIS — D49.6 EPILEPSY DUE TO INTRACRANIAL TUMOR (HCC): ICD-10-CM

## 2023-07-17 RX ORDER — LEVETIRACETAM 1000 MG/1
TABLET ORAL
Qty: 180 TABLET | Refills: 0 | Status: SHIPPED | OUTPATIENT
Start: 2023-07-17

## 2023-07-27 DIAGNOSIS — G40.909 EPILEPSY DUE TO INTRACRANIAL TUMOR (HCC): ICD-10-CM

## 2023-07-27 DIAGNOSIS — D49.6 EPILEPSY DUE TO INTRACRANIAL TUMOR (HCC): ICD-10-CM

## 2023-07-28 RX ORDER — LEVETIRACETAM 250 MG/1
TABLET ORAL
Qty: 180 TABLET | Refills: 0 | Status: SHIPPED | OUTPATIENT
Start: 2023-07-28

## 2024-10-20 NOTE — PROGRESS NOTES
[facility] [address]  Progress Note  Name: Iraida Bar  MRN: [id number]  Unit/Bed#: -01 I Date of Admission: 5/4/2023   Date of Service: 5/7/2023 I Hospital Day: 2    Assessment/Plan   * Facial nerve palsy, secondary  Assessment & Plan  · Initial concern was a stroke to explain his underlying clinical symptoms  · Acute CVA has been worked up and ruled out  · Symptoms are secondary to a progressing glioblastoma multiforme  · Status post a neurology evaluation  · Continue dexamethasone 2 mg p o  every 12 hours  · No further inpatient testing, treatment, and or work-up is needed  · Patient to eventually follow-up in the outpatient setting with hematology oncology  · Status post a PT and OT evaluation-they recommend postacute rehabilitation services  · Case management has has notified us that placement has been set up for Monday, 5/8/2023 at the [address] [place] [address]  · Continue all other present medical management until that point      Glioblastoma [facility])  Assessment & Plan  · Patient normally is on Avastin  · Continue steroids  · May benefit from an inpatient palliative care evaluation prior to discharge  · Attempts to call the patient's mother 2 days in a row have proved futile  · Additional outpatient work-up as per neurology and hematology oncology in the outpatient setting    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 ([facility] [address] )  Assessment & Plan  · Continue Keppra 1250 mg p o  twice daily    Essential hypertension  Assessment & Plan  · Continue Cozaar, hydrochlorothiazide, and amlodipine    Mixed hyperlipidemia  Assessment & Plan  · Patient with mixed dyslipidemia  · Continue atorvastatin, and fenofibrate    Subclinical hypothyroidism  Assessment & Plan  · Continue Synthroid             VTE Prophylaxis:  SCDs    Patient Centered Rounds: I have performed bedside rounds with nursing staff today      Discussions with Specialists or Other Care Team Provider: Case management, nursing  Education and Discussions with Family / Patient: Patient was brought up to [facility], once again, attempts to call his mother at both phone numbers listed in epic-no answer    Current Length of Stay: 2 day(s)    Current Patient Status: Inpatient   Certification Statement: The patient will continue to require additional inpatient hospital stay due to The need for placement and possible palliative care evaluation    Discharge Plan: Discharge planning once case management has set up placement    Code Status: Level 1 - Full Code    Subjective:   Patient seen, resting in recliner, no new complaints, feels tired    Objective:     Vitals:   Temp (24hrs), Av 3 °F (36 8 °C), Min:97 9 °F (36 6 °C), Max:98 7 °F (37 1 °C)    Temp:  [97 9 °F (36 6 °C)-98 7 °F (37 1 °C)] 97 9 °F (36 6 °C)  HR:  [67-80] 70  Resp:  [18] 18  BP: (120-142)/(75-93) 123/75  SpO2:  [93 %-97 %] 93 %  Body mass index is 27 17 kg/m²  Input and Output Summary (last 24 hours): Intake/Output Summary (Last 24 hours) at 2023 1051  Last data filed at 2023 0856  Gross per 24 hour   Intake 180 ml   Output 1700 ml   Net -1520 ml       Physical Exam:   Physical Exam  Vitals and nursing note reviewed  Constitutional:       General: He is not in acute distress  Appearance: Normal appearance  He is not ill-appearing  HENT:      Head: Normocephalic and atraumatic  Comments: Left eye swelling has improved     Nose: Nose normal    Eyes:      Extraocular Movements: Extraocular movements intact  Pupils: Pupils are equal, round, and reactive to light  Cardiovascular:      Rate and Rhythm: Normal rate and regular rhythm  Pulses: Normal pulses  Heart sounds: Normal heart sounds  No murmur heard  No friction rub  No gallop  Pulmonary:      Effort: Pulmonary effort is normal       Breath sounds: Normal breath sounds  Abdominal:      General: There is no distension  Palpations: Abdomen is soft  There is no mass  Tenderness:  There is no abdominal tenderness  There is no guarding or rebound  Musculoskeletal:         General: No swelling or tenderness  Normal range of motion  Cervical back: Normal range of motion and neck supple  No rigidity  No muscular tenderness  Right lower leg: No edema  Left lower leg: No edema  Skin:     General: Skin is warm  Capillary Refill: Capillary refill takes less than 2 seconds  Findings: No erythema or rash  Neurological:      General: No focal deficit present  Mental Status: He is alert and oriented to person, place, and time  Mental status is at baseline  Psychiatric:         Mood and Affect: Mood normal          Behavior: Behavior normal          Additional Data:     Labs:    Results from last 7 days   Lab Units 05/05/23  0407   WBC Thousand/uL 6 83   HEMOGLOBIN g/dL 17 9*   HEMATOCRIT % 53 1*   PLATELETS Thousands/uL 136*   NEUTROS PCT % 59   LYMPHS PCT % 29   MONOS PCT % 9   EOS PCT % 3     Results from last 7 days   Lab Units 05/05/23  0407   SODIUM mmol/L 134*   POTASSIUM mmol/L 3 8   CHLORIDE mmol/L 99   CO2 mmol/L 27   BUN mg/dL 17   CREATININE mg/dL 0 72   CALCIUM mg/dL 8 8   ALK PHOS U/L 49   ALT U/L 22   AST U/L 17     Results from last 7 days   Lab Units 05/04/23  0912   INR  0 86     Results from last 7 days   Lab Units 05/04/23  0910   POC GLUCOSE mg/dl 91     Results from last 7 days   Lab Units 05/05/23  0407   HEMOGLOBIN A1C % 5 6       * I Have Reviewed All Lab Data Listed Above  * Additional Pertinent Lab Tests Reviewed:  [facility] Admission  Reviewed    Imaging:  Imaging Reports Reviewed Today Include: None    Recent Cultures (last 7 days):           Last 24 Hours Medication List:   Current Facility-Administered Medications   Medication Dose Route Frequency Provider Last Rate   • amLODIPine  10 mg Oral Daily Yolande Erazo MD     • artificial tear   Both Eyes 4x Daily Ron Fay PA-C     • aspirin  81 mg Oral Daily Satya Irizarry MD     • atorvastatin  10 mg Oral QPM Bernardine Pain, MD     • dexamethasone  2 mg Oral Q12H 2020 Candace Patrick MD     • famotidine  20 mg Oral BID Israelardine Pain, MD     • fenofibrate  48 mg Oral QPM Bernardine Pain, MD     • hydrALAZINE  10 mg Intravenous Q6H PRN Alysha Pereira PA-C     • hydrochlorothiazide  12 5 mg Oral Daily Ellie Mcclendon MD     • levETIRAcetam  1,000 mg Oral Q12H Israelardine Pain, MD     • levETIRAcetam  250 mg Oral BID Israelardine Pain, MD     • levothyroxine  25 mcg Oral Daily Israelardine Pain, MD     • losartan  100 mg Oral Daily Ellie Mcclendon MD          Today, Patient Was Seen By: Ellie Mcclendon MD    ** Please Note: Dictation voice to text software may have been used in the creation of this document   ** HIV exposure    Please take the medications as prescribed - Isentress twice a day and Truvada once a day for a total of 28 days.  Take Zofran (ondasetron) - 1 tab on tongue every 6 hours as needed for nausea.     Follow up with your occupational health department for ongoing care and follow up testing.    Caring for Cuts and Needlesticks: What to Know  A needlestick injury happens when you get poked by a needle or sharp tool (sharps) that may have someone else's blood on it. You may get that person's blood or body fluids on you.    Blood can carry germs that cause infection, such as:  Hepatitis B germs.  Hepatitis C germs.  HIV (human immunodeficiency virus).  What are the causes?  This injury is caused by a needle or other sharp tool that breaks through or cuts your skin. It can happen to people who do these things:  Give someone a shot.  Take a blood sample from someone.  Do medical procedures with a needle or sharp knife used for surgery (scalpel).  Handle or throw away used needles.  Clean medical tools or patient care areas.  Touch a needle that was thrown out.  Share needles or use them to give themselves drugs.  What increases the risk?  Putting caps on needles.  Passing sharp objects to someone else.  Not using safety tools with needles.  Not having safety tools to use with needles.  Feeling like you have to move or work fast when using needles or sharps at work.  What are the signs or symptoms?  Pain or irritation at or near the wound.  Bleeding at or near the wound.  How is this treated?  A person having a blood sample taken from the arm.  Take these steps if you get poked by a needle or something sharp, or if you think you got blood or body fluids on you:  Wash your wound right away with soap and water. Wash for at least 20 seconds.  Place a bandage or clean towel on your wound and put gentle pressure on it.  Do not squeeze or rub your wound.  Tell a workplace supervisor or doctor right away. Then:  Follow any procedures in your workplace, if they apply.  Get treatment right away, if you need it.  Keep your shots (vaccines) up to date, if you are an adult.  Treatment may include:  A tetanus booster shot.  A hepatitis B shot.  Blood tests.  Medicines.  Wound care.  Follow these instructions at home:  Wound care    Take care of your wound as told. Make sure you:  Wash your hands with soap and water for at least 20 seconds before and after you change your bandage. If you can't use soap and water, use hand .  Change your bandage.  Leave stitches or skin glue alone.  Leave tape strips alone unless you're told to take them off. You may trim the edges of the tape strips if they curl up.  Keep the wound dry.  Do not take baths, swim, or use a hot tub. Do not do anything that would put your wound under water until told.  Check your wound every day for signs of infection. Check for:  Redness, swelling, or pain.  Fluid or blood.  Warmth.  Pus or a bad smell.  General instructions    Take your medicines only as told.  If you were given antibiotics, take them as told. Do not stop taking them even if you start to feel better.  Keep all follow-up visits to check for infection.  Contact a doctor if:  The poked area is red, swollen, bleeding, or painful.  You have a fever.  You feel:  Worried or nervous.  Mad.  Depressed, which is when you feel sad or hopeless.  You have trouble sleeping or feel tired.  You feel generally sick (malaise).  You get infections often.  Your skin or the white parts of your eyes turn yellow, also called jaundice.  You have belly pain or a feeling of fullness.

## 2025-04-09 ENCOUNTER — TELEPHONE (OUTPATIENT)
Dept: FAMILY MEDICINE CLINIC | Facility: CLINIC | Age: 70
End: 2025-04-09

## 2025-04-14 NOTE — PLAN OF CARE
Prior auth for oxyCODONE HCl ER 20MG er tablets submitted via CM.    Problem: Potential for Falls  Goal: Patient will remain free of falls  Description: INTERVENTIONS:  - Educate patient/family on patient safety including physical limitations  - Instruct patient to call for assistance with activity   - Consult OT/PT to assist with strengthening/mobility   - Keep Call bell within reach  - Keep bed low and locked with side rails adjusted as appropriate  - Keep care items and personal belongings within reach  - Initiate and maintain comfort rounds  - Make Fall Risk Sign visible to staff  - Offer Toileting every 1 Hours, in advance of need  - Apply yellow socks and bracelet for high fall risk patients  - Consider moving patient to room near nurses station  Outcome: Progressing

## 2025-07-01 NOTE — TELEPHONE ENCOUNTER
Forms given to Dr Grant Erickson to sign
Forms signed/faxed to [employer] and scanned into chart 
Ok to complete 
Patient calling to say that today is his 6 month date of being seizure free  Last seizure 1/28/21 with no further episodes  Ok to complete [employer] form?
Seizure reporting form completed and given to [address] 
Yes

## (undated) DEVICE — NEEDLE 18 G X 1 1/2 SAFETY

## (undated) DEVICE — SUT MONOCRYL 4-0 PS-2 27 IN Y426H

## (undated) DEVICE — TIBURON SPLIT SHEET: Brand: CONVERTORS

## (undated) DEVICE — INTENDED FOR TISSUE SEPARATION, AND OTHER PROCEDURES THAT REQUIRE A SHARP SURGICAL BLADE TO PUNCTURE OR CUT.: Brand: BARD-PARKER ® CARBON RIB-BACK BLADES

## (undated) DEVICE — ENDOPATH XCEL BLADELESS TROCARS WITH STABILITY SLEEVES: Brand: ENDOPATH XCEL

## (undated) DEVICE — MARKER REFLECTIVE RADIOPAQUE SPHERE

## (undated) DEVICE — TELFA NON-ADHERENT ABSORBENT DRESSING: Brand: TELFA

## (undated) DEVICE — 4-PORT MANIFOLD: Brand: NEPTUNE 2

## (undated) DEVICE — 3M™ TEGADERM™ TRANSPARENT FILM DRESSING FRAME STYLE, 1624W, 2-3/8 IN X 2-3/4 IN (6 CM X 7 CM), 100/CT 4CT/CASE: Brand: 3M™ TEGADERM™

## (undated) DEVICE — GLOVE INDICATOR PI UNDERGLOVE SZ 8 BLUE

## (undated) DEVICE — BETHLEHEM UNIVERSAL MINOR GEN: Brand: CARDINAL HEALTH

## (undated) DEVICE — ENDOPATH 5 MM GRASPERS WITH RATCHET HANDLES: Brand: ENDOPATH

## (undated) DEVICE — SUT MONOCRYL PLUS 3-0 PS-2 27 IN MCP427H

## (undated) DEVICE — DRAPE SHEET X-LG

## (undated) DEVICE — TROCAR: Brand: KII FIOS FIRST ENTRY

## (undated) DEVICE — 3M™ IOBAN™ 2 ANTIMICROBIAL INCISE DRAPE 6640EZ: Brand: IOBAN™ 2

## (undated) DEVICE — LAPAROSCOPIC SCISSORS: Brand: EPIX LAPAROSCOPIC SCISSORS

## (undated) DEVICE — INTENDED FOR TISSUE SEPARATION, AND OTHER PROCEDURES THAT REQUIRE A SHARP SURGICAL BLADE TO PUNCTURE OR CUT.: Brand: BARD-PARKER SAFETY BLADES SIZE 11, STERILE

## (undated) DEVICE — 5 MM CURVED DISSECTORS WITH MONOPOLAR CAUTERY: Brand: ENDOPATH

## (undated) DEVICE — MAYFIELD® DISPOSABLE ADULT SKULL PIN (PLASTIC BASE): Brand: MAYFIELD®

## (undated) DEVICE — GLOVE SRG BIOGEL 7

## (undated) DEVICE — NEEDLE 25G X 1 1/2

## (undated) DEVICE — BETADINE OINTMENT FOIL PACK

## (undated) DEVICE — GLOVE SRG BIOGEL ECLIPSE 8

## (undated) DEVICE — ADHESIVE SKIN HIGH VISCOSITY EXOFIN 1ML

## (undated) DEVICE — DRAPE CAMERA/LASER

## (undated) DEVICE — PREP SURGICAL PURPREP 26ML

## (undated) DEVICE — SPECIMEN CONTAINER STERILE PEEL PACK

## (undated) DEVICE — TUBING SMOKE EVAC W/FILTRATION DEVICE PLUMEPORT ACTIV

## (undated) DEVICE — GAUZE SPONGES,8 PLY: Brand: CURITY

## (undated) DEVICE — SUT VICRYL 0 UR-6 27 IN J603H

## (undated) DEVICE — GLOVE SRG BIOGEL 7.5

## (undated) DEVICE — PACK PBDS LAP CHOLE RF

## (undated) DEVICE — GLOVE INDICATOR PI UNDERGLOVE SZ 7.5 BLUE

## (undated) DEVICE — CLOSURE DEVICE ENDO CLOSE

## (undated) DEVICE — CHLORAPREP HI-LITE 26ML ORANGE

## (undated) DEVICE — NEURO SURGICAL CLIPPER BLADE

## (undated) DEVICE — SYRINGE 10ML LL

## (undated) DEVICE — HARMONIC 1100 SHEARS, 36CM SHAFT LENGTH: Brand: HARMONIC

## (undated) DEVICE — NEEDLE SPINAL 22G X 5IN QUINCKE

## (undated) DEVICE — GARMENT,MEDLINE,DVT,INT,CALF,FOAM,MED: Brand: MEDLINE

## (undated) DEVICE — 2.5MM DRILL BIT/QC/GOLD/110MM

## (undated) DEVICE — SPONGE PVP SCRUB WING STERILE

## (undated) DEVICE — TRAY FOLEY 16FR URIMETER SURESTEP

## (undated) DEVICE — NAVIGATED BRAIN BIOPSY CANNULA - COMPATIBLE MEDTRONICS: Brand: NAVIGATED BRAIN BIOPSY CANNULA

## (undated) DEVICE — ASTOUND STANDARD SURGICAL GOWN, XL: Brand: CONVERTORS